# Patient Record
Sex: MALE | Race: WHITE | NOT HISPANIC OR LATINO | Employment: UNEMPLOYED | URBAN - METROPOLITAN AREA
[De-identification: names, ages, dates, MRNs, and addresses within clinical notes are randomized per-mention and may not be internally consistent; named-entity substitution may affect disease eponyms.]

---

## 2017-06-14 ENCOUNTER — OFFICE VISIT (OUTPATIENT)
Dept: FAMILY MEDICINE CLINIC | Facility: CLINIC | Age: 52
End: 2017-06-14

## 2017-06-14 VITALS
SYSTOLIC BLOOD PRESSURE: 120 MMHG | TEMPERATURE: 98.1 F | BODY MASS INDEX: 22.9 KG/M2 | HEART RATE: 78 BPM | OXYGEN SATURATION: 99 % | HEIGHT: 70 IN | WEIGHT: 160 LBS | DIASTOLIC BLOOD PRESSURE: 78 MMHG

## 2017-06-14 DIAGNOSIS — Z12.11 COLON CANCER SCREENING: ICD-10-CM

## 2017-06-14 DIAGNOSIS — M54.32 SCIATICA OF LEFT SIDE: Primary | ICD-10-CM

## 2017-06-14 DIAGNOSIS — N48.9 PENIS DISORDER: ICD-10-CM

## 2017-06-14 DIAGNOSIS — B20 HIV (HUMAN IMMUNODEFICIENCY VIRUS INFECTION) (HCC): ICD-10-CM

## 2017-06-14 DIAGNOSIS — K21.9 GASTROESOPHAGEAL REFLUX DISEASE WITHOUT ESOPHAGITIS: ICD-10-CM

## 2017-06-14 PROBLEM — F19.21 DRUG ADDICTION IN REMISSION (HCC): Status: ACTIVE | Noted: 2017-06-14

## 2017-06-14 PROBLEM — B19.10 HEPATITIS B VIRUS INFECTION: Status: ACTIVE | Noted: 2017-06-14

## 2017-06-14 PROBLEM — Z21 HIV (HUMAN IMMUNODEFICIENCY VIRUS INFECTION): Status: ACTIVE | Noted: 2017-06-14

## 2017-06-14 PROCEDURE — 99213 OFFICE O/P EST LOW 20 MIN: CPT | Performed by: NURSE PRACTITIONER

## 2017-06-14 RX ORDER — EFAVIRENZ, EMTRICITABINE, AND TENOFOVIR DISOPROXIL FUMARATE 600; 200; 300 MG/1; MG/1; MG/1
TABLET, FILM COATED ORAL
COMMUNITY
Start: 2017-06-12 | End: 2017-11-22

## 2017-06-14 RX ORDER — TRAZODONE HYDROCHLORIDE 50 MG/1
TABLET ORAL
Qty: 30 TABLET | Refills: 5 | Status: SHIPPED | OUTPATIENT
Start: 2017-06-14 | End: 2017-08-29 | Stop reason: SDUPTHER

## 2017-06-14 RX ORDER — OMEPRAZOLE 20 MG/1
40 CAPSULE, DELAYED RELEASE ORAL DAILY
Qty: 30 CAPSULE | Refills: 5 | Status: SHIPPED | OUTPATIENT
Start: 2017-06-14 | End: 2017-12-05 | Stop reason: SDUPTHER

## 2017-06-14 RX ORDER — MELOXICAM 15 MG/1
15 TABLET ORAL DAILY
Qty: 30 TABLET | Refills: 1 | Status: SHIPPED | OUTPATIENT
Start: 2017-06-14 | End: 2017-08-12 | Stop reason: SDUPTHER

## 2017-06-14 NOTE — PROGRESS NOTES
Subjective   Eliseo Phan is a 51 y.o. male.     HPI Comments: Pleasant gentleman here today for first time  Needs to establish care several issues    Fell a couple months ago I believe he tripped  Landed on his side  He has no back pain but is been having pain radiates down his left lateral buttock lateral leg to posterior heel  No weakness no bowel or bladder change no incontinence no fever chills    He had a cervical strain which is resolved    Is taking some over-the-counter medicine occasionally helped      Intermittent reflux takes Nexium 3 times a week approximately  Requesting prescription    Insomnia takes trazodone 50 mg one half tablet when necessary not nightly request prescription    HIV diagnosed 2010 viral load has been undetectable since anti-viral treatment  Needs referral to infectious disease specialty  Compliant with [employer]     He has a irregular shape to his penis  No acute pain questions if this is normal      Some issues with his toes we'll discuss at the follow-up  He also would like a physical  Has not had a colonoscopy      He previously practiced as a nurse anesthetist  Unfortunately he was forced to retire after becoming HIV-positive  After which she became severely depressed  And developed a drug problem  He did not go into detail but said he would like to talk about this further at some point  So I would know his history better       The following portions of the patient's history were reviewed and updated as appropriate: allergies, current medications, past medical history, past social history, past surgical history and problem list.    Review of Systems   Constitutional: Negative.  Negative for appetite change, chills, fatigue, fever and unexpected weight change.   HENT: Negative for congestion, ear pain and sore throat.    Eyes: Negative.    Respiratory: Negative for cough, chest tightness, shortness of breath and wheezing.    Cardiovascular: Negative for chest pain, palpitations  and leg swelling.   Gastrointestinal: Negative for abdominal pain and constipation.   Genitourinary: Negative for difficulty urinating, dysuria and urgency.        See H&P   Musculoskeletal: Negative for arthralgias and myalgias.        Sciatica pain left lower extremity   Skin: Negative for rash and wound.   Neurological: Negative for dizziness, speech difficulty, weakness, numbness and headaches.   Psychiatric/Behavioral: Negative for sleep disturbance. The patient is not nervous/anxious.        Objective   Physical Exam   Constitutional: He is oriented to person, place, and time. He appears well-developed and well-nourished.   HENT:   Head: Normocephalic.   Tm clear castro no mass canal patent without d/c   Eyes: Conjunctivae are normal. Pupils are equal, round, and reactive to light. No scleral icterus.   Neck: Neck supple. No JVD present.   Cardiovascular: Normal rate, regular rhythm and normal heart sounds.  Exam reveals no gallop and no friction rub.    No murmur heard.  Pulmonary/Chest: Effort normal and breath sounds normal. No stridor. No respiratory distress. He has no wheezes. He has no rales.   Abdominal: Soft. Bowel sounds are normal. He exhibits no distension. There is no tenderness.   No hepatosplenomegaly, no ascites,   Musculoskeletal: Normal range of motion. He exhibits no edema or tenderness.   Negative straight leg raise plantar flexion dorsal flexion normal gait normal     Lymphadenopathy:     He has no cervical adenopathy.   Neurological: He is alert and oriented to person, place, and time. He has normal reflexes. He displays normal reflexes.   Skin: Skin is warm and dry. No rash noted. No erythema.   Psychiatric: He has a normal mood and affect. His behavior is normal. Judgment and thought content normal.   Vitals reviewed.      Assessment/Plan   Eliseo was seen today for establish care.    Diagnoses and all orders for this visit:    Sciatica of left side  -     XR Spine Lumbar 2 or 3 View  -      Ambulatory Referral to Physical Therapy Evaluate and treat    Gastroesophageal reflux disease without esophagitis  -     omeprazole (priLOSEC) 20 MG capsule; Take 2 capsules by mouth Daily. As needed    HIV (human immunodeficiency virus infection)  -     Ambulatory Referral to Infectious Disease    Colon cancer screening  -     Ambulatory Referral For Screening Colonoscopy    Penis disorder  Comments:  Questionable pyronies dis  Orders:  -     Ambulatory Referral to Urology    Other orders  -     traZODone (DESYREL) 50 MG tablet; One half to one tablet daily at bedtime when necessary insomnia  -     meloxicam (MOBIC) 15 MG tablet; Take 1 tablet by mouth Daily.                  Return office complete physical exam fasting labs  Meloxicam risk benefit 64 ounces of water stop if worsens reflux presently which is stable  May take daily with omeprazole  Follow-up after physical therapy  Return sooner for the physical however  I'll see him back in 6 weeks after physical therapy  Call for results of x-ray as well lumbar spine  Office visit 25 minutes    Thank You,      James Epley, NP

## 2017-08-11 ENCOUNTER — TELEPHONE (OUTPATIENT)
Dept: FAMILY MEDICINE CLINIC | Facility: CLINIC | Age: 52
End: 2017-08-11

## 2017-08-11 NOTE — TELEPHONE ENCOUNTER
Patient wanted Rober to know that he completed his physical therapy at [facility] on [facility]. Zofia was in therapist and should be faxing over a progress note. Patient stated that the rehab had no effect and that he is actually in more pain.

## 2017-08-14 RX ORDER — MELOXICAM 15 MG/1
TABLET ORAL
Qty: 30 TABLET | Refills: 1 | Status: SHIPPED | OUTPATIENT
Start: 2017-08-14 | End: 2017-08-29

## 2017-08-16 NOTE — TELEPHONE ENCOUNTER
Sorry he is not improving  Return office  Reevaluation, so we can get a  MRI of the lumbar spine    Thank you

## 2017-08-18 ENCOUNTER — APPOINTMENT (OUTPATIENT)
Dept: GENERAL RADIOLOGY | Facility: HOSPITAL | Age: 52
End: 2017-08-18

## 2017-08-18 PROCEDURE — 72110 X-RAY EXAM L-2 SPINE 4/>VWS: CPT | Performed by: GENERAL PRACTICE

## 2017-08-25 ENCOUNTER — TELEPHONE (OUTPATIENT)
Dept: FAMILY MEDICINE CLINIC | Facility: CLINIC | Age: 52
End: 2017-08-25

## 2017-08-25 DIAGNOSIS — M51.16 LUMBAR DISC HERNIATION WITH RADICULOPATHY: ICD-10-CM

## 2017-08-25 RX ORDER — PREDNISONE 10 MG/1
TABLET ORAL DAILY
Qty: 21 TABLET | Refills: 0 | Status: SHIPPED | OUTPATIENT
Start: 2017-08-25 | End: 2017-08-29

## 2017-08-25 NOTE — TELEPHONE ENCOUNTER
Left patient a message  I refilled his prednisone pack Groin paresthesias weakness fever worsening symptoms  Emergency room

## 2017-08-25 NOTE — TELEPHONE ENCOUNTER
Spoke with patient and he went to the ER for the pain and was diagnosed with a herniated disc, patient was given gabapentin, robaxin, and predinsone. He has finished the steroid and the pain is still there, he has requested a refill of the steroid. Patient has an appointment on the 29th of this month to see James Epley

## 2017-08-25 NOTE — TELEPHONE ENCOUNTER
----- Message from Dennies Garrick sent at 8/25/2017 12:02 PM EDT -----  Regarding: back pain  Contact: [phone]  Mr. Phan was seen at the Urgent Care for back pain was given Medrol dose pack, Gabapentin and muscle relaxer for herniated disk L4 thru L5? Patient states that he is still in pain want to know if you can ok another script for the medrol dose pack he is still having pain. Mr. Phan has an appointment with you on Tuesday 8/29.    Please advise

## 2017-08-29 ENCOUNTER — OFFICE VISIT (OUTPATIENT)
Dept: FAMILY MEDICINE CLINIC | Facility: CLINIC | Age: 52
End: 2017-08-29

## 2017-08-29 VITALS
HEIGHT: 70 IN | DIASTOLIC BLOOD PRESSURE: 80 MMHG | TEMPERATURE: 98.2 F | SYSTOLIC BLOOD PRESSURE: 110 MMHG | WEIGHT: 166 LBS | HEART RATE: 75 BPM | BODY MASS INDEX: 23.77 KG/M2 | OXYGEN SATURATION: 100 %

## 2017-08-29 DIAGNOSIS — M51.36 DEGENERATIVE LUMBAR DISC: ICD-10-CM

## 2017-08-29 DIAGNOSIS — R39.9 SYMPTOMS INVOLVING URINARY SYSTEM: ICD-10-CM

## 2017-08-29 DIAGNOSIS — E29.1 HYPOGONADISM IN MALE: ICD-10-CM

## 2017-08-29 DIAGNOSIS — Z12.11 COLON CANCER SCREENING: ICD-10-CM

## 2017-08-29 DIAGNOSIS — M54.42 ACUTE LEFT-SIDED LOW BACK PAIN WITH LEFT-SIDED SCIATICA: ICD-10-CM

## 2017-08-29 DIAGNOSIS — M51.16 LUMBAR DISC HERNIATION WITH RADICULOPATHY: ICD-10-CM

## 2017-08-29 DIAGNOSIS — Z00.00 HEALTH MAINTENANCE EXAMINATION: Primary | ICD-10-CM

## 2017-08-29 DIAGNOSIS — B20 HIV (HUMAN IMMUNODEFICIENCY VIRUS INFECTION) (HCC): ICD-10-CM

## 2017-08-29 DIAGNOSIS — Z12.5 PROSTATE CANCER SCREENING: ICD-10-CM

## 2017-08-29 DIAGNOSIS — M54.50 LOW BACK PAIN WITH RADIATION, UNSPECIFIED LATERALITY: ICD-10-CM

## 2017-08-29 PROBLEM — M51.369 DEGENERATIVE LUMBAR DISC: Status: ACTIVE | Noted: 2017-08-29

## 2017-08-29 LAB — HEMOCCULT STL QL IA: NEGATIVE

## 2017-08-29 PROCEDURE — 82274 ASSAY TEST FOR BLOOD FECAL: CPT | Performed by: NURSE PRACTITIONER

## 2017-08-29 PROCEDURE — 99396 PREV VISIT EST AGE 40-64: CPT | Performed by: NURSE PRACTITIONER

## 2017-08-29 RX ORDER — SENNOSIDES 8.6 MG
CAPSULE ORAL
Qty: 60 TABLET | Refills: 5 | Status: SHIPPED | OUTPATIENT
Start: 2017-08-29 | End: 2017-09-15 | Stop reason: SDUPTHER

## 2017-08-29 RX ORDER — TRAZODONE HYDROCHLORIDE 100 MG/1
TABLET ORAL
Qty: 30 TABLET | Refills: 6 | Status: SHIPPED | OUTPATIENT
Start: 2017-08-29 | End: 2017-10-03 | Stop reason: SDUPTHER

## 2017-08-29 RX ORDER — METHOCARBAMOL 750 MG/1
750 TABLET, FILM COATED ORAL 3 TIMES DAILY PRN
Qty: 30 TABLET | Refills: 2 | Status: SHIPPED | OUTPATIENT
Start: 2017-08-29 | End: 2017-10-03 | Stop reason: SDUPTHER

## 2017-08-29 RX ORDER — NABUMETONE 750 MG/1
750 TABLET, FILM COATED ORAL 2 TIMES DAILY
Qty: 60 TABLET | Refills: 1 | Status: SHIPPED | OUTPATIENT
Start: 2017-08-29 | End: 2017-09-06

## 2017-08-29 RX ORDER — GABAPENTIN 300 MG/1
600 CAPSULE ORAL 3 TIMES DAILY
Qty: 60 CAPSULE | Refills: 2 | Status: SHIPPED | OUTPATIENT
Start: 2017-08-29 | End: 2017-09-11 | Stop reason: SDUPTHER

## 2017-08-31 PROBLEM — M51.16 LUMBAR DISC HERNIATION WITH RADICULOPATHY: Status: ACTIVE | Noted: 2017-08-31

## 2017-08-31 PROBLEM — Z00.00 HEALTH MAINTENANCE EXAMINATION: Status: ACTIVE | Noted: 2017-08-31

## 2017-08-31 PROBLEM — R39.9 SYMPTOMS INVOLVING URINARY SYSTEM: Status: ACTIVE | Noted: 2017-08-31

## 2017-08-31 PROBLEM — E29.1 HYPOGONADISM IN MALE: Status: ACTIVE | Noted: 2017-08-31

## 2017-09-01 LAB
ALBUMIN SERPL-MCNC: 4.2 G/DL (ref 3.5–5.2)
ALBUMIN/GLOB SERPL: 1.8 G/DL
ALP SERPL-CCNC: 57 U/L (ref 39–117)
ALT SERPL-CCNC: 12 U/L (ref 1–41)
AST SERPL-CCNC: 18 U/L (ref 1–40)
BASOPHILS # BLD AUTO: 0.02 10*3/MM3 (ref 0–0.2)
BASOPHILS NFR BLD AUTO: 0.3 % (ref 0–1.5)
BILIRUB SERPL-MCNC: 0.2 MG/DL (ref 0.1–1.2)
BUN SERPL-MCNC: 19 MG/DL (ref 6–20)
BUN/CREAT SERPL: 13.6 (ref 7–25)
CALCIUM SERPL-MCNC: 9.6 MG/DL (ref 8.6–10.5)
CHLORIDE SERPL-SCNC: 103 MMOL/L (ref 98–107)
CHOLEST SERPL-MCNC: 190 MG/DL (ref 0–200)
CO2 SERPL-SCNC: 26.9 MMOL/L (ref 22–29)
CREAT SERPL-MCNC: 1.4 MG/DL (ref 0.76–1.27)
EOSINOPHIL # BLD AUTO: 0.18 10*3/MM3 (ref 0–0.7)
EOSINOPHIL NFR BLD AUTO: 2.5 % (ref 0.3–6.2)
ERYTHROCYTE [DISTWIDTH] IN BLOOD BY AUTOMATED COUNT: 14.1 % (ref 11.5–14.5)
FERRITIN SERPL-MCNC: 12.72 NG/ML (ref 30–400)
FSH SERPL-ACNC: 3.4 MIU/ML (ref 1.5–12.4)
GLOBULIN SER CALC-MCNC: 2.4 GM/DL
GLUCOSE SERPL-MCNC: 95 MG/DL (ref 65–99)
HCT VFR BLD AUTO: 46 % (ref 40.4–52.2)
HDLC SERPL-MCNC: 43 MG/DL (ref 40–60)
HGB BLD-MCNC: 15.6 G/DL (ref 13.7–17.6)
IMM GRANULOCYTES # BLD: 0.06 10*3/MM3 (ref 0–0.03)
IMM GRANULOCYTES NFR BLD: 0.8 % (ref 0–0.5)
LDLC SERPL CALC-MCNC: 90 MG/DL (ref 0–100)
LDLC/HDLC SERPL: 2.1 {RATIO}
LH SERPL-ACNC: 10.1 MIU/ML (ref 1.7–8.6)
LYMPHOCYTES # BLD AUTO: 2.52 10*3/MM3 (ref 0.9–4.8)
LYMPHOCYTES NFR BLD AUTO: 34.8 % (ref 19.6–45.3)
MCH RBC QN AUTO: 30.6 PG (ref 27–32.7)
MCHC RBC AUTO-ENTMCNC: 33.9 G/DL (ref 32.6–36.4)
MCV RBC AUTO: 90.4 FL (ref 79.8–96.2)
MONOCYTES # BLD AUTO: 0.63 10*3/MM3 (ref 0.2–1.2)
MONOCYTES NFR BLD AUTO: 8.7 % (ref 5–12)
NEUTROPHILS # BLD AUTO: 3.83 10*3/MM3 (ref 1.9–8.1)
NEUTROPHILS NFR BLD AUTO: 52.9 % (ref 42.7–76)
PLATELET # BLD AUTO: 234 10*3/MM3 (ref 140–500)
POTASSIUM SERPL-SCNC: 3.9 MMOL/L (ref 3.5–5.2)
PROT SERPL-MCNC: 6.6 G/DL (ref 6–8.5)
PSA SERPL-MCNC: 0.43 NG/ML (ref 0–4)
RBC # BLD AUTO: 5.09 10*6/MM3 (ref 4.6–6)
SODIUM SERPL-SCNC: 142 MMOL/L (ref 136–145)
TRIGL SERPL-MCNC: 284 MG/DL (ref 0–150)
TSH SERPL DL<=0.005 MIU/L-ACNC: 2.02 MIU/ML (ref 0.27–4.2)
VLDLC SERPL CALC-MCNC: 56.8 MG/DL (ref 5–40)
WBC # BLD AUTO: 7.24 10*3/MM3 (ref 4.5–10.7)

## 2017-09-06 ENCOUNTER — TELEPHONE (OUTPATIENT)
Dept: FAMILY MEDICINE CLINIC | Facility: CLINIC | Age: 52
End: 2017-09-06

## 2017-09-06 ENCOUNTER — HOSPITAL ENCOUNTER (OUTPATIENT)
Dept: MRI IMAGING | Facility: HOSPITAL | Age: 52
Discharge: HOME | End: 2017-09-06
Admitting: NURSE PRACTITIONER

## 2017-09-06 DIAGNOSIS — M51.16 LUMBAR DISC HERNIATION WITH RADICULOPATHY: Primary | ICD-10-CM

## 2017-09-06 DIAGNOSIS — R79.89 LOW TESTOSTERONE: ICD-10-CM

## 2017-09-06 DIAGNOSIS — M51.36 BULGING LUMBAR DISC: ICD-10-CM

## 2017-09-06 DIAGNOSIS — M54.42 ACUTE LEFT-SIDED LOW BACK PAIN WITH LEFT-SIDED SCIATICA: ICD-10-CM

## 2017-09-06 DIAGNOSIS — M51.36 DEGENERATIVE LUMBAR DISC: ICD-10-CM

## 2017-09-06 DIAGNOSIS — N28.9 RENAL INSUFFICIENCY: Primary | ICD-10-CM

## 2017-09-06 PROCEDURE — 72148 MRI LUMBAR SPINE W/O DYE: CPT

## 2017-09-06 RX ORDER — PREDNISONE 10 MG/1
TABLET ORAL
Qty: 1 EACH | Refills: 0 | Status: SHIPPED | OUTPATIENT
Start: 2017-09-06 | End: 2017-11-22

## 2017-09-06 NOTE — TELEPHONE ENCOUNTER
Please call patient's  Infectious disease specialist  Patient has severe low back pain  MRI shows disc bulging degenerative disc changes    He has a history of HIV  I want to make sure it's okay if he has epidural steroid injections to pain management    Prior to him receiving the injection    Thank you

## 2017-09-06 NOTE — TELEPHONE ENCOUNTER
Discuss renal insufficiency with the patient  No NSAIDs push fluids  Recheck in one month    Refill prednisone per patient's request caution only if needed    Discussed MRI disc bulging annular tears degenerative disc disease  Not a candidate for opioids  Patient interested in traction  Refer to physical therapy  Evaluate epidural injections  I requested my medical assistant to contact patient's  Infectious disease specialist prior to patient receiving injections  Secondary to HIV status    BMP one month  Testosterone 1 month

## 2017-09-07 NOTE — TELEPHONE ENCOUNTER
Called [facility] to ask about epidural injections. Waiting for call back from office before starting prior authorization.

## 2017-09-08 ENCOUNTER — APPOINTMENT (OUTPATIENT)
Dept: MRI IMAGING | Facility: HOSPITAL | Age: 52
End: 2017-09-08

## 2017-09-09 DIAGNOSIS — M51.16 LUMBAR DISC HERNIATION WITH RADICULOPATHY: ICD-10-CM

## 2017-09-09 RX ORDER — GABAPENTIN 300 MG/1
CAPSULE ORAL
Qty: 60 CAPSULE | Refills: 0 | Status: CANCELLED | OUTPATIENT
Start: 2017-09-09

## 2017-09-11 ENCOUNTER — TELEPHONE (OUTPATIENT)
Dept: FAMILY MEDICINE CLINIC | Facility: CLINIC | Age: 52
End: 2017-09-11

## 2017-09-11 DIAGNOSIS — M51.16 LUMBAR DISC HERNIATION WITH RADICULOPATHY: ICD-10-CM

## 2017-09-11 RX ORDER — GABAPENTIN 300 MG/1
600 CAPSULE ORAL 3 TIMES DAILY
Qty: 60 CAPSULE | Refills: 2 | Status: SHIPPED | OUTPATIENT
Start: 2017-09-11 | End: 2017-09-19 | Stop reason: SDUPTHER

## 2017-09-12 ENCOUNTER — TELEPHONE (OUTPATIENT)
Dept: FAMILY MEDICINE CLINIC | Facility: CLINIC | Age: 52
End: 2017-09-12

## 2017-09-12 NOTE — TELEPHONE ENCOUNTER
Would like refill of steroid pack while waiting for approval of epidural injections. PA request faxed on 9/7/2017. I left message with [employer] to check status but per [employer] approval may take up to 14 days.

## 2017-09-12 NOTE — TELEPHONE ENCOUNTER
I believe he is had 3 within the last month of steroid cherelle    I would rather avoid another round of steroid  Due to his past medical history      Have him return office tomorrow morning if possible for labs  To check his kidney function    If normal we could consider anti-inflammatories again with close follow-up of renal function    He can take up to 3 g of Tylenol daily  Try heat or cold as well    Thank you

## 2017-09-12 NOTE — TELEPHONE ENCOUNTER
Spoke with patient and gave him the suggestions per James EPley, and the patient stated that he didnt think that coming in to see Rober would help the pain and its pretty much been the same. He stated he would think about coming and will call back later

## 2017-09-15 ENCOUNTER — OFFICE VISIT (OUTPATIENT)
Dept: FAMILY MEDICINE CLINIC | Facility: CLINIC | Age: 52
End: 2017-09-15

## 2017-09-15 ENCOUNTER — TELEPHONE (OUTPATIENT)
Dept: FAMILY MEDICINE CLINIC | Facility: CLINIC | Age: 52
End: 2017-09-15

## 2017-09-15 VITALS
HEART RATE: 94 BPM | HEIGHT: 70 IN | SYSTOLIC BLOOD PRESSURE: 114 MMHG | DIASTOLIC BLOOD PRESSURE: 68 MMHG | WEIGHT: 172.2 LBS | OXYGEN SATURATION: 95 % | BODY MASS INDEX: 24.65 KG/M2

## 2017-09-15 DIAGNOSIS — M51.36 DEGENERATIVE LUMBAR DISC: ICD-10-CM

## 2017-09-15 DIAGNOSIS — Z79.899 HIGH RISK MEDICATION USE: ICD-10-CM

## 2017-09-15 DIAGNOSIS — M51.16 LUMBAR DISC HERNIATION WITH RADICULOPATHY: Primary | ICD-10-CM

## 2017-09-15 DIAGNOSIS — F19.21 DRUG ADDICTION IN REMISSION (HCC): ICD-10-CM

## 2017-09-15 DIAGNOSIS — Z51.81 THERAPEUTIC DRUG MONITORING: ICD-10-CM

## 2017-09-15 DIAGNOSIS — M54.32 SCIATICA OF LEFT SIDE: ICD-10-CM

## 2017-09-15 DIAGNOSIS — B20 HIV (HUMAN IMMUNODEFICIENCY VIRUS INFECTION) (HCC): ICD-10-CM

## 2017-09-15 DIAGNOSIS — M54.42 ACUTE LEFT-SIDED LOW BACK PAIN WITH LEFT-SIDED SCIATICA: Primary | ICD-10-CM

## 2017-09-15 PROBLEM — M51.369 BULGING LUMBAR DISC: Status: ACTIVE | Noted: 2017-09-15

## 2017-09-15 PROCEDURE — 99213 OFFICE O/P EST LOW 20 MIN: CPT | Performed by: NURSE PRACTITIONER

## 2017-09-15 RX ORDER — TRAMADOL HYDROCHLORIDE 50 MG/1
TABLET ORAL
Qty: 42 TABLET | Refills: 0 | Status: SHIPPED | OUTPATIENT
Start: 2017-09-15 | End: 2017-09-22 | Stop reason: SDUPTHER

## 2017-09-15 RX ORDER — SENNOSIDES 8.6 MG
CAPSULE ORAL
Qty: 120 TABLET | Refills: 5 | Status: SHIPPED | OUTPATIENT
Start: 2017-09-15 | End: 2020-07-09

## 2017-09-15 NOTE — TELEPHONE ENCOUNTER
Patient would like to try the Tramadol .  I did explain that he'd have to come in for an office visit, sign control substance agreement as well as agree to terms of urine drug screening under the given terns of the agreement as long as he was being treated with the drug by James Epley.  Patient says the pain has gotten much worse and he now has numbness.  Awaiting your approval to schedule appointment.

## 2017-09-15 NOTE — PROGRESS NOTES
Subjective   Eliseo Phan is a 51 y.o. male.     HPI Comments: Patient's here today to discuss severe low back pain low back radiates down his left leg to his knee at times down his leg to ankle including foot  Sometimes some numbness and paresthesias without weakness  Presently taking Tylenol not helping  Neurontin he doesn't think helping as well  He has renal insufficiency likely chronic as he recalls having elevated numbers for many years possibly 20?  He hasn't taken care of his health and stay on top of this  He's had at least 3 steroid rounds, I declined the last due to his history of HIV risk  He may be a candidate for steroid epidural injection, but this is pending awaiting clearance from his infectious disease specialist.  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    We discussed his situation we discussed  Tramadol which is a scheduled synthetic opiate agonists  With a lesser addiction profile compared to other opiates  That said tramadol can be addictive, possibly dependence as well as worst-case scenario, reactivate an addiction which could be life-threatening.  Tramadol isn't effective pain reliever, but patient should have full understanding extreme caution with this medication lowest effective dose shortest time possible    We did discuss previous  visits other controlled medications, with personal history of addiction   He is not a candidate for chronic pain medication management            The following portions of the patient's history were reviewed and updated as appropriate: allergies, current medications, past medical history, past social history, past surgical history and problem list.    Review of Systems   Constitutional: Negative.  Negative for appetite change, chills, fatigue, fever and unexpected weight change.   HENT: Negative for congestion, ear pain and sore throat.    Eyes: Negative.    Respiratory: Negative for cough, chest tightness, shortness of breath and wheezing.    Cardiovascular: Negative for chest pain, palpitations and leg swelling.   Gastrointestinal: Negative for abdominal pain and constipation.   Genitourinary: Negative for decreased urine volume, difficulty urinating, dysuria and urgency.   Musculoskeletal: Positive for back pain. Negative for arthralgias, gait problem and myalgias.   Skin: Negative for rash and wound.   Neurological: Negative for dizziness, speech difficulty, weakness, numbness and headaches.   Psychiatric/Behavioral: Negative for sleep disturbance. The patient is not nervous/anxious.        Objective   Physical Exam   Constitutional: He is oriented to person, place, and time. He appears well-developed.   Musculoskeletal: He exhibits no tenderness.   Negative straight leg raise plantar flexion dorsal flexion  Left normal right mildly impaired at least the lateral distal portion of his foot digits 3,4,5 decreased range of motion plantar and dorsal flexion  Otherwise normal    Normal gait   Neurological: He is alert and oriented to person, place, and time. He displays normal reflexes. Coordination normal.   Skin: Skin is warm and dry.   Psychiatric: He has a normal mood and affect. His behavior is normal. Judgment and thought content normal.   Vitals reviewed.      Assessment/Plan   Eliseo was seen today for  pain.    Diagnoses and all orders for this visit:    Lumbar disc herniation with radiculopathy  -     ToxASSURE Select 13 (MW)    Sciatica of left side  -     ToxASSURE Select 13 (MW)    Drug addiction in remission  -     ToxASSURE Select 13 (MW)    High risk medication use  -     ToxASSURE Select 13 (MW)    Therapeutic drug monitoring  -     ToxASSURE Select 13 (MW)    Other orders  -     traMADol (ULTRAM) 50 MG tablet; 1-2 tablets 3 times a day as needed for pain, maximum 6 per day, may take with Tylenol  -     acetaminophen (TYLENOL) 650 MG 8 hr tablet; 1-2 tablets twice daily as needed for pain                  I don't see a good option here  HIV, we are limited regarding steroid use  Renal insufficiency, NSAIDs contraindicated  Steroid injection epidural, pending  He previously declined physical therapy, we will start physical therapy  I've been resistant to any controlled medications  Neurontin not effective or less effective  He was slowly decrease Neurontin as instructed  Until discontinued if not helping  Vern reviewed and clear  Urine drug screen pending  Patient has legitimate pain he is high risk  He is not a candidate for opiates due to his history of addiction  He has severe legitimate pain  He cannot stop working  Frequent calls with unbearable pain  I discussed the risk of tramadol  Risk of reactivation of addiction which could be catastrophic  Risk of dependence  I have agreed with extreme caution close follow-up short duration tramadol  To take with Tylenol  He understands we will have a plan to exit  May need referral to pain management which will manage this if required more than few weeks  As well as psychological evaluation    Office visit 30 minutes  Proper storage disposal  Appropriate use    As part of this patient's treatment plan I am prescribing controlled substances. The patient has been made aware of appropriate use of such medications, including potential risk of somnolence, limited  ability to drive and/or work safely, and potential for dependence or overdose. It has also been made clear that these medications are for use by this patient only, without concomitant use of alcohol or other substances unless prescribed.    Patient has completed prescribing agreement detailing terms of continued prescribing of controlled substances, including monitoring FRANCISCO reports, urine drug screening, and pill counts if necessary. The patient is aware that inappropriate use will result in cessation of prescribing such medications.    James Epley NP

## 2017-09-15 NOTE — TELEPHONE ENCOUNTER
Spoke with Dalia at Dr WASSERMAN's office with [facility]. She will not approve epidural injections until she gets a full set of labs on patient to check his immunity. Patient is to call her on Monday. Patient is notified.

## 2017-09-15 NOTE — TELEPHONE ENCOUNTER
Patient called again this morning stating that his pain has worsen. Now he has numbness in his legs. Patient asked about the epidural injections? We still have not heard from [employer].

## 2017-09-15 NOTE — TELEPHONE ENCOUNTER
Please call patient  Until his kidney function is normal,   Receive approval for epidurals, by his infectious disease doctor and insurance.    Physical therapy, heat ice etc.  Tylenol the mainstay of treatment    He might be a candidate for tramadol synthetic opiate  With a much lower addiction profile to take with Tylenol?  Although we generally avoid this with his history      I would like to refer him orthopedic surgery to review his MRI with him    Thank you

## 2017-09-19 DIAGNOSIS — M51.16 LUMBAR DISC HERNIATION WITH RADICULOPATHY: ICD-10-CM

## 2017-09-20 RX ORDER — GABAPENTIN 300 MG/1
CAPSULE ORAL
Qty: 60 CAPSULE | Refills: 0 | OUTPATIENT
Start: 2017-09-20 | End: 2017-10-03 | Stop reason: SDUPTHER

## 2017-09-22 ENCOUNTER — TELEPHONE (OUTPATIENT)
Dept: FAMILY MEDICINE CLINIC | Facility: CLINIC | Age: 52
End: 2017-09-22

## 2017-09-22 LAB — DRUGS UR: NORMAL

## 2017-09-22 RX ORDER — TRAMADOL HYDROCHLORIDE 50 MG/1
TABLET ORAL
Qty: 42 TABLET | Refills: 0 | OUTPATIENT
Start: 2017-09-22 | End: 2017-11-22

## 2017-09-22 RX ORDER — TRAMADOL HYDROCHLORIDE 50 MG/1
TABLET ORAL
Qty: 42 TABLET | Refills: 0 | OUTPATIENT
Start: 2017-09-22

## 2017-09-29 ENCOUNTER — RESULTS ENCOUNTER (OUTPATIENT)
Dept: FAMILY MEDICINE CLINIC | Facility: CLINIC | Age: 52
End: 2017-09-29

## 2017-09-29 DIAGNOSIS — R79.89 LOW TESTOSTERONE: ICD-10-CM

## 2017-09-29 DIAGNOSIS — N28.9 RENAL INSUFFICIENCY: ICD-10-CM

## 2017-10-01 ENCOUNTER — HOSPITAL ENCOUNTER (EMERGENCY)
Facility: HOSPITAL | Age: 52
Discharge: LEFT BEFORE DISCHARGE | End: 2017-10-01

## 2017-10-01 VITALS
BODY MASS INDEX: 24.34 KG/M2 | OXYGEN SATURATION: 100 % | RESPIRATION RATE: 16 BRPM | HEART RATE: 103 BPM | SYSTOLIC BLOOD PRESSURE: 154 MMHG | HEIGHT: 70 IN | TEMPERATURE: 98.1 F | WEIGHT: 170 LBS | DIASTOLIC BLOOD PRESSURE: 83 MMHG

## 2017-10-01 LAB
ALBUMIN SERPL-MCNC: 4.2 G/DL (ref 3.5–5.2)
ALBUMIN/GLOB SERPL: 1.3 G/DL
ALP SERPL-CCNC: 49 U/L (ref 39–117)
ALT SERPL W P-5'-P-CCNC: 18 U/L (ref 1–41)
ANION GAP SERPL CALCULATED.3IONS-SCNC: 11.1 MMOL/L
AST SERPL-CCNC: 18 U/L (ref 1–40)
BASOPHILS # BLD AUTO: 0.03 10*3/MM3 (ref 0–0.2)
BASOPHILS NFR BLD AUTO: 0.5 % (ref 0–1.5)
BILIRUB SERPL-MCNC: 0.3 MG/DL (ref 0.1–1.2)
BILIRUB UR QL STRIP: NEGATIVE
BUN BLD-MCNC: 18 MG/DL (ref 6–20)
BUN/CREAT SERPL: 11.7 (ref 7–25)
CALCIUM SPEC-SCNC: 9.5 MG/DL (ref 8.6–10.5)
CHLORIDE SERPL-SCNC: 103 MMOL/L (ref 98–107)
CLARITY UR: CLEAR
CO2 SERPL-SCNC: 25.9 MMOL/L (ref 22–29)
COLOR UR: YELLOW
CREAT BLD-MCNC: 1.54 MG/DL (ref 0.76–1.27)
DEPRECATED RDW RBC AUTO: 41.8 FL (ref 37–54)
EOSINOPHIL # BLD AUTO: 0.13 10*3/MM3 (ref 0–0.7)
EOSINOPHIL NFR BLD AUTO: 2.2 % (ref 0.3–6.2)
ERYTHROCYTE [DISTWIDTH] IN BLOOD BY AUTOMATED COUNT: 12.9 % (ref 11.5–14.5)
GFR SERPL CREATININE-BSD FRML MDRD: 48 ML/MIN/1.73
GLOBULIN UR ELPH-MCNC: 3.3 GM/DL
GLUCOSE BLD-MCNC: 101 MG/DL (ref 65–99)
GLUCOSE UR STRIP-MCNC: NEGATIVE MG/DL
HCT VFR BLD AUTO: 46.4 % (ref 40.4–52.2)
HGB BLD-MCNC: 15.9 G/DL (ref 13.7–17.6)
HGB UR QL STRIP.AUTO: NEGATIVE
HOLD SPECIMEN: NORMAL
HOLD SPECIMEN: NORMAL
IMM GRANULOCYTES # BLD: 0 10*3/MM3 (ref 0–0.03)
IMM GRANULOCYTES NFR BLD: 0 % (ref 0–0.5)
KETONES UR QL STRIP: NEGATIVE
LEUKOCYTE ESTERASE UR QL STRIP.AUTO: NEGATIVE
LIPASE SERPL-CCNC: 51 U/L (ref 13–60)
LYMPHOCYTES # BLD AUTO: 1.65 10*3/MM3 (ref 0.9–4.8)
LYMPHOCYTES NFR BLD AUTO: 28.4 % (ref 19.6–45.3)
MCH RBC QN AUTO: 30.6 PG (ref 27–32.7)
MCHC RBC AUTO-ENTMCNC: 34.3 G/DL (ref 32.6–36.4)
MCV RBC AUTO: 89.2 FL (ref 79.8–96.2)
MONOCYTES # BLD AUTO: 0.37 10*3/MM3 (ref 0.2–1.2)
MONOCYTES NFR BLD AUTO: 6.4 % (ref 5–12)
NEUTROPHILS # BLD AUTO: 3.64 10*3/MM3 (ref 1.9–8.1)
NEUTROPHILS NFR BLD AUTO: 62.5 % (ref 42.7–76)
NITRITE UR QL STRIP: NEGATIVE
PH UR STRIP.AUTO: 6 [PH] (ref 5–8)
PLATELET # BLD AUTO: 198 10*3/MM3 (ref 140–500)
PMV BLD AUTO: 9 FL (ref 6–12)
POTASSIUM BLD-SCNC: 3.9 MMOL/L (ref 3.5–5.2)
PROT SERPL-MCNC: 7.5 G/DL (ref 6–8.5)
PROT UR QL STRIP: NEGATIVE
RBC # BLD AUTO: 5.2 10*6/MM3 (ref 4.6–6)
SODIUM BLD-SCNC: 140 MMOL/L (ref 136–145)
SP GR UR STRIP: 1.02 (ref 1–1.03)
UROBILINOGEN UR QL STRIP: NORMAL
WBC NRBC COR # BLD: 5.82 10*3/MM3 (ref 4.5–10.7)
WHOLE BLOOD HOLD SPECIMEN: NORMAL
WHOLE BLOOD HOLD SPECIMEN: NORMAL

## 2017-10-01 PROCEDURE — 80053 COMPREHEN METABOLIC PANEL: CPT

## 2017-10-01 PROCEDURE — 99211 OFF/OP EST MAY X REQ PHY/QHP: CPT

## 2017-10-01 PROCEDURE — 36415 COLL VENOUS BLD VENIPUNCTURE: CPT

## 2017-10-01 PROCEDURE — 81003 URINALYSIS AUTO W/O SCOPE: CPT

## 2017-10-01 PROCEDURE — 85025 COMPLETE CBC W/AUTO DIFF WBC: CPT

## 2017-10-01 PROCEDURE — 83690 ASSAY OF LIPASE: CPT

## 2017-10-01 RX ORDER — SODIUM CHLORIDE 0.9 % (FLUSH) 0.9 %
10 SYRINGE (ML) INJECTION AS NEEDED
Status: DISCONTINUED | OUTPATIENT
Start: 2017-10-01 | End: 2017-10-01 | Stop reason: HOSPADM

## 2017-10-02 ENCOUNTER — TELEPHONE (OUTPATIENT)
Dept: FAMILY MEDICINE CLINIC | Facility: CLINIC | Age: 52
End: 2017-10-02

## 2017-10-02 NOTE — TELEPHONE ENCOUNTER
Patient was aware that he had an appointment on Friday and didn't call nor did he show. Patient stated that he was in bed sick with flu like symptoms, he believes he received from a flu vaccine that was given his at infectious diseases office. Patient requested  trazodone, gabapentin, and methocarbamol to be refilled on Friday. Medications were not refilled due to no show. Patient is requesting a phone call if and when possible from [facility]

## 2017-10-03 DIAGNOSIS — M51.16 LUMBAR DISC HERNIATION WITH RADICULOPATHY: ICD-10-CM

## 2017-10-03 RX ORDER — TRAZODONE HYDROCHLORIDE 100 MG/1
TABLET ORAL
Qty: 30 TABLET | Refills: 6 | Status: SHIPPED | OUTPATIENT
Start: 2017-10-03 | End: 2018-05-15 | Stop reason: SDUPTHER

## 2017-10-03 RX ORDER — METHOCARBAMOL 750 MG/1
750 TABLET, FILM COATED ORAL 3 TIMES DAILY PRN
Qty: 30 TABLET | Refills: 2 | Status: SHIPPED | OUTPATIENT
Start: 2017-10-03 | End: 2017-11-22

## 2017-10-03 RX ORDER — GABAPENTIN 300 MG/1
600 CAPSULE ORAL 3 TIMES DAILY
Qty: 60 CAPSULE | Refills: 0 | OUTPATIENT
Start: 2017-10-03 | End: 2017-11-22

## 2017-10-03 NOTE — TELEPHONE ENCOUNTER
Please refill patient's medication  Which she had requested on Friday  Please call patient notified  Thank you

## 2017-11-08 ENCOUNTER — OFFICE VISIT (OUTPATIENT)
Dept: ORTHOPEDIC SURGERY | Facility: CLINIC | Age: 52
End: 2017-11-08

## 2017-11-08 VITALS — TEMPERATURE: 98.4 F | HEIGHT: 68 IN | WEIGHT: 152 LBS | BODY MASS INDEX: 23.04 KG/M2

## 2017-11-08 DIAGNOSIS — M48.061 SPINAL STENOSIS OF LUMBAR REGION, UNSPECIFIED WHETHER NEUROGENIC CLAUDICATION PRESENT: Primary | ICD-10-CM

## 2017-11-08 PROCEDURE — 99204 OFFICE O/P NEW MOD 45 MIN: CPT | Performed by: ORTHOPAEDIC SURGERY

## 2017-11-08 NOTE — PROGRESS NOTES
New patient or new problem visit    Chief Complaint   Patient presents with   • Lumbar Spine - Pain       HPI: He complains of left hip and left lower extremity pain for 6-8 months which is moderate, constant, aching and burning and worse with activity.  He's tried a Medrol pack Neurontin and Robaxin with some relief physical therapy and traction haven't helped much yet.  Does not have much in way of back pain.    PFSH: See chart- reviewed.  He has a history of drug abuse and has been sober for 8 months.  He also has a history of HIV.    Review of Systems   Constitutional: Negative for chills, fever and unexpected weight change.   HENT: Negative for trouble swallowing and voice change.    Eyes: Negative for visual disturbance.   Respiratory: Negative for cough and shortness of breath.    Cardiovascular: Negative for chest pain and leg swelling.   Gastrointestinal: Negative for abdominal pain, nausea and vomiting.   Endocrine: Negative for cold intolerance and heat intolerance.   Genitourinary: Negative for difficulty urinating, frequency and urgency.   Musculoskeletal: Positive for joint swelling.   Skin: Negative for rash and wound.   Allergic/Immunologic: Positive for immunocompromised state.   Neurological: Negative for weakness and numbness.   Hematological: Does not bruise/bleed easily.   Psychiatric/Behavioral: Negative for dysphoric mood. The patient is not nervous/anxious.        PE: Constitutional: Vital signs above-noted.  Awake, alert and oriented    Psychiatric: Affect and insight do not appear grossly disturbed.    Pulmonary: Breathing is unlabored, color is good.    Skin: Warm, dry and normal turgor    Cardiac:  pedal pulses intact.  No edema.    Eyesight and hearing appear adequate for examination purposes      Musculoskeletal:  There is no tenderness to percussion and palpation of the spine. Motion appears undisturbed.  Posture is unremarkable to coronal and sagittal inspection.    The skin about the  area is intact.  There is no palpable or visible deformity.  There is no local spasm.       Neurologic:   Reflexes are 2+ and symmetrical in the patellae and absent in the Achilles.   Motor function is undisturbed in quadriceps, EHL, and gastrocnemius excetp for some breakaway weakness in the quads on the left.   Sensation appears symmetrically intact to light touch is note some diminishment in the lateral aspect of the left leg..  In the bilateral lower extremities there is no evidence of atrophy.   Clonus is absent..  Gait appears undisturbed.  SLR test negative      MEDICAL DECISION MAKING    XRAY: Plain film x-rays of lumbar spine obtained previously show slight scoliosis and the L3 to S1 disc degeneration, disc space narrowing and some crowding of the foramina on sagittal views.  MRI scan shows foraminal stenosis at 3-4, 4-5 and L5-S1.  Reviewed the radiologist's report with which I agree.    Other: n/a    Impression: Lumbar spinal stenosis left side seems to be L5.  Probably from the L5-S1 far lateral compression but could be from some passing obstruction at L4 5.  If he comes to surgery we may need to obtain a myelogram and CT scan but I don't think that will be necessary as we can try epidurals first    Plan: Plan lumbar epidural steroid injections and when necessary follow-up.

## 2017-11-20 ENCOUNTER — ANESTHESIA EVENT (OUTPATIENT)
Dept: PAIN MEDICINE | Facility: HOSPITAL | Age: 52
End: 2017-11-20

## 2017-11-20 ENCOUNTER — ANESTHESIA (OUTPATIENT)
Dept: PAIN MEDICINE | Facility: HOSPITAL | Age: 52
End: 2017-11-20

## 2017-11-20 ENCOUNTER — HOSPITAL ENCOUNTER (OUTPATIENT)
Dept: PAIN MEDICINE | Facility: HOSPITAL | Age: 52
Discharge: HOME | End: 2017-11-20
Attending: ORTHOPAEDIC SURGERY

## 2017-11-22 ENCOUNTER — HOSPITAL ENCOUNTER (OUTPATIENT)
Dept: PAIN MEDICINE | Facility: HOSPITAL | Age: 52
Discharge: HOME | End: 2017-11-22
Attending: ORTHOPAEDIC SURGERY | Admitting: ORTHOPAEDIC SURGERY

## 2017-11-22 ENCOUNTER — HOSPITAL ENCOUNTER (OUTPATIENT)
Dept: GENERAL RADIOLOGY | Facility: HOSPITAL | Age: 52
Discharge: HOME | End: 2017-11-22

## 2017-11-22 VITALS
BODY MASS INDEX: 23.62 KG/M2 | RESPIRATION RATE: 16 BRPM | OXYGEN SATURATION: 97 % | TEMPERATURE: 98.9 F | WEIGHT: 165 LBS | HEIGHT: 70 IN | DIASTOLIC BLOOD PRESSURE: 93 MMHG | SYSTOLIC BLOOD PRESSURE: 130 MMHG | HEART RATE: 86 BPM

## 2017-11-22 DIAGNOSIS — R52 PAIN: ICD-10-CM

## 2017-11-22 DIAGNOSIS — M48.061 SPINAL STENOSIS OF LUMBAR REGION, UNSPECIFIED WHETHER NEUROGENIC CLAUDICATION PRESENT: ICD-10-CM

## 2017-11-22 PROCEDURE — C1755 CATHETER, INTRASPINAL: HCPCS

## 2017-11-22 PROCEDURE — 0 IOPAMIDOL 41 % SOLUTION: Performed by: ANESTHESIOLOGY

## 2017-11-22 PROCEDURE — 77003 FLUOROGUIDE FOR SPINE INJECT: CPT

## 2017-11-22 PROCEDURE — 25010000002 METHYLPREDNISOLONE PER 80 MG: Performed by: ANESTHESIOLOGY

## 2017-11-22 RX ORDER — FENTANYL CITRATE 50 UG/ML
50 INJECTION, SOLUTION INTRAMUSCULAR; INTRAVENOUS
Status: DISCONTINUED | OUTPATIENT
Start: 2017-11-22 | End: 2017-11-23 | Stop reason: HOSPADM

## 2017-11-22 RX ORDER — METHYLPREDNISOLONE ACETATE 80 MG/ML
80 INJECTION, SUSPENSION INTRA-ARTICULAR; INTRALESIONAL; INTRAMUSCULAR; SOFT TISSUE ONCE
Status: COMPLETED | OUTPATIENT
Start: 2017-11-22 | End: 2017-11-22

## 2017-11-22 RX ORDER — MIDAZOLAM HYDROCHLORIDE 1 MG/ML
1 INJECTION INTRAMUSCULAR; INTRAVENOUS
Status: DISCONTINUED | OUTPATIENT
Start: 2017-11-22 | End: 2017-11-23 | Stop reason: HOSPADM

## 2017-11-22 RX ORDER — SODIUM CHLORIDE 0.9 % (FLUSH) 0.9 %
1-10 SYRINGE (ML) INJECTION AS NEEDED
Status: DISCONTINUED | OUTPATIENT
Start: 2017-11-22 | End: 2017-11-23 | Stop reason: HOSPADM

## 2017-11-22 RX ORDER — LIDOCAINE HYDROCHLORIDE 10 MG/ML
1 INJECTION, SOLUTION INFILTRATION; PERINEURAL ONCE
Status: DISCONTINUED | OUTPATIENT
Start: 2017-11-22 | End: 2017-11-23 | Stop reason: HOSPADM

## 2017-11-22 RX ADMIN — METHYLPREDNISOLONE ACETATE 80 MG: 80 INJECTION, SUSPENSION INTRA-ARTICULAR; INTRALESIONAL; INTRAMUSCULAR; SOFT TISSUE at 13:36

## 2017-11-22 RX ADMIN — IOPAMIDOL 10 ML: 408 INJECTION, SOLUTION INTRATHECAL at 13:35

## 2017-11-22 NOTE — H&P
[facility]    History and Physical    Patient Name: Eliseo Phan  :  1965  MRN:  [id number]  Date of Admission: 2017    Subjective     Patient is a 52 y.o. male presents with chief complaint of chronic, intermitent, moderate low back and leg: left pain.  Onset of symptoms was gradual starting several months ago.  Symptoms are associated/aggravated by activity, lifting or twisting. Symptoms improve with rest    The following portions of the patients history were reviewed and updated as appropriate: current medications, allergies, past medical history, past surgical history, past family history, past social history and problem list                Objective     Past Medical History:   Past Medical History:   Diagnosis Date   • Acid reflux    • Hepatitis B    • HIV (human immunodeficiency virus infection)      Past Surgical History:   Past Surgical History:   Procedure Laterality Date   • APPENDECTOMY     • BONE TUMOR EXCISION Right     right lower leg - as a child   • KNEE SURGERY Left     knee scope     Family History:   Family History   Problem Relation Age of Onset   • Heart disease Mother    • Hypertension Mother    • Diabetes Mother    • Thyroid disease Mother    • Depression Mother    • Anxiety disorder Mother    • Hypertension Father    • Depression Father    • Anxiety disorder Father    • Nephrolithiasis Brother      Social History:   Social History   Substance Use Topics   • Smoking status: Current Every Day Smoker     Packs/day: 1.00     Years: 10.00     Types: Cigarettes   • Smokeless tobacco: Never Used   • Alcohol use No       Vital Signs Range for the last 24 hours  Temperature: Temp:  [37.2 °C (98.9 °F)] 37.2 °C (98.9 °F)   Temp Source: Temp src: Oral   BP: BP: (139)/(99) 139/99   Pulse: Heart Rate:  [90] 90   Respirations: Resp:  [16] 16   SPO2: SpO2:  [99 %] 99 %   O2 Amount (l/min):     O2 Devices O2 Device: room air   Weight: Weight:  [165 lb (74.8 kg)] 165 lb (74.8 kg)  "    Flowsheet Rows         First Filed Value    Admission Height  70\" (177.8 cm) Documented at 11/22/2017 1308    Admission Weight  165 lb (74.8 kg) Documented at 11/22/2017 1308          --------------------------------------------------------------------------------    Current Outpatient Prescriptions   Medication Sig Dispense Refill   • acetaminophen (TYLENOL) 650 MG 8 hr tablet 1-2 tablets twice daily as needed for pain 120 tablet 5   • dolutegravir (TIVICAY) 50 MG tablet Take 50 mg by mouth.     • Emtricitabine-Tenofovir AF (DESCOVY) 200-25 MG tablet      • omeprazole (priLOSEC) 20 MG capsule Take 2 capsules by mouth Daily. As needed 30 capsule 5   • pentoxifylline (TRENtal) 400 MG CR tablet Take 400 mg by mouth.     • PredniSONE (DELTASONE) 10 MG (21) tablet pack Use as directed on package 1 each 0   • traMADol (ULTRAM) 50 MG tablet 1-2 tablets 3 times a day as needed for pain, maximum 6 per day, may take with Tylenol 42 tablet 0   • traZODone (DESYREL) 100 MG tablet One half to one tablet daily at bedtime when necessary insomnia 30 tablet 6   • UNKNOWN TO PATIENT As Needed. OTC allergy med prn       No current facility-administered medications for this encounter.        --------------------------------------------------------------------------------  Assessment/Plan      Anesthesia Evaluation     Patient summary reviewed and Nursing notes reviewed   no history of anesthetic complications:  NPO Solid Status: > 6 hours  NPO Liquid Status: > 6 hours     Airway   Mallampati: II  TM distance: >3 FB  Neck ROM: full  Dental - normal exam     Pulmonary - negative pulmonary ROS and normal exam   Cardiovascular - normal exam        Neuro/Psych- neuro exam normal  (+) numbness,    GI/Hepatic/Renal/Endo    (+)  GERD, hepatitis B, liver disease,     Musculoskeletal     (+) back pain, Straight Leg Test, radiculopathy  (-)  IRINA test  Abdominal  - normal exam   Substance History      OB/GYN          Other   (+) arthritis "                                Diagnosis and Plan    Treatment Plan  ASA 3      Procedures: Lumbar Epidural Steroid Injection(LESI), With fluoroscopy,       Anesthetic plan and risks discussed with patient.          Diagnosis     * Lumbar radiculopathy [M54.16]     * Lumbar degenerative disc disease [M51.36]

## 2017-11-22 NOTE — ANESTHESIA PROCEDURE NOTES
PAIN Epidural block    Patient location during procedure: pain clinic  Indication:procedure for pain  Performed By  Anesthesiologist: KARLA DERAS  Preanesthetic Checklist  Completed: patient identified, site marked, surgical consent, pre-op evaluation, timeout performed, risks and benefits discussed and monitors and equipment checked  Additional Notes  Depomedrol - 80mg    Needle position confirmed by fluoroscopy and epidurogram using 2cc of [id number].    Diagnosis  Post-Op Diagnosis Codes:     * Lumbar radiculopathy (M54.16)     * Lumbar degenerative disc disease (M51.36)    Prep:  Pt Position:prone  Sterile Tech:cap, gloves, mask and sterile barrier  Prep:chlorhexidine gluconate and isopropyl alcohol  Monitoring:blood pressure monitoring, continuous pulse oximetry and EKG  Procedure:  Sedation: no   Approach:left paramedian  Guidance: fluoroscopy  Location:lumbar  Level:4-5  Needle Type:Tuohy  Needle Gauge:20  Aspiration:negative  Medications:  Depomedrol:80 mg  Preservative Free Saline:2mL  Isovue:2mL    Post Assessment:  Post-procedure: bandaide.  Pt Tolerance:patient tolerated the procedure well with no apparent complications  Complications:no

## 2017-12-05 DIAGNOSIS — K21.9 GASTROESOPHAGEAL REFLUX DISEASE WITHOUT ESOPHAGITIS: ICD-10-CM

## 2017-12-06 RX ORDER — OMEPRAZOLE 20 MG/1
CAPSULE, DELAYED RELEASE ORAL
Qty: 30 CAPSULE | Refills: 5 | Status: SHIPPED | OUTPATIENT
Start: 2017-12-06 | End: 2018-04-04

## 2018-01-08 ENCOUNTER — TELEPHONE (OUTPATIENT)
Dept: ORTHOPEDIC SURGERY | Facility: CLINIC | Age: 53
End: 2018-01-08

## 2018-01-08 DIAGNOSIS — M54.50 LUMBAR SPINE PAIN: Primary | ICD-10-CM

## 2018-01-10 ENCOUNTER — TRANSCRIBE ORDERS (OUTPATIENT)
Dept: PAIN MEDICINE | Facility: HOSPITAL | Age: 53
End: 2018-01-10

## 2018-01-10 ENCOUNTER — HOSPITAL ENCOUNTER (OUTPATIENT)
Dept: PAIN MEDICINE | Facility: HOSPITAL | Age: 53
Discharge: HOME | End: 2018-01-10
Admitting: ORTHOPAEDIC SURGERY

## 2018-01-10 ENCOUNTER — HOSPITAL ENCOUNTER (OUTPATIENT)
Dept: GENERAL RADIOLOGY | Facility: HOSPITAL | Age: 53
Discharge: HOME | End: 2018-01-10

## 2018-01-10 ENCOUNTER — ANESTHESIA EVENT (OUTPATIENT)
Dept: PAIN MEDICINE | Facility: HOSPITAL | Age: 53
End: 2018-01-10

## 2018-01-10 ENCOUNTER — ANESTHESIA (OUTPATIENT)
Dept: PAIN MEDICINE | Facility: HOSPITAL | Age: 53
End: 2018-01-10

## 2018-01-10 VITALS
DIASTOLIC BLOOD PRESSURE: 74 MMHG | SYSTOLIC BLOOD PRESSURE: 134 MMHG | RESPIRATION RATE: 16 BRPM | TEMPERATURE: 98.6 F | HEART RATE: 87 BPM | OXYGEN SATURATION: 97 %

## 2018-01-10 DIAGNOSIS — R52 PAIN: ICD-10-CM

## 2018-01-10 DIAGNOSIS — M54.50 LUMBAR SPINE PAIN: ICD-10-CM

## 2018-01-10 DIAGNOSIS — M54.50 LUMBAR SPINE PAIN: Primary | ICD-10-CM

## 2018-01-10 PROCEDURE — 25010000002 METHYLPREDNISOLONE PER 80 MG: Performed by: ANESTHESIOLOGY

## 2018-01-10 PROCEDURE — 77003 FLUOROGUIDE FOR SPINE INJECT: CPT

## 2018-01-10 PROCEDURE — C1755 CATHETER, INTRASPINAL: HCPCS

## 2018-01-10 RX ORDER — FENTANYL CITRATE 50 UG/ML
50 INJECTION, SOLUTION INTRAMUSCULAR; INTRAVENOUS AS NEEDED
Status: DISCONTINUED | OUTPATIENT
Start: 2018-01-10 | End: 2018-01-11 | Stop reason: HOSPADM

## 2018-01-10 RX ORDER — SODIUM CHLORIDE 0.9 % (FLUSH) 0.9 %
1-10 SYRINGE (ML) INJECTION AS NEEDED
Status: DISCONTINUED | OUTPATIENT
Start: 2018-01-10 | End: 2018-01-11 | Stop reason: HOSPADM

## 2018-01-10 RX ORDER — MIDAZOLAM HYDROCHLORIDE 1 MG/ML
1 INJECTION INTRAMUSCULAR; INTRAVENOUS AS NEEDED
Status: DISCONTINUED | OUTPATIENT
Start: 2018-01-10 | End: 2018-01-11 | Stop reason: HOSPADM

## 2018-01-10 RX ORDER — METHYLPREDNISOLONE ACETATE 80 MG/ML
80 INJECTION, SUSPENSION INTRA-ARTICULAR; INTRALESIONAL; INTRAMUSCULAR; SOFT TISSUE ONCE
Status: COMPLETED | OUTPATIENT
Start: 2018-01-10 | End: 2018-01-10

## 2018-01-10 RX ORDER — LIDOCAINE HYDROCHLORIDE 10 MG/ML
1 INJECTION, SOLUTION INFILTRATION; PERINEURAL ONCE AS NEEDED
Status: DISCONTINUED | OUTPATIENT
Start: 2018-01-10 | End: 2018-01-11 | Stop reason: HOSPADM

## 2018-01-10 RX ADMIN — METHYLPREDNISOLONE ACETATE 80 MG: 80 INJECTION, SUSPENSION INTRA-ARTICULAR; INTRALESIONAL; INTRAMUSCULAR; SOFT TISSUE at 10:10

## 2018-01-10 NOTE — H&P
[facility]    History and Physical    Patient Name: Eliseo Phan  :  1965  MRN:  [id number]  Date of Admission: 1/10/2018    Subjective     Patient is a 52 y.o. male presents with chief complaint of chronic low back, buttock and leg: left pain.    He had one epidural in November for multilevel Lumbar disc degeneration, spinal and foraminal stenosis and neuritis and noted an 80% improvement. He is here today for his second epidural.    The following history were reviewed and updated as appropriate: current medications, allergies, past medical history, past surgical history, past family history, past social history and problem list                Objective     Past Medical History:   Past Medical History:   Diagnosis Date   • Acid reflux    • Hepatitis B    • HIV (human immunodeficiency virus infection)      Past Surgical History:   Past Surgical History:   Procedure Laterality Date   • APPENDECTOMY     • BONE TUMOR EXCISION Right     right lower leg - as a child   • KNEE SURGERY Left     knee scope     Family History:   Family History   Problem Relation Age of Onset   • Heart disease Mother    • Hypertension Mother    • Diabetes Mother    • Thyroid disease Mother    • Depression Mother    • Anxiety disorder Mother    • Hypertension Father    • Depression Father    • Anxiety disorder Father    • Nephrolithiasis Brother      Social History:   Social History   Substance Use Topics   • Smoking status: Current Every Day Smoker     Packs/day: 1.00     Years: 10.00     Types: Cigarettes   • Smokeless tobacco: Never Used   • Alcohol use No       Vital Signs Range for the last 24 hours  Temperature:     Temp Source:     BP:     Pulse:     Respirations:     SPO2:     O2 Amount (l/min):     O2 Devices     Weight:           --------------------------------------------------------------------------------    Current Outpatient Prescriptions   Medication Sig Dispense Refill   • acetaminophen (TYLENOL) 650 MG 8  hr tablet 1-2 tablets twice daily as needed for pain 120 tablet 5   • dolutegravir (TIVICAY) 50 MG tablet Take 50 mg by mouth.     • Emtricitabine-Tenofovir AF (DESCOVY) 200-25 MG tablet      • omeprazole (priLOSEC) 20 MG capsule take 2 capsules by mouth once daily if needed 30 capsule 5   • pentoxifylline (TRENtal) 400 MG CR tablet Take 400 mg by mouth.     • traZODone (DESYREL) 100 MG tablet One half to one tablet daily at bedtime when necessary insomnia 30 tablet 6     No current facility-administered medications for this encounter.        --------------------------------------------------------------------------------  Assessment/Plan      Anesthesia Evaluation            Airway   Mallampati: II  Dental - normal exam     Pulmonary     breath sounds clear to auscultation  Cardiovascular     Rate: normal        Neuro/Psych- neuro exam normal  normal reflexes and symmetric  (-) left straight leg raise test, right straight leg raise test  GI/Hepatic/Renal/Endo      Musculoskeletal (-) normal exam    Abdominal    Substance History      OB/GYN          Other                                           Diagnosis and Plan    Treatment Plan  ASA 2   Patient has had previous injection/procedure with % improvement.   Procedures: Lumbar Epidural Steroid Injection(LESI), With fluoroscopy,       Anesthetic plan and risks discussed with patient.          Diagnosis     * Lumbar degenerative disc disease [M51.36]     * Lumbar spinal stenosis [M48.061]     * Lumbar neuritis [M54.16]

## 2018-01-10 NOTE — ANESTHESIA PROCEDURE NOTES
PAIN Epidural block    Patient location during procedure: pain clinic  Indication:procedure for pain  Performed By  Anesthesiologist: JANNETH SEAY  Preanesthetic Checklist  Completed: patient identified, site marked, surgical consent, pre-op evaluation, timeout performed, IV checked, risks and benefits discussed and monitors and equipment checked  Additional Notes  Post-Op Diagnosis Codes:     * Lumbar degenerative disc disease (M51.36)     * Lumbar spinal stenosis (M48.061)     * Lumbar neuritis (M54.16)  Performed under fluoroscopic guidance  Prep:  Pt Position:prone  Sterile Tech:cap, gloves, mask and sterile barrier  Prep:chlorhexidine gluconate and isopropyl alcohol  Monitoring:blood pressure monitoring, continuous pulse oximetry and EKG  Procedure:  Sedation: no   Approach:left paramedian  Guidance: fluoroscopy  Location:lumbar (Intralaminar)  Interspace: L5-S1.  Needle Type:Tuohy  Needle Gauge:20  Aspiration:negative  Medications:  Depomedrol:80  Preservative Free Saline:4mL    Post Assessment:  Post-procedure: Band-aid.  Pt Tolerance:patient tolerated the procedure well with no apparent complications  Complications:no

## 2018-02-07 ENCOUNTER — ANESTHESIA (OUTPATIENT)
Dept: PAIN MEDICINE | Facility: HOSPITAL | Age: 53
End: 2018-02-07

## 2018-02-07 ENCOUNTER — ANESTHESIA EVENT (OUTPATIENT)
Dept: PAIN MEDICINE | Facility: HOSPITAL | Age: 53
End: 2018-02-07

## 2018-02-07 ENCOUNTER — HOSPITAL ENCOUNTER (OUTPATIENT)
Dept: GENERAL RADIOLOGY | Facility: HOSPITAL | Age: 53
Discharge: HOME | End: 2018-02-07

## 2018-02-07 ENCOUNTER — HOSPITAL ENCOUNTER (OUTPATIENT)
Dept: PAIN MEDICINE | Facility: HOSPITAL | Age: 53
Discharge: HOME | End: 2018-02-07
Attending: ORTHOPAEDIC SURGERY | Admitting: ORTHOPAEDIC SURGERY

## 2018-02-07 VITALS
HEIGHT: 70 IN | SYSTOLIC BLOOD PRESSURE: 133 MMHG | OXYGEN SATURATION: 98 % | BODY MASS INDEX: 23.62 KG/M2 | RESPIRATION RATE: 16 BRPM | DIASTOLIC BLOOD PRESSURE: 96 MMHG | WEIGHT: 165 LBS | HEART RATE: 78 BPM | TEMPERATURE: 99.1 F

## 2018-02-07 DIAGNOSIS — R52 PAIN: ICD-10-CM

## 2018-02-07 DIAGNOSIS — M54.50 LUMBAR SPINE PAIN: ICD-10-CM

## 2018-02-07 PROCEDURE — 25010000002 METHYLPREDNISOLONE PER 80 MG: Performed by: ANESTHESIOLOGY

## 2018-02-07 PROCEDURE — C1755 CATHETER, INTRASPINAL: HCPCS

## 2018-02-07 PROCEDURE — 0 IOPAMIDOL 41 % SOLUTION: Performed by: ANESTHESIOLOGY

## 2018-02-07 PROCEDURE — 77003 FLUOROGUIDE FOR SPINE INJECT: CPT

## 2018-02-07 RX ORDER — METHYLPREDNISOLONE ACETATE 80 MG/ML
80 INJECTION, SUSPENSION INTRA-ARTICULAR; INTRALESIONAL; INTRAMUSCULAR; SOFT TISSUE ONCE
Status: COMPLETED | OUTPATIENT
Start: 2018-02-07 | End: 2018-02-07

## 2018-02-07 RX ORDER — MIDAZOLAM HYDROCHLORIDE 1 MG/ML
1 INJECTION INTRAMUSCULAR; INTRAVENOUS
Status: DISCONTINUED | OUTPATIENT
Start: 2018-02-07 | End: 2018-02-08 | Stop reason: HOSPADM

## 2018-02-07 RX ORDER — SODIUM CHLORIDE 0.9 % (FLUSH) 0.9 %
1-10 SYRINGE (ML) INJECTION AS NEEDED
Status: DISCONTINUED | OUTPATIENT
Start: 2018-02-07 | End: 2018-02-08 | Stop reason: HOSPADM

## 2018-02-07 RX ORDER — FENTANYL CITRATE 50 UG/ML
50 INJECTION, SOLUTION INTRAMUSCULAR; INTRAVENOUS
Status: DISCONTINUED | OUTPATIENT
Start: 2018-02-07 | End: 2018-02-08 | Stop reason: HOSPADM

## 2018-02-07 RX ORDER — LIDOCAINE HYDROCHLORIDE 10 MG/ML
1 INJECTION, SOLUTION INFILTRATION; PERINEURAL ONCE
Status: DISCONTINUED | OUTPATIENT
Start: 2018-02-07 | End: 2018-02-08 | Stop reason: HOSPADM

## 2018-02-07 RX ADMIN — METHYLPREDNISOLONE ACETATE 80 MG: 80 INJECTION, SUSPENSION INTRA-ARTICULAR; INTRALESIONAL; INTRAMUSCULAR; SOFT TISSUE at 12:40

## 2018-02-07 RX ADMIN — IOPAMIDOL 10 ML: 408 INJECTION, SOLUTION INTRATHECAL at 12:39

## 2018-02-07 NOTE — PLAN OF CARE
Problem: Pain, Chronic (Adult)  Goal: Identify Related Risk Factors and Signs and Symptoms  Outcome: Unable to achieve outcome(s) by discharge Date Met: 02/07/18

## 2018-02-07 NOTE — ANESTHESIA PROCEDURE NOTES
PAIN Epidural block    Patient location during procedure: pain clinic  Indication:procedure for pain  Performed By  Anesthesiologist: KARLA DERAS  Preanesthetic Checklist  Completed: patient identified, site marked, surgical consent, pre-op evaluation, timeout performed, risks and benefits discussed and monitors and equipment checked  Additional Notes  Needed 2 pillows under his abdomen for better angulation.    Depomedrol - 80mg    Needle position confirmed by fluoroscopy and epidurogram using 2cc of [id number].    Diagnosis  Post-Op Diagnosis Codes:     * Lumbar radiculopathy (M54.16)     * Lumbar degenerative disc disease (M51.36)     * Lumbar spinal stenosis (M48.061)    Prep:  Pt Position:prone  Sterile Tech:cap, gloves, mask and sterile barrier  Prep:chlorhexidine gluconate and isopropyl alcohol  Monitoring:blood pressure monitoring, continuous pulse oximetry and EKG  Procedure:  Sedation: no   Approach:left paramedian  Guidance: fluoroscopy  Location:lumbar  Level:4-5  Needle Type:Tuohy  Needle Gauge:20  Aspiration:negative  Medications:  Depomedrol:80 mg  Preservative Free Saline:2mL  Isovue:2mL    Post Assessment:  Post-procedure: bandaide.  Pt Tolerance:patient tolerated the procedure well with no apparent complications  Complications:no

## 2018-02-07 NOTE — H&P (VIEW-ONLY)
[facility]    History and Physical    Patient Name: Eliseo Phan  :  1965  MRN:  [id number]  Date of Admission: 1/10/2018    Subjective     Patient is a 52 y.o. male presents with chief complaint of chronic low back, buttock and leg: left pain.    He had one epidural in November for multilevel Lumbar disc degeneration, spinal and foraminal stenosis and neuritis and noted an 80% improvement. He is here today for his second epidural.    The following history were reviewed and updated as appropriate: current medications, allergies, past medical history, past surgical history, past family history, past social history and problem list                Objective     Past Medical History:   Past Medical History:   Diagnosis Date   • Acid reflux    • Hepatitis B    • HIV (human immunodeficiency virus infection)      Past Surgical History:   Past Surgical History:   Procedure Laterality Date   • APPENDECTOMY     • BONE TUMOR EXCISION Right     right lower leg - as a child   • KNEE SURGERY Left     knee scope     Family History:   Family History   Problem Relation Age of Onset   • Heart disease Mother    • Hypertension Mother    • Diabetes Mother    • Thyroid disease Mother    • Depression Mother    • Anxiety disorder Mother    • Hypertension Father    • Depression Father    • Anxiety disorder Father    • Nephrolithiasis Brother      Social History:   Social History   Substance Use Topics   • Smoking status: Current Every Day Smoker     Packs/day: 1.00     Years: 10.00     Types: Cigarettes   • Smokeless tobacco: Never Used   • Alcohol use No       Vital Signs Range for the last 24 hours  Temperature:     Temp Source:     BP:     Pulse:     Respirations:     SPO2:     O2 Amount (l/min):     O2 Devices     Weight:           --------------------------------------------------------------------------------    Current Outpatient Prescriptions   Medication Sig Dispense Refill   • acetaminophen (TYLENOL) 650 MG 8  hr tablet 1-2 tablets twice daily as needed for pain 120 tablet 5   • dolutegravir (TIVICAY) 50 MG tablet Take 50 mg by mouth.     • Emtricitabine-Tenofovir AF (DESCOVY) 200-25 MG tablet      • omeprazole (priLOSEC) 20 MG capsule take 2 capsules by mouth once daily if needed 30 capsule 5   • pentoxifylline (TRENtal) 400 MG CR tablet Take 400 mg by mouth.     • traZODone (DESYREL) 100 MG tablet One half to one tablet daily at bedtime when necessary insomnia 30 tablet 6     No current facility-administered medications for this encounter.        --------------------------------------------------------------------------------  Assessment/Plan      Anesthesia Evaluation            Airway   Mallampati: II  Dental - normal exam     Pulmonary     breath sounds clear to auscultation  Cardiovascular     Rate: normal        Neuro/Psych- neuro exam normal  normal reflexes and symmetric  (-) left straight leg raise test, right straight leg raise test  GI/Hepatic/Renal/Endo      Musculoskeletal (-) normal exam    Abdominal    Substance History      OB/GYN          Other                                           Diagnosis and Plan    Treatment Plan  ASA 2   Patient has had previous injection/procedure with % improvement.   Procedures: Lumbar Epidural Steroid Injection(LESI), With fluoroscopy,       Anesthetic plan and risks discussed with patient.          Diagnosis     * Lumbar degenerative disc disease [M51.36]     * Lumbar spinal stenosis [M48.061]     * Lumbar neuritis [M54.16]

## 2018-02-07 NOTE — PLAN OF CARE
Problem: Pain, Chronic (Adult)  Goal: Acceptable Pain Control/Comfort Level  Outcome: Unable to achieve outcome(s) by discharge Date Met: 02/07/18

## 2018-02-07 NOTE — INTERVAL H&P NOTE
[facility]  H&P reviewed. No changes since last visit.  Patient states   25-50% improvement since the last procedure/injection.    Diagnosis     * Lumbar radiculopathy [M54.16]     * Lumbar degenerative disc disease [M51.36]     * Lumbar spinal stenosis [M48.061]      Airway assessed since last visit. Airway class equals: 2.

## 2018-04-03 ENCOUNTER — OFFICE VISIT (OUTPATIENT)
Dept: ORTHOPEDIC SURGERY | Facility: CLINIC | Age: 53
End: 2018-04-03

## 2018-04-03 VITALS — WEIGHT: 164 LBS | HEIGHT: 70 IN | TEMPERATURE: 97.2 F | BODY MASS INDEX: 23.48 KG/M2

## 2018-04-03 DIAGNOSIS — M48.062 LUMBAR STENOSIS WITH NEUROGENIC CLAUDICATION: Primary | ICD-10-CM

## 2018-04-03 PROCEDURE — 99214 OFFICE O/P EST MOD 30 MIN: CPT | Performed by: ORTHOPAEDIC SURGERY

## 2018-04-03 RX ORDER — SODIUM CHLORIDE, SODIUM LACTATE, POTASSIUM CHLORIDE, CALCIUM CHLORIDE 600; 310; 30; 20 MG/100ML; MG/100ML; MG/100ML; MG/100ML
100 INJECTION, SOLUTION INTRAVENOUS CONTINUOUS
Status: CANCELLED | OUTPATIENT
Start: 2018-04-05

## 2018-04-03 RX ORDER — CEFAZOLIN SODIUM 2 G/100ML
2 INJECTION, SOLUTION INTRAVENOUS ONCE
Status: CANCELLED | OUTPATIENT
Start: 2018-04-05 | End: 2018-04-05

## 2018-04-03 NOTE — PROGRESS NOTES
New patient or new problem visit    CC: Left hip and leg pain    HPI: He has failed to improve with conservative care for lumbar spinal stenosis causing left hip and leg pain, bilateral leg numbness.  He has minimal back pain.  Epidurals and physical therapy failed to provide relief.    PFSH: See attached.  He is HIV nonreactive, but previously did react positive for this    ROS: See attached    PE: Constitutional: Vital signs above-noted.  Awake, alert and oriented    Psychiatric: Affect and insight do not appear grossly disturbed.    Pulmonary: Breathing is unlabored, color is good.    Skin: Warm, dry and normal turgor    Cardiac:  pedal pulses intact.  No edema.    Eyesight and hearing appear adequate for examination purposes      Musculoskeletal:  There is no tenderness to percussion and palpation of the spine. Motion appears undisturbed.  Posture is unremarkable to coronal and sagittal inspection.    The skin about the area is intact.  There is no palpable or visible deformity.  There is no local spasm.       Neurologic:   Reflexes are 2+ and symmetrical in the patellae and absent in the left Achilles.   Motor function is undisturbed in quadriceps, EHL, and gastrocnemius on the right and he has slight weakness in the left EHL   Sensation appears symmetrically intact to light touch   .  In the bilateral lower extremities there is no evidence of atrophy.   Clonus is absent..  Gait appears undisturbed.  SLR test on the left    XRAY: Eye shows stenosis at L4 5 and 5 one on the left side particularly in the foramen at and far laterally at L5-S1.  He viewed radiologist's report with which I agree.  Plain film x-rays from the before showed a slight scoliosis at 3 for patellar groove at the lumbosacral junction on the left.    Other: n/a    Impression: At L4-5, L5-S1 stenosis    Plan: I plan L5 neural lysis from shoulder to ganglion and this will encompass a 4551 decompression on the left.  I think we can do this  without destabilizing him, and thus avoid a fusion.I discussed the risks and benefits of laminectomy.  Risks include adverse anesthetic events including death, stroke and myocardial infarction.  More specific surgical complications include infection, nerve root injury and/or paralysis, persistent pain, recurrent pain, spinal fluid leakage, painful scarring, and increased back pain and/or instability, among others. There is a 70 to 90 percent chance of success.   Alternatives were discussed.  After careful consideration the patient wishes to proceed with surgery.

## 2018-04-04 ENCOUNTER — APPOINTMENT (OUTPATIENT)
Dept: PREADMISSION TESTING | Facility: HOSPITAL | Age: 53
End: 2018-04-04

## 2018-04-04 VITALS
TEMPERATURE: 97.4 F | HEIGHT: 70 IN | SYSTOLIC BLOOD PRESSURE: 105 MMHG | HEART RATE: 75 BPM | DIASTOLIC BLOOD PRESSURE: 70 MMHG | OXYGEN SATURATION: 98 % | WEIGHT: 164 LBS | BODY MASS INDEX: 23.48 KG/M2 | RESPIRATION RATE: 18 BRPM

## 2018-04-04 LAB
ALBUMIN SERPL-MCNC: 4.2 G/DL (ref 3.5–5.2)
ALBUMIN/GLOB SERPL: 1.4 G/DL
ALP SERPL-CCNC: 47 U/L (ref 39–117)
ALT SERPL W P-5'-P-CCNC: 17 U/L (ref 1–41)
ANION GAP SERPL CALCULATED.3IONS-SCNC: 10.4 MMOL/L
AST SERPL-CCNC: 20 U/L (ref 1–40)
BILIRUB SERPL-MCNC: 0.3 MG/DL (ref 0.1–1.2)
BUN BLD-MCNC: 17 MG/DL (ref 6–20)
BUN/CREAT SERPL: 12.6 (ref 7–25)
CALCIUM SPEC-SCNC: 9.2 MG/DL (ref 8.6–10.5)
CHLORIDE SERPL-SCNC: 103 MMOL/L (ref 98–107)
CO2 SERPL-SCNC: 26.6 MMOL/L (ref 22–29)
CREAT BLD-MCNC: 1.35 MG/DL (ref 0.76–1.27)
DEPRECATED RDW RBC AUTO: 42.5 FL (ref 37–54)
ERYTHROCYTE [DISTWIDTH] IN BLOOD BY AUTOMATED COUNT: 13.1 % (ref 11.5–14.5)
GFR SERPL CREATININE-BSD FRML MDRD: 55 ML/MIN/1.73
GLOBULIN UR ELPH-MCNC: 3 GM/DL
GLUCOSE BLD-MCNC: 90 MG/DL (ref 65–99)
HCT VFR BLD AUTO: 47.9 % (ref 40.4–52.2)
HGB BLD-MCNC: 16.1 G/DL (ref 13.7–17.6)
MCH RBC QN AUTO: 30.1 PG (ref 27–32.7)
MCHC RBC AUTO-ENTMCNC: 33.6 G/DL (ref 32.6–36.4)
MCV RBC AUTO: 89.5 FL (ref 79.8–96.2)
PLATELET # BLD AUTO: 178 10*3/MM3 (ref 140–500)
PMV BLD AUTO: 9.7 FL (ref 6–12)
POTASSIUM BLD-SCNC: 4.1 MMOL/L (ref 3.5–5.2)
PROT SERPL-MCNC: 7.2 G/DL (ref 6–8.5)
RBC # BLD AUTO: 5.35 10*6/MM3 (ref 4.6–6)
SODIUM BLD-SCNC: 140 MMOL/L (ref 136–145)
WBC NRBC COR # BLD: 5.46 10*3/MM3 (ref 4.5–10.7)

## 2018-04-04 PROCEDURE — 85027 COMPLETE CBC AUTOMATED: CPT | Performed by: ORTHOPAEDIC SURGERY

## 2018-04-04 PROCEDURE — 36415 COLL VENOUS BLD VENIPUNCTURE: CPT

## 2018-04-04 PROCEDURE — 80053 COMPREHEN METABOLIC PANEL: CPT | Performed by: ORTHOPAEDIC SURGERY

## 2018-04-04 RX ORDER — OMEPRAZOLE 20 MG/1
20 CAPSULE, DELAYED RELEASE ORAL DAILY
COMMUNITY
End: 2019-04-22 | Stop reason: SDUPTHER

## 2018-04-04 NOTE — DISCHARGE INSTRUCTIONS
Take the following medications the morning of surgery with a small sip of water:        General Instructions:  • Do not eat solid food after midnight the night before surgery.  • You may drink clear liquids day of surgery but must stop at least one hour before your hospital arrival time.  • It is beneficial for you to have a clear drink that contains carbohydrates the day of surgery.  We suggest a 12 to 20 ounce bottle of Gatorade or Powerade for non-diabetic patients or a 12 to 20 ounce bottle of G2 or Powerade Zero for diabetic patients. (Pediatric patients, are not advised to drink a 12 to 20 ounce carbohydrate drink)    Clear liquids are liquids you can see through.  Nothing red in color.     Plain water                               Sports drinks  Sodas                                   Gelatin (Jell-O)  Fruit juices without pulp such as white grape juice and apple juice  Popsicles that contain no fruit or yogurt  Tea or coffee (no cream or milk added)  Gatorade / Powerade  G2 / Powerade Zero    • Infants may have breast milk up to four hours before surgery.  • Infants drinking formula may drink formula up to six hours before surgery.   • Patients who avoid smoking, chewing tobacco and alcohol for 4 weeks prior to surgery have a reduced risk of post-operative complications.  Quit smoking as many days before surgery as you can.  • Do not smoke, use chewing tobacco or drink alcohol the day of surgery.   • If applicable bring your C-PAP/ BI-PAP machine.  • Bring any papers given to you in the doctor’s office.  • Wear clean comfortable clothes and socks.  • Do not wear contact lenses or make-up.  Bring a case for your glasses.   • Bring crutches or walker if applicable.  • Remove all piercings.  Leave jewelry and any other valuables at home.  • Hair extensions with metal clips must be removed prior to surgery.  • The Pre-Admission Testing nurse will instruct you to bring medications if unable to obtain an accurate  list in Pre-Admission Testing.        If you were given a blood bank ID arm band remember to bring it with you the day of surgery.    Preventing a Surgical Site Infection:  • For 2 to 3 days before surgery, avoid shaving with a razor because the razor can irritate skin and make it easier to develop an infection.  • The night prior to surgery sleep in a clean bed with clean clothing.  Do not allow pets to sleep with you.  • Shower on the morning of surgery using a fresh bar of anti-bacterial soap (such as Dial) and clean washcloth.  Dry with a clean towel and dress in clean clothing.  • Ask your surgeon if you will be receiving antibiotics prior to surgery.  • Make sure you, your family, and all healthcare providers clean their hands with soap and water or an alcohol based hand  before caring for you or your wound.    Day of surgery:  Upon arrival, a Pre-op nurse and Anesthesiologist will review your health history, obtain vital signs, and answer questions you may have.  The only belongings needed at this time will be your home medications and if applicable your C-PAP/BI-PAP machine.  If you are staying overnight your family can leave the rest of your belongings in the car and bring them to your room later.  A Pre-op nurse will start an IV and you may receive medication in preparation for surgery, including something to help you relax.  Your family will be able to see you in the Pre-op area.  While you are in surgery your family should notify the waiting room  if they leave the waiting room area and provide a contact phone number.    Please be aware that surgery does come with discomfort.  We want to make every effort to control your discomfort so please discuss any uncontrolled symptoms with your nurse.   Your doctor will most likely have prescribed pain medications.      If you are going home after surgery you will receive individualized written care instructions before being discharged.  A  responsible adult must drive you to and from the hospital on the day of your surgery and stay with you for 24 hours.    If you are staying overnight following surgery, you will be transported to your hospital room following the recovery period.  [facility] has all private rooms.    If you have any questions please call Pre-Admission Testing at [phone].  Deductibles and co-payments are collected on the day of service. Please be prepared to pay the required co-pay, deductible or deposit on the day of service as defined by your plan.

## 2018-04-05 ENCOUNTER — HOSPITAL ENCOUNTER (OUTPATIENT)
Facility: HOSPITAL | Age: 53
Discharge: HOME | End: 2018-04-06
Attending: ORTHOPAEDIC SURGERY | Admitting: ORTHOPAEDIC SURGERY

## 2018-04-05 ENCOUNTER — ANESTHESIA EVENT (OUTPATIENT)
Dept: PERIOP | Facility: HOSPITAL | Age: 53
End: 2018-04-05

## 2018-04-05 ENCOUNTER — APPOINTMENT (OUTPATIENT)
Dept: GENERAL RADIOLOGY | Facility: HOSPITAL | Age: 53
End: 2018-04-05

## 2018-04-05 ENCOUNTER — ANESTHESIA (OUTPATIENT)
Dept: PERIOP | Facility: HOSPITAL | Age: 53
End: 2018-04-05

## 2018-04-05 DIAGNOSIS — M48.062 LUMBAR STENOSIS WITH NEUROGENIC CLAUDICATION: ICD-10-CM

## 2018-04-05 LAB
ABO GROUP BLD: NORMAL
BLD GP AB SCN SERPL QL: NEGATIVE
RH BLD: POSITIVE
T&S EXPIRATION DATE: NORMAL

## 2018-04-05 PROCEDURE — 25010000003 CEFAZOLIN IN DEXTROSE 2-4 GM/100ML-% SOLUTION: Performed by: ORTHOPAEDIC SURGERY

## 2018-04-05 PROCEDURE — 25010000002 ONDANSETRON PER 1 MG: Performed by: NURSE ANESTHETIST, CERTIFIED REGISTERED

## 2018-04-05 PROCEDURE — G0378 HOSPITAL OBSERVATION PER HR: HCPCS

## 2018-04-05 PROCEDURE — 86901 BLOOD TYPING SEROLOGIC RH(D): CPT | Performed by: ORTHOPAEDIC SURGERY

## 2018-04-05 PROCEDURE — 25010000002 PROPOFOL 10 MG/ML EMULSION: Performed by: NURSE ANESTHETIST, CERTIFIED REGISTERED

## 2018-04-05 PROCEDURE — 76000 FLUOROSCOPY <1 HR PHYS/QHP: CPT

## 2018-04-05 PROCEDURE — 63048 LAM FACETEC &FORAMOT EA ADDL: CPT | Performed by: ORTHOPAEDIC SURGERY

## 2018-04-05 PROCEDURE — 72100 X-RAY EXAM L-S SPINE 2/3 VWS: CPT

## 2018-04-05 PROCEDURE — 25010000002 MAGNESIUM SULFATE PER 500 MG OF MAGNESIUM: Performed by: NURSE ANESTHETIST, CERTIFIED REGISTERED

## 2018-04-05 PROCEDURE — 25010000002 FENTANYL CITRATE (PF) 100 MCG/2ML SOLUTION: Performed by: NURSE ANESTHETIST, CERTIFIED REGISTERED

## 2018-04-05 PROCEDURE — 63047 LAM FACETEC & FORAMOT LUMBAR: CPT | Performed by: ORTHOPAEDIC SURGERY

## 2018-04-05 PROCEDURE — 86850 RBC ANTIBODY SCREEN: CPT | Performed by: ORTHOPAEDIC SURGERY

## 2018-04-05 PROCEDURE — 25010000002 PHENYLEPHRINE PER 1 ML: Performed by: NURSE ANESTHETIST, CERTIFIED REGISTERED

## 2018-04-05 PROCEDURE — 25010000002 FENTANYL CITRATE (PF) 100 MCG/2ML SOLUTION: Performed by: ANESTHESIOLOGY

## 2018-04-05 PROCEDURE — 63710000001 DIPHENHYDRAMINE PER 50 MG: Performed by: ORTHOPAEDIC SURGERY

## 2018-04-05 PROCEDURE — 25010000002 HYDROMORPHONE HCL PF 500 MG/50ML SOLUTION: Performed by: ORTHOPAEDIC SURGERY

## 2018-04-05 PROCEDURE — 25010000002 HYDROMORPHONE PER 4 MG: Performed by: NURSE ANESTHETIST, CERTIFIED REGISTERED

## 2018-04-05 PROCEDURE — 86900 BLOOD TYPING SEROLOGIC ABO: CPT | Performed by: ORTHOPAEDIC SURGERY

## 2018-04-05 PROCEDURE — 25010000002 MIDAZOLAM PER 1 MG: Performed by: ANESTHESIOLOGY

## 2018-04-05 PROCEDURE — 25810000003 POTASSIUM CHLORIDE PER 2 MEQ: Performed by: ORTHOPAEDIC SURGERY

## 2018-04-05 RX ORDER — PROMETHAZINE HYDROCHLORIDE 25 MG/ML
12.5 INJECTION, SOLUTION INTRAMUSCULAR; INTRAVENOUS ONCE AS NEEDED
Status: DISCONTINUED | OUTPATIENT
Start: 2018-04-05 | End: 2018-04-05 | Stop reason: HOSPADM

## 2018-04-05 RX ORDER — FENTANYL CITRATE 50 UG/ML
50 INJECTION, SOLUTION INTRAMUSCULAR; INTRAVENOUS
Status: DISCONTINUED | OUTPATIENT
Start: 2018-04-05 | End: 2018-04-05 | Stop reason: HOSPADM

## 2018-04-05 RX ORDER — PROMETHAZINE HYDROCHLORIDE 25 MG/1
25 TABLET ORAL ONCE AS NEEDED
Status: DISCONTINUED | OUTPATIENT
Start: 2018-04-05 | End: 2018-04-05 | Stop reason: HOSPADM

## 2018-04-05 RX ORDER — ONDANSETRON 2 MG/ML
4 INJECTION INTRAMUSCULAR; INTRAVENOUS ONCE AS NEEDED
Status: COMPLETED | OUTPATIENT
Start: 2018-04-05 | End: 2018-04-05

## 2018-04-05 RX ORDER — FLUMAZENIL 0.1 MG/ML
0.2 INJECTION INTRAVENOUS AS NEEDED
Status: DISCONTINUED | OUTPATIENT
Start: 2018-04-05 | End: 2018-04-05 | Stop reason: HOSPADM

## 2018-04-05 RX ORDER — DIPHENHYDRAMINE HYDROCHLORIDE 50 MG/ML
12.5 INJECTION INTRAMUSCULAR; INTRAVENOUS
Status: DISCONTINUED | OUTPATIENT
Start: 2018-04-05 | End: 2018-04-05 | Stop reason: HOSPADM

## 2018-04-05 RX ORDER — CEFAZOLIN SODIUM 2 G/100ML
2 INJECTION, SOLUTION INTRAVENOUS ONCE
Status: COMPLETED | OUTPATIENT
Start: 2018-04-05 | End: 2018-04-05

## 2018-04-05 RX ORDER — SODIUM CHLORIDE 0.9 % (FLUSH) 0.9 %
1-10 SYRINGE (ML) INJECTION AS NEEDED
Status: DISCONTINUED | OUTPATIENT
Start: 2018-04-05 | End: 2018-04-06 | Stop reason: HOSPADM

## 2018-04-05 RX ORDER — BISACODYL 10 MG
10 SUPPOSITORY, RECTAL RECTAL DAILY PRN
Status: DISCONTINUED | OUTPATIENT
Start: 2018-04-05 | End: 2018-04-06 | Stop reason: HOSPADM

## 2018-04-05 RX ORDER — ONDANSETRON 2 MG/ML
4 INJECTION INTRAMUSCULAR; INTRAVENOUS EVERY 6 HOURS PRN
Status: DISCONTINUED | OUTPATIENT
Start: 2018-04-05 | End: 2018-04-06 | Stop reason: HOSPADM

## 2018-04-05 RX ORDER — NALOXONE HCL 0.4 MG/ML
0.2 VIAL (ML) INJECTION AS NEEDED
Status: DISCONTINUED | OUTPATIENT
Start: 2018-04-05 | End: 2018-04-05 | Stop reason: HOSPADM

## 2018-04-05 RX ORDER — HYDROCODONE BITARTRATE AND ACETAMINOPHEN 7.5; 325 MG/1; MG/1
1 TABLET ORAL ONCE AS NEEDED
Status: DISCONTINUED | OUTPATIENT
Start: 2018-04-05 | End: 2018-04-05 | Stop reason: HOSPADM

## 2018-04-05 RX ORDER — SODIUM CHLORIDE, SODIUM LACTATE, POTASSIUM CHLORIDE, CALCIUM CHLORIDE 600; 310; 30; 20 MG/100ML; MG/100ML; MG/100ML; MG/100ML
9 INJECTION, SOLUTION INTRAVENOUS CONTINUOUS
Status: DISCONTINUED | OUTPATIENT
Start: 2018-04-05 | End: 2018-04-06 | Stop reason: HOSPADM

## 2018-04-05 RX ORDER — ROCURONIUM BROMIDE 10 MG/ML
INJECTION, SOLUTION INTRAVENOUS AS NEEDED
Status: DISCONTINUED | OUTPATIENT
Start: 2018-04-05 | End: 2018-04-05 | Stop reason: SURG

## 2018-04-05 RX ORDER — CYCLOBENZAPRINE HCL 10 MG
10 TABLET ORAL 3 TIMES DAILY PRN
Status: DISCONTINUED | OUTPATIENT
Start: 2018-04-05 | End: 2018-04-06 | Stop reason: HOSPADM

## 2018-04-05 RX ORDER — PROMETHAZINE HYDROCHLORIDE 25 MG/1
12.5 TABLET ORAL ONCE AS NEEDED
Status: DISCONTINUED | OUTPATIENT
Start: 2018-04-05 | End: 2018-04-05 | Stop reason: HOSPADM

## 2018-04-05 RX ORDER — LIDOCAINE HYDROCHLORIDE 10 MG/ML
0.5 INJECTION, SOLUTION EPIDURAL; INFILTRATION; INTRACAUDAL; PERINEURAL ONCE AS NEEDED
Status: DISCONTINUED | OUTPATIENT
Start: 2018-04-05 | End: 2018-04-05 | Stop reason: HOSPADM

## 2018-04-05 RX ORDER — ONDANSETRON 4 MG/1
4 TABLET, ORALLY DISINTEGRATING ORAL EVERY 6 HOURS PRN
Status: DISCONTINUED | OUTPATIENT
Start: 2018-04-05 | End: 2018-04-06 | Stop reason: HOSPADM

## 2018-04-05 RX ORDER — SENNA AND DOCUSATE SODIUM 50; 8.6 MG/1; MG/1
1 TABLET, FILM COATED ORAL NIGHTLY
Status: DISCONTINUED | OUTPATIENT
Start: 2018-04-05 | End: 2018-04-06 | Stop reason: HOSPADM

## 2018-04-05 RX ORDER — MAGNESIUM SULFATE HEPTAHYDRATE 500 MG/ML
INJECTION, SOLUTION INTRAMUSCULAR; INTRAVENOUS AS NEEDED
Status: DISCONTINUED | OUTPATIENT
Start: 2018-04-05 | End: 2018-04-05 | Stop reason: SURG

## 2018-04-05 RX ORDER — LIDOCAINE HYDROCHLORIDE 20 MG/ML
INJECTION, SOLUTION INFILTRATION; PERINEURAL AS NEEDED
Status: DISCONTINUED | OUTPATIENT
Start: 2018-04-05 | End: 2018-04-05 | Stop reason: SURG

## 2018-04-05 RX ORDER — HYDROMORPHONE HCL IN 0.9% NACL 10 MG/50ML
PATIENT CONTROLLED ANALGESIA SYRINGE INTRAVENOUS CONTINUOUS
Status: DISCONTINUED | OUTPATIENT
Start: 2018-04-05 | End: 2018-04-06 | Stop reason: HOSPADM

## 2018-04-05 RX ORDER — DIPHENHYDRAMINE HCL 12.5MG/5ML
25 LIQUID (ML) ORAL EVERY 6 HOURS PRN
Status: DISCONTINUED | OUTPATIENT
Start: 2018-04-05 | End: 2018-04-05

## 2018-04-05 RX ORDER — BUPIVACAINE HYDROCHLORIDE AND EPINEPHRINE 2.5; 5 MG/ML; UG/ML
INJECTION, SOLUTION INFILTRATION; PERINEURAL AS NEEDED
Status: DISCONTINUED | OUTPATIENT
Start: 2018-04-05 | End: 2018-04-05 | Stop reason: HOSPADM

## 2018-04-05 RX ORDER — DIPHENHYDRAMINE HCL 25 MG
25 CAPSULE ORAL EVERY 6 HOURS PRN
Status: DISCONTINUED | OUTPATIENT
Start: 2018-04-05 | End: 2018-04-06 | Stop reason: HOSPADM

## 2018-04-05 RX ORDER — EPHEDRINE SULFATE 50 MG/ML
INJECTION, SOLUTION INTRAVENOUS AS NEEDED
Status: DISCONTINUED | OUTPATIENT
Start: 2018-04-05 | End: 2018-04-05 | Stop reason: SURG

## 2018-04-05 RX ORDER — EPHEDRINE SULFATE 50 MG/ML
5 INJECTION, SOLUTION INTRAVENOUS ONCE AS NEEDED
Status: DISCONTINUED | OUTPATIENT
Start: 2018-04-05 | End: 2018-04-05 | Stop reason: HOSPADM

## 2018-04-05 RX ORDER — NALOXONE HCL 0.4 MG/ML
0.1 VIAL (ML) INJECTION
Status: DISCONTINUED | OUTPATIENT
Start: 2018-04-05 | End: 2018-04-06 | Stop reason: HOSPADM

## 2018-04-05 RX ORDER — OXYCODONE AND ACETAMINOPHEN 7.5; 325 MG/1; MG/1
1 TABLET ORAL ONCE AS NEEDED
Status: DISCONTINUED | OUTPATIENT
Start: 2018-04-05 | End: 2018-04-05 | Stop reason: HOSPADM

## 2018-04-05 RX ORDER — HYDROMORPHONE HCL 110MG/55ML
0.5 PATIENT CONTROLLED ANALGESIA SYRINGE INTRAVENOUS
Status: DISCONTINUED | OUTPATIENT
Start: 2018-04-05 | End: 2018-04-05 | Stop reason: HOSPADM

## 2018-04-05 RX ORDER — OXYCODONE HYDROCHLORIDE AND ACETAMINOPHEN 5; 325 MG/1; MG/1
2 TABLET ORAL EVERY 4 HOURS PRN
Status: DISCONTINUED | OUTPATIENT
Start: 2018-04-05 | End: 2018-04-06 | Stop reason: HOSPADM

## 2018-04-05 RX ORDER — PANTOPRAZOLE SODIUM 40 MG/1
40 TABLET, DELAYED RELEASE ORAL EVERY MORNING
Status: DISCONTINUED | OUTPATIENT
Start: 2018-04-06 | End: 2018-04-06 | Stop reason: HOSPADM

## 2018-04-05 RX ORDER — FAMOTIDINE 10 MG/ML
20 INJECTION, SOLUTION INTRAVENOUS ONCE
Status: COMPLETED | OUTPATIENT
Start: 2018-04-05 | End: 2018-04-05

## 2018-04-05 RX ORDER — LABETALOL HYDROCHLORIDE 5 MG/ML
5 INJECTION, SOLUTION INTRAVENOUS
Status: DISCONTINUED | OUTPATIENT
Start: 2018-04-05 | End: 2018-04-05 | Stop reason: HOSPADM

## 2018-04-05 RX ORDER — MIDAZOLAM HYDROCHLORIDE 1 MG/ML
2 INJECTION INTRAMUSCULAR; INTRAVENOUS
Status: DISCONTINUED | OUTPATIENT
Start: 2018-04-05 | End: 2018-04-05 | Stop reason: HOSPADM

## 2018-04-05 RX ORDER — SODIUM CHLORIDE AND POTASSIUM CHLORIDE 150; 450 MG/100ML; MG/100ML
100 INJECTION, SOLUTION INTRAVENOUS CONTINUOUS
Status: DISCONTINUED | OUTPATIENT
Start: 2018-04-05 | End: 2018-04-06 | Stop reason: HOSPADM

## 2018-04-05 RX ORDER — FENTANYL CITRATE 50 UG/ML
INJECTION, SOLUTION INTRAMUSCULAR; INTRAVENOUS AS NEEDED
Status: DISCONTINUED | OUTPATIENT
Start: 2018-04-05 | End: 2018-04-05 | Stop reason: SURG

## 2018-04-05 RX ORDER — MIDAZOLAM HYDROCHLORIDE 1 MG/ML
1 INJECTION INTRAMUSCULAR; INTRAVENOUS
Status: DISCONTINUED | OUTPATIENT
Start: 2018-04-05 | End: 2018-04-05 | Stop reason: HOSPADM

## 2018-04-05 RX ORDER — LIDOCAINE HYDROCHLORIDE 40 MG/ML
SOLUTION TOPICAL AS NEEDED
Status: DISCONTINUED | OUTPATIENT
Start: 2018-04-05 | End: 2018-04-05 | Stop reason: SURG

## 2018-04-05 RX ORDER — HYDRALAZINE HYDROCHLORIDE 20 MG/ML
5 INJECTION INTRAMUSCULAR; INTRAVENOUS
Status: DISCONTINUED | OUTPATIENT
Start: 2018-04-05 | End: 2018-04-05 | Stop reason: HOSPADM

## 2018-04-05 RX ORDER — PROPOFOL 10 MG/ML
VIAL (ML) INTRAVENOUS AS NEEDED
Status: DISCONTINUED | OUTPATIENT
Start: 2018-04-05 | End: 2018-04-05 | Stop reason: SURG

## 2018-04-05 RX ORDER — SODIUM CHLORIDE, SODIUM LACTATE, POTASSIUM CHLORIDE, CALCIUM CHLORIDE 600; 310; 30; 20 MG/100ML; MG/100ML; MG/100ML; MG/100ML
100 INJECTION, SOLUTION INTRAVENOUS CONTINUOUS
Status: DISCONTINUED | OUTPATIENT
Start: 2018-04-05 | End: 2018-04-06 | Stop reason: HOSPADM

## 2018-04-05 RX ORDER — ACETAMINOPHEN 325 MG/1
650 TABLET ORAL EVERY 4 HOURS PRN
Status: DISCONTINUED | OUTPATIENT
Start: 2018-04-05 | End: 2018-04-06 | Stop reason: HOSPADM

## 2018-04-05 RX ORDER — PROMETHAZINE HYDROCHLORIDE 25 MG/1
25 SUPPOSITORY RECTAL ONCE AS NEEDED
Status: DISCONTINUED | OUTPATIENT
Start: 2018-04-05 | End: 2018-04-05 | Stop reason: HOSPADM

## 2018-04-05 RX ORDER — TRAZODONE HYDROCHLORIDE 100 MG/1
100 TABLET ORAL NIGHTLY
Status: DISCONTINUED | OUTPATIENT
Start: 2018-04-05 | End: 2018-04-06 | Stop reason: HOSPADM

## 2018-04-05 RX ORDER — ONDANSETRON 4 MG/1
4 TABLET, FILM COATED ORAL EVERY 6 HOURS PRN
Status: DISCONTINUED | OUTPATIENT
Start: 2018-04-05 | End: 2018-04-06 | Stop reason: HOSPADM

## 2018-04-05 RX ORDER — SODIUM CHLORIDE 0.9 % (FLUSH) 0.9 %
1-10 SYRINGE (ML) INJECTION AS NEEDED
Status: DISCONTINUED | OUTPATIENT
Start: 2018-04-05 | End: 2018-04-05 | Stop reason: HOSPADM

## 2018-04-05 RX ORDER — CEFAZOLIN SODIUM 2 G/100ML
2 INJECTION, SOLUTION INTRAVENOUS EVERY 8 HOURS
Status: COMPLETED | OUTPATIENT
Start: 2018-04-05 | End: 2018-04-06

## 2018-04-05 RX ADMIN — HYDROMORPHONE HYDROCHLORIDE: 10 INJECTION INTRAMUSCULAR; INTRAVENOUS; SUBCUTANEOUS at 21:55

## 2018-04-05 RX ADMIN — ONDANSETRON 4 MG: 2 INJECTION INTRAMUSCULAR; INTRAVENOUS at 12:42

## 2018-04-05 RX ADMIN — SUGAMMADEX 300 MG: 100 INJECTION, SOLUTION INTRAVENOUS at 11:36

## 2018-04-05 RX ADMIN — DOCUSATE SODIUM -SENNOSIDES 1 TABLET: 50; 8.6 TABLET, COATED ORAL at 21:45

## 2018-04-05 RX ADMIN — HYDROMORPHONE HYDROCHLORIDE 0.5 MG: 2 INJECTION INTRAMUSCULAR; INTRAVENOUS; SUBCUTANEOUS at 12:50

## 2018-04-05 RX ADMIN — PHENYLEPHRINE HYDROCHLORIDE 100 MCG: 10 INJECTION INTRAVENOUS at 11:32

## 2018-04-05 RX ADMIN — LIDOCAINE HYDROCHLORIDE 1 EACH: 40 SPRAY LARYNGEAL; TRANSTRACHEAL at 10:32

## 2018-04-05 RX ADMIN — CYCLOBENZAPRINE 10 MG: 10 TABLET, FILM COATED ORAL at 15:46

## 2018-04-05 RX ADMIN — EPHEDRINE SULFATE 5 MG: 50 INJECTION INTRAMUSCULAR; INTRAVENOUS; SUBCUTANEOUS at 10:50

## 2018-04-05 RX ADMIN — EMTRICITABINE AND TENOFOVIR ALAFENAMIDE 1 TABLET: 200; 25 TABLET ORAL at 21:45

## 2018-04-05 RX ADMIN — MIDAZOLAM 2 MG: 1 INJECTION INTRAMUSCULAR; INTRAVENOUS at 09:28

## 2018-04-05 RX ADMIN — OXYCODONE HYDROCHLORIDE AND ACETAMINOPHEN 2 TABLET: 5; 325 TABLET ORAL at 18:03

## 2018-04-05 RX ADMIN — TRAZODONE HYDROCHLORIDE 100 MG: 100 TABLET, FILM COATED ORAL at 21:45

## 2018-04-05 RX ADMIN — ROCURONIUM BROMIDE 30 MG: 10 INJECTION INTRAVENOUS at 10:30

## 2018-04-05 RX ADMIN — DIPHENHYDRAMINE HYDROCHLORIDE 25 MG: 25 CAPSULE ORAL at 14:15

## 2018-04-05 RX ADMIN — HYDROMORPHONE HYDROCHLORIDE 0.5 MG: 2 INJECTION INTRAMUSCULAR; INTRAVENOUS; SUBCUTANEOUS at 12:10

## 2018-04-05 RX ADMIN — ROCURONIUM BROMIDE 20 MG: 10 INJECTION INTRAVENOUS at 10:49

## 2018-04-05 RX ADMIN — FENTANYL CITRATE 50 MCG: 50 INJECTION, SOLUTION INTRAMUSCULAR; INTRAVENOUS at 09:20

## 2018-04-05 RX ADMIN — PHENYLEPHRINE HYDROCHLORIDE 100 MCG: 10 INJECTION INTRAVENOUS at 11:18

## 2018-04-05 RX ADMIN — HYDROMORPHONE HYDROCHLORIDE: 10 INJECTION INTRAMUSCULAR; INTRAVENOUS; SUBCUTANEOUS at 12:18

## 2018-04-05 RX ADMIN — FENTANYL CITRATE 50 MCG: 50 INJECTION, SOLUTION INTRAMUSCULAR; INTRAVENOUS at 12:35

## 2018-04-05 RX ADMIN — OXYCODONE HYDROCHLORIDE AND ACETAMINOPHEN 2 TABLET: 5; 325 TABLET ORAL at 14:09

## 2018-04-05 RX ADMIN — MIDAZOLAM 2 MG: 1 INJECTION INTRAMUSCULAR; INTRAVENOUS at 10:09

## 2018-04-05 RX ADMIN — CEFAZOLIN SODIUM 2 G: 2 INJECTION, SOLUTION INTRAVENOUS at 10:34

## 2018-04-05 RX ADMIN — FENTANYL CITRATE 50 MCG: 50 INJECTION, SOLUTION INTRAMUSCULAR; INTRAVENOUS at 10:08

## 2018-04-05 RX ADMIN — DOLUTEGRAVIR SODIUM 50 MG: 50 TABLET, FILM COATED ORAL at 21:46

## 2018-04-05 RX ADMIN — PHENYLEPHRINE HYDROCHLORIDE 100 MCG: 10 INJECTION INTRAVENOUS at 10:50

## 2018-04-05 RX ADMIN — FAMOTIDINE 20 MG: 10 INJECTION INTRAVENOUS at 09:20

## 2018-04-05 RX ADMIN — HYDROMORPHONE HYDROCHLORIDE 0.5 MG: 2 INJECTION INTRAMUSCULAR; INTRAVENOUS; SUBCUTANEOUS at 12:41

## 2018-04-05 RX ADMIN — FENTANYL CITRATE 100 MCG: 50 INJECTION INTRAMUSCULAR; INTRAVENOUS at 10:28

## 2018-04-05 RX ADMIN — FENTANYL CITRATE 50 MCG: 50 INJECTION INTRAMUSCULAR; INTRAVENOUS at 10:58

## 2018-04-05 RX ADMIN — HYDROMORPHONE HYDROCHLORIDE 0.5 MG: 2 INJECTION INTRAMUSCULAR; INTRAVENOUS; SUBCUTANEOUS at 12:27

## 2018-04-05 RX ADMIN — SODIUM CHLORIDE, POTASSIUM CHLORIDE, SODIUM LACTATE AND CALCIUM CHLORIDE: 600; 310; 30; 20 INJECTION, SOLUTION INTRAVENOUS at 10:48

## 2018-04-05 RX ADMIN — MAGNESIUM SULFATE HEPTAHYDRATE 2 G: 500 INJECTION, SOLUTION INTRAMUSCULAR; INTRAVENOUS at 11:07

## 2018-04-05 RX ADMIN — POTASSIUM CHLORIDE AND SODIUM CHLORIDE 100 ML/HR: 450; 150 INJECTION, SOLUTION INTRAVENOUS at 15:46

## 2018-04-05 RX ADMIN — DIPHENHYDRAMINE HYDROCHLORIDE 25 MG: 25 CAPSULE ORAL at 19:32

## 2018-04-05 RX ADMIN — LIDOCAINE HYDROCHLORIDE 80 MG: 20 INJECTION, SOLUTION INFILTRATION; PERINEURAL at 10:30

## 2018-04-05 RX ADMIN — PHENYLEPHRINE HYDROCHLORIDE 100 MCG: 10 INJECTION INTRAVENOUS at 11:06

## 2018-04-05 RX ADMIN — CYCLOBENZAPRINE 10 MG: 10 TABLET, FILM COATED ORAL at 19:49

## 2018-04-05 RX ADMIN — PHENYLEPHRINE HYDROCHLORIDE 100 MCG: 10 INJECTION INTRAVENOUS at 10:37

## 2018-04-05 RX ADMIN — FENTANYL CITRATE 50 MCG: 50 INJECTION, SOLUTION INTRAMUSCULAR; INTRAVENOUS at 12:21

## 2018-04-05 RX ADMIN — SODIUM CHLORIDE, POTASSIUM CHLORIDE, SODIUM LACTATE AND CALCIUM CHLORIDE: 600; 310; 30; 20 INJECTION, SOLUTION INTRAVENOUS at 10:25

## 2018-04-05 RX ADMIN — PROPOFOL 200 MG: 10 INJECTION, EMULSION INTRAVENOUS at 10:30

## 2018-04-05 RX ADMIN — CEFAZOLIN SODIUM 2 G: 2 INJECTION, SOLUTION INTRAVENOUS at 18:04

## 2018-04-05 RX ADMIN — PHENYLEPHRINE HYDROCHLORIDE 100 MCG: 10 INJECTION INTRAVENOUS at 10:40

## 2018-04-05 NOTE — INTERVAL H&P NOTE
H&P updated. The patient was examined and There are no changes.  I plan left L4-5, L5-S1 laminectomy

## 2018-04-05 NOTE — ANESTHESIA POSTPROCEDURE EVALUATION
"Patient: Eliseo Phan    Procedure Summary     Date:  04/05/18 Room / Location:  [facility] OR [address] [place] [address] MAIN OR    Anesthesia Start:  1025 Anesthesia Stop:  1155    Procedure:  L4-5, L5-S1 Lumbar Laminectomy (N/A Spine Lumbar) Diagnosis:       Lumbar stenosis with neurogenic claudication      (Lumbar stenosis with neurogenic claudication [M48.062])    Surgeon:  Ernie Curtis MD Provider:  Gayla Bravo MD    Anesthesia Type:  general ASA Status:  2          Anesthesia Type: general  Last vitals  BP   116/83 (04/05/18 1255)   Temp   36.5 °C (97.7 °F) (04/05/18 1255)   Pulse   75 (04/05/18 1255)   Resp   16 (04/05/18 1255)     SpO2   94 % (04/05/18 1255)     Post Anesthesia Care and Evaluation    Patient location during evaluation: PACU  Level of consciousness: awake    Comments: Blood pressure 116/83, pulse 75, temperature 36.5 °C (97.7 °F), temperature source Oral, resp. rate 16, height 177.8 cm (70\"), weight 73.7 kg (162 lb 6 oz), SpO2 94 %.    No anesthesia care post op    "

## 2018-04-05 NOTE — PLAN OF CARE
Problem: Patient Care Overview  Goal: Plan of Care Review  Outcome: Ongoing (interventions implemented as appropriate)   04/05/18 7440   Coping/Psychosocial   Plan of Care Reviewed With patient   Plan of Care Review   Progress improving   OTHER   Outcome Summary Plan is to go home with significant other at d/c. Pain is being controlled with PCA and po pain meds. Ambulating in the room to bathroom and doing well. Will continue to monitor.        Problem: Pain, Acute (Adult)  Goal: Identify Related Risk Factors and Signs and Symptoms  Outcome: Ongoing (interventions implemented as appropriate)    Goal: Acceptable Pain Control/Comfort Level  Outcome: Ongoing (interventions implemented as appropriate)      Problem: Fall Risk (Adult)  Goal: Identify Related Risk Factors and Signs and Symptoms  Outcome: Ongoing (interventions implemented as appropriate)    Goal: Absence of Fall  Outcome: Ongoing (interventions implemented as appropriate)

## 2018-04-05 NOTE — OP NOTE
Operative note    Preoperative diagnosis: Lumbar spinal stenosis L4-5, L5-S1    Postoperative diagnosis: Same    Procedure: Left L4-5, L5-S1 laminectomy, medial facetectomy, and foraminotomy    Surgeon: Ernie Curtis Jr., M.D.    Assistant: Felipa Glynn    Anesthetic: Gen.    Estimated blood loss: Minimal    Condition: Satisfactory    Description of procedure: The patient was anesthetized and positioned prone.  Bony prominences were well protected.  The back was prepped and draped in sterile fashion.  Incision was made down to lumbodorsal fascia.  This was divided on on the left side side of the spinous processes and subperiosteal elevation was carried out to the medial aspect of the facet joints.  Self-retaining retractors were placed.  Anatomic level was verified by image intensification.  The interspinous ligament was excised.  The upper laminae was removed completely out to within 8-10 mm of the pars intra-articularis at L4-5 and L5-S1.  A small portion of the cephalad aspect of the caudal lamina was excised with a Kerrison rongeur.  Medial facetectomies were performed bilaterally using Kerrison rongeur and osteotomes.  A high-speed natasha was used as well.  Foraminotomies were accomplished with a foraminotomy rongeur.   The disc was determined to be not impinging and stable and was not excised.  This process was repeated at the adjacent levels in identical fashion.  Upon completion of the decompression I could pass a ball probe through the affected foramina, and easily retract  the nerve roots  toward the midline.  Bleeding was controlled with bipolar cautery,and Gelfoam with thrombin and/ or FloSeal hemostatic matrix.  No dural trauma was sustained, and no CSF leakage was noted.  The wound was irrigated and then closed with #1 Vicryl in running and interrupted fashion for the lumbodorsal fascia.  2-0 Vicryl subcutaneously, followed by 4-0 Monocryl and Dermabond completed skin closure.  A dressing was applied  and the patient is about to be recovered.

## 2018-04-05 NOTE — PERIOPERATIVE NURSING NOTE
Patient complains of low back pain that radiates down left buttock, left leg all the way to toes with intermittent numbness and tingling.

## 2018-04-05 NOTE — ANESTHESIA PREPROCEDURE EVALUATION
Anesthesia Evaluation     Patient summary reviewed   history of anesthetic complications: PONV  NPO Solid Status: > 8 hours  NPO Liquid Status: > 4 hours           Airway   Mallampati: II  TM distance: >3 FB  Dental      Pulmonary    Cardiovascular     Rhythm: regular  Rate: normal        Neuro/Psych  GI/Hepatic/Renal/Endo    (+)  GERD,  hepatitis B,     Musculoskeletal     Abdominal    Substance History      OB/GYN          Other   (+) arthritis     ROS/Med Hx Other: HIV, undetectable viral count.      Phys Exam Other: Upper front all caps.                Anesthesia Plan    ASA 2     general     intravenous induction   Anesthetic plan and risks discussed with patient.

## 2018-04-05 NOTE — ANESTHESIA PROCEDURE NOTES
Airway  Urgency: elective    Airway not difficult    General Information and Staff    Patient location during procedure: OR  Anesthesiologist: DARY GRIER  CRNA: ALEJANDRINA BERNAL    Indications and Patient Condition  Indications for airway management: airway protection    Preoxygenated: yes  Mask difficulty assessment: 1 - vent by mask    Final Airway Details  Final airway type: endotracheal airway      Successful airway: ETT  Cuffed: yes   Successful intubation technique: direct laryngoscopy  Facilitating devices/methods: intubating stylet  Endotracheal tube insertion site: oral  Blade: Shahriar  Blade size: #4  ETT size: 7.5 mm  Cormack-Lehane Classification: grade III - view of epiglottis only  Placement verified by: chest auscultation and capnometry   Measured from: teeth  ETT to teeth (cm): 22  Number of attempts at approach: 1    Additional Comments  Atraumatic. Dentition as preop.

## 2018-04-06 VITALS
WEIGHT: 162.38 LBS | SYSTOLIC BLOOD PRESSURE: 105 MMHG | RESPIRATION RATE: 16 BRPM | OXYGEN SATURATION: 94 % | DIASTOLIC BLOOD PRESSURE: 56 MMHG | TEMPERATURE: 99.3 F | HEIGHT: 70 IN | HEART RATE: 98 BPM | BODY MASS INDEX: 23.25 KG/M2

## 2018-04-06 PROCEDURE — G0378 HOSPITAL OBSERVATION PER HR: HCPCS

## 2018-04-06 PROCEDURE — 25810000003 POTASSIUM CHLORIDE PER 2 MEQ: Performed by: ORTHOPAEDIC SURGERY

## 2018-04-06 PROCEDURE — 25010000003 CEFAZOLIN IN DEXTROSE 2-4 GM/100ML-% SOLUTION: Performed by: ORTHOPAEDIC SURGERY

## 2018-04-06 PROCEDURE — 99024 POSTOP FOLLOW-UP VISIT: CPT | Performed by: ORTHOPAEDIC SURGERY

## 2018-04-06 PROCEDURE — 25010000002 ONDANSETRON PER 1 MG: Performed by: ORTHOPAEDIC SURGERY

## 2018-04-06 RX ORDER — CYCLOBENZAPRINE HCL 10 MG
10 TABLET ORAL NIGHTLY PRN
Qty: 30 TABLET | Refills: 0 | Status: SHIPPED | OUTPATIENT
Start: 2018-04-06 | End: 2018-09-26

## 2018-04-06 RX ORDER — HYDROCODONE BITARTRATE AND ACETAMINOPHEN 5; 325 MG/1; MG/1
TABLET ORAL
Qty: 30 TABLET | Refills: 0 | Status: SHIPPED | OUTPATIENT
Start: 2018-04-06 | End: 2018-04-18 | Stop reason: HOSPADM

## 2018-04-06 RX ADMIN — CYCLOBENZAPRINE 10 MG: 10 TABLET, FILM COATED ORAL at 08:23

## 2018-04-06 RX ADMIN — POTASSIUM CHLORIDE AND SODIUM CHLORIDE 100 ML/HR: 450; 150 INJECTION, SOLUTION INTRAVENOUS at 01:27

## 2018-04-06 RX ADMIN — ONDANSETRON 4 MG: 2 INJECTION INTRAMUSCULAR; INTRAVENOUS at 01:27

## 2018-04-06 RX ADMIN — OXYCODONE HYDROCHLORIDE AND ACETAMINOPHEN 2 TABLET: 5; 325 TABLET ORAL at 06:58

## 2018-04-06 RX ADMIN — OXYCODONE HYDROCHLORIDE AND ACETAMINOPHEN 2 TABLET: 5; 325 TABLET ORAL at 02:56

## 2018-04-06 RX ADMIN — PANTOPRAZOLE SODIUM 40 MG: 40 TABLET, DELAYED RELEASE ORAL at 07:01

## 2018-04-06 RX ADMIN — CEFAZOLIN SODIUM 2 G: 2 INJECTION, SOLUTION INTRAVENOUS at 02:57

## 2018-04-06 RX ADMIN — Medication 10 ML: at 01:27

## 2018-04-06 RX ADMIN — CYCLOBENZAPRINE 10 MG: 10 TABLET, FILM COATED ORAL at 02:56

## 2018-04-06 NOTE — DISCHARGE SUMMARY
Orthopedic Discharge Summary      Patient: Eliseo Phan  YOB: 1965  Medical Record Number: [id number]    Attending Physician: Ernie Curtis MD  Consulting Physician(s):   Consults     No orders found for last 30 day(s).          Date of Admission: 4/5/2018  8:04 AM  Date of Discharge:4/6/2018    Discharge Diagnosis: L4-5, L5-S1 Lumbar Laminectomy,         Presenting Problem/History of Present Illness: Lumbar stenosis with neurogenic claudication [M48.062]  Lumbar stenosis with neurogenic claudication [M48.062]        Allergies: No Known Allergies    Discharge Medications   Eliseo Phan   Home Medication Instructions NELLY:[id number]    Printed on:04/06/18 0719   Medication Information                      acetaminophen (TYLENOL) 650 MG 8 hr tablet  1-2 tablets twice daily as needed for pain             Cholecalciferol (VITAMIN D) 1000 units tablet  Take 1,000 Units by mouth Daily.             cyclobenzaprine (FLEXERIL) 10 MG tablet  Take 1 tablet by mouth At Night As Needed for Muscle Spasms.             dolutegravir (TIVICAY) 50 MG tablet  Take 50 mg by mouth Daily.             Emtricitabine-Tenofovir AF (DESCOVY) 200-25 MG tablet  Take 1 tablet by mouth Daily.             HYDROcodone-acetaminophen (NORCO) 5-325 MG per tablet  One to two po q 4 hr prn pain             omeprazole (priLOSEC) 20 MG capsule  Take 20 mg by mouth Daily.             pentoxifylline (TRENtal) 400 MG CR tablet  Take 400 mg by mouth Daily.             traZODone (DESYREL) 100 MG tablet  One half to one tablet daily at bedtime when necessary insomnia                   Past Medical History:   Diagnosis Date   • Acid reflux    • Arthritis    • Hepatitis B    • History of kidney stones    • HIV (human immunodeficiency virus infection)    • PONV (postoperative nausea and vomiting)         Past Surgical History:   Procedure Laterality Date   • APPENDECTOMY     • BONE TUMOR EXCISION Right     right lower leg - as a child    • KNEE SURGERY Left 2011    knee scope        Social History     Occupational History   • care giver      Social History Main Topics   • Smoking status: Current Every Day Smoker     Packs/day: 0.50     Years: 10.00     Types: Cigarettes   • Smokeless tobacco: Never Used      Comment: TRYING TO QUIT   • Alcohol use No   • Drug use:      Types: Other      Comment: opiates, patient states he stopped 7 years ago   • Sexual activity: Defer      Social History     Social History Narrative   • No narrative on file        Family History   Problem Relation Age of Onset   • Heart disease Mother    • Hypertension Mother    • Diabetes Mother    • Thyroid disease Mother    • Depression Mother    • Anxiety disorder Mother    • Hypertension Father    • Depression Father    • Anxiety disorder Father    • Nephrolithiasis Brother          Physical Exam: 52 y.o. male  General Appearance:    Alert, cooperative, in no acute distress                      Vitals:    04/05/18 1320 04/05/18 1955 04/06/18 0011 04/06/18 0300   BP: 122/81 172/73 98/59 121/63   BP Location: Right arm Right arm Right arm Right arm   Patient Position: Sitting Lying Lying Lying   Pulse: 72 87 95 99   Resp:  16 16 16   Temp: 97.7 °F (36.5 °C) 98.4 °F (36.9 °C) 99.9 °F (37.7 °C) 98.9 °F (37.2 °C)   TempSrc: Oral Oral Oral Oral   SpO2:  94% 90% 93%   Weight:       Height:            DIAGNOSTIC TESTS:   Admission on 04/05/2018   Component Date Value Ref Range Status   • ABO Type 04/05/2018 A   Final   • RH type 04/05/2018 Positive   Final   • Antibody Screen 04/05/2018 Negative   Final   • T&S Expiration Date 04/05/2018 4/8/2018 11:59:59 PM   Final   Appointment on 04/04/2018   Component Date Value Ref Range Status   • WBC 04/04/2018 5.46  4.50 - 10.70 10*3/mm3 Final   • RBC 04/04/2018 5.35  4.60 - 6.00 10*6/mm3 Final   • Hemoglobin 04/04/2018 16.1  13.7 - 17.6 g/dL Final   • Hematocrit 04/04/2018 47.9  40.4 - 52.2 % Final   • MCV 04/04/2018 89.5  79.8 - 96.2 fL  Final   • MCH 04/04/2018 30.1  27.0 - 32.7 pg Final   • MCHC 04/04/2018 33.6  32.6 - 36.4 g/dL Final   • RDW 04/04/2018 13.1  11.5 - 14.5 % Final   • RDW-SD 04/04/2018 42.5  37.0 - 54.0 fl Final   • MPV 04/04/2018 9.7  6.0 - 12.0 fL Final   • Platelets 04/04/2018 178  140 - 500 10*3/mm3 Final   • Glucose 04/04/2018 90  65 - 99 mg/dL Final   • BUN 04/04/2018 17  6 - 20 mg/dL Final   • Creatinine 04/04/2018 1.35* 0.76 - 1.27 mg/dL Final   • Sodium 04/04/2018 140  136 - 145 mmol/L Final   • Potassium 04/04/2018 4.1  3.5 - 5.2 mmol/L Final   • Chloride 04/04/2018 103  98 - 107 mmol/L Final   • CO2 04/04/2018 26.6  22.0 - 29.0 mmol/L Final   • Calcium 04/04/2018 9.2  8.6 - 10.5 mg/dL Final   • Total Protein 04/04/2018 7.2  6.0 - 8.5 g/dL Final   • Albumin 04/04/2018 4.20  3.50 - 5.20 g/dL Final   • ALT (SGPT) 04/04/2018 17  1 - 41 U/L Final   • AST (SGOT) 04/04/2018 20  1 - 40 U/L Final   • Alkaline Phosphatase 04/04/2018 47  39 - 117 U/L Final   • Total Bilirubin 04/04/2018 0.3  0.1 - 1.2 mg/dL Final   • eGFR Non African Amer 04/04/2018 55* >60 mL/min/1.73 Final   • Globulin 04/04/2018 3.0  gm/dL Final   • A/G Ratio 04/04/2018 1.4  g/dL Final   • BUN/Creatinine Ratio 04/04/2018 12.6  7.0 - 25.0 Final   • Anion Gap 04/04/2018 10.4  mmol/L Final       No results found.    Hospital Course:  52 y.o. male admitted to [facility] to services of Ernie Curtis MD with Lumbar stenosis with neurogenic claudication [M48.062]  Lumbar stenosis with neurogenic claudication [M48.062] on 4/5/2018 and underwent L4-5, L5-S1 Lumbar Laminectomy  Per Ernie Curtis MD. Antibiotic and VTE prophylaxis were per [facility] protocols.  The patient was admitted to the floor where IV and/or oral pain medications were administered for postoperative pain.  At discharge the incisional pain was tolerable and preop neurologic function was intact.  The dressing was dry and the wound was clean.    Condition on Discharge:  Stable    Discharge  Instructions: . Patient may weight bear as tolerated unless otherwise specified. Continue SOPHIA hose daily (for two weeks) and ice regularly. Patient also instructed on incentive spirometer during hospitalization and encouraged to continue to use at home regularly. Patient may shower on POD #3 if and when all wound drainage has stopped.  Where applicable, the brace should be worn when up and about.  It need not be worn to the bathroom and certainly not in the shower.  A detailed list of instructions specific to the operation was given to the patient at the time of discharge.    Follow up Instructions:  Follow up in the office with Dr. Ernie Curtis Jr. in 2-3 weeks - patient to call the office at [phone] to schedule. Prescriptions were given for pain medication.    Follow-up Appointments  No future appointments.      Discharge Disposition Plan:today to home    Date: 4/6/2018    Ernie Curtis MD  04/06/18  7:19 AM

## 2018-04-09 NOTE — PROGRESS NOTES
Case Management Discharge Note    Final Note: home    Destination     No service coordination in this encounter.      Durable Medical Equipment     No service coordination in this encounter.      Dialysis/Infusion     No service coordination in this encounter.      Home Medical Care     No service coordination in this encounter.      Social Care     No service coordination in this encounter.        Other:  (car)    Final Discharge Disposition Code: 01 - home or self-care

## 2018-04-11 RX ORDER — METHYLPREDNISOLONE 4 MG/1
TABLET ORAL
Qty: 21 TABLET | Refills: 0 | Status: SHIPPED | OUTPATIENT
Start: 2018-04-11 | End: 2018-04-18

## 2018-04-16 ENCOUNTER — TELEPHONE (OUTPATIENT)
Dept: ORTHOPEDIC SURGERY | Facility: CLINIC | Age: 53
End: 2018-04-16

## 2018-04-18 ENCOUNTER — TELEPHONE (OUTPATIENT)
Dept: ORTHOPEDIC SURGERY | Facility: CLINIC | Age: 53
End: 2018-04-18

## 2018-04-18 ENCOUNTER — OFFICE VISIT (OUTPATIENT)
Dept: ORTHOPEDIC SURGERY | Facility: CLINIC | Age: 53
End: 2018-04-18

## 2018-04-18 VITALS — BODY MASS INDEX: 23.62 KG/M2 | TEMPERATURE: 97.8 F | WEIGHT: 165 LBS | HEIGHT: 70 IN

## 2018-04-18 DIAGNOSIS — Z98.890 S/P LAMINECTOMY: Primary | ICD-10-CM

## 2018-04-18 PROCEDURE — 72100 X-RAY EXAM L-S SPINE 2/3 VWS: CPT | Performed by: ORTHOPAEDIC SURGERY

## 2018-04-18 PROCEDURE — 99024 POSTOP FOLLOW-UP VISIT: CPT | Performed by: ORTHOPAEDIC SURGERY

## 2018-04-18 RX ORDER — PREGABALIN 50 MG/1
50 CAPSULE ORAL 3 TIMES DAILY
Qty: 90 CAPSULE | Refills: 0 | Status: SHIPPED | OUTPATIENT
Start: 2018-04-18 | End: 2018-06-11 | Stop reason: SDUPTHER

## 2018-04-18 NOTE — PROGRESS NOTES
He has persistent leg pain and seems to of failed to improve from the L4 5, L5-S1 left laminectomy.  The wound looks fine.  Straight leg raise is negative but he has slight weakness of the left EHL.  Two-view x-rays of the lumbar spine show the appropriate anatomic level and side of the laminectomy.  I'm going to put him on Lyrica and watch him along see him back in a month and try some physical therapy meantime.  If he fails to improve MRI with gadolinium, and EMG may be in the future.  No Further x-rays are needed.

## 2018-04-19 NOTE — TELEPHONE ENCOUNTER
SPOKE WITH PT MG  WE DID CALL IN HIM RX FOR LYRICA. THIS MED NEEDED A PRIOR AUTHORIZATION. THIS WAS SENT TO INSURANCE YESTERDAY AND WAITING ON THEIR RESPONSE. PT IS AWARE OF THIS. MG

## 2018-04-20 NOTE — PLAN OF CARE
- Recommend starting warm compresses x 10 minutes with lid massage BID OU Problem: Pain, Chronic (Adult)  Goal: Acceptable Pain Control/Comfort Level  Outcome: Ongoing (interventions implemented as appropriate)

## 2018-04-24 DIAGNOSIS — K21.9 GASTROESOPHAGEAL REFLUX DISEASE WITHOUT ESOPHAGITIS: ICD-10-CM

## 2018-04-25 RX ORDER — OMEPRAZOLE 20 MG/1
CAPSULE, DELAYED RELEASE ORAL
Qty: 30 CAPSULE | Refills: 5 | Status: SHIPPED | OUTPATIENT
Start: 2018-04-25 | End: 2018-10-09 | Stop reason: SDUPTHER

## 2018-04-26 ENCOUNTER — HOSPITAL ENCOUNTER (OUTPATIENT)
Dept: PHYSICAL THERAPY | Facility: HOSPITAL | Age: 53
Setting detail: THERAPIES SERIES
Discharge: HOME | End: 2018-04-26
Attending: ORTHOPAEDIC SURGERY

## 2018-04-26 DIAGNOSIS — G89.29 CHRONIC LEFT-SIDED LOW BACK PAIN WITH LEFT-SIDED SCIATICA: ICD-10-CM

## 2018-04-26 DIAGNOSIS — Z98.890 S/P LUMBAR LAMINECTOMY: Primary | ICD-10-CM

## 2018-04-26 DIAGNOSIS — M54.42 CHRONIC LEFT-SIDED LOW BACK PAIN WITH LEFT-SIDED SCIATICA: ICD-10-CM

## 2018-04-26 PROCEDURE — 97161 PT EVAL LOW COMPLEX 20 MIN: CPT | Performed by: PHYSICAL THERAPIST

## 2018-04-26 NOTE — THERAPY EVALUATION
Outpatient Physical Therapy Ortho Initial Evaluation  [facility]     Patient Name: Eliseo Phan  : 1965  MRN: [id number]  Today's Date: 2018      Visit Date: 2018    Patient Active Problem List   Diagnosis   • HIV (human immunodeficiency virus infection)   • Acid reflux   • Penis disorder   • Sciatica of left side   • Hepatitis B virus infection   • Colon cancer screening   • Drug addiction in remission   • Acute left-sided low back pain with left-sided sciatica   • Degenerative lumbar disc   • Health maintenance examination   • Lumbar disc herniation with radiculopathy   • Hypogonadism in male   • Symptoms involving urinary system   • Bulging lumbar disc   • Lumbar stenosis with neurogenic claudication        Past Medical History:   Diagnosis Date   • Acid reflux    • Arthritis    • Hepatitis B    • History of kidney stones    • HIV (human immunodeficiency virus infection)    • PONV (postoperative nausea and vomiting)         Past Surgical History:   Procedure Laterality Date   • APPENDECTOMY     • BONE TUMOR EXCISION Right     right lower leg - as a child   • KNEE SURGERY Left     knee scope   • LUMBAR LAMINECTOMY DISCECTOMY DECOMPRESSION N/A 2018    Procedure: L4-5, L5-S1 Lumbar Laminectomy;  Surgeon: Ernie Curtis MD;  Location: [facility];  Service: Neurosurgery       Visit Dx:     ICD-10-CM ICD-9-CM   1. S/P lumbar laminectomy Z98.890 V45.89   2. Chronic left-sided low back pain with left-sided sciatica M54.42 724.2    G89.29 724.3     338.29             Patient History     Row Name 18 0900             History    Chief Complaint Difficulty Walking;Difficulty with daily activities;Numbness;Tightness;Pain;Muscle weakness  -      Type of Pain Back pain;Lower Extremity / Leg  -      Date Current Problem(s) Began 17  -      Brief Description of Current Complaint Pt started with back pain about a year ago. He was having constant L LE. he did have  epidural injections that did not help. He had laminectomy on 4/5/18. He is having less leg pain over the last couple of days. 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 advil  -         Pain     Pain Location Back;Leg  -      Pain at Present 5  -      Pain at Worst 8  -      Pain Frequency Constant/continuous  -      Pain Description Sharp;Numbness  -      What Performance Factors Make the Current Problem(s) WORSE? standing, walking, extended sitting,   -      What Performance Factors Make the Current Problem(s) BETTER? hot tub, ice after surgery  -         Fall Risk Assessment    Any falls in the past year: No  -         Services    Prior Rehab/Home Health Experiences Yes  -         Daily Activities    Teaching needs identified Home Exercise Program;Management of Condition  -      Patient is concerned about/has problems with Climbing Stairs;Performing home management (household chores, shopping, care of dependents);Performing job responsibilities/community activities (work, school,;Standing;Walking  -      Pt Participated in POC and Goals Yes  -         Safety    Are you being hurt, hit, or frightened by anyone at home or in your life? No  -      Are you being neglected by a caregiver No  -        User Key  (r) = Recorded By, (t) = Taken By, (c) = Cosigned By    Initials Name Provider Type     Yusra Alanis PT Physical Therapist                PT Ortho     Row Name 04/26/18 0900       Posture/Observations    Lumbar  lordosis Mild;Decreased  -LH    Iliac crests Normal  -LH    Posture/Observations Comments incision healed with tenderness at the distal end  -LH       Sensory Screen for Light Touch- Lower Quarter Clearing    L1 (inguinal area) Intact  -LH    L2 (anterior mid thigh) Intact  -LH    L3 (distal anterior thigh) Intact  -LH    L4 (medial lower leg/foot) Left:;Diminished  -LH    L5 (lateral lower leg/great toe) Left:;Diminished  -LH    S1 (bottom of foot) Intact  -LH       Myotomal Screen- Lower Quarter Clearing    Hip flexion (L2) Right:;5 (Normal);Left:;4 (Good)  -LH    Knee extension (L3) Right:;5 (Normal);Left:;4+ (Good +)  -LH    Ankle DF (L4) Right:;5 (Normal);Left:;4- (Good -)  -LH    Great toe extension (L5) Right:;5 (Normal);Left:;4- (Good -)  -LH    Ankle PF (S1) Right:;5 (Normal);Left:;4 (Good)  -LH    Knee flexion (S2) Right:;5 (Normal);Left:;4+ (Good +)  -LH       Lumbosacral Palpation    Piriformis Left:;Tender;Guarded/taut  -LH    Gluteus Wayne Left:;Tender;Guarded/taut  -LH    Quadratus Lumborum Bilateral:;Tender;Guarded/taut;Left:;Trigger point  -    Erector Spinae (Paraspinals) Bilateral:;Tender;Guarded/taut;Left:;Trigger point  -LH       Head/Neck/Trunk    Trunk Flexion AROM 50%   tightness  -LH    Trunk Lt Lateral Flexion AROM 40%  -LH    Trunk Rt Lateral Flexion AROM 40%  -LH    Trunk Lt Rotation AROM 50%  -LH    Trunk Rt Rotation AROM 50%  -LH    Head/Neck/Trunk Comments some pain with L rotation  -LH       General Assessment (Manual Muscle Testing)    Comment, General Manual Muscle Testing (MMT) Assessment trunk strength 4/5  -LH       Lower Extremity Flexibility    Hamstrings Bilateral:;Moderately limited  -LH    Hip External Rotators Right:;Mildly limited;Left:;Moderately limited  -LH    Hip Internal Rotators Right:;Mildly limited;Left:;Moderately limited  -LH    Gastrocnemius Bilateral:;Mildly limited  -LH    Soleus Bilateral:;Mildly limited  -LH      User Key  (r) = Recorded By, (t) = Taken  By, (c) = Cosigned By    Initials Name Provider Type     Yusra Alanis PT Physical Therapist                      Therapy Education  Education Details: given red Guzmán back book for pt education  Given: HEP, Symptoms/condition management, Pain management, Posture/body mechanics  Program: New  How Provided: Verbal, Demonstration, Written  Provided to: Patient  Level of Understanding: Teach back education performed, Verbalized, Demonstrated           PT OP Goals     Row Name 04/26/18 1100          PT Short Term Goals    STG 1 Patient will be independent with education for symptom management, joint protection and strategies to minimize stress on affected tissues  -     STG 1 Progress New  -     STG 2 Pt to improve pain complaints to no more than 5/10 with ADLs  -     STG 2 Progress [facility]        Long Term Goals    LTG 1 Pt to improve pain complaints to no more than 2-3/10 with ADLs  -     LTG 1 Progress [facility]     LTG 2 Pt to improve trunk and  L LE strength by 1/2 to 1 MMT grade  -     LTG 2 Progress [facility]     LTG 3 Pt to improve Oswestry Back index score by 20% for overall functional improvement  -     LTG 3 Progress New  [facility]     LTG 4 Pt to be independent with HEP for ROM, flexibility and core strength  -     LTG 4 Progress New  -LH        Time Calculation    PT Goal Re-Cert Due Date 07/26/18  -       User Key  (r) = Recorded By, (t) = Taken By, (c) = Cosigned By    Initials Name Provider Type     Yusra Alanis PT Physical Therapist                PT Assessment/Plan     Row Name 04/26/18 1130          PT Assessment    Functional Limitations Limitation in home management;Limitations in community activities;Performance in work activities;Performance in leisure activities;Limitations in functional capacity and performance  -     Impairments Range of motion;Sensation;Muscle strength;Pain;Impaired flexibility;Joint mobility;Posture;Poor body mechanics  -     Assessment Comments  Pt presents to therapy s/p lumbar laminectomy on 4/5/18. He continues to have L LE radicular symptoms, which are a little less this week, and weakness in the L lower leg in L4/5 myotomes. His incision is healed with some swelling and tendenress over the distal incision. He has decreased lumbar AROM and mild pain on the L rising from flexion and with rotation. He has decreased trunk and L LE strength, decreased flexiblity of L>R LEs, pain with L SLR, and tenderness and tightness of the lumbar PVMs and L gluteals. Pt scored a 48% on the JUAN JOSE, with 0% being no disability. 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   -     Please refer to paper survey for additional self-reported information Yes  -LH     Rehab Potential Good  -     Patient/caregiver participated in establishment of treatment plan and goals Yes  -     Patient would benefit from skilled therapy intervention Yes  -        PT Plan    PT Frequency 2x/week  -     Predicted Duration of Therapy Intervention (OT Eval) 6-8 weeks  -     Planned CPT's? PT EVAL LOW COMPLEXITY: 82352;PT ULTRASOUND EA 15 MIN: 44194;PT MANUAL THERAPY EA 15 MIN: 80194;PT HOT/COLD PACK WC NONMCARE: 37384;PT THER PROC EA 15 MIN: 10431  -     Physical Therapy Interventions (Optional Details) joint mobilization;postural re-education;lumbar stabilization;ROM (Range of Motion);manual therapy techniques;stair training;strengthening;modalities;stretching;dry needling;home exercise program;patient/family education  -     PT Plan Comments plan to see pt 2x week for ROM, flexibilty, core strength, posture education, and pain control  -       User Key  (r) = Recorded By, (t) = Taken By, (c) = Cosigned By    Initials Name Provider Type     Yusra Alanis, PT Physical Therapist                  Exercises     Row Name 04/26/18 1100             Exercise 1     Exercise Name 1 PPT  -LH      Sets 1 1  -LH      Reps 1 10  -LH      Time 1 5 sec  -LH         Exercise 2    Exercise Name 2 hip add iso with pillow  -LH      Sets 2 1  -LH      Reps 2 10  -LH      Time 2 5 sec  -LH         Exercise 3    Exercise Name 3 SKTC  -LH      Sets 3 1  -LH      Reps 3 3  -LH      Time 3 20 sec  -LH         Exercise 4    Exercise Name 4 piriformis and ITBand stretching  -LH      Sets 4 1  -LH      Reps 4 3  -LH      Time 4 20 sec  -LH        User Key  (r) = Recorded By, (t) = Taken By, (c) = Cosigned By    Initials Name Provider Type     Yusra Alanis, PT Physical Therapist                        Outcome Measure Options: Cornelius Ackerman  Modified Oswestry  Modified Oswestry Score/Comments: 48%      Time Calculation:   Start Time: 0930  Stop Time: 1015  Time Calculation (min): 45 min     Therapy Charges for Today     Code Description Service Date Service Provider Modifiers Qty    12708985037 HC PT EVAL LOW COMPLEXITY 3 4/26/2018 Yusra Alanis, PT GP 1          PT G-Codes  Outcome Measure Options: Modifed Owestry         Yusra Alanis, PT  4/26/2018

## 2018-05-02 ENCOUNTER — HOSPITAL ENCOUNTER (OUTPATIENT)
Dept: PHYSICAL THERAPY | Facility: HOSPITAL | Age: 53
Setting detail: THERAPIES SERIES
Discharge: HOME | End: 2018-05-02

## 2018-05-02 DIAGNOSIS — G89.29 CHRONIC LEFT-SIDED LOW BACK PAIN WITH LEFT-SIDED SCIATICA: ICD-10-CM

## 2018-05-02 DIAGNOSIS — M54.42 CHRONIC LEFT-SIDED LOW BACK PAIN WITH LEFT-SIDED SCIATICA: ICD-10-CM

## 2018-05-02 DIAGNOSIS — Z98.890 S/P LUMBAR LAMINECTOMY: Primary | ICD-10-CM

## 2018-05-02 PROCEDURE — 97140 MANUAL THERAPY 1/> REGIONS: CPT | Performed by: PHYSICAL THERAPIST

## 2018-05-02 PROCEDURE — 97110 THERAPEUTIC EXERCISES: CPT | Performed by: PHYSICAL THERAPIST

## 2018-05-02 NOTE — THERAPY TREATMENT NOTE
Outpatient Physical Therapy Ortho Treatment Note   [facility]     Patient Name: Eliseo Phan  : 1965  MRN: [id number]  Today's Date: 2018      Visit Date: 2018    Visit Dx:    ICD-10-CM ICD-9-CM   1. S/P lumbar laminectomy Z98.890 V45.89   2. Chronic left-sided low back pain with left-sided sciatica M54.42 724.2    G89.29 724.3     338.29       Patient Active Problem List   Diagnosis   • HIV (human immunodeficiency virus infection)   • Acid reflux   • Penis disorder   • Sciatica of left side   • Hepatitis B virus infection   • Colon cancer screening   • Drug addiction in remission   • Acute left-sided low back pain with left-sided sciatica   • Degenerative lumbar disc   • Health maintenance examination   • Lumbar disc herniation with radiculopathy   • Hypogonadism in male   • Symptoms involving urinary system   • Bulging lumbar disc   • Lumbar stenosis with neurogenic claudication        Past Medical History:   Diagnosis Date   • Acid reflux    • Arthritis    • Hepatitis B    • History of kidney stones    • HIV (human immunodeficiency virus infection)    • PONV (postoperative nausea and vomiting)         Past Surgical History:   Procedure Laterality Date   • APPENDECTOMY     • BONE TUMOR EXCISION Right     right lower leg - as a child   • KNEE SURGERY Left     knee scope   • LUMBAR LAMINECTOMY DISCECTOMY DECOMPRESSION N/A 2018    Procedure: L4-5, L5-S1 Lumbar Laminectomy;  Surgeon: Ernie Curtis MD;  Location: [facility]  Service: Neurosurgery                             PT Assessment/Plan     Row Name 18 1157          PT Assessment    Assessment Comments Pt with complaints of tightness all the time in the L gluteals and LB. He is having radiating pain into the L anterior lower leg that is constant. We discussed dry needling and he was willing to try it. He was very sensative in the gluteals but he did tolerate it well and the muscles did relax. He had less pain in  the LE as well   -        PT Plan    PT Plan Comments cont  -LH       User Key  (r) = Recorded By, (t) = Taken By, (c) = Cosigned By    Initials Name Provider Type     Yusra Alanis PT Physical Therapist                    Exercises     Row Name 05/02/18 1000             Subjective Comments    Subjective Comments I haven't really done much with the exercises this week.   -LH         Subjective Pain    Able to rate subjective pain? yes  -LH      Pre-Treatment Pain Level 7   pain radiating into the L shin  -LH      Post-Treatment Pain Level 6  -LH         Exercise 1    Exercise Name 1 PPT  -LH      Sets 1 1  -LH      Reps 1 10  -LH      Time 1 5 sec  -LH         Exercise 2    Exercise Name 2 hip add iso with pillow  -LH      Sets 2 1  -LH      Reps 2 10  -LH      Time 2 5 sec  -LH         Exercise 3    Exercise Name 3 SKTC  -LH      Sets 3 1  -LH      Reps 3 3  -LH      Time 3 20 sec  -LH         Exercise 4    Exercise Name 4 piriformis and ITBand stretching  -LH      Sets 4 1  -LH      Reps 4 3  -LH      Time 4 20 sec  -LH        User Key  (r) = Recorded By, (t) = Taken By, (c) = Cosigned By    Initials Name Provider Type     Yusra Alanis, PT Physical Therapist                        Manual Rx (last 36 hours)      Manual Treatments     Row Name 05/02/18 1100             Manual Rx 1    Manual Rx 1 Location L erector spinae, glute max and glute med  -      Manual Rx 1 Type intramuscular manual therapy using direct technique. LTRs were achieved. Palpation was done before, during, and after tx. Obtained written and verbal consent to treat.   -LH      Manual Rx 1 Duration 8  -LH         Manual Rx 2    Manual Rx 2 Location L gluteals in R SL  -      Manual Rx 2 Type STM to above with hands and use of smooth end of the hand tool, sacral and ITBand release  -      Manual Rx 2 Duration 15  -LH        User Key  (r) = Recorded By, (t) = Taken By, (c) = Cosigned By    Initials Name Provider Type     Yusra STEVE  Zeke, PT Physical Therapist              Therapy Education  Education Details: given info for dry needling  Given: HEP, Symptoms/condition management, Pain management, Posture/body mechanics  Program: Reinforced  How Provided: Verbal  Provided to: Patient  Level of Understanding: Teach back education performed              Time Calculation:   Start Time: 1016  Stop Time: 1100  Time Calculation (min): 44 min  Total Timed Code Minutes- PT: 42 minute(s)    Therapy Charges for Today     Code Description Service Date Service Provider Modifiers Qty    65338542232  PT THER PROC EA 15 MIN 5/2/2018 Yusra Alanis, PT GP 2    [id number]  PT MANUAL THERAPY EA 15 MIN 5/2/2018 Yusra Alanis, PT GP 1                    Yusra Alanis, PT  5/2/2018

## 2018-05-04 ENCOUNTER — HOSPITAL ENCOUNTER (OUTPATIENT)
Dept: PHYSICAL THERAPY | Facility: HOSPITAL | Age: 53
Setting detail: THERAPIES SERIES
Discharge: HOME | End: 2018-05-04

## 2018-05-04 DIAGNOSIS — M54.42 CHRONIC LEFT-SIDED LOW BACK PAIN WITH LEFT-SIDED SCIATICA: ICD-10-CM

## 2018-05-04 DIAGNOSIS — G89.29 CHRONIC LEFT-SIDED LOW BACK PAIN WITH LEFT-SIDED SCIATICA: ICD-10-CM

## 2018-05-04 DIAGNOSIS — Z98.890 S/P LUMBAR LAMINECTOMY: Primary | ICD-10-CM

## 2018-05-04 PROCEDURE — 97110 THERAPEUTIC EXERCISES: CPT | Performed by: PHYSICAL THERAPIST

## 2018-05-04 PROCEDURE — 97035 APP MDLTY 1+ULTRASOUND EA 15: CPT | Performed by: PHYSICAL THERAPIST

## 2018-05-04 PROCEDURE — 97140 MANUAL THERAPY 1/> REGIONS: CPT | Performed by: PHYSICAL THERAPIST

## 2018-05-04 NOTE — THERAPY TREATMENT NOTE
Outpatient Physical Therapy Ortho Treatment Note   [facility]     Patient Name: Eliseo Phan  : 1965  MRN: [id number]  Today's Date: 2018      Visit Date: 2018    Visit Dx:    ICD-10-CM ICD-9-CM   1. S/P lumbar laminectomy Z98.890 V45.89   2. Chronic left-sided low back pain with left-sided sciatica M54.42 724.2    G89.29 724.3     338.29       Patient Active Problem List   Diagnosis   • HIV (human immunodeficiency virus infection)   • Acid reflux   • Penis disorder   • Sciatica of left side   • Hepatitis B virus infection   • Colon cancer screening   • Drug addiction in remission   • Acute left-sided low back pain with left-sided sciatica   • Degenerative lumbar disc   • Health maintenance examination   • Lumbar disc herniation with radiculopathy   • Hypogonadism in male   • Symptoms involving urinary system   • Bulging lumbar disc   • Lumbar stenosis with neurogenic claudication        Past Medical History:   Diagnosis Date   • Acid reflux    • Arthritis    • Hepatitis B    • History of kidney stones    • HIV (human immunodeficiency virus infection)    • PONV (postoperative nausea and vomiting)         Past Surgical History:   Procedure Laterality Date   • APPENDECTOMY     • BONE TUMOR EXCISION Right     right lower leg - as a child   • KNEE SURGERY Left     knee scope   • LUMBAR LAMINECTOMY DISCECTOMY DECOMPRESSION N/A 2018    Procedure: L4-5, L5-S1 Lumbar Laminectomy;  Surgeon: Ernie Curtis MD;  Location: [facility]  Service: Neurosurgery                             PT Assessment/Plan     Row Name 18 0951          PT Assessment    Assessment Comments Pt did get about 3 hours of relief from dry needling and had no L lower leg pain at that time. He is still very taut in the L piriformis and gluteals, getting some relief from ischemic pressure to the muscle   -LH        PT Plan    PT Plan Comments cont  -LH       User Key  (r) = Recorded By, (t) = Taken By, (c) =  Cosigned By    Initials Name Provider Type     Yusra Alanis PT Physical Therapist                Modalities     Row Name 05/04/18 0800             Ultrasound 28966    Location L LB, piriformis  -LH      Rx Minutes 10  -LH      Duty Cycle 100  -LH      Frequency 1.0 MHz  -LH      Intensity - Wts/cm 1.6  -LH        User Key  (r) = Recorded By, (t) = Taken By, (c) = Cosigned By    Initials Name Provider Type     Yusra Alanis PT Physical Therapist                Exercises     Row Name 05/04/18 0800             Subjective Comments    Subjective Comments I had about 3 hours of relief following the needling, and had a few hours of no pain in the shin.   -LH         Subjective Pain    Able to rate subjective pain? yes  -LH      Pre-Treatment Pain Level 6  -LH      Post-Treatment Pain Level 5  -LH         Exercise 1    Exercise Name 1 PPT  -LH      Sets 1 1  -LH      Reps 1 10  -LH      Time 1 5 sec  -LH         Exercise 2    Exercise Name 2 hip add iso with pillow  -LH      Sets 2 1  -LH      Reps 2 10  -LH      Time 2 5 sec  -LH         Exercise 3    Exercise Name 3 SKTC  -LH      Sets 3 1  -LH      Reps 3 3  -LH      Time 3 20 sec  -LH         Exercise 4    Exercise Name 4 piriformis and ITBand stretching  -LH      Sets 4 1  -LH      Reps 4 3  -LH      Time 4 20 sec  -LH         Exercise 5    Exercise Name 5 1/2 bridge with pillow  -LH      Sets 5 1  -LH      Reps 5 20  -LH         Exercise 6    Exercise Name 6 hip abd/ER with band  -LH      Sets 6 1  -LH      Reps 6 20  -LH      Additional Comments green  -LH        User Key  (r) = Recorded By, (t) = Taken By, (c) = Cosigned By    Initials Name Provider Type     Yusra Alanis PT Physical Therapist                        Manual Rx (last 36 hours)      Manual Treatments     Row Name 05/04/18 0900             Manual Rx 2    Manual Rx 2 Location L gluteals in R SL  -LH      Manual Rx 2 Type STM to above with hands and use of smooth end of the hand tool,  sacral and ITBand release, ischemic pressure on the proximal piriformis  -      Manual Rx 2 Duration 15  -LH        User Key  (r) = Recorded By, (t) = Taken By, (c) = Cosigned By    Initials Name Provider Type     Yusra Alanis, PT Physical Therapist              Therapy Education  Given: HEP, Symptoms/condition management, Pain management, Posture/body mechanics  Program: New  How Provided: Verbal, Written  Provided to: Patient  Level of Understanding: Teach back education performed              Time Calculation:   Start Time: 0845  Stop Time: 0930  Time Calculation (min): 45 min  Total Timed Code Minutes- PT: 42 minute(s)    Therapy Charges for Today     Code Description Service Date Service Provider Modifiers Qty    50481420486 HC PT THER PROC EA 15 MIN 5/4/2018 Yusra Alanis, PT GP 1    39807697988 HC PT ULTRASOUND EA 15 MIN 5/4/2018 Yusra Alanis, PT GP 1    93696725827 HC PT MANUAL THERAPY EA 15 MIN 5/4/2018 Yusra Alanis, PT GP 1                    Yusra Alanis, PT  5/4/2018

## 2018-05-09 ENCOUNTER — APPOINTMENT (OUTPATIENT)
Dept: PHYSICAL THERAPY | Facility: HOSPITAL | Age: 53
End: 2018-05-09

## 2018-05-11 ENCOUNTER — APPOINTMENT (OUTPATIENT)
Dept: PHYSICAL THERAPY | Facility: HOSPITAL | Age: 53
End: 2018-05-11

## 2018-05-14 ENCOUNTER — HOSPITAL ENCOUNTER (OUTPATIENT)
Dept: PHYSICAL THERAPY | Facility: HOSPITAL | Age: 53
Setting detail: THERAPIES SERIES
Discharge: HOME | End: 2018-05-14

## 2018-05-14 DIAGNOSIS — Z98.890 S/P LUMBAR LAMINECTOMY: Primary | ICD-10-CM

## 2018-05-14 DIAGNOSIS — G89.29 CHRONIC LEFT-SIDED LOW BACK PAIN WITH LEFT-SIDED SCIATICA: ICD-10-CM

## 2018-05-14 DIAGNOSIS — M54.42 CHRONIC LEFT-SIDED LOW BACK PAIN WITH LEFT-SIDED SCIATICA: ICD-10-CM

## 2018-05-14 PROCEDURE — 97110 THERAPEUTIC EXERCISES: CPT | Performed by: PHYSICAL THERAPIST

## 2018-05-14 PROCEDURE — 97140 MANUAL THERAPY 1/> REGIONS: CPT | Performed by: PHYSICAL THERAPIST

## 2018-05-14 NOTE — THERAPY TREATMENT NOTE
Outpatient Physical Therapy Ortho Treatment Note   [facility]     Patient Name: Eliseo Phan  : 1965  MRN: [id number]  Today's Date: 2018      Visit Date: 2018    Visit Dx:    ICD-10-CM ICD-9-CM   1. S/P lumbar laminectomy Z98.890 V45.89   2. Chronic left-sided low back pain with left-sided sciatica M54.42 724.2    G89.29 724.3     338.29       Patient Active Problem List   Diagnosis   • HIV (human immunodeficiency virus infection)   • Acid reflux   • Penis disorder   • Sciatica of left side   • Hepatitis B virus infection   • Colon cancer screening   • Drug addiction in remission   • Acute left-sided low back pain with left-sided sciatica   • Degenerative lumbar disc   • Health maintenance examination   • Lumbar disc herniation with radiculopathy   • Hypogonadism in male   • Symptoms involving urinary system   • Bulging lumbar disc   • Lumbar stenosis with neurogenic claudication        Past Medical History:   Diagnosis Date   • Acid reflux    • Arthritis    • Hepatitis B    • History of kidney stones    • HIV (human immunodeficiency virus infection)    • PONV (postoperative nausea and vomiting)         Past Surgical History:   Procedure Laterality Date   • APPENDECTOMY     • BONE TUMOR EXCISION Right     right lower leg - as a child   • KNEE SURGERY Left     knee scope   • LUMBAR LAMINECTOMY DISCECTOMY DECOMPRESSION N/A 2018    Procedure: L4-5, L5-S1 Lumbar Laminectomy;  Surgeon: Ernie Curtis MD;  Location: [facility];  Service: Neurosurgery                             PT Assessment/Plan     Row Name 18 0930          PT Assessment    Assessment Comments Pt was sick last week and was not doing as much physical activity. He did note that his leg pain was less. He was lifting some things yesterday and does have leg pain and increased L buttock tightness today. He did have release from the dry needling and had no leg pain with leaving therapy. Discussed core  activation with lifting  -        PT Plan    PT Plan Comments cont  -LH       User Key  (r) = Recorded By, (t) = Taken By, (c) = Cosigned By    Initials Name Provider Type     Yusra Alanis PT Physical Therapist                    Exercises     Row Name 05/14/18 0800             Subjective Comments    Subjective Comments I was sick last week  -         Subjective Pain    Able to rate subjective pain? yes  -LH      Pre-Treatment Pain Level 5  -LH      Post-Treatment Pain Level 3  -LH         Exercise 1    Exercise Name 1 PPT  -LH      Sets 1 1  -LH      Reps 1 10  -LH      Time 1 5 sec  -LH         Exercise 2    Exercise Name 2 hip add iso with pillow  -LH      Sets 2 1  -LH      Reps 2 10  -LH      Time 2 5 sec  -LH         Exercise 3    Exercise Name 3 SKTC  -LH      Sets 3 1  -LH      Reps 3 3  -LH      Time 3 20 sec  -LH         Exercise 4    Exercise Name 4 piriformis and ITBand stretching  -LH      Sets 4 1  -LH      Reps 4 3  -LH      Time 4 20 sec  -LH         Exercise 5    Exercise Name 5 1/2 bridge with pillow  -LH      Sets 5 1  -LH      Reps 5 20  -LH         Exercise 6    Exercise Name 6 hip abd/ER with band  -LH      Sets 6 1  -LH      Reps 6 20  -LH      Additional Comments green  -LH        User Key  (r) = Recorded By, (t) = Taken By, (c) = Cosigned By    Initials Name Provider Type     Yusra Alanis, PT Physical Therapist                        Manual Rx (last 36 hours)      Manual Treatments     Row Name 05/14/18 0900             Manual Rx 1    Manual Rx 1 Location L glute max and glute med  -LH      Manual Rx 1 Type intramuscular manual therapy using direct technique. LTRs were achieved. Palpation was done before, during, and after tx. Obtained verbal consent to treat.   -LH      Manual Rx 1 Duration 8  -LH         Manual Rx 2    Manual Rx 2 Location L gluteals in R SL  -LH      Manual Rx 2 Type STM to above with hands and use of smooth end of the hand tool, sacral and ITBand  release, ischemic pressure on the proximal piriformis  -      Manual Rx 2 Duration 15  -LH        User Key  (r) = Recorded By, (t) = Taken By, (c) = Cosigned By    Initials Name Provider Type     Yusra Alanis, PT Physical Therapist              Therapy Education  Given: HEP, Symptoms/condition management, Pain management, Posture/body mechanics  Program: Reinforced  How Provided: Verbal  Provided to: Patient  Level of Understanding: Teach back education performed              Time Calculation:   Start Time: 0845  Stop Time: 0930  Time Calculation (min): 45 min  Total Timed Code Minutes- PT: 43 minute(s)    Therapy Charges for Today     Code Description Service Date Service Provider Modifiers Qty    44683974557  PT THER PROC EA 15 MIN 5/14/2018 Yusra Alanis, PT GP 1    [id number]  PT MANUAL THERAPY EA 15 MIN 5/14/2018 Yusra Alanis, PT GP 2                    Yusra Alanis, PT  5/14/2018

## 2018-05-15 RX ORDER — TRAZODONE HYDROCHLORIDE 100 MG/1
TABLET ORAL
Qty: 30 TABLET | Refills: 6 | Status: SHIPPED | OUTPATIENT
Start: 2018-05-15 | End: 2018-12-10 | Stop reason: SDUPTHER

## 2018-05-16 ENCOUNTER — HOSPITAL ENCOUNTER (OUTPATIENT)
Dept: PHYSICAL THERAPY | Facility: HOSPITAL | Age: 53
Setting detail: THERAPIES SERIES
Discharge: HOME | End: 2018-05-16

## 2018-05-16 DIAGNOSIS — M54.42 CHRONIC LEFT-SIDED LOW BACK PAIN WITH LEFT-SIDED SCIATICA: ICD-10-CM

## 2018-05-16 DIAGNOSIS — G89.29 CHRONIC LEFT-SIDED LOW BACK PAIN WITH LEFT-SIDED SCIATICA: ICD-10-CM

## 2018-05-16 DIAGNOSIS — Z98.890 S/P LUMBAR LAMINECTOMY: Primary | ICD-10-CM

## 2018-05-16 PROCEDURE — 97110 THERAPEUTIC EXERCISES: CPT | Performed by: PHYSICAL THERAPIST

## 2018-05-16 PROCEDURE — 97140 MANUAL THERAPY 1/> REGIONS: CPT | Performed by: PHYSICAL THERAPIST

## 2018-05-16 PROCEDURE — 97035 APP MDLTY 1+ULTRASOUND EA 15: CPT | Performed by: PHYSICAL THERAPIST

## 2018-05-16 NOTE — THERAPY TREATMENT NOTE
Outpatient Physical Therapy Ortho Treatment Note   [facility]     Patient Name: Eliseo Phan  : 1965  MRN: [id number]  Today's Date: 2018      Visit Date: 2018    Visit Dx:    ICD-10-CM ICD-9-CM   1. S/P lumbar laminectomy Z98.890 V45.89   2. Chronic left-sided low back pain with left-sided sciatica M54.42 724.2    G89.29 724.3     338.29       Patient Active Problem List   Diagnosis   • HIV (human immunodeficiency virus infection)   • Acid reflux   • Penis disorder   • Sciatica of left side   • Hepatitis B virus infection   • Colon cancer screening   • Drug addiction in remission   • Acute left-sided low back pain with left-sided sciatica   • Degenerative lumbar disc   • Health maintenance examination   • Lumbar disc herniation with radiculopathy   • Hypogonadism in male   • Symptoms involving urinary system   • Bulging lumbar disc   • Lumbar stenosis with neurogenic claudication        Past Medical History:   Diagnosis Date   • Acid reflux    • Arthritis    • Hepatitis B    • History of kidney stones    • HIV (human immunodeficiency virus infection)    • PONV (postoperative nausea and vomiting)         Past Surgical History:   Procedure Laterality Date   • APPENDECTOMY     • BONE TUMOR EXCISION Right     right lower leg - as a child   • KNEE SURGERY Left     knee scope   • LUMBAR LAMINECTOMY DISCECTOMY DECOMPRESSION N/A 2018    Procedure: L4-5, L5-S1 Lumbar Laminectomy;  Surgeon: Ernie Curtis MD;  Location: [facility];  Service: Neurosurgery                             PT Assessment/Plan     Row Name 18 1125          PT Assessment    Assessment Comments when patient is not at work, he is having less pain and less tightness overall. 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 Discussed stress managing techniques, relaxation breathing to help with this  -        PT Plan    PT Plan Comments cont  -LH       User Key  (r) = Recorded By, (t) =  Taken By, (c) = Cosigned By    Initials Name Provider Type     Yusra Alanis PT Physical Therapist                Modalities     Row Name 05/16/18 0900             Ultrasound 08239    Location L LB, piriformis  -LH      Rx Minutes 9  -LH      Duty Cycle 100  -LH      Frequency 1.0 MHz  -LH      Intensity - Wts/cm 1.6  -LH        User Key  (r) = Recorded By, (t) = Taken By, (c) = Cosigned By    Initials Name Provider Type     Yusra Alanis PT Physical Therapist                Exercises     Row Name 05/16/18 0900             Subjective Comments    Subjective Comments yesterday and this am I am really sore and painful  -         Subjective Pain    Able to rate subjective pain? yes  -LH      Pre-Treatment Pain Level 8   radiating to top of foot.  -LH      Post-Treatment Pain Level 4  -LH         Exercise 1    Exercise Name 1 PPT  -LH      Sets 1 1  -LH      Reps 1 10  -LH      Time 1 5 sec  -LH         Exercise 2    Exercise Name 2 hip add iso with pillow  -LH      Sets 2 1  -LH      Reps 2 10  -LH      Time 2 5 sec  -LH         Exercise 3    Exercise Name 3 SKTC  -LH      Sets 3 1  -LH      Reps 3 3  -LH      Time 3 20 sec  -LH         Exercise 4    Exercise Name 4 piriformis and ITBand stretching  -LH      Sets 4 1  -LH      Reps 4 3  -LH      Time 4 20 sec  -LH         Exercise 5    Exercise Name 5 1/2 bridge with pillow  -LH      Sets 5 1  -LH      Reps 5 20  -LH         Exercise 6    Exercise Name 6 hip abd/ER with band  -LH      Sets 6 1  -LH      Reps 6 20  -LH      Additional Comments green  -LH         Exercise 7    Exercise Name 7 supine horiz abd and D2 flex  -LH      Sets 7 1  -LH      Reps 7 20  -LH      Additional Comments green  -LH        User Key  (r) = Recorded By, (t) = Taken By, (c) = Cosigned By    Initials Name Provider Type     Yusra Alanis PT Physical Therapist                        Manual Rx (last 36 hours)      Manual Treatments     Row Name 05/16/18 1100              Manual Rx 2    Manual Rx 2 Location L gluteals and lower lumbar PVMs in R SL  -LH      Manual Rx 2 Type STM to above with hands and use of smooth end of the hand tool, sacral and ITBand release, ischemic pressure on the proximal piriformis  -      Manual Rx 2 Duration 15  -LH        User Key  (r) = Recorded By, (t) = Taken By, (c) = Cosigned By    Initials Name Provider Type     Yusra Alanis, PT Physical Therapist              Therapy Education  Given: HEP, Symptoms/condition management, Pain management, Posture/body mechanics  Program: New  How Provided: Verbal, Written  Provided to: Patient  Level of Understanding: Teach back education performed              Time Calculation:   Start Time: 0933  Stop Time: 1015  Time Calculation (min): 42 min  Total Timed Code Minutes- PT: 41 minute(s)    Therapy Charges for Today     Code Description Service Date Service Provider Modifiers Qty    18571806483  PT THER PROC EA 15 MIN 5/16/2018 Yusra Alanis, PT GP 1    [id number] HC PT ULTRASOUND EA 15 MIN 5/16/2018 Yusra Alanis, PT GP 1    [id number]  PT MANUAL THERAPY EA 15 MIN 5/16/2018 Yusra Alanis, PT GP 1                    Yusra Alanis, PT  5/16/2018

## 2018-05-23 ENCOUNTER — HOSPITAL ENCOUNTER (OUTPATIENT)
Dept: PHYSICAL THERAPY | Facility: HOSPITAL | Age: 53
Setting detail: THERAPIES SERIES
Discharge: HOME | End: 2018-05-23

## 2018-05-23 DIAGNOSIS — G89.29 CHRONIC LEFT-SIDED LOW BACK PAIN WITH LEFT-SIDED SCIATICA: ICD-10-CM

## 2018-05-23 DIAGNOSIS — Z98.890 S/P LUMBAR LAMINECTOMY: Primary | ICD-10-CM

## 2018-05-23 DIAGNOSIS — M54.42 CHRONIC LEFT-SIDED LOW BACK PAIN WITH LEFT-SIDED SCIATICA: ICD-10-CM

## 2018-05-23 PROCEDURE — 97110 THERAPEUTIC EXERCISES: CPT | Performed by: PHYSICAL THERAPIST

## 2018-05-23 PROCEDURE — 97140 MANUAL THERAPY 1/> REGIONS: CPT | Performed by: PHYSICAL THERAPIST

## 2018-05-23 NOTE — THERAPY PROGRESS REPORT/RE-CERT
Outpatient Physical Therapy Ortho Progress Note   [facility]     Patient Name: Eliseo Phan  : 1965  MRN: [id number]  Today's Date: 2018      Visit Date: 2018    Visit Dx:    ICD-10-CM ICD-9-CM   1. S/P lumbar laminectomy Z98.890 V45.89   2. Chronic left-sided low back pain with left-sided sciatica M54.42 724.2    G89.29 724.3     338.29       Patient Active Problem List   Diagnosis   • HIV (human immunodeficiency virus infection)   • Acid reflux   • Penis disorder   • Sciatica of left side   • Hepatitis B virus infection   • Colon cancer screening   • Drug addiction in remission   • Acute left-sided low back pain with left-sided sciatica   • Degenerative lumbar disc   • Health maintenance examination   • Lumbar disc herniation with radiculopathy   • Hypogonadism in male   • Symptoms involving urinary system   • Bulging lumbar disc   • Lumbar stenosis with neurogenic claudication        Past Medical History:   Diagnosis Date   • Acid reflux    • Arthritis    • Hepatitis B    • History of kidney stones    • HIV (human immunodeficiency virus infection)    • PONV (postoperative nausea and vomiting)         Past Surgical History:   Procedure Laterality Date   • APPENDECTOMY     • BONE TUMOR EXCISION Right     right lower leg - as a child   • KNEE SURGERY Left     knee scope   • LUMBAR LAMINECTOMY DISCECTOMY DECOMPRESSION N/A 2018    Procedure: L4-5, L5-S1 Lumbar Laminectomy;  Surgeon: Ernie Curtis MD;  Location: [facility];  Service: Neurosurgery                             PT Assessment/Plan     Row Name 18 1017          PT Assessment    Assessment Comments Pt's overall pain levels have improved over the past month. His pain is 5-6/10 on avg with occasional spikes up to 8-9/10. His higher levels of pain are less frequent and shorter in duration. He still has radiating pain into the L LE to the foot that is there the majority of the day. he has trigger points in the L  gluteals that have been improved with manual therapy  -        PT Plan    PT Plan Comments cont  -LH       User Key  (r) = Recorded By, (t) = Taken By, (c) = Cosigned By    Initials Name Provider Type     Yusra Alanis PT Physical Therapist                    Exercises     Row Name 05/23/18 0800             Subjective Comments    Subjective Comments I feel better throughout the day, but still pretty painful at the end of the day.   -         Subjective Pain    Able to rate subjective pain? yes  -      Pre-Treatment Pain Level 5  -LH      Post-Treatment Pain Level 3  -      Subjective Pain Comment pain at most 8-9/10  -LH         Exercise 1    Exercise Name 1 PPT  -LH      Sets 1 1  -LH      Reps 1 10  -LH      Time 1 5 sec  -LH         Exercise 2    Exercise Name 2 hip add iso with pillow  -LH      Sets 2 1  -LH      Reps 2 10  -LH      Time 2 5 sec  -LH         Exercise 3    Exercise Name 3 SKTC  -LH      Sets 3 1  -LH      Reps 3 3  -LH      Time 3 20 sec  -LH         Exercise 4    Exercise Name 4 piriformis and ITBand stretching  -      Sets 4 1  -LH      Reps 4 3  -LH      Time 4 20 sec  -LH         Exercise 5    Exercise Name 5 1/2 bridge with pillow  -LH      Sets 5 1  -LH      Reps 5 20  -LH         Exercise 6    Exercise Name 6 hip abd/ER with band  -LH      Sets 6 1  -LH      Reps 6 20  -LH         Exercise 7    Exercise Name 7 supine horiz abd and D2 flex  -LH      Sets 7 1  -LH      Reps 7 20  -LH      Additional Comments green  -LH         Exercise 8    Exercise Name 8 quad cat/camel  -      Reps 8 10  -LH        User Key  (r) = Recorded By, (t) = Taken By, (c) = Cosigned By    Initials Name Provider Type     Yusra Alanis PT Physical Therapist                        Manual Rx (last 36 hours)      Manual Treatments     Row Name 05/23/18 0900             Manual Rx 1    Manual Rx 1 Location L glute max and glute med  -LH      Manual Rx 1 Type intramuscular manual therapy using  direct technique. LTRs were achieved. Palpation was done before, during, and after tx. Obtained verbal consent to treat.   -      Manual Rx 1 Duration 8  -LH         Manual Rx 2    Manual Rx 2 Location L gluteals and lower lumbar PVMs in R SL  -      Manual Rx 2 Type STM to above with hands and use of smooth end of the hand tool, sacral and ITBand release, ischemic pressure on the proximal piriformis  -      Manual Rx 2 Duration 15  -LH        User Key  (r) = Recorded By, (t) = Taken By, (c) = Cosigned By    Initials Name Provider Type     Yusra Alanis PT Physical Therapist                PT OP Goals     Row Name 05/23/18 1000          PT Short Term Goals    STG 1 Patient will be independent with education for symptom management, joint protection and strategies to minimize stress on affected tissues  -     STG 1 Progress Partially Met;Ongoing  -     STG 2 Pt to improve pain complaints to no more than 5/10 with ADLs  -     STG 2 Progress Ongoing  -     STG 2 Progress Comments some spikes up to 8-9/10  -        Long Term Goals    LTG 1 Pt to improve pain complaints to no more than 2-3/10 with ADLs  -     LTG 1 Progress Ongoing  -     LTG 2 Pt to improve trunk and  L LE strength by 1/2 to 1 MMT grade  -     LTG 2 Progress Ongoing  -     LTG 3 Pt to improve Oswestry Back index score by 20% for overall functional improvement  -     LTG 3 Progress Ongoing  -     LTG 4 Pt to be independent with HEP for ROM, flexibility and core strength  -     LTG 4 Progress Ongoing  -       User Key  (r) = Recorded By, (t) = Taken By, (c) = Cosigned By    Initials Name Provider Type     Yusra Alanis PT Physical Therapist          Therapy Education  Given: HEP, Symptoms/condition management, Pain management, Posture/body mechanics  Program: New  How Provided: Verbal  Provided to: Patient  Level of Understanding: Teach back education performed, Demonstrated              Time Calculation:   Start  Time: 0845  Stop Time: 0930  Time Calculation (min): 45 min  Total Timed Code Minutes- PT: 43 minute(s)    Therapy Charges for Today     Code Description Service Date Service Provider Modifiers Qty    [id number]  PT THER PROC EA 15 MIN 5/23/2018 Yusra Alanis, PT GP 1    [id number]  PT MANUAL THERAPY EA 15 MIN 5/23/2018 Yusra Alanis, PT GP 2                    Yusra Alanis, PT  5/23/2018

## 2018-05-30 ENCOUNTER — HOSPITAL ENCOUNTER (OUTPATIENT)
Dept: PHYSICAL THERAPY | Facility: HOSPITAL | Age: 53
Setting detail: THERAPIES SERIES
Discharge: HOME | End: 2018-05-30

## 2018-05-30 DIAGNOSIS — M54.42 CHRONIC LEFT-SIDED LOW BACK PAIN WITH LEFT-SIDED SCIATICA: ICD-10-CM

## 2018-05-30 DIAGNOSIS — Z98.890 S/P LUMBAR LAMINECTOMY: Primary | ICD-10-CM

## 2018-05-30 DIAGNOSIS — G89.29 CHRONIC LEFT-SIDED LOW BACK PAIN WITH LEFT-SIDED SCIATICA: ICD-10-CM

## 2018-05-30 PROCEDURE — 97110 THERAPEUTIC EXERCISES: CPT | Performed by: PHYSICAL THERAPIST

## 2018-05-30 PROCEDURE — 97140 MANUAL THERAPY 1/> REGIONS: CPT | Performed by: PHYSICAL THERAPIST

## 2018-05-30 PROCEDURE — 97035 APP MDLTY 1+ULTRASOUND EA 15: CPT | Performed by: PHYSICAL THERAPIST

## 2018-05-30 NOTE — THERAPY TREATMENT NOTE
Outpatient Physical Therapy Ortho Treatment Note   [facility]     Patient Name: Eliseo Phan  : 1965  MRN: [id number]  Today's Date: 2018      Visit Date: 2018    Visit Dx:    ICD-10-CM ICD-9-CM   1. S/P lumbar laminectomy Z98.890 V45.89   2. Chronic left-sided low back pain with left-sided sciatica M54.42 724.2    G89.29 724.3     338.29       Patient Active Problem List   Diagnosis   • HIV (human immunodeficiency virus infection)   • Acid reflux   • Penis disorder   • Sciatica of left side   • Hepatitis B virus infection   • Colon cancer screening   • Drug addiction in remission   • Acute left-sided low back pain with left-sided sciatica   • Degenerative lumbar disc   • Health maintenance examination   • Lumbar disc herniation with radiculopathy   • Hypogonadism in male   • Symptoms involving urinary system   • Bulging lumbar disc   • Lumbar stenosis with neurogenic claudication        Past Medical History:   Diagnosis Date   • Acid reflux    • Arthritis    • Hepatitis B    • History of kidney stones    • HIV (human immunodeficiency virus infection)    • PONV (postoperative nausea and vomiting)         Past Surgical History:   Procedure Laterality Date   • APPENDECTOMY     • BONE TUMOR EXCISION Right     right lower leg - as a child   • KNEE SURGERY Left     knee scope   • LUMBAR LAMINECTOMY DISCECTOMY DECOMPRESSION N/A 2018    Procedure: L4-5, L5-S1 Lumbar Laminectomy;  Surgeon: Ernie Curtis MD;  Location: [facility];  Service: Neurosurgery                             PT Assessment/Plan     Row Name 18 0927          PT Assessment    Assessment Comments Pt had a tooth abcess last week and has been ill with a fever. He is on antibiotics 4x daily and is doing better. He has not been as active and his back/LE have been a little less flared up. He has still been stretching at home  -        PT Plan    PT Plan Comments cont  -LH       User Key  (r) = Recorded By,  (t) = Taken By, (c) = Cosigned By    Initials Name Provider Type     Yusra Alanis PT Physical Therapist                Modalities     Row Name 05/30/18 0800             Subjective Pain    Post-Treatment Pain Level 2  -         Ultrasound 26921    Location L LB, piriformis  -LH      Rx Minutes 10  -LH      Duty Cycle 100  -LH      Frequency 1.0 MHz  -LH      Intensity - Wts/cm 1.6  -LH        User Key  (r) = Recorded By, (t) = Taken By, (c) = Cosigned By    Initials Name Provider Type     Yusra Alanis PT Physical Therapist                Exercises     Row Name 05/30/18 0800             Subjective Comments    Subjective Comments I had an abcessed tooth and have been ill.   -LH         Subjective Pain    Able to rate subjective pain? yes  -LH      Pre-Treatment Pain Level 3  -LH      Post-Treatment Pain Level 2  -         Exercise 1    Exercise Name 1 PPT  -LH      Sets 1 1  -LH      Reps 1 10  -LH      Time 1 5 sec  -LH         Exercise 2    Exercise Name 2 hip add iso with pillow  -LH      Sets 2 1  -LH      Reps 2 10  -LH      Time 2 5 sec  -LH         Exercise 3    Exercise Name 3 SKTC  -LH      Sets 3 1  -LH      Reps 3 3  -LH      Time 3 20 sec  -LH         Exercise 4    Exercise Name 4 piriformis and ITBand stretching  -LH      Sets 4 1  -LH      Reps 4 3  -LH      Time 4 20 sec  -LH         Exercise 5    Exercise Name 5 1/2 bridge with pillow  -LH      Sets 5 1  -LH      Reps 5 20  -LH         Exercise 6    Exercise Name 6 hip abd/ER with band  -LH      Sets 6 --  -LH      Reps 6 --  -LH         Exercise 7    Exercise Name 7 supine horiz abd and D2 flex  -LH      Sets 7 --  -LH      Reps 7 --  -LH         Exercise 8    Exercise Name 8 quad cat/camel  -LH      Reps 8 --  -LH        User Key  (r) = Recorded By, (t) = Taken By, (c) = Cosigned By    Initials Name Provider Type     Yusra Alanis PT Physical Therapist                        Manual Rx (last 36 hours)      Manual Treatments      Row Name 05/30/18 0900             Manual Rx 2    Manual Rx 2 Location L gluteals and lower lumbar PVMs in R SL  -LH      Manual Rx 2 Type STM to above with hands and use of smooth end of the hand tool, sacral and ITBand release, ischemic pressure on the proximal piriformis  -      Manual Rx 2 Duration 20  -LH        User Key  (r) = Recorded By, (t) = Taken By, (c) = Cosigned By    Initials Name Provider Type     Yusra Alanis, PT Physical Therapist              Therapy Education  Given: HEP, Symptoms/condition management, Pain management, Posture/body mechanics  Program: Reinforced  How Provided: Verbal  Provided to: Patient  Level of Understanding: Teach back education performed, Demonstrated              Time Calculation:   Start Time: 0845  Stop Time: 0929  Time Calculation (min): 44 min  Total Timed Code Minutes- PT: 42 minute(s)    Therapy Charges for Today     Code Description Service Date Service Provider Modifiers Qty    [id number]  PT THER PROC EA 15 MIN 5/30/2018 Yusra Alanis, PT GP 1    [id number]  PT ULTRASOUND EA 15 MIN 5/30/2018 Yusra Alanis, PT GP 1    [id number]  PT MANUAL THERAPY EA 15 MIN 5/30/2018 Yusra Alanis, PT GP 1                    Yusra Alanis PT  5/30/2018

## 2018-06-01 ENCOUNTER — OFFICE VISIT (OUTPATIENT)
Dept: FAMILY MEDICINE CLINIC | Facility: CLINIC | Age: 53
End: 2018-06-01

## 2018-06-01 VITALS
TEMPERATURE: 98.3 F | DIASTOLIC BLOOD PRESSURE: 70 MMHG | SYSTOLIC BLOOD PRESSURE: 110 MMHG | BODY MASS INDEX: 23.34 KG/M2 | WEIGHT: 163 LBS | OXYGEN SATURATION: 95 % | HEART RATE: 88 BPM | HEIGHT: 70 IN

## 2018-06-01 DIAGNOSIS — J20.9 ACUTE BRONCHITIS, UNSPECIFIED ORGANISM: Primary | ICD-10-CM

## 2018-06-01 PROCEDURE — 99213 OFFICE O/P EST LOW 20 MIN: CPT | Performed by: NURSE PRACTITIONER

## 2018-06-01 RX ORDER — DEXTROMETHORPHAN HYDROBROMIDE AND PROMETHAZINE HYDROCHLORIDE 15; 6.25 MG/5ML; MG/5ML
5 SYRUP ORAL 4 TIMES DAILY PRN
Qty: 120 ML | Refills: 0 | Status: SHIPPED | OUTPATIENT
Start: 2018-06-01 | End: 2018-09-26

## 2018-06-01 RX ORDER — FLUTICASONE PROPIONATE 50 MCG
2 SPRAY, SUSPENSION (ML) NASAL DAILY
Qty: 1 BOTTLE | Refills: 5 | Status: SHIPPED | OUTPATIENT
Start: 2018-06-01 | End: 2019-08-24 | Stop reason: SDUPTHER

## 2018-06-01 RX ORDER — BENZONATATE 200 MG/1
200 CAPSULE ORAL 3 TIMES DAILY PRN
Qty: 20 CAPSULE | Refills: 1 | Status: SHIPPED | OUTPATIENT
Start: 2018-06-01 | End: 2018-09-26

## 2018-06-01 RX ORDER — CETIRIZINE HYDROCHLORIDE 10 MG/1
10 TABLET ORAL NIGHTLY PRN
Qty: 30 TABLET | Refills: 11 | Status: SHIPPED | OUTPATIENT
Start: 2018-06-01 | End: 2020-07-09

## 2018-06-04 NOTE — PROGRESS NOTES
Subjective   Eliseo Phan is a 52 y.o. male.     Patient complains cough congestion sore throat.  Ear Pressure several days  No chest pain shortness of breath  Taking some over-the-counter medicine helps some  Needs something for cough      hiv      Undetectable  3-4 mos       He would like Chantix to quit smoking  No history severe psychiatric illness    Allergies nasal          Cough   Associated symptoms include a sore throat. Pertinent negatives include no chills, fever or shortness of breath.   Otitis Media   Associated symptoms include coughing and a sore throat. Pertinent negatives include no chills, fatigue or fever.   Sore Throat    Associated symptoms include coughing. Pertinent negatives include no shortness of breath.        The following portions of the patient's history were reviewed and updated as appropriate: allergies, current medications, past family history, past social history, past surgical history and problem list.    Review of Systems   Constitutional: Negative for chills, fatigue and fever.   HENT: Positive for sore throat.    Respiratory: Positive for cough. Negative for shortness of breath.    Skin: Negative.    Psychiatric/Behavioral: Negative.    All other systems reviewed and are negative.      Objective   Physical Exam   Constitutional: He is oriented to person, place, and time. He appears well-developed and well-nourished. No distress.   HENT:   Head: Normocephalic and atraumatic.   Nose: Nose normal.   Mouth/Throat: Oropharynx is clear and moist.   Turbinates congested   Eyes: Conjunctivae are normal. Pupils are equal, round, and reactive to light.   Neck: Neck supple.   Cardiovascular: Normal rate, regular rhythm and normal heart sounds.    No murmur heard.  Pulmonary/Chest: Effort normal and breath sounds normal. No respiratory distress. He has no wheezes.   Abdominal: Soft. Bowel sounds are normal. He exhibits no distension and no mass. There is no tenderness. There is no  guarding. No hernia.   Musculoskeletal: He exhibits no edema or tenderness.   Lymphadenopathy:     He has no cervical adenopathy.   Neurological: He is alert and oriented to person, place, and time.   Skin: Skin is warm and dry. He is not diaphoretic.   Psychiatric: He has a normal mood and affect. His behavior is normal. Judgment and thought content normal.   Vitals reviewed.        Assessment/Plan   Eliseo was seen today for cough, otitis media and sore throat.    Diagnoses and all orders for this visit:    Acute bronchitis, unspecified organism    Other orders  -     varenicline (CHANTIX STARTING MONTH PAK) 0.5 MG X 11 & 1 MG X 42 tablet; Take 0.5 mg one daily on days 1-3 and and 0.5 mg twice daily on days 4-7.Then 1 mg twice daily for a total of 12 weeks.  -     benzonatate (TESSALON) 200 MG capsule; Take 1 capsule by mouth 3 (Three) Times a Day As Needed for Cough.  -     promethazine-dextromethorphan (PROMETHAZINE-DM) 6.25-15 MG/5ML syrup; Take 5 mL by mouth 4 (Four) Times a Day As Needed for Cough.  -     fluticasone (FLONASE) 50 MCG/ACT nasal spray; 2 sprays into each nostril Daily. As needed for allergies  -     cetirizine (zyrTEC) 10 MG tablet; Take 1 tablet by mouth At Night As Needed for Allergies. As needed allergies                  Symptomatic treatment  Push fluids  He's requesting cough but for the day Tessalon Perle  At night he can take promethazine with Phenergan    For allergies Flonase and Zyrtec    Risk-benefit Chantix he should do well change people places and things  A recheck in 3 weeks

## 2018-06-07 ENCOUNTER — HOSPITAL ENCOUNTER (OUTPATIENT)
Dept: PHYSICAL THERAPY | Facility: HOSPITAL | Age: 53
Setting detail: THERAPIES SERIES
Discharge: HOME | End: 2018-06-07

## 2018-06-07 DIAGNOSIS — G89.29 CHRONIC LEFT-SIDED LOW BACK PAIN WITH LEFT-SIDED SCIATICA: ICD-10-CM

## 2018-06-07 DIAGNOSIS — M54.42 CHRONIC LEFT-SIDED LOW BACK PAIN WITH LEFT-SIDED SCIATICA: ICD-10-CM

## 2018-06-07 DIAGNOSIS — Z98.890 S/P LUMBAR LAMINECTOMY: Primary | ICD-10-CM

## 2018-06-07 PROCEDURE — 97110 THERAPEUTIC EXERCISES: CPT | Performed by: PHYSICAL THERAPIST

## 2018-06-07 PROCEDURE — 97035 APP MDLTY 1+ULTRASOUND EA 15: CPT | Performed by: PHYSICAL THERAPIST

## 2018-06-07 PROCEDURE — 97140 MANUAL THERAPY 1/> REGIONS: CPT | Performed by: PHYSICAL THERAPIST

## 2018-06-07 NOTE — THERAPY TREATMENT NOTE
Outpatient Physical Therapy Ortho Treatment Note  [facility]     Patient Name: Eliseo Phan  : 1965  MRN: [id number]  Today's Date: 2018      Visit Date: 2018    Visit Dx:    ICD-10-CM ICD-9-CM   1. S/P lumbar laminectomy Z98.890 V45.89   2. Chronic left-sided low back pain with left-sided sciatica M54.42 724.2    G89.29 724.3     338.29       Patient Active Problem List   Diagnosis   • HIV (human immunodeficiency virus infection)   • Acid reflux   • Penis disorder   • Sciatica of left side   • Hepatitis B virus infection   • Colon cancer screening   • Drug addiction in remission   • Acute left-sided low back pain with left-sided sciatica   • Degenerative lumbar disc   • Health maintenance examination   • Lumbar disc herniation with radiculopathy   • Hypogonadism in male   • Symptoms involving urinary system   • Bulging lumbar disc   • Lumbar stenosis with neurogenic claudication        Past Medical History:   Diagnosis Date   • Acid reflux    • Arthritis    • Hepatitis B    • History of kidney stones    • HIV (human immunodeficiency virus infection)    • PONV (postoperative nausea and vomiting)         Past Surgical History:   Procedure Laterality Date   • APPENDECTOMY     • BONE TUMOR EXCISION Right     right lower leg - as a child   • KNEE SURGERY Left     knee scope   • LUMBAR LAMINECTOMY DISCECTOMY DECOMPRESSION N/A 2018    Procedure: L4-5, L5-S1 Lumbar Laminectomy;  Surgeon: Ernie Curtis MD;  [facility];  Service: Neurosurgery                             PT Assessment/Plan     Row Name 18 1149          PT Assessment    Assessment Comments Pt is still healing from his tooth abcess. He has been a little more active this past week and his back has been pretty sore. 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 He has been having to move furniture (mostly scooting it) with the help of a friend. He has tenderness,  tightness, and trigger points in the L gluteals with pain that is radiating into the L LE to the calf/foot. He does get relief from therapy but usually for about 1day.   -        PT Plan    PT Plan Comments cont to work on core strength and stability, pain control  -       User Key  (r) = Recorded By, (t) = Taken By, (c) = Cosigned By    Initials Name Provider Type     Ysura Alanis, PT Physical Therapist                Modalities     Row Name 06/07/18 1000             Ultrasound 77790    Location L LB, piriformis  -      Rx Minutes 9  -LH      Duty Cycle 100  -      Frequency 1.0 MHz  -      Intensity - Wts/cm 1.6  -LH        User Key  (r) = Recorded By, (t) = Taken By, (c) = Cosigned By    Initials Name Provider Type     Yusra Alanis, PT Physical Therapist                Exercises     Row Name 06/07/18 1000             Subjective Comments    Subjective Comments We are having some painting done at the house and i have had to move some furniture. , mainly scooting it around.   -         Subjective Pain    Able to rate subjective pain? yes  -      Pre-Treatment Pain Level 7  -      Subjective Pain Comment pain into L buttock and lower leg  -         Exercise 1    Exercise Name 1 PPT  -LH      Sets 1 1  -LH      Reps 1 10  -LH      Time 1 5 sec  -LH         Exercise 2    Exercise Name 2 hip add iso with pillow  -LH      Sets 2 1  -LH      Reps 2 10  -LH      Time 2 5 sec  -LH         Exercise 3    Exercise Name 3 SKTC  -LH      Sets 3 1  -LH      Reps 3 3  -LH      Time 3 20 sec  -LH         Exercise 4    Exercise Name 4 piriformis and ITBand stretching  -LH      Sets 4 1  -LH      Reps 4 3  -LH      Time 4 20 sec  -LH         Exercise 5    Exercise Name 5 1/2 bridge with pillow  -LH      Sets 5 1  -LH      Reps 5 20  -LH         Exercise 6    Exercise Name 6 hip abd/ER with band  -LH      Sets 6 1  -LH      Reps 6 20  -LH      Additional Comments green  -LH         Exercise 7    Exercise  Name 7 supine horiz abd and D2 flex  -LH         Exercise 8    Exercise Name 8 quad cat/camel  -LH        User Key  (r) = Recorded By, (t) = Taken By, (c) = Cosigned By    Initials Name Provider Type     Yusra Alanis PT Physical Therapist                        Manual Rx (last 36 hours)      Manual Treatments     Row Name 06/07/18 1100             Manual Rx 2    Manual Rx 2 Location L gluteals and lower lumbar PVMs in R SL  -LH      Manual Rx 2 Type STM to above with hands and use of smooth end of the hand tool, sacral and ITBand release, ischemic pressure on the proximal piriformis  -      Manual Rx 2 Duration 12  -LH        User Key  (r) = Recorded By, (t) = Taken By, (c) = Cosigned By    Initials Name Provider Type     Yusra Alanis PT Physical Therapist              Therapy Education  Given: HEP, Symptoms/condition management, Pain management, Posture/body mechanics  Program: Reinforced  How Provided: Verbal  Provided to: Patient  Level of Understanding: Teach back education performed, Demonstrated              Time Calculation:   Start Time: 1020  Stop Time: 1100  Time Calculation (min): 40 min  Total Timed Code Minutes- PT: 40 minute(s)    Therapy Charges for Today     Code Description Service Date Service Provider Modifiers Qty    24594994617 HC PT THER PROC EA 15 MIN 6/7/2018 Yusra Alanis, PT GP 1    32804861387 HC PT ULTRASOUND EA 15 MIN 6/7/2018 Yusra Alanis, PT GP 1    15414309166 HC PT MANUAL THERAPY EA 15 MIN 6/7/2018 Yusra Alanis, PT GP 1                    Yusra Alanis, PT  6/7/2018

## 2018-06-11 ENCOUNTER — HOSPITAL ENCOUNTER (OUTPATIENT)
Dept: PHYSICAL THERAPY | Facility: HOSPITAL | Age: 53
Setting detail: THERAPIES SERIES
Discharge: HOME | End: 2018-06-11

## 2018-06-11 ENCOUNTER — TELEPHONE (OUTPATIENT)
Dept: ORTHOPEDIC SURGERY | Facility: CLINIC | Age: 53
End: 2018-06-11

## 2018-06-11 DIAGNOSIS — G89.29 CHRONIC LEFT-SIDED LOW BACK PAIN WITH LEFT-SIDED SCIATICA: ICD-10-CM

## 2018-06-11 DIAGNOSIS — Z98.890 S/P LUMBAR LAMINECTOMY: Primary | ICD-10-CM

## 2018-06-11 DIAGNOSIS — M54.42 CHRONIC LEFT-SIDED LOW BACK PAIN WITH LEFT-SIDED SCIATICA: ICD-10-CM

## 2018-06-11 PROCEDURE — 97035 APP MDLTY 1+ULTRASOUND EA 15: CPT | Performed by: PHYSICAL THERAPIST

## 2018-06-11 PROCEDURE — 97110 THERAPEUTIC EXERCISES: CPT | Performed by: PHYSICAL THERAPIST

## 2018-06-11 PROCEDURE — 97140 MANUAL THERAPY 1/> REGIONS: CPT | Performed by: PHYSICAL THERAPIST

## 2018-06-11 RX ORDER — PREGABALIN 50 MG/1
50 CAPSULE ORAL 3 TIMES DAILY
Qty: 90 CAPSULE | Refills: 0 | OUTPATIENT
Start: 2018-06-11 | End: 2018-09-26

## 2018-06-11 NOTE — THERAPY PROGRESS REPORT/RE-CERT
Outpatient Physical Therapy Ortho Progress Note  [facility]     Patient Name: Eliseo Phan  : 1965  MRN: [id number]  Today's Date: 2018      Visit Date: 2018    Visit Dx:    ICD-10-CM ICD-9-CM   1. S/P lumbar laminectomy Z98.890 V45.89   2. Chronic left-sided low back pain with left-sided sciatica M54.42 724.2    G89.29 724.3     338.29       Patient Active Problem List   Diagnosis   • HIV (human immunodeficiency virus infection)   • Acid reflux   • Penis disorder   • Sciatica of left side   • Hepatitis B virus infection   • Colon cancer screening   • Drug addiction in remission   • Acute left-sided low back pain with left-sided sciatica   • Degenerative lumbar disc   • Health maintenance examination   • Lumbar disc herniation with radiculopathy   • Hypogonadism in male   • Symptoms involving urinary system   • Bulging lumbar disc   • Lumbar stenosis with neurogenic claudication        Past Medical History:   Diagnosis Date   • Acid reflux    • Arthritis    • Hepatitis B    • History of kidney stones    • HIV (human immunodeficiency virus infection)    • PONV (postoperative nausea and vomiting)         Past Surgical History:   Procedure Laterality Date   • APPENDECTOMY     • BONE TUMOR EXCISION Right     right lower leg - as a child   • KNEE SURGERY Left     knee scope   • LUMBAR LAMINECTOMY DISCECTOMY DECOMPRESSION N/A 2018    Procedure: L4-5, L5-S1 Lumbar Laminectomy;  Surgeon: Ernie Curtis MD;  [facility];  Service: Neurosurgery             PT Ortho     Row Name 18 1100       Subjective Comments    Subjective Comments Overall pain levels have b een helped with therapy  -       Subjective Pain    Able to rate subjective pain? yes  -    Pre-Treatment Pain Level 6  -    Subjective Pain Comment pain at best 2/10, pain at most 8-9/10 depending on activity  -      User Key  (r) = Recorded By, (t) = Taken By, (c) = Cosigned By    Initials Name  Provider Type     Yusra Alanis PT Physical Therapist                            PT Assessment/Plan     Row Name 06/11/18 1147          PT Assessment    Assessment Comments Pt is still having L sided LBP with radicular symptoms into the L LE down to the foot. 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 He continues with tenderness and tightness over the L gluteals and lumbar PVMs with trigger points. He is improving his core strength and working on body mechanics with his ADLs and work. He can lie down to help releive his pain, 2/10 at the best and 8-9/10 at the most, and he reports that the severe pain is less frequent.   -        PT Plan    PT Plan Comments cont to work on core strength, stability, and pain control to improve function.   -       User Key  (r) = Recorded By, (t) = Taken By, (c) = Cosigned By    Initials Name Provider Type     Yusra Alanis PT Physical Therapist                Modalities     Row Name 06/11/18 1100             Ultrasound 61433    Location L LB, piriformis  -LH      Rx Minutes 10  -      Duty Cycle 100  -      Frequency 1.0 MHz  -      Intensity - Wts/cm 1.6  -        User Key  (r) = Recorded By, (t) = Taken By, (c) = Cosigned By    Initials Name Provider Type     Yusra Alanis, PT Physical Therapist                Exercises     Row Name 06/11/18 1100             Subjective Comments    Subjective Comments Overall pain levels have b een helped with therapy  -         Subjective Pain    Able to rate subjective pain? yes  -      Pre-Treatment Pain Level 6  -      Subjective Pain Comment pain at best 2/10, pain at most 8-9/10 depending on activity  -         Exercise 1    Exercise Name 1 PPT  -      Sets 1 1  -      Reps 1 10  -      Time 1 5 sec  -         Exercise 2    Exercise Name 2 hip add iso with pillow  -      Sets 2 1   -LH      Reps 2 10  -LH      Time 2 5 sec  -LH         Exercise 3    Exercise Name 3 SKTC  -LH      Sets 3 1  -LH      Reps 3 3  -LH      Time 3 20 sec  -LH         Exercise 4    Exercise Name 4 piriformis and ITBand stretching  -LH      Sets 4 1  -LH      Reps 4 3  -LH      Time 4 20 sec  -LH         Exercise 5    Exercise Name 5 1/2 bridge with pillow  -LH      Sets 5 1  -LH      Reps 5 20  -LH         Exercise 6    Exercise Name 6 hip abd/ER with band  -LH      Sets 6 1  -LH      Reps 6 20  -LH      Additional Comments green  -LH         Exercise 7    Exercise Name 7 supine horiz abd and D2 flex  -LH         Exercise 8    Exercise Name 8 quad cat/camel  -LH         Exercise 9    Exercise Name 9 sciatic nerve glide  -LH      Sets 9 2  -LH      Reps 9 10  -LH        User Key  (r) = Recorded By, (t) = Taken By, (c) = Cosigned By    Initials Name Provider Type     Yusra Alanis, PT Physical Therapist                        Manual Rx (last 36 hours)      Manual Treatments     Row Name 06/11/18 1100             Manual Rx 2    Manual Rx 2 Location L gluteals and lower lumbar PVMs in R SL  -LH      Manual Rx 2 Type STM to above with hands and use of smooth end of the hand tool, sacral and ITBand release, ischemic pressure on the proximal piriformis  -      Manual Rx 2 Duration 15  -LH        User Key  (r) = Recorded By, (t) = Taken By, (c) = Cosigned By    Initials Name Provider Type     Yusra Alanis, PT Physical Therapist                PT OP Goals     Row Name 06/11/18 1100          PT Short Term Goals    STG 1 Patient will be independent with education for symptom management, joint protection and strategies to minimize stress on affected tissues  -     STG 1 Progress Partially Met;Ongoing  -     STG 2 Pt to improve pain complaints to no more than 5/10 with ADLs  -     STG 2 Progress Ongoing  -        Long Term Goals    LTG 1 Pt to improve pain complaints to no more than 2-3/10 with ADLs  -LH      LTG 1 Progress Ongoing  -     LTG 2 Pt to improve trunk and  L LE strength by 1/2 to 1 MMT grade  -     LTG 2 Progress Ongoing  -     LTG 3 Pt to improve Oswestry Back index score by 20% for overall functional improvement  -     LTG 3 Progress Ongoing  -     LTG 3 Progress Comments improved 8%  -     LTG 4 Pt to be independent with HEP for ROM, flexibility and core strength  -     LTG 4 Progress Ongoing  -       User Key  (r) = Recorded By, (t) = Taken By, (c) = Cosigned By    Initials Name Provider Type     Yusra Alanis, PT Physical Therapist          Therapy Education  Given: HEP, Symptoms/condition management, Pain management, Posture/body mechanics  Program: New  How Provided: Verbal  Provided to: Patient  Level of Understanding: Teach back education performed, Demonstrated    Outcome Measure Options: Modifed Owestry  Modified Oswestry  Modified Oswestry Score/Comments: 40%      Time Calculation:   Start Time: 1100  Stop Time: 1145  Time Calculation (min): 45 min    Therapy Charges for Today     Code Description Service Date Service Provider Modifiers Qty    08001815926 HC PT THER PROC EA 15 MIN 6/11/2018 Yusra Alanis, PT GP 1    18896562835 HC PT ULTRASOUND EA 15 MIN 6/11/2018 Yusra Alanis, PT GP 1    38557606716 HC PT MANUAL THERAPY EA 15 MIN 6/11/2018 Yusra Alanis, PT GP 1          PT G-Codes  Outcome Measure Options: Modifed Owestry         Yusra Alanis, PT  6/11/2018

## 2018-06-12 ENCOUNTER — TELEPHONE (OUTPATIENT)
Dept: ORTHOPEDIC SURGERY | Facility: CLINIC | Age: 53
End: 2018-06-12

## 2018-06-12 NOTE — TELEPHONE ENCOUNTER
HIS INSURANCE DOES NOT COVER THE LYRICA 50 MG THAT YOU GAVE HIM.   IS THERE SOME THING ELSE YOU CAN GIVE HIM?  PLEASE ADVISE

## 2018-06-20 ENCOUNTER — APPOINTMENT (OUTPATIENT)
Dept: PHYSICAL THERAPY | Facility: HOSPITAL | Age: 53
End: 2018-06-20

## 2018-06-22 ENCOUNTER — TELEPHONE (OUTPATIENT)
Dept: ORTHOPEDIC SURGERY | Facility: CLINIC | Age: 53
End: 2018-06-22

## 2018-06-22 ENCOUNTER — DOCUMENTATION (OUTPATIENT)
Dept: ORTHOPEDIC SURGERY | Facility: CLINIC | Age: 53
End: 2018-06-22

## 2018-06-22 ENCOUNTER — HOSPITAL ENCOUNTER (OUTPATIENT)
Dept: PHYSICAL THERAPY | Facility: HOSPITAL | Age: 53
Setting detail: THERAPIES SERIES
Discharge: HOME | End: 2018-06-22

## 2018-06-22 DIAGNOSIS — Z98.1 S/P LAMINECTOMY WITH SPINAL FUSION: Primary | ICD-10-CM

## 2018-06-22 DIAGNOSIS — G89.29 CHRONIC LEFT-SIDED LOW BACK PAIN WITH LEFT-SIDED SCIATICA: ICD-10-CM

## 2018-06-22 DIAGNOSIS — M54.42 CHRONIC LEFT-SIDED LOW BACK PAIN WITH LEFT-SIDED SCIATICA: ICD-10-CM

## 2018-06-22 DIAGNOSIS — Z98.890 S/P LUMBAR LAMINECTOMY: Primary | ICD-10-CM

## 2018-06-22 PROCEDURE — 97035 APP MDLTY 1+ULTRASOUND EA 15: CPT | Performed by: PHYSICAL THERAPIST

## 2018-06-22 PROCEDURE — 97140 MANUAL THERAPY 1/> REGIONS: CPT | Performed by: PHYSICAL THERAPIST

## 2018-06-22 PROCEDURE — 97110 THERAPEUTIC EXERCISES: CPT | Performed by: PHYSICAL THERAPIST

## 2018-06-22 NOTE — THERAPY TREATMENT NOTE
Outpatient Physical Therapy Ortho Treatment Note  [facility]     Patient Name: Eliseo Phan  : 1965  MRN: [id number]  Today's Date: 2018      Visit Date: 2018    Visit Dx:    ICD-10-CM ICD-9-CM   1. S/P lumbar laminectomy Z98.890 V45.89   2. Chronic left-sided low back pain with left-sided sciatica M54.42 724.2    G89.29 724.3     338.29       Patient Active Problem List   Diagnosis   • HIV (human immunodeficiency virus infection)   • Acid reflux   • Penis disorder   • Sciatica of left side   • Hepatitis B virus infection   • Colon cancer screening   • Drug addiction in remission   • Acute left-sided low back pain with left-sided sciatica   • Degenerative lumbar disc   • Health maintenance examination   • Lumbar disc herniation with radiculopathy   • Hypogonadism in male   • Symptoms involving urinary system   • Bulging lumbar disc   • Lumbar stenosis with neurogenic claudication        Past Medical History:   Diagnosis Date   • Acid reflux    • Arthritis    • Hepatitis B    • History of kidney stones    • HIV (human immunodeficiency virus infection)    • PONV (postoperative nausea and vomiting)         Past Surgical History:   Procedure Laterality Date   • APPENDECTOMY     • BONE TUMOR EXCISION Right     right lower leg - as a child   • KNEE SURGERY Left     knee scope   • LUMBAR LAMINECTOMY DISCECTOMY DECOMPRESSION N/A 2018    Procedure: L4-5, L5-S1 Lumbar Laminectomy;  Surgeon: Ernie Curtis MD;  Location: [facility];  Service: Neurosurgery                             PT Assessment/Plan     Row Name 18 0937          PT Assessment    Assessment Comments Pt pain levels today are lower, but still having sharp pain upt o 8/10 and down the L LE. 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 He is working on relaxation breathing more to help with his L buttock and LB tightness  -        PT Plan    PT  Plan Comments cont to work on core strength, flexibility, and pain control  -       User Key  (r) = Recorded By, (t) = Taken By, (c) = Cosigned By    Initials Name Provider Type     Yusra Alanis, PT Physical Therapist                Modalities     Row Name 06/22/18 0800             Ultrasound 74617    Location L LB, piriformis  -      Duty Cycle 100  -      Frequency 1.0 MHz  -      Intensity - Wts/cm 1.6  -      85093 - PT Ultrasound Minutes 9  -LH         Other Treatment Provided    Rx Minutes 10  -LH        User Key  (r) = Recorded By, (t) = Taken By, (c) = Cosigned By    Initials Name Provider Type     Yusra Alanis, PT Physical Therapist                Exercises     Row Name 06/22/18 0900 06/22/18 0800          Subjective Comments    Subjective Comments  -- started on Neurontin, insurance wouldn't approve Lyrica.   -        Subjective Pain    Able to rate subjective pain?  -- yes  -     Pre-Treatment Pain Level  -- 3  -        Total Minutes    31658 - PT Therapeutic Exercise Minutes  -- 20  -LH     55072 - PT Manual Therapy Minutes 12  -LH  --        Exercise 1    Exercise Name 1  -- PPT  -LH     Sets 1  -- 1  -LH     Reps 1  -- 10  -LH     Time 1  -- 5 sec  -LH        Exercise 2    Exercise Name 2  -- hip add iso with pillow  -     Sets 2  -- 1  -LH     Reps 2  -- 10  -LH     Time 2  -- 5 sec  -LH        Exercise 3    Exercise Name 3  -- SKTC  -     Sets 3  -- 1  -LH     Reps 3  -- 3  -LH     Time 3  -- 20 sec  -LH        Exercise 4    Exercise Name 4  -- piriformis and ITBand stretching  -     Sets 4  -- 1  -LH     Reps 4  -- 3  -LH     Time 4  -- 20 sec  -LH        Exercise 5    Exercise Name 5  -- 1/2 bridge with pillow  -     Sets 5  -- 1  -LH     Reps 5  -- 20  -LH        Exercise 6    Exercise Name 6  -- hip abd/ER with band  -     Sets 6  -- 1  -LH     Reps 6  -- 20  -LH     Additional Comments  -- blue  -        Exercise 7    Exercise Name 7  -- supine horiz abd  and D2 flex  -     Sets 7  -- 1  -LH     Reps 7  -- 20  -     Additional Comments  -- green  -        Exercise 8    Exercise Name 8  -- quad cat/camel  -        Exercise 9    Exercise Name 9  -- sciatic nerve glide  -     Sets 9  -- 2  -LH     Reps 9  -- 10  -LH       User Key  (r) = Recorded By, (t) = Taken By, (c) = Cosigned By    Initials Name Provider Type     Yusra Alanis PT Physical Therapist                        Manual Rx (last 36 hours)      Manual Treatments     Row Name 06/22/18 0900             Total Minutes    38049 - PT Manual Therapy Minutes 12  -LH         Manual Rx 2    Manual Rx 2 Location L gluteals and lower lumbar PVMs in R SL  -      Manual Rx 2 Type STM to above with hands and use of smooth end of the hand tool, sacral and ITBand release, ischemic pressure on the proximal piriformis  -      Manual Rx 2 Duration 12  -LH        User Key  (r) = Recorded By, (t) = Taken By, (c) = Cosigned By    Initials Name Provider Type     Yusra Alanis PT Physical Therapist              Therapy Education  Given: HEP, Symptoms/condition management, Pain management, Posture/body mechanics  Program: Reinforced  How Provided: Verbal  Provided to: Patient  Level of Understanding: Teach back education performed, Demonstrated              Time Calculation:   Start Time: 0850  Stop Time: 0930  Time Calculation (min): 40 min  Total Timed Code Minutes- PT: 40 minute(s)  Therapy Suggested Charges     Code   Minutes Charges    49292 (CPT®)  Pt Neuromusc Re Education Ea 15 Min      17712 (CPT®) Hc Pt Ther Proc Ea 15 Min 20 1    59194 (CPT®) Hc Gait Training Ea 15 Min      60585 (CPT®) Hc Pt Therapeutic Act Ea 15 Min      93843 (CPT®) Hc Pt Manual Therapy Ea 15 Min 12 1    99331 (CPT®) Hc Pt Ther Massage- Per 15 Min      53445 (CPT®) Hc Pt Iontophoresis Ea 15 Min      10038 (CPT®) Hc Pt Elec Stim Ea-Per 15 Min      27528 (CPT®) Hc Pt Ultrasound Ea 15 Min 9 1    49872 (CPT®)  Pt Self  Care/Mgmt/Train Ea 15 Min      Total  41 3        Therapy Charges for Today     Code Description Service Date Service Provider Modifiers Qty    09258690917 HC PT THER PROC EA 15 MIN 6/22/2018 Yusra Alanis, PT GP 1    74938226709 HC PT MANUAL THERAPY EA 15 MIN 6/22/2018 Yusra Alanis, PT GP 1    03465191299 HC PT ULTRASOUND EA 15 MIN 6/22/2018 Yusra Alanis, PT GP 1                    Yusra Alanis, PT  6/22/2018

## 2018-06-22 NOTE — TELEPHONE ENCOUNTER
Pt stopped by the office asking about an MRI.  He stated he is still hurting and the medication is not helping.  Order has been placed for lumbar MRI w/wo lucia. Per ERLIN.

## 2018-06-25 ENCOUNTER — HOSPITAL ENCOUNTER (OUTPATIENT)
Dept: PHYSICAL THERAPY | Facility: HOSPITAL | Age: 53
Setting detail: THERAPIES SERIES
Discharge: HOME | End: 2018-06-25

## 2018-06-25 DIAGNOSIS — M54.42 CHRONIC LEFT-SIDED LOW BACK PAIN WITH LEFT-SIDED SCIATICA: ICD-10-CM

## 2018-06-25 DIAGNOSIS — Z98.890 S/P LUMBAR LAMINECTOMY: Primary | ICD-10-CM

## 2018-06-25 DIAGNOSIS — G89.29 CHRONIC LEFT-SIDED LOW BACK PAIN WITH LEFT-SIDED SCIATICA: ICD-10-CM

## 2018-06-25 PROCEDURE — 97140 MANUAL THERAPY 1/> REGIONS: CPT | Performed by: PHYSICAL THERAPIST

## 2018-06-25 PROCEDURE — 97035 APP MDLTY 1+ULTRASOUND EA 15: CPT | Performed by: PHYSICAL THERAPIST

## 2018-06-25 PROCEDURE — 97110 THERAPEUTIC EXERCISES: CPT | Performed by: PHYSICAL THERAPIST

## 2018-06-25 NOTE — THERAPY TREATMENT NOTE
Outpatient Physical Therapy Ortho Treatment Note  [facility]     Patient Name: Eliseo Phan  : 1965  MRN: [id number]  Today's Date: 2018      Visit Date: 2018    Visit Dx:    ICD-10-CM ICD-9-CM   1. S/P lumbar laminectomy Z98.890 V45.89   2. Chronic left-sided low back pain with left-sided sciatica M54.42 724.2    G89.29 724.3     338.29       Patient Active Problem List   Diagnosis   • HIV (human immunodeficiency virus infection)   • Acid reflux   • Penis disorder   • Sciatica of left side   • Hepatitis B virus infection   • Colon cancer screening   • Drug addiction in remission   • Acute left-sided low back pain with left-sided sciatica   • Degenerative lumbar disc   • Health maintenance examination   • Lumbar disc herniation with radiculopathy   • Hypogonadism in male   • Symptoms involving urinary system   • Bulging lumbar disc   • Lumbar stenosis with neurogenic claudication        Past Medical History:   Diagnosis Date   • Acid reflux    • Arthritis    • Hepatitis B    • History of kidney stones    • HIV (human immunodeficiency virus infection)    • PONV (postoperative nausea and vomiting)         Past Surgical History:   Procedure Laterality Date   • APPENDECTOMY     • BONE TUMOR EXCISION Right     right lower leg - as a child   • KNEE SURGERY Left     knee scope   • LUMBAR LAMINECTOMY DISCECTOMY DECOMPRESSION N/A 2018    Procedure: L4-5, L5-S1 Lumbar Laminectomy;  Surgeon: Ernie Curtis MD;  Location: [facility];  Service: Neurosurgery                             PT Assessment/Plan     Row Name 18 1059          PT Assessment    Assessment Comments pt pain levels are coming down some, but he is still having sharp pains at times down the L LE. The frequency and duration has been reduced. He has tenderness over the L gluteals and lumbar PVMs. He reports that lifting or extended sitting (such as in a movie seat) continue to bother him  -        PT Plan    PT  Plan Comments cont to work on core strength, flexibility, and pain  -       User Key  (r) = Recorded By, (t) = Taken By, (c) = Cosigned By    Initials Name Provider Type     Yusra Alanis PT Physical Therapist                Modalities     Row Name 06/25/18 1000             Subjective Pain    Pre-Treatment Pain Level 5  -LH      Post-Treatment Pain Level 3  -LH         Ultrasound 96700    Location L LB, piriformis  -      Duty Cycle 100  -      Frequency 1.0 MHz  -      Intensity - Wts/cm 1.6  -LH      75690 - PT Ultrasound Minutes 9  -LH         Other Treatment Provided    Rx Minutes 10  -LH        User Key  (r) = Recorded By, (t) = Taken By, (c) = Cosigned By    Initials Name Provider Type     Yusra Alanis PT Physical Therapist                Exercises     Row Name 06/25/18 1000 06/25/18 0900          Subjective Comments    Subjective Comments Pt is having an MRI done on 7/9. He is having more times without intense pain  -  --        Subjective Pain    Able to rate subjective pain? yes  -LH  --     Pre-Treatment Pain Level 5  -LH  --     Post-Treatment Pain Level 3  -LH  --        Total Minutes    40206 - PT Therapeutic Exercise Minutes 18  -LH  --     31644 - PT Manual Therapy Minutes  -- 15  -LH        Exercise 1    Exercise Name 1 PPT  -LH  --     Sets 1 1  -LH  --     Reps 1 10  -LH  --     Time 1 5 sec  -LH  --        Exercise 2    Exercise Name 2 hip add iso with pillow  -LH  --     Sets 2 1  -LH  --     Reps 2 10  -LH  --     Time 2 5 sec  -LH  --        Exercise 3    Exercise Name 3 SKTC  -LH  --     Sets 3 1  -LH  --     Reps 3 3  -LH  --     Time 3 20 sec  -LH  --        Exercise 4    Exercise Name 4 piriformis and ITBand stretching  -LH  --     Sets 4 1  -LH  --     Reps 4 3  -LH  --     Time 4 20 sec  -LH  --        Exercise 5    Exercise Name 5 1/2 bridge with pillow  -LH  --     Sets 5 1  -LH  --     Reps 5 20  -LH  --        Exercise 6    Exercise Name 6 hip abd/ER with band   -LH  --     Sets 6 1  -LH  --     Reps 6 20  -LH  --     Additional Comments blue  -LH  --        Exercise 7    Exercise Name 7 supine horiz abd and D2 flex  -LH  --     Cueing 7 Verbal  -LH  --     Sets 7 1  -LH  --     Reps 7 20  -LH  --     Additional Comments blue  -LH  --        Exercise 8    Exercise Name 8 quad cat/camel  -LH  --        Exercise 9    Exercise Name 9 sciatic nerve glide  -LH  --     Sets 9 2  -LH  --     Reps 9 10  -LH  --       User Key  (r) = Recorded By, (t) = Taken By, (c) = Cosigned By    Initials Name Provider Type     Yusra Alanis, PT Physical Therapist                        Manual Rx (last 36 hours)      Manual Treatments     Row Name 06/25/18 0900             Total Minutes    28305 - PT Manual Therapy Minutes 15  -LH         Manual Rx 2    Manual Rx 2 Location L gluteals and lower lumbar PVMs in R SL  -LH      Manual Rx 2 Type STM to above with hands and use of smooth end of the hand tool, sacral and ITBand release, ischemic pressure on the proximal piriformis  -      Manual Rx 2 Duration 15  -LH        User Key  (r) = Recorded By, (t) = Taken By, (c) = Cosigned By    Initials Name Provider Type     Yusra Alanis PT Physical Therapist              Therapy Education  Given: HEP, Symptoms/condition management, Pain management, Posture/body mechanics  Program: Reinforced  How Provided: Verbal  Provided to: Patient  Level of Understanding: Teach back education performed, Demonstrated              Time Calculation:   Start Time: 1015  Stop Time: 1058  Time Calculation (min): 43 min  Total Timed Code Minutes- PT: 43 minute(s)  Therapy Suggested Charges     Code   Minutes Charges    63458 (CPT®) Hc Pt Neuromusc Re Education Ea 15 Min      90837 (CPT®) Hc Pt Ther Proc Ea 15 Min 18 1    46561 (CPT®) Hc Gait Training Ea 15 Min      37730 (CPT®) Hc Pt Therapeutic Act Ea 15 Min      62252 (CPT®) Hc Pt Manual Therapy Ea 15 Min 15 1    45624 (CPT®) Hc Pt Ther Massage- Per 15 Min       46690 (CPT®) Hc Pt Iontophoresis Ea 15 Min      33548 (CPT®) Hc Pt Elec Stim Ea-Per 15 Min      96556 (CPT®) Hc Pt Ultrasound Ea 15 Min 9 1    73524 (CPT®) Hc Pt Self Care/Mgmt/Train Ea 15 Min      Total  42 3        Therapy Charges for Today     Code Description Service Date Service Provider Modifiers Qty    33175094473 HC PT THER PROC EA 15 MIN 6/25/2018 Yusra Alanis, PT GP 1    79584193128 HC PT MANUAL THERAPY EA 15 MIN 6/25/2018 Yusra Alanis, PT GP 1    19638872578 HC PT ULTRASOUND EA 15 MIN 6/25/2018 Yusra Alanis, PT GP 1                    Yusra Alanis, PT  6/25/2018

## 2018-07-09 ENCOUNTER — HOSPITAL ENCOUNTER (OUTPATIENT)
Dept: PHYSICAL THERAPY | Facility: HOSPITAL | Age: 53
Setting detail: THERAPIES SERIES
Discharge: HOME | End: 2018-07-09

## 2018-07-09 ENCOUNTER — HOSPITAL ENCOUNTER (OUTPATIENT)
Dept: MRI IMAGING | Facility: HOSPITAL | Age: 53
Discharge: HOME | End: 2018-07-09
Attending: ORTHOPAEDIC SURGERY | Admitting: ORTHOPAEDIC SURGERY

## 2018-07-09 DIAGNOSIS — Z98.890 S/P LUMBAR LAMINECTOMY: Primary | ICD-10-CM

## 2018-07-09 DIAGNOSIS — M54.42 CHRONIC LEFT-SIDED LOW BACK PAIN WITH LEFT-SIDED SCIATICA: ICD-10-CM

## 2018-07-09 DIAGNOSIS — Z98.1 S/P LAMINECTOMY WITH SPINAL FUSION: ICD-10-CM

## 2018-07-09 DIAGNOSIS — G89.29 CHRONIC LEFT-SIDED LOW BACK PAIN WITH LEFT-SIDED SCIATICA: ICD-10-CM

## 2018-07-09 PROCEDURE — 97110 THERAPEUTIC EXERCISES: CPT | Performed by: PHYSICAL THERAPIST

## 2018-07-09 PROCEDURE — 72158 MRI LUMBAR SPINE W/O & W/DYE: CPT

## 2018-07-09 PROCEDURE — 97140 MANUAL THERAPY 1/> REGIONS: CPT | Performed by: PHYSICAL THERAPIST

## 2018-07-09 PROCEDURE — A9577 INJ MULTIHANCE: HCPCS | Performed by: ORTHOPAEDIC SURGERY

## 2018-07-09 PROCEDURE — 97035 APP MDLTY 1+ULTRASOUND EA 15: CPT | Performed by: PHYSICAL THERAPIST

## 2018-07-09 PROCEDURE — 82565 ASSAY OF CREATININE: CPT

## 2018-07-09 PROCEDURE — 0 GADOBENATE DIMEGLUMINE 529 MG/ML SOLUTION: Performed by: ORTHOPAEDIC SURGERY

## 2018-07-09 RX ADMIN — GADOBENATE DIMEGLUMINE 15 ML: 529 INJECTION, SOLUTION INTRAVENOUS at 10:44

## 2018-07-09 NOTE — THERAPY TREATMENT NOTE
Outpatient Physical Therapy Ortho Treatment Note  [facility]     Patient Name: Eliseo Phan  : 1965  MRN: [id number]  Today's Date: 2018      Visit Date: 2018    Visit Dx:    ICD-10-CM ICD-9-CM   1. S/P lumbar laminectomy Z98.890 V45.89   2. Chronic left-sided low back pain with left-sided sciatica M54.42 724.2    G89.29 724.3     338.29       Patient Active Problem List   Diagnosis   • HIV (human immunodeficiency virus infection) (CMS/HCC)   • Acid reflux   • Penis disorder   • Sciatica of left side   • Hepatitis B virus infection   • Colon cancer screening   • Drug addiction in remission (CMS/HCC)   • Acute left-sided low back pain with left-sided sciatica   • Degenerative lumbar disc   • Health maintenance examination   • Lumbar disc herniation with radiculopathy   • Hypogonadism in male   • Symptoms involving urinary system   • Bulging lumbar disc   • Lumbar stenosis with neurogenic claudication        Past Medical History:   Diagnosis Date   • Acid reflux    • Arthritis    • Hepatitis B    • History of kidney stones    • HIV (human immunodeficiency virus infection) (CMS/[facility])    • PONV (postoperative nausea and vomiting)         Past Surgical History:   Procedure Laterality Date   • APPENDECTOMY     • BONE TUMOR EXCISION Right     right lower leg - as a child   • KNEE SURGERY Left     knee scope   • LUMBAR LAMINECTOMY DISCECTOMY DECOMPRESSION N/A 2018    Procedure: L4-5, L5-S1 Lumbar Laminectomy;  Surgeon: Ernie Curtis MD;  Location: [facility];  Service: Neurosurgery                             PT Assessment/Plan     Row Name 18 0952          PT Assessment    Assessment Comments Pt is still having pain iwth extended activities such as sitting, walking, standing and with lifting. 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 Pt has repeat MRI today  -       User Key  (r) = Recorded By, (t) = Taken By, (c) = Cosigned By    Initials Name Provider Type     Yusra Alanis, PT Physical Therapist                Modalities     Row Name 07/09/18 0800             Subjective Pain    Post-Treatment Pain Level 2  -         Ultrasound 11835    Location L LB, piriformis  -      Duty Cycle 100  -      Frequency 1.0 MHz  -      Intensity - Wts/cm 1.6  -      36332 - PT Ultrasound Minutes 10  -LH         Other Treatment Provided    Rx Minutes 10  -LH        User Key  (r) = Recorded By, (t) = Taken By, (c) = Cosigned By    Initials Name Provider Type     Yusra Alanis PT Physical Therapist                Exercises     Row Name 07/09/18 0900 07/09/18 0800          Subjective Comments    Subjective Comments  -- I was travelling with his charge.  -        Subjective Pain    Able to rate subjective pain?  -- yes  -     Pre-Treatment Pain Level  -- 4  -     Post-Treatment Pain Level  -- 2  -     Subjective Pain Comment  -- pain at the most 9/10 walking in the airport  -        Total Minutes    26896 - PT Therapeutic Exercise Minutes  -- 20  -LH     26687 - PT Manual Therapy Minutes 15  -LH  --        Exercise 1    Exercise Name 1  -- PPT  -     Sets 1  -- 1  -LH     Reps 1  -- 10  -LH     Time 1  -- 5 sec  -LH        Exercise 2    Exercise Name 2  -- hip add iso with pillow  -     Sets 2  -- 1  -LH     Reps 2  -- 10  -LH     Time 2  -- 5 sec  -LH        Exercise 3    Exercise Name 3  -- SKTC  -     Sets 3  -- 1  -LH     Reps 3  -- 3  -LH     Time 3  -- 20 sec  -LH        Exercise 4    Exercise Name 4  -- piriformis and ITBand stretching  -     Sets 4  -- 1  -LH     Reps 4  -- 3  -LH     Time 4  -- 20 sec  -LH        Exercise 5    Exercise Name 5  -- 1/2 bridge with pillow  -     Sets 5  -- 1  -LH     Reps 5  -- 20  -LH        Exercise 6    Exercise Name  6  -- hip abd/ER with band  -LH     Sets 6  -- 1  -LH     Reps 6  -- 20  -LH     Additional Comments  -- blue  -LH        Exercise 7    Exercise Name 7  -- supine horiz abd and D2 flex  -LH     Cueing 7  -- Verbal  -LH     Sets 7  -- 1  -LH     Reps 7  -- 20  -LH     Additional Comments  -- blue  -LH        Exercise 8    Exercise Name 8  -- quad cat/camel  -     Reps 8  -- 10  -LH        Exercise 9    Exercise Name 9  -- sciatic nerve glide  -LH     Sets 9  -- --  -LH     Reps 9  -- --  -LH        Exercise 10    Exercise Name 10  -- prone alt hip ext over pillow  -     Cueing 10  -- Verbal;Tactile  -LH     Sets 10  -- 1  -LH     Reps 10  -- 10  -LH       User Key  (r) = Recorded By, (t) = Taken By, (c) = Cosigned By    Initials Name Provider Type     Yusra Alanis, PT Physical Therapist                        Manual Rx (last 36 hours)      Manual Treatments     Row Name 07/09/18 0900             Total Minutes    27655 - PT Manual Therapy Minutes 15  -LH         Manual Rx 2    Manual Rx 2 Location L gluteals and lower lumbar PVMs in R SL  -      Manual Rx 2 Type STM to above with hands and use of smooth end of the hand tool, sacral and ITBand release, ischemic pressure on the proximal piriformis  -      Manual Rx 2 Duration 15  -LH        User Key  (r) = Recorded By, (t) = Taken By, (c) = Cosigned By    Initials Name Provider Type     Yusra Alanis, PT Physical Therapist              Therapy Education  Given: HEP, Symptoms/condition management, Pain management, Posture/body mechanics  Program: New  How Provided: Verbal  Provided to: Patient  Level of Understanding: Teach back education performed, Demonstrated              Time Calculation:   Start Time: 0852  Stop Time: 0937  Time Calculation (min): 45 min  Total Timed Code Minutes- PT: 45 minute(s)  Therapy Suggested Charges     Code   Minutes Charges    65519 (CPT®)  Pt Neuromusc Re Education Ea 15 Min      23706 (CPT®) Hc Pt Ther Proc Ea 15 Min  20 1    31163 (CPT®) Hc Gait Training Ea 15 Min      73283 (CPT®) Hc Pt Therapeutic Act Ea 15 Min      33829 (CPT®) Hc Pt Manual Therapy Ea 15 Min 15 1    38603 (CPT®) Hc Pt Ther Massage- Per 15 Min      62725 (CPT®) Hc Pt Iontophoresis Ea 15 Min      58855 (CPT®) Hc Pt Elec Stim Ea-Per 15 Min      12062 (CPT®) Hc Pt Ultrasound Ea 15 Min 10 1    59959 (CPT®) Hc Pt Self Care/Mgmt/Train Ea 15 Min      Total  45 3        Therapy Charges for Today     Code Description Service Date Service Provider Modifiers Qty    58264069708 HC PT THER PROC EA 15 MIN 7/9/2018 Yusra Alanis, PT GP 1    87603707330 HC PT MANUAL THERAPY EA 15 MIN 7/9/2018 Yusra Alanis, PT GP 1    50274352126 HC PT ULTRASOUND EA 15 MIN 7/9/2018 Yusra Alanis, PT GP 1                    Yusra Alanis, PT  7/9/2018

## 2018-07-10 ENCOUNTER — TELEPHONE (OUTPATIENT)
Dept: ORTHOPEDIC SURGERY | Facility: CLINIC | Age: 53
End: 2018-07-10

## 2018-07-10 LAB — CREAT BLDA-MCNC: 1.4 MG/DL (ref 0.6–1.3)

## 2018-07-10 NOTE — TELEPHONE ENCOUNTER
Pt called back and he was informed of his results.  He already has a follow up appt scheduled for 8/1/2018.

## 2018-07-10 NOTE — TELEPHONE ENCOUNTER
----- Message from Ernie Curtis MD sent at 7/10/2018  7:59 AM EDT -----  Let him know that there is considerable wear and tear change but no new neurologic compression since the previous film.  I think it's a combination of multilevel degenerative change which is causing his current problem and I don't see a single, discrete the target to go after like last time.  If he likes to have him make an appointment and we'll spend some more time going over this.

## 2018-07-11 ENCOUNTER — HOSPITAL ENCOUNTER (OUTPATIENT)
Dept: PHYSICAL THERAPY | Facility: HOSPITAL | Age: 53
Setting detail: THERAPIES SERIES
Discharge: HOME | End: 2018-07-11

## 2018-07-11 DIAGNOSIS — M54.42 CHRONIC LEFT-SIDED LOW BACK PAIN WITH LEFT-SIDED SCIATICA: ICD-10-CM

## 2018-07-11 DIAGNOSIS — Z98.890 S/P LUMBAR LAMINECTOMY: Primary | ICD-10-CM

## 2018-07-11 DIAGNOSIS — G89.29 CHRONIC LEFT-SIDED LOW BACK PAIN WITH LEFT-SIDED SCIATICA: ICD-10-CM

## 2018-07-11 PROCEDURE — 97110 THERAPEUTIC EXERCISES: CPT | Performed by: PHYSICAL THERAPIST

## 2018-07-11 PROCEDURE — 97140 MANUAL THERAPY 1/> REGIONS: CPT | Performed by: PHYSICAL THERAPIST

## 2018-07-11 NOTE — THERAPY PROGRESS REPORT/RE-CERT
Outpatient Physical Therapy Ortho Progress Note  [facility]     Patient Name: Eliseo Phan  : 1965  MRN: [id number]  Today's Date: 2018      Visit Date: 2018    Visit Dx:    ICD-10-CM ICD-9-CM   1. S/P lumbar laminectomy Z98.890 V45.89   2. Chronic left-sided low back pain with left-sided sciatica M54.42 724.2    G89.29 724.3     338.29       Patient Active Problem List   Diagnosis   • HIV (human immunodeficiency virus infection) (CMS/HCC)   • Acid reflux   • Penis disorder   • Sciatica of left side   • Hepatitis B virus infection   • Colon cancer screening   • Drug addiction in remission (CMS/HCC)   • Acute left-sided low back pain with left-sided sciatica   • Degenerative lumbar disc   • Health maintenance examination   • Lumbar disc herniation with radiculopathy   • Hypogonadism in male   • Symptoms involving urinary system   • Bulging lumbar disc   • Lumbar stenosis with neurogenic claudication        Past Medical History:   Diagnosis Date   • Acid reflux    • Arthritis    • Hepatitis B    • History of kidney stones    • HIV (human immunodeficiency virus infection) (CMS/HCC)    • PONV (postoperative nausea and vomiting)         Past Surgical History:   Procedure Laterality Date   • APPENDECTOMY     • BONE TUMOR EXCISION Right     right lower leg - as a child   • KNEE SURGERY Left     knee scope   • LUMBAR LAMINECTOMY DISCECTOMY DECOMPRESSION N/A 2018    Procedure: L4-5, L5-S1 Lumbar Laminectomy;  Surgeon: Ernie Curtis MD;  Location: [facility];  Service: Neurosurgery             PT Ortho     Row Name 18 0900       Subjective Pain    Post-Treatment Pain Level 3  -LH       Lumbar/SI Special Tests    SLR (Neural Tension) Left:;Positive  -LH    SI Compression Test (SI Dysfunction) Positive  -LH    SI Distraction Test (SI Dysfunction) Positive   reduction of pain  -LH    IRINA (hip vs. SI Dysfunction) Left:;Positive  -LH      User Key  (r) = Recorded By, (t) = Taken  By, (c) = Cosigned By    Initials Name Provider Type     Yusra Alanis, PT Physical Therapist                            PT Assessment/Plan     Row Name 07/11/18 1104          PT Assessment    Assessment Comments Pt had repeat MRI and is meeting with MD in 3 weeks. He has continued pain with extended sitting, standing, and walking. He is (+) for neural tension tests on the L LE. He is continuing to improve his core strength, but is still weak in the deep muscles, like the multifidus. He does get some relief from manual lumbar distraction and therapy helps him for about 1-2 days after the sessions  -        PT Plan    PT Plan Comments cont to work on core strength and pain control  -       User Key  (r) = Recorded By, (t) = Taken By, (c) = Cosigned By    Initials Name Provider Type     Yusra Alanis, PT Physical Therapist                    Exercises     Row Name 07/11/18 0900             Subjective Comments    Subjective Comments A little more painful today. Had MRI and they told me I had a lot of changes in the spine  -         Subjective Pain    Able to rate subjective pain? yes  -LH      Pre-Treatment Pain Level 6  -LH      Post-Treatment Pain Level 3  -LH         Total Minutes    63320 - PT Therapeutic Exercise Minutes 20  -LH      74846 - PT Manual Therapy Minutes 23  -LH         Exercise 1    Exercise Name 1 PPT  -LH      Sets 1 1  -LH      Reps 1 10  -LH      Time 1 5 sec  -LH         Exercise 2    Exercise Name 2 hip add iso with pillow  -LH      Sets 2 1  -LH      Reps 2 10  -LH      Time 2 5 sec  -LH         Exercise 3    Exercise Name 3 SKTC  -LH      Sets 3 1  -LH      Reps 3 3  -LH      Time 3 20 sec  -LH         Exercise 4    Exercise Name 4 piriformis and ITBand stretching  -LH      Sets 4 1  -LH      Reps 4 3  -LH      Time 4 20 sec  -LH         Exercise 5    Exercise Name 5 1/2 bridge with pillow  -LH      Sets 5 1  -LH      Reps 5 20  -LH         Exercise 6    Exercise Name 6 hip  abd/ER with band  -LH      Sets 6 1  -LH      Reps 6 20  -LH      Additional Comments blue  -LH         Exercise 7    Exercise Name 7 supine horiz abd and D2 flex  -LH      Cueing 7 Verbal  -LH      Sets 7 1  -LH      Reps 7 20  -LH         Exercise 8    Exercise Name 8 quad cat/camel  -LH      Reps 8 10  -LH         Exercise 9    Exercise Name 9 sciatic nerve glide  -         Exercise 10    Exercise Name 10 prone alt hip ext over pillow  -LH      Cueing 10 Verbal;Tactile  -LH      Sets 10 1  -LH      Reps 10 10  -LH        User Key  (r) = Recorded By, (t) = Taken By, (c) = Cosigned By    Initials Name Provider Type     Yusra Alanis, PT Physical Therapist                        Manual Rx (last 36 hours)      Manual Treatments     Row Name 07/11/18 0900             Total Minutes    82475 - PT Manual Therapy Minutes 23  -LH         Manual Rx 2    Manual Rx 2 Location L gluteals and lower lumbar PVMs in R SL  -      Manual Rx 2 Type STM to above with hands and use of smooth end of the hand tool, sacral and ITBand release, ischemic pressure on the proximal piriformis  -      Manual Rx 2 Duration 15  -LH         Manual Rx 3    Manual Rx 3 Location lumbar spine  -      Manual Rx 3 Type light PA mobs to lower spine for mobility, manual distraction using cross hand technique   -      Manual Rx 3 Duration 8  -LH        User Key  (r) = Recorded By, (t) = Taken By, (c) = Cosigned By    Initials Name Provider Type     Yusra Alanis, PT Physical Therapist                PT OP Goals     Row Name 07/11/18 1100          PT Short Term Goals    STG 1 Patient will be independent with education for symptom management, joint protection and strategies to minimize stress on affected tissues  -     STG 1 Progress Partially Met;Ongoing  -     STG 2 Pt to improve pain complaints to no more than 5/10 with ADLs  -     STG 2 Progress Ongoing  -        Long Term Goals    LTG 1 Pt to improve pain complaints to no more  than 2-3/10 with ADLs  -     LTG 1 Progress Ongoing  -     LTG 2 Pt to improve trunk and  L LE strength by 1/2 to 1 MMT grade  -     LTG 2 Progress Partially Met;Ongoing  -     LTG 3 Pt to improve Oswestry Back index score by 20% for overall functional improvement  -     LTG 3 Progress Ongoing  -     LTG 4 Pt to be independent with HEP for ROM, flexibility and core strength  -     LTG 4 Progress Ongoing  [facility]       User Key  (r) = Recorded By, (t) = Taken By, (c) = Cosigned By    Initials Name Provider Type     Yusra Alanis, PT Physical Therapist          Therapy Education  Given: HEP, Symptoms/condition management, Pain management, Posture/body mechanics  Program: Reinforced  How Provided: Verbal  Provided to: Patient  Level of Understanding: Teach back education performed              Time Calculation:   Start Time: 0800  Stop Time: 0845  Time Calculation (min): 45 min  Therapy Suggested Charges     Code   Minutes Charges    75260 (CPT®) Hc Pt Neuromusc Re Education Ea 15 Min      55394 (CPT®) Hc Pt Ther Proc Ea 15 Min 20 1    57519 (CPT®) Hc Gait Training Ea 15 Min      21357 (CPT®) Hc Pt Therapeutic Act Ea 15 Min      40671 (CPT®) Hc Pt Manual Therapy Ea 15 Min 23 2    44724 (CPT®) Hc Pt Ther Massage- Per 15 Min      90641 (CPT®) Hc Pt Iontophoresis Ea 15 Min      69002 (CPT®) Hc Pt Elec Stim Ea-Per 15 Min      44188 (CPT®) Hc Pt Ultrasound Ea 15 Min      89452 (CPT®) Hc Pt Self Care/Mgmt/Train Ea 15 Min      Total  43 3        Therapy Charges for Today     Code Description Service Date Service Provider Modifiers Qty    71625139213 HC PT THER PROC EA 15 MIN 7/11/2018 Yusra Alanis, PT GP 1    48183320900 HC PT MANUAL THERAPY EA 15 MIN 7/11/2018 Yusra Alanis, PT GP 2                    Yusra Alanis, PT  7/11/2018

## 2018-08-06 ENCOUNTER — TELEPHONE (OUTPATIENT)
Dept: FAMILY MEDICINE CLINIC | Facility: CLINIC | Age: 53
End: 2018-08-06

## 2018-08-06 DIAGNOSIS — Z98.890 STATUS POST LAMINECTOMY: ICD-10-CM

## 2018-08-06 DIAGNOSIS — M51.36 DEGENERATIVE LUMBAR DISC: ICD-10-CM

## 2018-08-06 DIAGNOSIS — M51.16 LUMBAR DISC HERNIATION WITH RADICULOPATHY: Primary | ICD-10-CM

## 2018-08-06 NOTE — TELEPHONE ENCOUNTER
----- Message from Dorcas Brumfield sent at 8/1/2018 11:39 AM EDT -----  PT IS WANTING A REFERRAL TO SEE DR EDWARD FOR A SECOND OPINION FOR HIS BACK PAIN       PT CONTACT # [phone]

## 2018-08-20 ENCOUNTER — DOCUMENTATION (OUTPATIENT)
Dept: PHYSICAL THERAPY | Facility: HOSPITAL | Age: 53
End: 2018-08-20

## 2018-08-20 DIAGNOSIS — G89.29 CHRONIC LEFT-SIDED LOW BACK PAIN WITH LEFT-SIDED SCIATICA: ICD-10-CM

## 2018-08-20 DIAGNOSIS — Z98.890 S/P LUMBAR LAMINECTOMY: Primary | ICD-10-CM

## 2018-08-20 DIAGNOSIS — M54.42 CHRONIC LEFT-SIDED LOW BACK PAIN WITH LEFT-SIDED SCIATICA: ICD-10-CM

## 2018-08-20 NOTE — THERAPY DISCHARGE NOTE
Outpatient Physical Therapy Discharge Summary         Patient Name: Eliseo Phan  : 1965  MRN: [id number]    Today's Date: 2018    Visit Dx:    ICD-10-CM ICD-9-CM   1. S/P lumbar laminectomy Z98.890 V45.89   2. Chronic left-sided low back pain with left-sided sciatica M54.42 724.2    G89.29 724.3     338.29             PT OP Goals     Row Name 18 1000          PT Short Term Goals    STG 1 Patient will be independent with education for symptom management, joint protection and strategies to minimize stress on affected tissues  -     STG 1 Progress Met  -     STG 2 Pt to improve pain complaints to no more than 5/10 with ADLs  -     STG 2 Progress Not Met  -        Long Term Goals    LTG 1 Pt to improve pain complaints to no more than 2-3/10 with ADLs  -     LTG 1 Progress Not Met  -     LTG 2 Pt to improve trunk and  L LE strength by 1/2 to 1 MMT grade  -     LTG 2 Progress Partially Met;Ongoing  -     LTG 3 Pt to improve Oswestry Back index score by 20% for overall functional improvement  -     LTG 3 Progress Not Met  -     LTG 4 Pt to be independent with HEP for ROM, flexibility and core strength  -     LTG 4 Progress Met  -       User Key  (r) = Recorded By, (t) = Taken By, (c) = Cosigned By    Initials Name Provider Type     Yusra Alanis, PT Physical Therapist          OP PT Discharge Summary  Date of Discharge: 18  Reason for Discharge: other (comment)  Outcomes Achieved: Patient able to partially acheive established goals  Discharge Destination: Home with home program  Discharge Instructions/Additional Comments: Pt continued to have pain and radiating symptoms into the L buttock and LE. His pain was exacerbated by lifting, sitting too long, or standing/walking. He was independent with his HEP for core strength and stretching.       Time Calculation:        Therapy Suggested Charges     Code   Minutes Charges    None                       Yusra MCKEON  Zeke, PT  8/20/2018

## 2018-09-26 ENCOUNTER — OFFICE VISIT (OUTPATIENT)
Dept: FAMILY MEDICINE CLINIC | Facility: CLINIC | Age: 53
End: 2018-09-26

## 2018-09-26 VITALS
SYSTOLIC BLOOD PRESSURE: 112 MMHG | DIASTOLIC BLOOD PRESSURE: 68 MMHG | WEIGHT: 164 LBS | BODY MASS INDEX: 23.48 KG/M2 | OXYGEN SATURATION: 95 % | HEART RATE: 79 BPM | HEIGHT: 70 IN

## 2018-09-26 DIAGNOSIS — B02.9 HERPES ZOSTER WITHOUT COMPLICATION: Primary | ICD-10-CM

## 2018-09-26 PROCEDURE — 99214 OFFICE O/P EST MOD 30 MIN: CPT | Performed by: NURSE PRACTITIONER

## 2018-09-26 RX ORDER — VALACYCLOVIR HYDROCHLORIDE 1 G/1
1000 TABLET, FILM COATED ORAL 3 TIMES DAILY
Qty: 21 TABLET | Refills: 0 | Status: SHIPPED | OUTPATIENT
Start: 2018-09-26 | End: 2018-10-03

## 2018-09-26 RX ORDER — IBUPROFEN 800 MG/1
TABLET ORAL
Qty: 90 TABLET | Refills: 1 | Status: SHIPPED | OUTPATIENT
Start: 2018-09-26 | End: 2019-04-24

## 2018-10-01 NOTE — PROGRESS NOTES
Subjective   Eliseo Phan is a 52 y.o. male.     Painful rash right side of chest  ×5 days  Acyclovir ×2 days  No fever no chills but the makes better or worse  Pain mild to moderate  No other sites  No recurrent shingles    HIV stable  Up-to-date infectious disease specialty           The following portions of the patient's history were reviewed and updated as appropriate: allergies, past family history, past medical history, past social history, past surgical history and problem list.    Review of Systems   Constitutional: Negative for chills, fatigue, fever and unexpected weight loss.   Skin: Positive for rash.        Painful rash   All other systems reviewed and are negative.      Objective   Physical Exam   Constitutional: He appears well-developed and well-nourished.   HENT:   Head: Normocephalic and atraumatic.   Eyes: Pupils are equal, round, and reactive to light. Conjunctivae are normal.   Neck: Neck supple.   Pulmonary/Chest: Effort normal.   Musculoskeletal:   Scattered mildly erythemic papular vesicle or rash right upper back wraps around lateral chest to anterior chest does not cross dermatomes  Moderate no pustules     Skin: Skin is warm and dry. No rash noted. No erythema.   Psychiatric: He has a normal mood and affect. His behavior is normal. Judgment and thought content normal.   Vitals reviewed.        Assessment/Plan   Eliseo was seen today for poss shingles.    Diagnoses and all orders for this visit:    Herpes zoster without complication    Other orders  -     valACYclovir (VALTREX) 1000 MG tablet; Take 1 tablet by mouth 3 (Three) Times a Day for 7 days.  -     lidocaine (XYLOCAINE) 2 % jelly; Apply  topically to the appropriate area as directed As Needed for Moderate Pain . Ever 3 hrs  -     ibuprofen (ADVIL,MOTRIN) 800 MG tablet; One half to one tablet 3 times a day as needed for pain take with food and water                Discussed expectations  Appropriate treatment  Recheck if not  improving in 1 week  If worsening rash severe rash  Similar rash in another dermatomes  Urgent recheck or ER  Contact precautions especially someone pregnant or who has not had chickenpox or severely immunocompromise

## 2018-10-09 DIAGNOSIS — K21.9 GASTROESOPHAGEAL REFLUX DISEASE WITHOUT ESOPHAGITIS: ICD-10-CM

## 2018-10-09 RX ORDER — OMEPRAZOLE 20 MG/1
CAPSULE, DELAYED RELEASE ORAL
Qty: 30 CAPSULE | Refills: 5 | Status: SHIPPED | OUTPATIENT
Start: 2018-10-09 | End: 2018-12-19

## 2018-11-05 ENCOUNTER — TELEPHONE (OUTPATIENT)
Dept: FAMILY MEDICINE CLINIC | Facility: CLINIC | Age: 53
End: 2018-11-05

## 2018-11-05 RX ORDER — AMOXICILLIN AND CLAVULANATE POTASSIUM 875; 125 MG/1; MG/1
1 TABLET, FILM COATED ORAL 2 TIMES DAILY
Qty: 14 TABLET | Refills: 0 | Status: SHIPPED | OUTPATIENT
Start: 2018-11-05 | End: 2018-11-12

## 2018-11-05 NOTE — TELEPHONE ENCOUNTER
Please call patient  Normally I would see the patient  If he has mild redness started without fever  I don't mind given him an antibiotic but if he has a big red swollen toe increased pain or fever  Or abscess he definitely needs to be seen  Urgent care  Please advise

## 2018-11-05 NOTE — TELEPHONE ENCOUNTER
Patient got a pedicure over the weekend now have and infected toe. Would like antibiotic.  No allergy

## 2018-11-05 NOTE — TELEPHONE ENCOUNTER
Patient denies fever  Denies abscess  Describes only mild redness  No streaking early    Augmentin  If increased pain redness fever chills  Emergency room  Recheck if not improving after 3 days      Reyna call patient back clarify  And make sure he does not have any serious infection

## 2018-11-16 ENCOUNTER — OFFICE VISIT (OUTPATIENT)
Dept: ORTHOPEDIC SURGERY | Facility: CLINIC | Age: 53
End: 2018-11-16

## 2018-11-16 VITALS — TEMPERATURE: 97.6 F | WEIGHT: 165 LBS | HEIGHT: 70 IN | BODY MASS INDEX: 23.62 KG/M2

## 2018-11-16 DIAGNOSIS — M48.062 LUMBAR STENOSIS WITH NEUROGENIC CLAUDICATION: Primary | ICD-10-CM

## 2018-11-16 PROCEDURE — 99214 OFFICE O/P EST MOD 30 MIN: CPT | Performed by: ORTHOPAEDIC SURGERY

## 2018-11-16 PROCEDURE — 99406 BEHAV CHNG SMOKING 3-10 MIN: CPT | Performed by: ORTHOPAEDIC SURGERY

## 2018-11-16 NOTE — PROGRESS NOTES
New patient or new problem visit    CC: Left leg pain    HPI: He is status post lumbar laminectomy I performed in April 2017 from which she had a less than stellar result.  Pain is increased since then in the left leg and he has some persistent mild weakness.  He does have back pain but he is complaining mostly of leg pain    PFSH: See attached.  He is HIV positive.    ROS: See attached    PE: Constitutional: Vital signs above-noted.  Awake, alert and oriented    Psychiatric: Affect and insight do not appear grossly disturbed.    Pulmonary: Breathing is unlabored, color is good.    Skin: Warm, dry and normal turgor    Cardiac:  pedal pulses intact.  No edema.    Eyesight and hearing appear adequate for examination purposes      Musculoskeletal:  There is no tenderness to percussion and palpation of the spine. Motion appears untested.  Posture is unremarkable to coronal and sagittal inspection.    The skin about the area is intact.  There is no palpable or visible deformity.  There is no local spasm.       Neurologic:   Reflexes are 2+ and symmetrical in the patellae and untested in the Achilles.   Motor function is undisturbed in quadriceps, EHL, and gastrocnemius on the right but weak in the left EHL where there is breakaway weakness   Sensation appears symmetrically intact to light touch except for diminished area in the lateral aspect the left leg.  In the bilateral lower extremities there is no evidence of atrophy.   Clonus is absent..  Gait appears undisturbed.  SLR test negative    XRAY: Plain film x-ray show multilevel disc degeneration and loss of lordosis.  MRI scan in summer of this year demonstrated multilevel disc degeneration and some left foraminal stenosis at 5 one which may be causing impingement of the exiting the L5 nerve root.  The radiologist report with which I agree.    Other: n/a    Impression: 2 level lumbar disc degeneration, lumbar hypolordosis, lumbar spinal stenosis    Plan: For now  epidural injections.  If he responds favorably but the effect Thomas's, this would be a good positive provocative test indicate for perhaps a repeat intervention which may consist of repeat laminectomy plus or minus fusion of the involved levels.  I would stay away from a large fusion and of caution him to go ahead and quit smoking meantime and that will help him whether he comes to surgery or not.  He is going to see Dr. Ledezma.

## 2018-11-21 ENCOUNTER — TELEPHONE (OUTPATIENT)
Dept: ORTHOPEDIC SURGERY | Facility: CLINIC | Age: 53
End: 2018-11-21

## 2018-11-21 DIAGNOSIS — M48.061 SPINAL STENOSIS OF LUMBAR REGION WITHOUT NEUROGENIC CLAUDICATION: Primary | ICD-10-CM

## 2018-11-21 NOTE — TELEPHONE ENCOUNTER
DONNA PUT THIS IN AS LUMBAR EPIDURAL TO BE ROUTED TO DR ROSE OFFICE BUT HIS HAS TO BE PUT IN AS PAIN MGMT.  COULD YOU PLEASE PUT THIS IN FOR ME SO I CAN HAVE THEM SCHEDULE CORRECTLY/DM

## 2018-12-06 ENCOUNTER — TELEPHONE (OUTPATIENT)
Dept: FAMILY MEDICINE CLINIC | Facility: CLINIC | Age: 53
End: 2018-12-06

## 2018-12-06 NOTE — TELEPHONE ENCOUNTER
If he is doing okay, and there is no outstanding problems he can cancel  Hope he is doing well  Thanks

## 2018-12-06 NOTE — TELEPHONE ENCOUNTER
Patient was seen on 9/26/18 he is schedule for appointment on 12/7 @ 8:45 do you need to see him or can he cancel appointment.    Please advise.

## 2018-12-10 RX ORDER — TRAZODONE HYDROCHLORIDE 100 MG/1
100 TABLET ORAL NIGHTLY
Qty: 30 TABLET | Refills: 6 | Status: SHIPPED | OUTPATIENT
Start: 2018-12-10 | End: 2019-07-05 | Stop reason: SDUPTHER

## 2018-12-19 ENCOUNTER — OFFICE VISIT (OUTPATIENT)
Dept: PAIN MEDICINE | Facility: CLINIC | Age: 53
End: 2018-12-19

## 2018-12-19 VITALS
RESPIRATION RATE: 15 BRPM | HEIGHT: 70 IN | SYSTOLIC BLOOD PRESSURE: 124 MMHG | BODY MASS INDEX: 23.91 KG/M2 | HEART RATE: 75 BPM | OXYGEN SATURATION: 98 % | WEIGHT: 167 LBS | TEMPERATURE: 98.3 F | DIASTOLIC BLOOD PRESSURE: 83 MMHG

## 2018-12-19 DIAGNOSIS — M48.061 NEURAL FORAMINAL STENOSIS OF LUMBAR SPINE: ICD-10-CM

## 2018-12-19 DIAGNOSIS — M54.16 LEFT LUMBAR RADICULOPATHY: ICD-10-CM

## 2018-12-19 DIAGNOSIS — G57.02 PIRIFORMIS SYNDROME OF LEFT SIDE: ICD-10-CM

## 2018-12-19 DIAGNOSIS — G89.4 CHRONIC PAIN SYNDROME: Primary | ICD-10-CM

## 2018-12-19 DIAGNOSIS — M53.3 SACROILIAC JOINT DYSFUNCTION OF LEFT SIDE: ICD-10-CM

## 2018-12-19 LAB
POC AMPHETAMINES: NEGATIVE
POC BARBITURATES: NEGATIVE
POC BENZODIAZEPHINES: NEGATIVE
POC COCAINE: NEGATIVE
POC METHADONE: NEGATIVE
POC METHAMPHETAMINE SCREEN URINE: NEGATIVE
POC OPIATES: NEGATIVE
POC OXYCODONE: NEGATIVE
POC PHENCYCLIDINE: NEGATIVE
POC PROPOXYPHENE: NEGATIVE
POC THC: NEGATIVE
POC TRICYCLIC ANTIDEPRESSANTS: NEGATIVE

## 2018-12-19 PROCEDURE — 80305 DRUG TEST PRSMV DIR OPT OBS: CPT | Performed by: ANESTHESIOLOGY

## 2018-12-19 PROCEDURE — 99204 OFFICE O/P NEW MOD 45 MIN: CPT | Performed by: ANESTHESIOLOGY

## 2018-12-19 NOTE — PROGRESS NOTES
"CHIEF COMPLAINT    New pt c/o low back pain. He has been struggling with it for 2 years and had a laminectomy in April 2018, and unfortunately has not gotten any better. It is mainly on the left side of low back and radiates down the back of the left leg and down the left shin. He c/o numbness in the left leg as well as weakness. Recent MRI 7-09-18. States he now has a herniated disk at L3, his fusion was lower L4, L5, S1. It is constant pain, aggravated by lifting, going up and down stairs, standing in one spot, and walking for any length of time. He has had PT. He has had 6 epidurals before but only 1-2 of them helped - done at [facility] (before his surgery). States he has only been on pain medication after surgery and it does help. He has tried Gabapentin which doesn't help. He states he had a problem with substance abuse 7 years ago but hasn't had any problems since. He hardly takes ibuprofen or tylenol. (Ibuprofen \"tears up stomach\"). This is his first time to a pain management office. States laying flat for 30 minutes helps the pain, stretching, and a ball he uses on the ground for his back (temporary relief).     Subjective   Eliseo Phan is a 53 y.o. male.   He presents to the office for evaluation of back pain. He was referred here by Dr. Curtis.         Back Pain   This is a chronic problem. The current episode started more than 1 year ago. The problem occurs constantly. The problem has been gradually worsening since onset. The pain is present in the lumbar spine and sacro-iliac (Left Leg pain > Back pain). The quality of the pain is described as aching, shooting and burning. The pain radiates to the left thigh (not to the foot but down to calf, in L4 and L5 dermatomes). The pain is severe. The symptoms are aggravated by standing and twisting. Associated symptoms include numbness and weakness. Pertinent negatives include no chest pain, fever or headaches. He has tried bed rest, NSAIDs, walking, " home exercises, heat and ice for the symptoms. The treatment provided mild relief.        PEG Assessment   What number best describes your pain on average in the past week?7  What number best describes how, during the past week, pain has interfered with your enjoyment of life?8  What number best describes how, during the past week, pain has interfered with your general activity?  9    --  The aforementioned information the Chief Complaint section and above subjective data including any HPI data has been personally reviewed and affirmed.  --        Current Outpatient Medications:   •  acetaminophen (TYLENOL) 650 MG 8 hr tablet, 1-2 tablets twice daily as needed for pain (Patient taking differently: Take 650 mg by mouth As Needed. 1-2 tablets twice daily as needed for pain), Disp: 120 tablet, Rfl: 5  •  cetirizine (zyrTEC) 10 MG tablet, Take 1 tablet by mouth At Night As Needed for Allergies. As needed allergies, Disp: 30 tablet, Rfl: 11  •  Cholecalciferol (VITAMIN D) 1000 units tablet, Take 1,000 Units by mouth Daily., Disp: , Rfl:   •  dolutegravir (TIVICAY) 50 MG tablet, Take 50 mg by mouth Daily., Disp: , Rfl:   •  Emtricitabine-Tenofovir AF (DESCOVY) 200-25 MG tablet, Take 1 tablet by mouth Daily., Disp: , Rfl:   •  fluticasone (FLONASE) 50 MCG/ACT nasal spray, 2 sprays into each nostril Daily. As needed for allergies, Disp: 1 bottle, Rfl: 5  •  ibuprofen (ADVIL,MOTRIN) 800 MG tablet, One half to one tablet 3 times a day as needed for pain take with food and water, Disp: 90 tablet, Rfl: 1  •  omeprazole (priLOSEC) 20 MG capsule, Take 20 mg by mouth Daily., Disp: , Rfl:   •  pentoxifylline (TRENtal) 400 MG CR tablet, Take 400 mg by mouth Daily., Disp: , Rfl:   •  traZODone (DESYREL) 100 MG tablet, Take 1 tablet by mouth Every Night., Disp: 30 tablet, Rfl: 6    The following portions of the patient's history were reviewed and updated as appropriate: allergies, current medications, past family history, past medical  history, past social history, past surgical history and problem list.    -------    The following portions of the patient's history were reviewed and updated as appropriate: allergies, current medications, past family history, past medical history, past social history, past surgical history and problem list.    No Known Allergies    Current Outpatient Medications on File Prior to Visit   Medication Sig Dispense Refill   • acetaminophen (TYLENOL) 650 MG 8 hr tablet 1-2 tablets twice daily as needed for pain (Patient taking differently: Take 650 mg by mouth As Needed. 1-2 tablets twice daily as needed for pain) 120 tablet 5   • cetirizine (zyrTEC) 10 MG tablet Take 1 tablet by mouth At Night As Needed for Allergies. As needed allergies 30 tablet 11   • Cholecalciferol (VITAMIN D) 1000 units tablet Take 1,000 Units by mouth Daily.     • dolutegravir (TIVICAY) 50 MG tablet Take 50 mg by mouth Daily.     • Emtricitabine-Tenofovir AF (DESCOVY) 200-25 MG tablet Take 1 tablet by mouth Daily.     • fluticasone (FLONASE) 50 MCG/ACT nasal spray 2 sprays into each nostril Daily. As needed for allergies 1 bottle 5   • ibuprofen (ADVIL,MOTRIN) 800 MG tablet One half to one tablet 3 times a day as needed for pain take with food and water 90 tablet 1   • omeprazole (priLOSEC) 20 MG capsule Take 20 mg by mouth Daily.     • pentoxifylline (TRENtal) 400 MG CR tablet Take 400 mg by mouth Daily.     • traZODone (DESYREL) 100 MG tablet Take 1 tablet by mouth Every Night. 30 tablet 6   • [DISCONTINUED] lidocaine (XYLOCAINE) 2 % jelly Apply  topically to the appropriate area as directed As Needed for Moderate Pain . Ever 3 hrs 90 mL 1   • [DISCONTINUED] omeprazole (priLOSEC) 20 MG capsule take 2 capsules by mouth once daily if needed 30 capsule 5     No current facility-administered medications on file prior to visit.        Patient Active Problem List   Diagnosis   • HIV (human immunodeficiency virus infection) (CMS/[facility])   • Acid reflux    • Penis disorder   • Sciatica of left side   • Hepatitis B virus infection   • Colon cancer screening   • Drug addiction in remission (CMS/HCC)   • Acute left-sided low back pain with left-sided sciatica   • Degenerative lumbar disc   • Health maintenance examination   • Lumbar disc herniation with radiculopathy   • Hypogonadism in male   • Symptoms involving urinary system   • Bulging lumbar disc   • Lumbar stenosis with neurogenic claudication   • Herpes zoster without complication       Past Medical History:   Diagnosis Date   • Acid reflux    • Arthritis    • Hepatitis B    • History of kidney stones    • HIV (human immunodeficiency virus infection) (CMS/HCC)    • PONV (postoperative nausea and vomiting)        Past Surgical History:   Procedure Laterality Date   • APPENDECTOMY     • BONE TUMOR EXCISION Right     right lower leg - as a child   • KNEE SURGERY Left 2011    knee scope   • LUMBAR LAMINECTOMY DISCECTOMY DECOMPRESSION N/A 4/5/2018    Procedure: L4-5, L5-S1 Lumbar Laminectomy;  Surgeon: Ernie Curtis MD;  Location: [facility];  Service: Neurosurgery       Family History   Problem Relation Age of Onset   • Heart disease Mother    • Hypertension Mother    • Diabetes Mother    • Thyroid disease Mother    • Depression Mother    • Anxiety disorder Mother    • Hypertension Father    • Depression Father    • Anxiety disorder Father    • Nephrolithiasis Brother        Social History     Socioeconomic History   • Marital status:      Spouse name: Not on file   • Number of children: Not on file   • Years of education: Not on file   • Highest education level: Not on file   Social Needs   • Financial resource strain: Not on file   • Food insecurity - worry: Not on file   • Food insecurity - inability: Not on file   • Transportation needs - medical: Not on file   • Transportation needs - non-medical: Not on file   Occupational History   • Occupation: care giver   Tobacco Use   • Smoking status:  Current Every Day Smoker     Packs/day: 0.50     Years: 10.00     Pack years: 5.00     Types: Cigarettes   • Smokeless tobacco: Never Used   • Tobacco comment: TRYING TO QUIT   Substance and Sexual Activity   • Alcohol use: No   • Drug use: Yes     Types: Other     Comment: opiates, patient states he stopped 7 years ago   • Sexual activity: Defer     Partners: Male   Other Topics Concern   • Not on file   Social History Narrative   • Not on file       -------      REVIEW OF PERTINENT MEDICAL DATA    I reviewed the office note from spine surgeon Dr. Ernie Curtis this was from 11/16/2018.  Review the patient had laminectomy in April 2017.  He had some increasing pain in the left leg and he had persistent leg weakness and radicular leg pain is worse than the back pain.  There is a notation multilevel disc degeneration and also some left-sided foraminal stenosis impinging the left L5 root.  The surgical plan was epidural injections in the monitor her for diagnostic and therapeutic effects.    I reviewed the most recent family medicine note which was from James Epley, APRN from 9/26/2018.  He was being treated for shingles.        ---  MRI OF THE LUMBAR SPINE WITH AND WITHOUT CONTRAST - 7-9-18     HISTORY: 52-year-old with ongoing left lower extremity radicular  symptoms and low back pain. On 04/05/2018, patient underwent left L4-5,  L5-S1 laminectomy, medial facetectomy, and foraminotomy.     TECHNIQUE: MRI of the lumbar spine includes sagittal T1, T2 fat-sat, PD  as well as axial T1 and T2-weighted sequences. Gadolinium was  administered intravenously followed by axial and sagittal T1-weighted  sequences.     COMPARISON: MRI lumbar spine without contrast 09/06/2017.     FINDINGS: Distal thoracic cord and conus medullaris appear normal. The  tip of the conus medullaris terminates at the L1 level. Vertebral body  heights are within normal limits.     At T12-L1, the central canal and neural foramen are patent.     At  L1-2, there is disc space narrowing and there is a broad disc bulge  with mild central canal and foraminal narrowing without change.     At L2-3, the central canal and neural foramen are patent.     At L3-4, there is disc space narrowing eccentric to the right and there  have developed endplate degenerative changes exhibiting T1 hypointense  and T2 hyperintense signal. Broad disc bulge is present and there is a  central to right posterior annular tear. There is 3 mm retrolisthesis  present of L3 with respect to L4. Bulge is slightly centered to the left  and there is mild narrowing of the central canal. Mild left and  mild-to-moderate right neuroforaminal narrowing is present.     At L4-5, there is bilateral facet overgrowth and there is a posterior  disc osteophyte complex. Left hemilaminectomy has been performed and  there has been medial facetectomy. Central canal is patent. There is  endplate and facet spur formation contributing to mild to moderate right  and mild left neuroforaminal narrowing.     At L5-S1, there has been left decompressive laminectomy with medial  facetectomy without canal narrowing. Disc space narrowing is present  particularly eccentric to the left and there are endplate degenerative  changes. Endplate spur formation is present and there is a disc  osteophyte complex as well as facet arthritis and mild right and  moderate to severe left foraminal narrowing associated with diminished  height. There are endplate degenerative changes at the L5-S1 level which  appear progressive when compared to the previous exam.     IMPRESSION:  1. Endplate degenerative changes are progressive at L3-4 compared to the  previous examination. At this level, there is a broad disc bulge and  there is 3 mm retrolisthesis of L3 with respect to L4. Bulge is  eccentric to the left and there is mild narrowing of the central canal,  mild left and mild-to-moderate right neuroforaminal narrowing.  2. Postsurgical changes  "at L4-5 and L5-S1. At L4-5, there is a broad  disc bulge with improved degree of canal narrowing. Endplate facet  spurring contributes to mild to moderate right and mild left  neuroforaminal narrowing.  3. At L5-S1, there are increased endplate degenerative changes. Endplate  and facet spur formation are present and there is diminished foraminal  height with moderate to severe left and mild right neuroforaminal  narrowing.     This report was finalized on 7/9/2018 1:12 PM by Dr. Emanuel Salter M.D.     @ -S1...          Review of Systems   Constitutional: Positive for activity change and fatigue. Negative for chills and fever.   HENT: Negative for ear pain.    Eyes: Negative for pain.   Respiratory: Negative for apnea, cough, shortness of breath and wheezing.    Cardiovascular: Negative for chest pain.   Gastrointestinal: Positive for constipation (thinks is r/t the back). Negative for diarrhea, nausea and vomiting.   Genitourinary: Negative for difficulty urinating.   Musculoskeletal: Positive for back pain.   Skin: Negative for rash and wound.   Allergic/Immunologic: Positive for immunocompromised state.   Neurological: Positive for weakness and numbness. Negative for dizziness, light-headedness and headaches.   Hematological: Does not bruise/bleed easily (be he does take Trental).   Psychiatric/Behavioral: Negative for agitation, confusion, hallucinations, sleep disturbance (states has a hard time falling asleep but can usually fall asleep) and suicidal ideas. The patient is not nervous/anxious.          Vitals:    12/19/18 0817   BP: 124/83   Pulse: 75   Resp: 15   Temp: 98.3 °F (36.8 °C)   SpO2: 98%   Weight: 75.8 kg (167 lb)   Height: 177.8 cm (70\")   PainSc:   7   PainLoc: Back         Objective   Physical Exam   Constitutional: He is oriented to person, place, and time. Vital signs are normal. He appears well-developed and well-nourished. He does not have a sickly appearance.   HENT:   Head: " Normocephalic and atraumatic.   Right Ear: Hearing and external ear normal.   Left Ear: Hearing and external ear normal.   Nose: Nose normal.   Mouth/Throat: Uvula is midline and oropharynx is clear and moist.   Eyes: Conjunctivae and EOM are normal. Pupils are equal, round, and reactive to light.   Neck: Neck supple.   Cardiovascular: Normal rate, regular rhythm and normal heart sounds.   Pulmonary/Chest: Effort normal and breath sounds normal.   Abdominal: Soft. Normal appearance and bowel sounds are normal. There is no hepatosplenomegaly. There is no tenderness. There is no CVA tenderness.   Musculoskeletal:        Lumbar back: He exhibits tenderness (karla left SI joint and also left piriformis) and pain (left vivien and left piriformis maneuvers mildly positive but SLR is more painful on the left than these). He exhibits normal range of motion.   Lymphadenopathy:     He has no cervical adenopathy.   Neurological: He is alert and oriented to person, place, and time. He displays no tremor. A sensory deficit (decr Light touch posterolateral left leg below knee) is present. No cranial nerve deficit. He exhibits abnormal muscle tone (mild weaknes LLE). He displays a negative Romberg sign. Coordination and gait normal.   Reflex Scores:       Patellar reflexes are 1+ on the right side and 0 on the left side.       Achilles reflexes are 1+ on the right side and 0 on the left side.  5-/5 on left leg knee flex/ext & hip flex.  Difficult with heel & toe walks.  RLE 5/5 throughout.     Skin: Skin is warm and dry.   Psychiatric: He has a normal mood and affect. His behavior is normal. Judgment and thought content normal.   Nursing note and vitals reviewed.      Assessment/Plan   Eliseo was seen today for back pain.    Diagnoses and all orders for this visit:    Chronic pain syndrome  -     Urine Drug Screen Confirmation - Urine, Clean Catch; Future  -     POC Urine Drug Screen, Triage    Neural foraminal stenosis of lumbar  spine  -     Case Request  -     Urine Drug Screen Confirmation - Urine, Clean Catch; Future  -     POC Urine Drug Screen, Triage    Sacroiliac joint dysfunction of left side  -     Urine Drug Screen Confirmation - Urine, Clean Catch; Future  -     POC Urine Drug Screen, Triage    Piriformis syndrome of left side  -     Urine Drug Screen Confirmation - Urine, Clean Catch; Future  -     POC Urine Drug Screen, Triage    Left lumbar radiculopathy  -     Case Request  -     Urine Drug Screen Confirmation - Urine, Clean Catch; Future  -     POC Urine Drug Screen, Triage        --- Follow-up for procedure... Left L4 and L5 LTFESI, x2, 2-4 wks apart    --- if not satisfactory, we could consider diagnostic Left SI joint & Left Piriformis injections.    Given lumbar radiculopathy follows the left L4 & L5 dermatome distrubution, patient would likely benefit from a left L4 & L5 transforaminal epidural injection.  The procedure was described in detail and the risks, benefits and alternatives were discussed with the patient (including but not limited to: bleeding, infection, nerve damage, worsening of pain, inability to perform injection, paralysis, seizures, and death) who agreed to proceed.       Regarding management of pentoxifylline, there is some controversy in our literature regarding whether or not to stop this medication before neuraxial procedures.  The prevailing consensus is that for low risk procedures is very safe and reasonable to continue.  The less invasive injections such as the use of a lumbar selective nerve blockades, it seems reasonable to allow him to continue this medication, considering risk/benefit analysis....     --------    Anticoagulation risks for procedures....   - there are risks to discontinuation of anticoagulants for neuraxial procedures and surgery.  Risks include but are not limited to deep vein thromboses, pulmonary emboli, myocardial infarction, and stroke    - Neuraxial access with a  needle is relatively contraindicated while anticoagulated because of the risk of hemorrhage and/or epidural hematoma, which can be a neurosurgical emergency to evacuate bleeding.  Left unchecked, bleeding can cause nerve damage leading to paralysis and/or death.  --------    -- He is very high risk for long-term opiate therapy.  I feel that long-term opiates are contraindicated.           FRANCISCO REPORT  FRANCISCO report has been reviewed and scanned into the patient's chart.  Date of last FRANCISCO : as above.  No current use of opioids.            EMR Dragon/Transcription disclaimer:   Much of this encounter note is an electronic transcription/translation of spoken language to printed text. The electronic translation of spoken language may permit erroneous, or at times, nonsensical words or phrases to be inadvertently transcribed; Although I have reviewed the note for such errors, some may still exist.

## 2018-12-19 NOTE — PATIENT INSTRUCTIONS
--- Follow-up for procedure... Left L4 and L5 LTFESI, x2, 2-4 wks apart  --- if not satisfactory, we could consider diagnostic Left SI joint & Left Piriformis injections.

## 2018-12-24 ENCOUNTER — RESULTS ENCOUNTER (OUTPATIENT)
Dept: PAIN MEDICINE | Facility: CLINIC | Age: 53
End: 2018-12-24

## 2018-12-24 DIAGNOSIS — G57.02 PIRIFORMIS SYNDROME OF LEFT SIDE: ICD-10-CM

## 2018-12-24 DIAGNOSIS — M53.3 SACROILIAC JOINT DYSFUNCTION OF LEFT SIDE: ICD-10-CM

## 2018-12-24 DIAGNOSIS — M54.16 LEFT LUMBAR RADICULOPATHY: ICD-10-CM

## 2018-12-24 DIAGNOSIS — M48.061 NEURAL FORAMINAL STENOSIS OF LUMBAR SPINE: ICD-10-CM

## 2018-12-24 DIAGNOSIS — G89.4 CHRONIC PAIN SYNDROME: ICD-10-CM

## 2019-02-19 ENCOUNTER — OFFICE VISIT (OUTPATIENT)
Dept: ORTHOPEDIC SURGERY | Facility: CLINIC | Age: 54
End: 2019-02-19

## 2019-02-19 VITALS — WEIGHT: 165 LBS | TEMPERATURE: 97.4 F | BODY MASS INDEX: 23.62 KG/M2 | HEIGHT: 70 IN

## 2019-02-19 DIAGNOSIS — M48.062 SPINAL STENOSIS OF LUMBAR REGION WITH NEUROGENIC CLAUDICATION: Primary | ICD-10-CM

## 2019-02-19 PROCEDURE — 99213 OFFICE O/P EST LOW 20 MIN: CPT | Performed by: ORTHOPAEDIC SURGERY

## 2019-02-19 NOTE — PROGRESS NOTES
He complains primarily of left buttock pain and occasional testicular pain and then low back pain in that order.  He is scheduled for transforaminal epidural steroid injection with Dr. Pederson for some reason passport would not approve this.  His last epidural was nearly a year ago.  On exam he has good strength in the lower extremity sensation is diminished in the lateral aspect of the left leg.  Reviewed his MRI from last year and he has multilevel disc degeneration and foraminal narrowing primarily at 5 1 but at multiple levels he is an excellent candidate for transforaminal injection which we will reorder I will see him as needed.

## 2019-03-21 ENCOUNTER — DOCUMENTATION (OUTPATIENT)
Dept: PAIN MEDICINE | Facility: CLINIC | Age: 54
End: 2019-03-21

## 2019-03-21 ENCOUNTER — OUTSIDE FACILITY SERVICE (OUTPATIENT)
Dept: PAIN MEDICINE | Facility: CLINIC | Age: 54
End: 2019-03-21

## 2019-03-21 PROCEDURE — 64483 NJX AA&/STRD TFRM EPI L/S 1: CPT | Performed by: ANESTHESIOLOGY

## 2019-03-23 NOTE — PROGRESS NOTES
Lumbar Transforaminal Epidural Steroid Injection (one level Unilateral)  [facility]      PREOPERATIVE DIAGNOSIS:  Lumbar Spinal Stenosis unspecified regarding Neurogenic Claudication and left Lumbar Radiculopathy    POSTOPERATIVE DIAGNOSIS:  Same as preop diagnosis    PROCEDURE:  CPT 21194 --  Diagnostic Transforaminal Epidural Steroid Injection at the L4 level, on the left     PRE-PROCEDURE DISCUSSION WITH PATIENT:    Risks and complications were discussed with the patient prior to starting the procedure and informed consent was obtained.  We discussed various topics including but not limited to bleeding, infection, injury, nerve injury, paralysis, coma, death, postprocedural painful flare-up, postprocedural site soreness, and a lack of pain relief.  We discussed the diagnostic aspect of transforaminal epidural / selective nerve root blockade.    SURGEON:  Chacorta Pederson MD    REASON FOR PROCEDURE:    Radicular pain pattern seems consistent with this dermatome.    SEDATION:  Versed 4mg & Fentanyl 100 mcg IV  ANESTHETIC:  Marcaine 0.25%  STEROID:  Methylprednisolone (DEPO MEDROL) 80mg/ml    DESCRIPTON OF PROCEDURE:  After obtaining informed consent, an I.V. was started in the preoperative area. The patient taken to the operating room and was placed in the prone position with a pillow under the abdomen.  All pressure points were well padded.  EKG, blood pressure, and pulse oximeter were monitored.  The lumbar area was prepped with Chloraprep and draped in a sterile fashion. Under fluoroscopic guidance in an oblique dimension on the above mentioned side, the transverse process of the aforementioned vertebra at the junction of the body at 6 o'clock position was identified. Skin and subcutaneous tissue was anesthetized with 1% lidocaine. A 22-gauge spinal needle was introduced under fluoroscopic guidance at the above junction into the foramen without parasthesias and into the epidural space. After  confirming the position of the needle with PA, lateral, and oblique fluoroscopic views, aspiration was checked and was clear of blood or CSF.  Next, 1 mL of Omnipaque was injected. After seeing adequate spread on the corresponding nerve root, a total volume 3mL of injectate containing 1ml of the above mentioned local anesthetic, 1 ml saline,  and the above mentioned corticosteroid was injected into the epidural space.    The needle was removed intact.  Vital signs were stable throughout.        ESTIMATED BLOOD LOSS:  <5 mL  SPECIMENS:  none    COMPLICATIONS:   No complications were noted., There was no indication of vascular uptake on live injection of contrast dye. and The patient did not have any signs of postprocedure numbness nor weakness.    TOLERANCE & DISCHARGE CONDITION:    The patient tolerated the procedure well.  The patient was transported to the recovery area without difficulties.  The patient was discharged to home under the care of family in stable and satisfactory condition.    PLAN OF CARE:  1. The patient was given our standard instruction sheet.  2. The patient will Return to clinic 2 wks.  3. The patient will resume all medications as per the medication reconciliation sheet.

## 2019-04-16 ENCOUNTER — TELEPHONE (OUTPATIENT)
Dept: NEUROSURGERY | Facility: CLINIC | Age: 54
End: 2019-04-16

## 2019-04-16 ENCOUNTER — TELEPHONE (OUTPATIENT)
Dept: FAMILY MEDICINE CLINIC | Facility: CLINIC | Age: 54
End: 2019-04-16

## 2019-04-16 DIAGNOSIS — M48.062 LUMBAR STENOSIS WITH NEUROGENIC CLAUDICATION: Primary | ICD-10-CM

## 2019-04-16 NOTE — TELEPHONE ENCOUNTER
Per Dr HINOJOSA he worked with this patient at [facility] several years ago, he ran into patient at [facility] and patient asked him to see him for a 2nd opinion, he saw Dr Curtis and he has recommended surgery.    I called patient for appt and he has Passport, but has a [facility] PCP.  Told him that we have to have a referral from his primary care and once that is received I can get him into see Dr HINOJOSA on 5.24.19 at 9:15 am.    Patient said he would work on that today.

## 2019-04-16 NOTE — TELEPHONE ENCOUNTER
----- Message from Dorcas Brumfield sent at 4/16/2019  2:30 PM EDT -----  PT IS REQUESTING A REFERRAL TO DR EDWARD FOR BACK PAIN       PT CONTACT # [phone]

## 2019-04-17 NOTE — TELEPHONE ENCOUNTER
Patient called back, his referral is in the computer. He was scheduled with the date in Malissa's message.

## 2019-04-22 RX ORDER — OMEPRAZOLE 20 MG/1
CAPSULE, DELAYED RELEASE ORAL
Qty: 60 CAPSULE | Refills: 2 | Status: SHIPPED | OUTPATIENT
Start: 2019-04-22 | End: 2019-10-15 | Stop reason: SDUPTHER

## 2019-04-24 ENCOUNTER — OFFICE VISIT (OUTPATIENT)
Dept: PAIN MEDICINE | Facility: CLINIC | Age: 54
End: 2019-04-24

## 2019-04-24 VITALS
HEART RATE: 73 BPM | HEIGHT: 70 IN | TEMPERATURE: 98.8 F | SYSTOLIC BLOOD PRESSURE: 108 MMHG | DIASTOLIC BLOOD PRESSURE: 72 MMHG | OXYGEN SATURATION: 94 % | RESPIRATION RATE: 18 BRPM | WEIGHT: 171.2 LBS | BODY MASS INDEX: 24.51 KG/M2

## 2019-04-24 DIAGNOSIS — M79.2 NEUROPATHIC PAIN: ICD-10-CM

## 2019-04-24 DIAGNOSIS — G89.29 OTHER CHRONIC PAIN: Primary | ICD-10-CM

## 2019-04-24 DIAGNOSIS — M48.061 NEURAL FORAMINAL STENOSIS OF LUMBAR SPINE: ICD-10-CM

## 2019-04-24 DIAGNOSIS — M54.16 LUMBAR RADICULOPATHY: ICD-10-CM

## 2019-04-24 PROCEDURE — 99214 OFFICE O/P EST MOD 30 MIN: CPT | Performed by: NURSE PRACTITIONER

## 2019-04-24 RX ORDER — METHYLPREDNISOLONE 4 MG/1
TABLET ORAL
Qty: 21 TABLET | Refills: 0 | Status: SHIPPED | OUTPATIENT
Start: 2019-04-24 | End: 2019-05-28

## 2019-04-24 RX ORDER — PREGABALIN 75 MG/1
75 CAPSULE ORAL 2 TIMES DAILY
Qty: 60 CAPSULE | Refills: 1 | Status: SHIPPED | OUTPATIENT
Start: 2019-04-24 | End: 2019-05-28

## 2019-04-24 NOTE — PROGRESS NOTES
CHIEF COMPLAINT  Follow-up for back pain.    Subjective   Eliseo Phan is a 53 y.o. male  who presents to the office for follow-up of procedure.  He completed a Lumbar Transforaminal Epidural Steroid Injection @ left L4  on  3/21/19 performed by Dr. Pederson for management of back pain. Patient reports 60% relief from the procedure for 2 days.     Back surgery April of 2018 with Dr. Curtis.  No improvement in pain/symptoms since surgery.  Dr. Curtis recommending LTFESI's for diagnostic/possibly therapeutic purposes. Sees Dr. Torres in a month for a second opinion.      He asks about pain medication. Reports no relief with Ibuprofen 800 mg, Tylenol extra strength, muscle relaxants, Gabapentin, Meloxicam.      Previous PT - yes    Back Pain   This is a chronic problem. The current episode started more than 1 year ago. The problem occurs constantly. The problem has been gradually worsening since onset. The pain is present in the lumbar spine and sacro-iliac (Left Leg pain > Back pain). The quality of the pain is described as aching, shooting and burning. The pain radiates to the left thigh (not to the foot but down to calf, in L4 and L5 dermatomes). The pain is at a severity of 8/10. The pain is severe. The symptoms are aggravated by standing and twisting. Associated symptoms include numbness (left leg) and weakness (left leg). Pertinent negatives include no chest pain, fever or headaches. He has tried bed rest, NSAIDs, walking, home exercises, heat and ice for the symptoms. The treatment provided mild relief.      PEG Assessment   What number best describes your pain on average in the past week?8  What number best describes how, during the past week, pain has interfered with your enjoyment of life?8  What number best describes how, during the past week, pain has interfered with your general activity?  8    The following portions of the patient's history were reviewed and updated as appropriate: allergies,  "current medications, past family history, past medical history, past social history, past surgical history and problem list.    Review of Systems   Constitutional: Negative for fatigue and fever.   HENT: Negative for congestion.    Eyes: Negative for visual disturbance.   Respiratory: Negative for cough, shortness of breath and wheezing.    Cardiovascular: Negative.  Negative for chest pain.   Gastrointestinal: Negative for constipation and diarrhea.   Genitourinary: Negative for difficulty urinating.   Musculoskeletal: Positive for back pain.   Neurological: Positive for weakness (left leg) and numbness (left leg). Negative for headaches.   Psychiatric/Behavioral: Negative for sleep disturbance and suicidal ideas. The patient is not nervous/anxious.        Vitals:    04/24/19 1432   BP: 108/72   Pulse: 73   Resp: 18   Temp: 98.8 °F (37.1 °C)   SpO2: 94%   Weight: 77.7 kg (171 lb 3.2 oz)   Height: 177.8 cm (70\")   PainSc:   8   PainLoc: Back     Objective   Physical Exam   Constitutional: He is oriented to person, place, and time. He appears well-developed and well-nourished. No distress.   HENT:   Head: Normocephalic and atraumatic.   Eyes: Conjunctivae and EOM are normal.   Neck: Neck supple.   Cardiovascular: Normal rate.   Pulmonary/Chest: Effort normal. No respiratory distress.   Musculoskeletal:        Lumbar back: He exhibits tenderness and pain.   +SLR left    Neurological: He is alert and oriented to person, place, and time. He has normal strength. No sensory deficit. Coordination and gait normal.   Skin: Skin is warm and dry. He is not diaphoretic.   Psychiatric: He has a normal mood and affect. His behavior is normal.   Nursing note and vitals reviewed.        Assessment/Plan   Eliseo was seen today for back pain.    Diagnoses and all orders for this visit:    Other chronic pain    Neural foraminal stenosis of lumbar spine  -     Cancel: Case Request  -     Case Request    Lumbar radiculopathy  -     " Cancel: Case Request  -     Case Request    Neuropathic pain  -     pregabalin (LYRICA) 75 MG capsule; Take 1 capsule by mouth 2 (Two) Times a Day.    Other orders  -     methylPREDNISolone (MEDROL, OMA,) 4 MG tablet; Take as directed on package instructions.  -     diclofenac (VOLTAREN) 50 MG EC tablet; Take 1 tablet by mouth 2 (Two) Times a Day As Needed (pain).      --- Repeat left L4 and L5 LTFESI - goal to maximize therapeutic benefit, add L5 as Dr. Curtis has also recommended.  Reviewed the procedure at length with the patient.  Included in the review was expectations, complications, risk and benefits.The procedure was described in detail and the risks, benefits and alternatives were discussed with the patient (including but not limited to: bleeding, infection, nerve damage, worsening of pain, inability to perform injection, paralysis, seizures, and death) who agreed to proceed.  Discussed the potential for sedation if warranted/wanted.  The procedure will plan to be performed at [facility] with fluoroscopic guidance(unless ultrasound is indicated). Questions were answered and in a way the patient could understand.  Patient verbalized understanding and wishes to proceed.  This intervention will be ordered.  --- Trial Lyrica 75 mg bid. Discussed medication with the patient.  Included in this discussion was the potential for side effects and adverse events.  Patient verbalized understanding and wished to proceed.  Prescription will be sent to pharmacy.  --- Trial Diclofenac  --- Medrol dose pack to pharmacy for acute flare - benefit with this in the past   --- Non-opioid regimen per Dr. Pederson plan - high risk, past drug abuse (opioids)  --- Follow-up after procedure          FRANCISCO REPORT  As part of the patient's treatment plan, I am prescribing controlled substances. The patient has been made aware of appropriate use of such medications, including potential risk of somnolence, limited  ability to drive and/or work safely, and the potential for dependence or overdose. It has also bee made clear that these medications are for use by this patient only, without concomitant use of alcohol or other substances unless prescribed.     Patient has completed prescribing agreement detailing terms of continued prescribing of controlled substances, including monitoring FRANCISCO reports, urine drug screening, and pill counts if necessary. The patient is aware that inappropriate use will results in cessation of prescribing such medications.    FRANCISCO report has been reviewed and scanned into the patient's chart.    As the clinician, I personally reviewed the FRANCISCO from 4/23/19 while the patient was in the office today.    History and physical exam exhibit continued safe and appropriate use of controlled substances.     EMR Dragon/Transcription disclaimer:   Much of this encounter note is an electronic transcription/translation of spoken language to printed text. The electronic translation of spoken language may permit erroneous, or at times, nonsensical words or phrases to be inadvertently transcribed; Although I have reviewed the note for such errors, some may still exist.

## 2019-04-25 ENCOUNTER — PRIOR AUTHORIZATION (OUTPATIENT)
Dept: PAIN MEDICINE | Facility: CLINIC | Age: 54
End: 2019-04-25

## 2019-04-25 NOTE — TELEPHONE ENCOUNTER
Eliseo Dailey (Key: [id number])  Lyrica 75MG capsules  Status: Denied  Created: April 25th, 2019  Sent: April 25th, 2019

## 2019-05-06 ENCOUNTER — TELEPHONE (OUTPATIENT)
Dept: NEUROSURGERY | Facility: CLINIC | Age: 54
End: 2019-05-06

## 2019-05-06 NOTE — TELEPHONE ENCOUNTER
Spoke with pt regarding NP Packet- he did receive it-informed him we have to have it back by 5.16.19/cc

## 2019-05-09 ENCOUNTER — TELEPHONE (OUTPATIENT)
Dept: NEUROSURGERY | Facility: CLINIC | Age: 54
End: 2019-05-09

## 2019-05-09 NOTE — TELEPHONE ENCOUNTER
Patient's PCP office called stating passport does not require a written passport referral anymore, she states that the patient just has to have a general referral to as specialist which was done in April.

## 2019-05-09 NOTE — TELEPHONE ENCOUNTER
LM for patient letting him know that we have not rec'd his passport referral from his PCP yet ( they are a [employer] provider), we must have by 5.24.19 or we have to cancel his appt

## 2019-05-21 ENCOUNTER — TELEPHONE (OUTPATIENT)
Dept: NEUROSURGERY | Facility: CLINIC | Age: 54
End: 2019-05-21

## 2019-05-21 NOTE — TELEPHONE ENCOUNTER
LM for patient at both numbers letting him know that Dr HINOJOSA has had to add on a 5 hour case for 5.24.19 and we have had to r/s his appt to 5.28.19 at 1:15 pm

## 2019-05-23 NOTE — PROGRESS NOTES
Subjective   Patient ID: Eliseo Phan is a 53 y.o. male is here today as a self referral for a 2nd opinion for low back and left leg pain.  Patient states that his symptoms started about 18 months ago.  He had his last DARLENE 6 weeks ago with Dr Pederson at [place] which did not help.  Dr Pederson's office is trying to get additional DARLENE approved with his insurance company.  PT 6 months ago was not very helpful.  MDP 6 weeks ago helped temporarily.    He had surgery 4.5.18 , left L4/5 L5/S1 laminectomy,medial facetectomy and foraminotomy with Dr Curtis.      Patient last saw Dr Curtis in November 2018, he ordered DARLENE and stated that he might need a fusion.  Patient is wanting a 2nd opinion before he proceeds with additional surgery.    Patient states that he has constant moderate to severe low back pain, left buttock and left posterior leg pain, left leg weakness.  He also has N/T left foot that is intermittent.    He is not taking any pain meds, he has tried Gabapentin in the past, however, it was not helpful.  His insurance would not approve Lyrica.    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 He has not worked the last year and a half.  He had surgery in the form of a left L4-L5 and left L5-S1 decompression and laminectomy by Dr. Curtis about 1 year ago. He said that he had been having that pain for about a year prior to that surgery. He actually has very little low back pain and no right leg pain.     He had epidural blocks and physical therapy and that did not help. He went on to have the surgery and really did not experience any improvement in his left buttock and leg pain. He continues to have very little low back pain. The pain in his leg tends to get worse if he sits for too long, but lying down tends to make it better, and activity tends to aggravate it. He had a postoperative epidural block which did not help. He has tried gabapentin  which did not help and Lyrica was not approved by his insurance. We went over his postoperative MRI which really did not show much nerve root compression but it did show 3 level degenerative disk disease from L3 to S1. He tells me that Dr. Conner had talked about doing a 3 level fusion and he wanted my opinion about this. First of all, I would not agree with a fusion since he actually has very little low back pain. Re-operating on him at L4-L5 to decompress his nerve root also probably would not result in a whole lot of improvement since I think the canal is actually sufficiently widened and the nerve itself might have been partially injured by the compressive process even before the 1st surgery. I think it is best to repeat the lumbar MRI because the images are over a year old and he thinks he has gotten worse since then. I suspect he would be a decent candidate for a percutaneous trial for spinal cord stimulator followed by permanent implantation. Again, he has mostly radicular pain and very little low back pain and this is the kind of pain pattern that spinal cord stimulator tends to be most helpful with. Will get the MRI and have him come back to see me again.       Back Pain   The current episode started more than 1 year ago. The problem occurs constantly. The pain is present in the lumbar spine. The pain radiates to the left thigh and right thigh. The pain is at a severity of 8/10. The pain is moderate. Associated symptoms include leg pain, numbness, tingling and weakness.   Leg Pain    The pain is present in the left leg. The pain is at a severity of 7/10. The pain is moderate. The pain has been constant since onset. Associated symptoms include numbness and tingling.   Extremity Weakness    The pain is present in the left lower leg and left upper leg. The problem occurs constantly. Associated symptoms include numbness and tingling.       The following portions of the patient's history were reviewed and  updated as appropriate: allergies, current medications, past family history, past medical history, past social history, past surgical history and problem list.    Review of Systems   Musculoskeletal: Positive for arthralgias, back pain and extremity weakness.   Neurological: Positive for tingling, weakness and numbness.   All other systems reviewed and are negative.      Objective   Physical Exam   Constitutional: He is oriented to person, place, and time. He appears well-developed and well-nourished.   HENT:   Head: Normocephalic and atraumatic.   Eyes: Conjunctivae and EOM are normal. Pupils are equal, round, and reactive to light.   Fundoscopic exam:       The right eye shows no papilledema. The right eye shows venous pulsations.        The left eye shows no papilledema. The left eye shows venous pulsations.   Neck: Carotid bruit is not present.   Neurological: He is oriented to person, place, and time. He has a normal Finger-Nose-Finger Test and a normal Heel to Shin Test. Gait normal.   Reflex Scores:       Tricep reflexes are 2+ on the right side and 2+ on the left side.       Bicep reflexes are 2+ on the right side and 2+ on the left side.       Brachioradialis reflexes are 2+ on the right side and 2+ on the left side.       Patellar reflexes are 2+ on the right side and 2+ on the left side.       Achilles reflexes are 2+ on the right side and 2+ on the left side.  Psychiatric: His speech is normal.     Neurologic Exam     Mental Status   Oriented to person, place, and time.   Registration of memory: Good recent and remote memory.   Attention: normal. Concentration: normal.   Speech: speech is normal   Level of consciousness: alert  Knowledge: consistent with education.     Cranial Nerves     CN II   Visual fields full to confrontation.   Visual acuity: normal    CN III, IV, VI   Pupils are equal, round, and reactive to light.  Extraocular motions are normal.     CN V   Facial sensation intact.   Right  corneal reflex: normal  Left corneal reflex: normal    CN VII   Facial expression full, symmetric.   Right facial weakness: none  Left facial weakness: none    CN VIII   Hearing: intact    CN IX, X   Palate: symmetric    CN XI   Right sternocleidomastoid strength: normal  Left sternocleidomastoid strength: normal    CN XII   Tongue: not atrophic  Tongue deviation: none    Motor Exam   Muscle bulk: normal  Right arm tone: normal  Left arm tone: normal  Right leg tone: normal  Left leg tone: normal    Strength   Strength 5/5 except as noted.     Sensory Exam   Light touch normal.     Gait, Coordination, and Reflexes     Gait  Gait: normal    Coordination   Finger to nose coordination: normal  Heel to shin coordination: normal    Reflexes   Right brachioradialis: 2+  Left brachioradialis: 2+  Right biceps: 2+  Left biceps: 2+  Right triceps: 2+  Left triceps: 2+  Right patellar: 2+  Left patellar: 2+  Right achilles: 2+  Left achilles: 2+  Right : 2+  Left : 2+      Assessment/Plan   Independent Review of Radiographic Studies:    I reviewed the postoperative MRI done 7/9/2018 shows multilevel degenerative disc disease at L3-L4 L4-L5 and L5-S1.  There is some mild persistent foraminal disc bulging at L4-L5 on the left compared to the preoperative study but the bottom line is that the entire spinal canal seems fairly wide open.  Agree with the report.      Medical Decision Making:    I suspect that he will be a candidate for spinal cord stimulation but I would like to check postoperative MRI again since the last one I saw was almost a year ago.  Assuming that there is not much nerve root compression at L4-L5, we can talk about a percutaneous trial by Dr. Pederson and possible permanent implantation by me.      Eliseo was seen today for back pain, leg pain, extremity weakness and numbness.    Diagnoses and all orders for this visit:    Postlaminectomy syndrome of lumbar region  -     MRI Lumbar Spine With &  Without Contrast; Future    Neuropathic pain, leg, left  -     MRI Lumbar Spine With & Without Contrast; Future      Return in about 8 days (around 6/5/2019) for After MRI.

## 2019-05-28 ENCOUNTER — OFFICE VISIT (OUTPATIENT)
Dept: NEUROSURGERY | Facility: CLINIC | Age: 54
End: 2019-05-28

## 2019-05-28 VITALS
BODY MASS INDEX: 24.05 KG/M2 | WEIGHT: 168 LBS | DIASTOLIC BLOOD PRESSURE: 78 MMHG | HEIGHT: 70 IN | HEART RATE: 72 BPM | SYSTOLIC BLOOD PRESSURE: 112 MMHG

## 2019-05-28 DIAGNOSIS — M79.2 NEUROPATHIC PAIN, LEG, LEFT: ICD-10-CM

## 2019-05-28 DIAGNOSIS — M96.1 POSTLAMINECTOMY SYNDROME OF LUMBAR REGION: Primary | ICD-10-CM

## 2019-05-28 PROCEDURE — 99204 OFFICE O/P NEW MOD 45 MIN: CPT | Performed by: NEUROLOGICAL SURGERY

## 2019-06-15 ENCOUNTER — HOSPITAL ENCOUNTER (OUTPATIENT)
Dept: MRI IMAGING | Facility: HOSPITAL | Age: 54
Discharge: HOME | End: 2019-06-15
Admitting: NEUROLOGICAL SURGERY

## 2019-06-15 DIAGNOSIS — M79.2 NEUROPATHIC PAIN, LEG, LEFT: ICD-10-CM

## 2019-06-15 DIAGNOSIS — M96.1 POSTLAMINECTOMY SYNDROME OF LUMBAR REGION: ICD-10-CM

## 2019-06-15 PROCEDURE — 72158 MRI LUMBAR SPINE W/O & W/DYE: CPT

## 2019-06-15 PROCEDURE — 82565 ASSAY OF CREATININE: CPT

## 2019-06-15 PROCEDURE — 0 GADOBENATE DIMEGLUMINE 529 MG/ML SOLUTION: Performed by: NEUROLOGICAL SURGERY

## 2019-06-15 PROCEDURE — A9577 INJ MULTIHANCE: HCPCS | Performed by: NEUROLOGICAL SURGERY

## 2019-06-15 RX ADMIN — GADOBENATE DIMEGLUMINE 15 ML: 529 INJECTION, SOLUTION INTRAVENOUS at 10:15

## 2019-06-17 LAB — CREAT BLDA-MCNC: 1.3 MG/DL (ref 0.6–1.3)

## 2019-06-21 NOTE — PROGRESS NOTES
Subjective   Patient ID: Eliseo Phan is a 53 y.o. male is here today for follow-up to discuss lumbar MRI results.    Patient was last seen 5.28.19 for  low back,left buttock and left posterior leg pain and weakness. Prior DARLENE at [facility] were not helpful.    Patient states that his symptoms have worsened since his visit.  He has constant moderate to severe low back, left buttock, left posterior leg pain, left leg weakness and N/T left lower extremity.  He has developed testicular pain and urinary frequency since since his last visit.  No incontinence.    He is not taking anything for pain prescribed or OTC.    He is here to talk about the MRI. I looked at it and while the report indicates that there is severe left L5-S1 foraminal stenosis, I disagree with that. I simply do not see that much root compression. For that reason, I do not think another surgery, particularly a fusion, would be terribly helpful.     Dr. Curtis had recommended a 3 level decompression and fusion. I disagree; I happen to think that is not likely to help him, particularly in his left buttock and leg. He also has a bit more low back pain as well but the right leg is fine. No motor deficits. We had a long discussion about this. I still think the spinal cord stimulator is the best option for him. He wants to return to work at some point, I think this is probably his best chance at doing that. I do not think a fusion operation offers him the likelihood that that would happen. I told him that having the percutaneous trial is essential and he will be able to decide if that is worth his while. Will send him back to Dr. Pederson for that. Gabapentin and Topamax did not help, blocks did not help. He said Tramadol was tried before and it did not help, so I did give him a small amount of pain medications to use very sparingly for now. He will see me after the trial.         Back Pain   The problem occurs constantly. The pain is present in the  lumbar spine and gluteal. The pain is at a severity of 8/10. The pain is moderate. The symptoms are aggravated by twisting, sitting, standing and bending. Associated symptoms include leg pain, numbness and weakness.   Leg Pain    The pain is present in the left leg. The pain is at a severity of 8/10. The pain is moderate. The pain has been constant since onset. Associated symptoms include numbness.   Extremity Weakness    The pain is present in the left lower leg and left upper leg. The problem occurs constantly. The problem has been unchanged. The pain is at a severity of 8/10. Associated symptoms include numbness.       The following portions of the patient's history were reviewed and updated as appropriate: allergies, current medications, past family history, past medical history, past social history, past surgical history and problem list.    Review of Systems   Genitourinary: Positive for frequency and testicular pain.   Musculoskeletal: Positive for arthralgias, back pain and extremity weakness. Negative for gait problem.   Neurological: Positive for weakness and numbness.   All other systems reviewed and are negative.      Objective   Physical Exam   Constitutional: He is oriented to person, place, and time. He appears well-developed and well-nourished.   HENT:   Head: Normocephalic and atraumatic.   Eyes: Conjunctivae and EOM are normal. Pupils are equal, round, and reactive to light.   Fundoscopic exam:       The right eye shows no papilledema. The right eye shows venous pulsations.        The left eye shows no papilledema. The left eye shows venous pulsations.   Neck: Carotid bruit is not present.   Neurological: He is oriented to person, place, and time. He has a normal Finger-Nose-Finger Test and a normal Heel to Shin Test. Gait normal.   Reflex Scores:       Tricep reflexes are 2+ on the right side and 2+ on the left side.       Bicep reflexes are 2+ on the right side and 2+ on the left side.        Brachioradialis reflexes are 2+ on the right side and 2+ on the left side.       Patellar reflexes are 2+ on the right side and 2+ on the left side.       Achilles reflexes are 2+ on the right side and 2+ on the left side.  Psychiatric: His speech is normal.     Neurologic Exam     Mental Status   Oriented to person, place, and time.   Registration of memory: Good recent and remote memory.   Attention: normal. Concentration: normal.   Speech: speech is normal   Level of consciousness: alert  Knowledge: consistent with education.     Cranial Nerves     CN II   Visual fields full to confrontation.   Visual acuity: normal    CN III, IV, VI   Pupils are equal, round, and reactive to light.  Extraocular motions are normal.     CN V   Facial sensation intact.   Right corneal reflex: normal  Left corneal reflex: normal    CN VII   Facial expression full, symmetric.   Right facial weakness: none  Left facial weakness: none    CN VIII   Hearing: intact    CN IX, X   Palate: symmetric    CN XI   Right sternocleidomastoid strength: normal  Left sternocleidomastoid strength: normal    CN XII   Tongue: not atrophic  Tongue deviation: none    Motor Exam   Muscle bulk: normal  Right arm tone: normal  Left arm tone: normal  Right leg tone: normal  Left leg tone: normal    Strength   Strength 5/5 except as noted.     Sensory Exam   Light touch normal.     Gait, Coordination, and Reflexes     Gait  Gait: normal    Coordination   Finger to nose coordination: normal  Heel to shin coordination: normal    Reflexes   Right brachioradialis: 2+  Left brachioradialis: 2+  Right biceps: 2+  Left biceps: 2+  Right triceps: 2+  Left triceps: 2+  Right patellar: 2+  Left patellar: 2+  Right achilles: 2+  Left achilles: 2+  Right : 2+  Left : 2+      Assessment/Plan   Independent Review of Radiographic Studies:    Reviewed the lumbar MRI done 6/15/2019 which has not really changed since the July 2018 MRI.  There are postoperative changes  at L4 and L5, and L5-S1.  There is a report of severe neuroforaminal compromise on the left at L5-S1 but I actually disagree with and I do not think it is that severe at the gets on the milder side.  Otherwise I agree with the report.      Medical Decision Making:    He is willing to see Dr. Pederson for a spinal cord stimulator trial.  I have given him some pain medications to use very sparingly and to tide him over.  I suspect a trial will be helpful and if it is he is to come back to see me to talk about putting him a permanent system.  We will not set any follow-up visit up now but he will call us to let us know when the trial will be and then we can set up that visit.    Eliseo was seen today for back pain, leg pain, numbness, testicle pain and urinary frequency.    Diagnoses and all orders for this visit:    Postlaminectomy syndrome of lumbar region  -     Ambulatory Referral to Pain Management    Neuropathic pain, leg, left  -     Ambulatory Referral to Pain Management    DDD (degenerative disc disease), lumbar  -     Ambulatory Referral to Pain Management    Other orders  -     HYDROcodone-acetaminophen (NORCO) 5-325 MG per tablet; Take 1 tablet by mouth 2 (Two) Times a Day As Needed for Severe Pain .      Return for he will call after scs trial for f/u visit.

## 2019-06-26 ENCOUNTER — OFFICE VISIT (OUTPATIENT)
Dept: NEUROSURGERY | Facility: CLINIC | Age: 54
End: 2019-06-26

## 2019-06-26 VITALS — HEIGHT: 70 IN | WEIGHT: 168 LBS | BODY MASS INDEX: 24.05 KG/M2

## 2019-06-26 DIAGNOSIS — M96.1 POSTLAMINECTOMY SYNDROME OF LUMBAR REGION: Primary | ICD-10-CM

## 2019-06-26 DIAGNOSIS — M79.2 NEUROPATHIC PAIN, LEG, LEFT: ICD-10-CM

## 2019-06-26 DIAGNOSIS — M51.36 DDD (DEGENERATIVE DISC DISEASE), LUMBAR: ICD-10-CM

## 2019-06-26 PROCEDURE — 99214 OFFICE O/P EST MOD 30 MIN: CPT | Performed by: NEUROLOGICAL SURGERY

## 2019-06-26 RX ORDER — HYDROCODONE BITARTRATE AND ACETAMINOPHEN 5; 325 MG/1; MG/1
1 TABLET ORAL 2 TIMES DAILY PRN
Qty: 50 TABLET | Refills: 0 | Status: SHIPPED | OUTPATIENT
Start: 2019-06-26 | End: 2019-10-30

## 2019-07-05 RX ORDER — TRAZODONE HYDROCHLORIDE 100 MG/1
100 TABLET ORAL NIGHTLY
Qty: 30 TABLET | Refills: 6 | Status: SHIPPED | OUTPATIENT
Start: 2019-07-05 | End: 2020-03-18 | Stop reason: SDUPTHER

## 2019-07-11 ENCOUNTER — TELEPHONE (OUTPATIENT)
Dept: NEUROSURGERY | Facility: CLINIC | Age: 54
End: 2019-07-11

## 2019-07-11 NOTE — TELEPHONE ENCOUNTER
Mr. Dailey will out be out of town for 3wks or more due to a family emerg. He will be leaving tomorrow. He is currently taking the  Hydrocodone that was prescribed. He does not know how long he will be out of town and is wanting to know if he can get a refill before he leaves. Please call and advise.    Thank you,

## 2019-07-11 NOTE — TELEPHONE ENCOUNTER
Discussed with Dr HINOJOSA- he will not refill pain meds.  He gave him #50 on 6.26.19 to tide him over till pain management appt which he has on Monday     Patient informed

## 2019-07-11 NOTE — TELEPHONE ENCOUNTER
Dr HINOJOSA please advise - he was given Norco 5/325 1 bid  #50 on 6.26.19-  He would not be due a refill till 7.21.19    He has an appt to see Dr Pederson with [facility] pain [facility] on Monday 7.15.19    Do you want to refill on 7.21.19 or have him discuss with Dr Pederson.      You gave him a script on 6.26.19 to use sparingly until he could get in with pain management

## 2019-07-12 ENCOUNTER — TELEPHONE (OUTPATIENT)
Dept: PAIN MEDICINE | Facility: CLINIC | Age: 54
End: 2019-07-12

## 2019-07-12 NOTE — TELEPHONE ENCOUNTER
I have reviewed Dr. Pederson's plan of care.  There is no plan for opioid management which is well documented in his initial office note as well as my last office note.  We can see him to discuss SCS trial as Dr. Torres has recommended, however prescribing this patient opioids has never been part of the plan.  If he wishes to discuss SCS he can schedule an office visit with myself or BB as soon as we can get him in, but this will be to discuss stim and not prescribe pain medication.  Also, the fact that he was asking Dr. Torres's office for a VERY early refill is concerning behavior and further supports our decision to manage his pain without the use of high risk medication.

## 2019-07-12 NOTE — TELEPHONE ENCOUNTER
Pt called in today c/o severe lower back pain and left leg pain. He had an upcoming appt with dr. Pederson which was cancelled due to his emergency leave. Pt is receiving norco 5/325mg from Dr. Torres. Pt sts Dr. Torres wants Dr. Pederson to prescribe him pain medication but his appointment was cancelled so now he doesn't know what to do. He last filled his hydro on 6/26/19.

## 2019-07-15 ENCOUNTER — TELEPHONE (OUTPATIENT)
Dept: PAIN MEDICINE | Facility: CLINIC | Age: 54
End: 2019-07-15

## 2019-07-15 NOTE — TELEPHONE ENCOUNTER
I checked the referral notes and saw the appeal was denied for his injection. So I don't know where to go from here:(also, he refuses the scs trial and said he doesn't want to schedule a f/u to discuss scs.

## 2019-07-15 NOTE — TELEPHONE ENCOUNTER
Called pt back this morning to discuss his back pain. I told him he is intervention only and that Laura and dr. Pederson are not prescribing opiods per office note. I asked him about scs trial, pt said he is not interested in trial anymore, he would like to have his injection, I will f/u with alin.

## 2019-07-15 NOTE — TELEPHONE ENCOUNTER
I checked the referral notes and saw the appeal was denied for his injection. So I don't know where to go from here:(

## 2019-08-26 RX ORDER — FLUTICASONE PROPIONATE 50 MCG
SPRAY, SUSPENSION (ML) NASAL
Qty: 16 G | Refills: 2 | Status: SHIPPED | OUTPATIENT
Start: 2019-08-26

## 2019-09-05 DIAGNOSIS — R23.9 SKIN CHANGE: Primary | ICD-10-CM

## 2019-10-11 NOTE — PROGRESS NOTES
Subjective   Patient ID: Eliseo Phan is a 53 y.o. male is here today for follow-up.  He has not been back to pain management since he was here last, he is not interested in SCS so they have nothing else to offer him and will not manage his pain meds.  Insurance denied appeal for injection.     Patient was last seen 6.26.19 for low back and leg pain, numbness, testicle pain and frequency.  Patient states that he is still having intermittent mild to moderate low back and left buttock pain, intermittent left leg pain, left foot weakness and N/T left foot and toes    He returns after seeing his infectious disease doctor. He is HIV positive and she did not feel that a spinal cord stimulator was a good option because of the risk of infection and the potential for treatment complications. That is a valid argument. I do not have much longitudinal experience with people who are HIV who have had spinal cord stimulators but apparently his ID doctor really did not feel it was a good ideal. I have to defer to her on that. Today he has mainly left-sided low back pain and some hip pain, occasionally some radiating leg pain. He had been working with Dr. Pederson. Epidural blocks work intermittently. I told him that a radiofrequency ablation might help him and so will send him back to Dr. Pederson for that. I stressed I did not think that surgery would be a good option, not only for his leg pain or his back pain, simply because I cannot identify anything that I considered to be surgically treatable. Will have him come back in 6 months.      Back Pain   The problem occurs intermittently. The problem is unchanged. The pain is present in the lumbar spine. The pain radiates to the left thigh. The pain is at a severity of 4/10. The pain is mild. The symptoms are aggravated by lying down, bending, standing, sitting and twisting. Associated symptoms include leg pain, numbness and weakness.   Leg Pain    The pain is present in the left  leg. The pain is at a severity of 4/10. The pain is mild. The pain has been intermittent since onset. Associated symptoms include numbness.   Extremity Weakness    The pain is present in the left foot. The problem occurs constantly. The problem has been unchanged. Associated symptoms include numbness.       The following portions of the patient's history were reviewed and updated as appropriate: allergies, current medications, past family history, past medical history, past social history, past surgical history and problem list.    Review of Systems   Musculoskeletal: Positive for back pain and extremity weakness. Negative for arthralgias, gait problem and myalgias.   Neurological: Positive for weakness and numbness.   All other systems reviewed and are negative.      Objective   Physical Exam   Constitutional: He is oriented to person, place, and time. He appears well-developed and well-nourished.   HENT:   Head: Normocephalic and atraumatic.   Eyes: Conjunctivae and EOM are normal. Pupils are equal, round, and reactive to light.   Fundoscopic exam:       The right eye shows no papilledema. The right eye shows venous pulsations.        The left eye shows no papilledema. The left eye shows venous pulsations.   Neck: Carotid bruit is not present.   Neurological: He is oriented to person, place, and time. He has a normal Finger-Nose-Finger Test and a normal Heel to Shin Test. Gait normal.   Reflex Scores:       Tricep reflexes are 2+ on the right side and 2+ on the left side.       Bicep reflexes are 2+ on the right side and 2+ on the left side.       Brachioradialis reflexes are 2+ on the right side and 2+ on the left side.       Patellar reflexes are 2+ on the right side and 2+ on the left side.       Achilles reflexes are 2+ on the right side and 2+ on the left side.  Psychiatric: His speech is normal.     Neurologic Exam     Mental Status   Oriented to person, place, and time.   Registration of memory: Good recent  and remote memory.   Attention: normal. Concentration: normal.   Speech: speech is normal   Level of consciousness: alert  Knowledge: consistent with education.     Cranial Nerves     CN II   Visual fields full to confrontation.   Visual acuity: normal    CN III, IV, VI   Pupils are equal, round, and reactive to light.  Extraocular motions are normal.     CN V   Facial sensation intact.   Right corneal reflex: normal  Left corneal reflex: normal    CN VII   Facial expression full, symmetric.   Right facial weakness: none  Left facial weakness: none    CN VIII   Hearing: intact    CN IX, X   Palate: symmetric    CN XI   Right sternocleidomastoid strength: normal  Left sternocleidomastoid strength: normal    CN XII   Tongue: not atrophic  Tongue deviation: none    Motor Exam   Muscle bulk: normal  Right arm tone: normal  Left arm tone: normal  Right leg tone: normal  Left leg tone: normal    Strength   Strength 5/5 except as noted.     Sensory Exam   Light touch normal.     Gait, Coordination, and Reflexes     Gait  Gait: normal    Coordination   Finger to nose coordination: normal  Heel to shin coordination: normal    Reflexes   Right brachioradialis: 2+  Left brachioradialis: 2+  Right biceps: 2+  Left biceps: 2+  Right triceps: 2+  Left triceps: 2+  Right patellar: 2+  Left patellar: 2+  Right achilles: 2+  Left achilles: 2+  Right : 2+  Left : 2+      Assessment/Plan   Independent Review of Radiographic Studies:    I reviewed the lumbar MRI done 6/15/2019 which shows postoperative changes at L4-L5 and L5-S1.  I do not see any's foraminal compromise at any other levels including L5-S1.  But there is some facet disease and disc degeneration.  Agree with the report.      Medical Decision Making:    We will send to Dr. Pederson for medial branch blocks and possible radiofrequency ablation.  Given that he cannot have a spinal cord stimulator, I think that is probably the best option.  I will see him in 6  months.      Eliseo was seen today for back pain, leg pain, extremity weakness and numbness.    Diagnoses and all orders for this visit:    Postlaminectomy syndrome of lumbar region  -     Ambulatory Referral to Pain Management    Neuropathic pain, leg, left  -     Ambulatory Referral to Pain Management    DDD (degenerative disc disease), lumbar  -     Ambulatory Referral to Pain Management      Return in about 6 months (around 4/30/2020).

## 2019-10-16 RX ORDER — OMEPRAZOLE 20 MG/1
CAPSULE, DELAYED RELEASE ORAL
Qty: 60 CAPSULE | Refills: 5 | Status: SHIPPED | OUTPATIENT
Start: 2019-10-16 | End: 2020-10-30 | Stop reason: SDUPTHER

## 2019-10-30 ENCOUNTER — OFFICE VISIT (OUTPATIENT)
Dept: NEUROSURGERY | Facility: CLINIC | Age: 54
End: 2019-10-30

## 2019-10-30 VITALS
WEIGHT: 164 LBS | SYSTOLIC BLOOD PRESSURE: 115 MMHG | HEIGHT: 70 IN | DIASTOLIC BLOOD PRESSURE: 70 MMHG | HEART RATE: 68 BPM | BODY MASS INDEX: 23.48 KG/M2

## 2019-10-30 DIAGNOSIS — M96.1 POSTLAMINECTOMY SYNDROME OF LUMBAR REGION: Primary | ICD-10-CM

## 2019-10-30 DIAGNOSIS — M51.36 DDD (DEGENERATIVE DISC DISEASE), LUMBAR: ICD-10-CM

## 2019-10-30 DIAGNOSIS — M79.2 NEUROPATHIC PAIN, LEG, LEFT: ICD-10-CM

## 2019-10-30 PROCEDURE — 99214 OFFICE O/P EST MOD 30 MIN: CPT | Performed by: NEUROLOGICAL SURGERY

## 2019-10-31 DIAGNOSIS — M47.816 LUMBAR FACET ARTHROPATHY: Primary | ICD-10-CM

## 2019-11-15 ENCOUNTER — DOCUMENTATION (OUTPATIENT)
Dept: PAIN MEDICINE | Facility: CLINIC | Age: 54
End: 2019-11-15

## 2019-11-15 ENCOUNTER — OUTSIDE FACILITY SERVICE (OUTPATIENT)
Dept: PAIN MEDICINE | Facility: CLINIC | Age: 54
End: 2019-11-15

## 2019-11-15 PROCEDURE — 64494 INJ PARAVERT F JNT L/S 2 LEV: CPT | Performed by: ANESTHESIOLOGY

## 2019-11-15 PROCEDURE — 64493 INJ PARAVERT F JNT L/S 1 LEV: CPT | Performed by: ANESTHESIOLOGY

## 2019-11-15 NOTE — PROGRESS NOTES
Bilateral L3-5 Lumbar Medial Branch Blockade  [facility]      PREOPERATIVE DIAGNOSIS:  Lumbar spondylosis without myelopathy    POSTOPERATIVE DIAGNOSIS:  Lumbar spondylosis without myelopathy    PROCEDURE:   Diagnostic Bilateral Lumbar Medial Branch Nerve Blockades, with fluoroscopy:  L3, L4, and L5 nerves (at the L4 & L5 transverse processes and the sacral alar groove) to block facet joints L4-5, and L5-S1  1. 23635-32 -- Bilateral Lumbar Facet blocks, 1st Level  2. 88553-40 -- Bilateral Lumbar Facet blocks, 2nd  Level    PRE-PROCEDURE DISCUSSION WITH PATIENT:    Risks and complications were discussed with the patient prior to starting the procedure and informed consent was obtained.      SURGEON:  Chacorta Pederson MD    REASON FOR PROCEDURE:    The patient complains of pain that seems to have a significant axial component and The patient has a mixed nociceptive / neuropathic painful presentation but the axial pain seems to be more problematic    SEDATION:  Versed 4mg & Fentanyl 100 mcg IV  ANESTHETIC:  Marcaine 0.5%  STEROID:  Methylprednisolone (DEPO MEDROL) 60mg  TOTAL VOLUME OF SOLUTION:  6ml    DESCRIPTON OF PROCEDURE:  After obtaining informed consent, IV access was obtained in the preoperative area.   The patient was taken to the operating room.  The patient was placed in the prone position with a pillow under the abdomen. All pressure points were well padded.  EKG, blood pressure, and pulse oximeter were monitored.  The patient was monitored and sedated by the RN under my direction. The lumbosacral area was prepped with Chloraprep and draped in a sterile fashion. Under fluoroscopic guidance the transverse processes of the L4 and L5 vertebrae at the junctions of the superior articular processes were identified on the right. Also identified was the groove between the ala and the superior articular process of the sacrum on the ipsilateral side.  Skin and subcutaneous tissue were  anesthetized with 1% lidocaine above each of these points. A 22-gauge spinal needle was introduced under fluoroscopic guidance at the above junctions. Aspiration was negative for blood and CSF.  After confirming the position of the needle with fluoroscope in all views, 0.25 mL of Omnipaque was injected, and after seeing the proper spread a total of 1 mL of the anesthetic solution noted above was injected at each of these points.  Needles were removed intact from each of the areas.  A similar procedure was repeated to block the L3, L4, and L5 nerves on the contralateral side.   Onset of analgesia was noted.  Vital signs remained stable throughout.      ESTIMATED BLOOD LOSS:  <5 mL  SPECIMENS:  none    COMPLICATIONS:   No complications were noted., There was no indication of vascular uptake on live injection of contrast dye., There was not any evidence of dural puncture.   and The patient did not have any signs of postprocedure numbness nor weakness.    TOLERANCE & DISCHARGE CONDITION:    The patient tolerated the procedure well.  The patient was transported to the recovery area without difficulties.  The patient was discharged to home under the care of family in stable and satisfactory condition.    PLAN OF CARE:  1. The patient was given our standard instruction sheet.  2. We discussed that Lumbar Medial Branch Blockade is a diagnostic procedure in consideration for radiofrequency ablation if two diagnostic procedures prove to be positive for significant benefit.  If sustained relief of 6 to eight weeks is obtained, then an alternative plan could be therapeutic lumbar branch blockades.  3. The patient is asked to keep a pain log each hour for 8 hours after the procedure today.  4. The patient will  Repeat injection 2-4 wks.  5. The patient will resume all medications as per the medication reconciliation sheet.

## 2019-12-26 ENCOUNTER — DOCUMENTATION (OUTPATIENT)
Dept: PAIN MEDICINE | Facility: CLINIC | Age: 54
End: 2019-12-26

## 2019-12-26 ENCOUNTER — OUTSIDE FACILITY SERVICE (OUTPATIENT)
Dept: PAIN MEDICINE | Facility: CLINIC | Age: 54
End: 2019-12-26

## 2019-12-26 PROCEDURE — 64493 INJ PARAVERT F JNT L/S 1 LEV: CPT | Performed by: ANESTHESIOLOGY

## 2019-12-26 PROCEDURE — 64494 INJ PARAVERT F JNT L/S 2 LEV: CPT | Performed by: ANESTHESIOLOGY

## 2019-12-27 NOTE — PROGRESS NOTES
Bilateral L3-5 Lumbar Medial Branch Blockade  [facility]      PREOPERATIVE DIAGNOSIS:  Lumbar spondylosis without myelopathy    POSTOPERATIVE DIAGNOSIS:  Lumbar spondylosis without myelopathy    PROCEDURE:   Diagnostic Bilateral Lumbar Medial Branch Nerve Blockades, with fluoroscopy:  L3, L4, and L5 nerves (at the L4 & L5 transverse processes and the sacral alar groove) to block facet joints L4-5, and L5-S1  1. 62511-93 -- Bilateral Lumbar Facet blocks, 1st Level  2. [id number] -- Bilateral Lumbar Facet blocks, 2nd  Level    PRE-PROCEDURE DISCUSSION WITH PATIENT:    Risks and complications were discussed with the patient prior to starting the procedure and informed consent was obtained.      SURGEON:  Chacorta Pederson MD    REASON FOR PROCEDURE:    The patient complains of pain that seems to have a significant axial component and Previous diagnostic positivity of a Lumbar Medial Branch Blockade at the same levels    SEDATION:  Versed 4mg & Fentanyl 100 mcg IV  ANESTHETIC:  Marcaine 0.5%  STEROID:  Methylprednisolone (DEPO MEDROL) 60mg  TOTAL VOLUME OF SOLUTION:  6ml    DESCRIPTON OF PROCEDURE:  After obtaining informed consent, IV access was obtained in the preoperative area.   The patient was taken to the operating room.  The patient was placed in the prone position with a pillow under the abdomen. All pressure points were well padded.  EKG, blood pressure, and pulse oximeter were monitored.  The patient was monitored and sedated by the RN under my direction. The lumbosacral area was prepped with Chloraprep and draped in a sterile fashion. Under fluoroscopic guidance the transverse processes of the L4 and L5 vertebrae at the junctions of the superior articular processes were identified on the right. Also identified was the groove between the ala and the superior articular process of the sacrum on the ipsilateral side.  Skin and subcutaneous tissue were anesthetized with 1% lidocaine above each of  these points. A 22-gauge spinal needle was introduced under fluoroscopic guidance at the above junctions. Aspiration was negative for blood and CSF.  After confirming the position of the needle with fluoroscope in all views, 0.25 mL of Omnipaque was injected, and after seeing the proper spread a total of 1 mL of the anesthetic solution noted above was injected at each of these points.  Needles were removed intact from each of the areas.  A similar procedure was repeated to block the L3, L4, and L5 nerves on the contralateral side.   Onset of analgesia was noted.  Vital signs remained stable throughout.      ESTIMATED BLOOD LOSS:  <5 mL  SPECIMENS:  none    COMPLICATIONS:   No complications were noted., There was no indication of vascular uptake on live injection of contrast dye. and The patient did not have any signs of postprocedure numbness nor weakness.    TOLERANCE & DISCHARGE CONDITION:    The patient tolerated the procedure well.  The patient was transported to the recovery area without difficulties.  The patient was discharged to home under the care of family in stable and satisfactory condition.    PLAN OF CARE:  1. The patient was given our standard instruction sheet.  2. We discussed that Lumbar Medial Branch Blockade is a diagnostic procedure in consideration for radiofrequency ablation if two diagnostic procedures prove to be positive for significant benefit.  If sustained relief of 6 to eight weeks is obtained, then an alternative plan could be therapeutic lumbar branch blockades.  3. The patient is asked to keep a pain log each hour for 8 hours after the procedure today.  4. The patient will  Return to clinic 3-4 wks.  5. The patient will resume all medications as per the medication reconciliation sheet.

## 2020-01-20 ENCOUNTER — OFFICE VISIT (OUTPATIENT)
Dept: PAIN MEDICINE | Facility: CLINIC | Age: 55
End: 2020-01-20

## 2020-01-20 VITALS
HEART RATE: 69 BPM | SYSTOLIC BLOOD PRESSURE: 133 MMHG | OXYGEN SATURATION: 98 % | TEMPERATURE: 98.6 F | HEIGHT: 70 IN | WEIGHT: 171.2 LBS | BODY MASS INDEX: 24.51 KG/M2 | RESPIRATION RATE: 16 BRPM | DIASTOLIC BLOOD PRESSURE: 81 MMHG

## 2020-01-20 DIAGNOSIS — M47.816 LUMBAR FACET ARTHROPATHY: ICD-10-CM

## 2020-01-20 DIAGNOSIS — M51.36 DDD (DEGENERATIVE DISC DISEASE), LUMBAR: Primary | ICD-10-CM

## 2020-01-20 PROCEDURE — 99214 OFFICE O/P EST MOD 30 MIN: CPT | Performed by: NURSE PRACTITIONER

## 2020-01-20 NOTE — PATIENT INSTRUCTIONS
--------  Education about Radiofrequency Lesioning of Medial Branches:    The medial branch blockade was intended for diagnostic purposes, with the intent of offering the patient Radiofrequency thermal rhizotomy if the MBB is diagnostically effective.  The diagnostic blockade is necessary to determine the likelihood that RF therapy could be efficacious in providing long term relief to the patient.  As indicated above, diagnostic efficacy was established.      In the RF procedure, needles are placed to the joint lines in the same fashion, and after testing, the needle tips are heated to thermally treat the nerves, blocking the nerves by in essence damaging the nerves with the heat treatment.      Medically, a successful RF procedure should provide a patient with 50% pain relief or more for at least 6 months.  We estimate a likelihood of about an 80% chance that medical success will be realized.  We discussed & educated once again that not all patients have a medically successful result, and the patient voices understanding.  However, our clinical experience suggests that successful patients receive relief more in the range of 12 months on average.  (We also discussed that a fortunate minority of patients receive therapeutic success from the MBB, and may not require RF ablation.  If a patient receives more than 8 weeks of relief from MBB, then occasional repeat MBB for therapeutic purposes is a very reasonable alternative therapy.  This course of therapy is consistent with our LCDs according to our CMS  in the area, and therefore other insurance providers should follow accordingly.  We will monitor our patients to screen for these therapeutic responders and will offer RF therapy only when necessary.  However, in this clinical scenario, this therapeutic result was not realized, and therefore Radiofrequency Lesioning is medically necessary.)      We discussed that MBB & RF are not without risks.  Guidelines  regarding anticoagulant use & neuraxial procedures will be respected.  Patients that are ill or otherwise may be at risk for sepsis will not have their spines accessed by neuraxial injections of any type.  This patient will not be offered these therapies if there is an increased risk.   We discussed that there is a risk of postprocedural pain and also a risk of worsening of clinical picture with these procedures as with any neuraxial procedure.  All invasive procedures have risks including but not limited to bleeding, infection, injury, nerve injury, paralysis, coma, death, lack of pain relief, and worsening of clinical picture.      In this education session, all of these topics were covered and the patient voiced understanding.    ---------

## 2020-01-20 NOTE — PROGRESS NOTES
CHIEF COMPLAINT  F/U back pain- Bilateral L3-5 Lumbar Medial Branch Blockade- patient states that his pain was improved.    Initial evaluation by BERRY Will   Eliseo Phan is a 54 y.o. male  who presents to the office for follow-up of procedure.  He completed  Bilateral L3-L5 Lumbar Medial Branch Blockade on 11/15/19 and 12/26/19 performed by Dr. Pederson for management of low back pain. Patient reports 80% pain relief x 2 days from the first procedure and 80-85% pain relief for the first 2 days and then 75% relief for 2 weeks after the second procedure. Today his pain is 5/10VAS.     Back Pain   This is a chronic problem. The current episode started more than 1 year ago. The problem occurs constantly. The problem has been gradually worsening since onset. The pain is present in the lumbar spine and sacro-iliac. The quality of the pain is described as aching, burning and shooting. The pain is at a severity of 5/10. The symptoms are aggravated by standing and twisting. Pertinent negatives include no abdominal pain, chest pain, dysuria, fever, headaches, numbness (left leg) or weakness (left leg). He has tried bed rest, NSAIDs, walking, home exercises, heat and ice (MBB) for the symptoms. The treatment provided moderate relief.      PEG Assessment   What number best describes your pain on average in the past week?4  What number best describes how, during the past week, pain has interfered with your enjoyment of life?5  What number best describes how, during the past week, pain has interfered with your general activity?  5      The following portions of the patient's history were reviewed and updated as appropriate: allergies, current medications, past family history, past medical history, past social history, past surgical history and problem list.    Review of Systems   Constitutional: Negative for activity change, fatigue and fever.   HENT: Negative for congestion.    Eyes: Negative for visual  "disturbance.   Respiratory: Negative for cough, chest tightness, shortness of breath and wheezing.    Cardiovascular: Negative.  Negative for chest pain.   Gastrointestinal: Negative for abdominal pain, constipation and diarrhea.   Genitourinary: Negative for difficulty urinating and dysuria.   Musculoskeletal: Positive for back pain.   Neurological: Negative for dizziness, weakness (left leg), light-headedness, numbness (left leg) and headaches.   Psychiatric/Behavioral: Positive for sleep disturbance. Negative for agitation and suicidal ideas. The patient is not nervous/anxious.        Vitals:    01/20/20 1345   BP: 133/81   Pulse: 69   Resp: 16   Temp: 98.6 °F (37 °C)   SpO2: 98%   Weight: 77.7 kg (171 lb 3.2 oz)   Height: 177.8 cm (70\")   PainSc:   5   PainLoc: Back     Objective   Physical Exam   Constitutional: He is oriented to person, place, and time. He appears well-developed and well-nourished.   HENT:   Head: Normocephalic and atraumatic.   Eyes: Conjunctivae and EOM are normal.   Neck: Normal range of motion.   Cardiovascular: Normal rate.   Pulmonary/Chest: Effort normal.   Musculoskeletal:        Lumbar back: He exhibits tenderness, bony tenderness and pain.   POS Lumbar Loading Maneuver  NEG SLR    Neurological: He is alert and oriented to person, place, and time. Gait normal.   Skin: Skin is warm and dry.   Psychiatric: He has a normal mood and affect. His behavior is normal. Judgment and thought content normal.   Nursing note and vitals reviewed.    Assessment/Plan   Eliseo was seen today for back pain.    Diagnoses and all orders for this visit:    DDD (degenerative disc disease), lumbar    Lumbar facet arthropathy  -     Case Request      --- Bilateral L3-L5 Radiofrequency ablation  --------  Education about Radiofrequency Lesioning of Medial Branches:    The medial branch blockade was intended for diagnostic purposes, with the intent of offering the patient Radiofrequency thermal rhizotomy if " the MBB is diagnostically effective.  The diagnostic blockade is necessary to determine the likelihood that RF therapy could be efficacious in providing long term relief to the patient.  As indicated above, diagnostic efficacy was established.      In the RF procedure, needles are placed to the joint lines in the same fashion, and after testing, the needle tips are heated to thermally treat the nerves, blocking the nerves by in essence damaging the nerves with the heat treatment.      Medically, a successful RF procedure should provide a patient with 50% pain relief or more for at least 6 months.  We estimate a likelihood of about an 80% chance that medical success will be realized.  We discussed & educated once again that not all patients have a medically successful result, and the patient voices understanding.  However, our clinical experience suggests that successful patients receive relief more in the range of 12 months on average.  (We also discussed that a fortunate minority of patients receive therapeutic success from the MBB, and may not require RF ablation.  If a patient receives more than 8 weeks of relief from MBB, then occasional repeat MBB for therapeutic purposes is a very reasonable alternative therapy.  This course of therapy is consistent with our LCDs according to our CMS  in the area, and therefore other insurance providers should follow accordingly.  We will monitor our patients to screen for these therapeutic responders and will offer RF therapy only when necessary.  However, in this clinical scenario, this therapeutic result was not realized, and therefore Radiofrequency Lesioning is medically necessary.)      We discussed that MBB & RF are not without risks.  Guidelines regarding anticoagulant use & neuraxial procedures will be respected.  Patients that are ill or otherwise may be at risk for sepsis will not have their spines accessed by neuraxial injections of any type.  This  patient will not be offered these therapies if there is an increased risk.   We discussed that there is a risk of postprocedural pain and also a risk of worsening of clinical picture with these procedures as with any neuraxial procedure.  All invasive procedures have risks including but not limited to bleeding, infection, injury, nerve injury, paralysis, coma, death, lack of pain relief, and worsening of clinical picture.      In this education session, all of these topics were covered and the patient voiced understanding.    ---------  --- Follow-up after procedure     FRANCISCO REPORT  FRANCISCO report has been reviewed and scanned into the patient's chart.    As the clinician, I personally reviewed the FRANCISCO from 1/17/2020 while the patient was in the office today.    EMR Dragon/Transcription disclaimer:   Much of this encounter note is an electronic transcription/translation of spoken language to printed text. The electronic translation of spoken language may permit erroneous, or at times, nonsensical words or phrases to be inadvertently transcribed; Although I have reviewed the note for such errors, some may still exist.

## 2020-02-20 ENCOUNTER — DOCUMENTATION (OUTPATIENT)
Dept: PAIN MEDICINE | Facility: CLINIC | Age: 55
End: 2020-02-20

## 2020-02-20 ENCOUNTER — OUTSIDE FACILITY SERVICE (OUTPATIENT)
Dept: PAIN MEDICINE | Facility: CLINIC | Age: 55
End: 2020-02-20

## 2020-02-20 PROCEDURE — 64636 DESTROY L/S FACET JNT ADDL: CPT | Performed by: ANESTHESIOLOGY

## 2020-02-20 PROCEDURE — 99152 MOD SED SAME PHYS/QHP 5/>YRS: CPT | Performed by: ANESTHESIOLOGY

## 2020-02-20 PROCEDURE — 64635 DESTROY LUMB/SAC FACET JNT: CPT | Performed by: ANESTHESIOLOGY

## 2020-02-21 NOTE — PROGRESS NOTES
Bilateral L3-5 Lumbar Medial Branch RADIOFREQUENCY  [facility]      PREOPERATIVE DIAGNOSIS:  Lumbar spondylosis without myelopathy    POSTOPERATIVE DIAGNOSIS:  Lumbar spondylosis without myelopathy    PROCEDURE:   Diagnostic Bilateral Lumbar Medial Branch Nerve thermal radiofrequency lesioning, with fluoroscopy:  L3, L4, and L5 nerves (at the L4 and L5 transverse processes and the sacral alar groove) to thermally treat the innervation to facet joints L4-5 and L5-S1  1. 91657-58 -- Bilateral L/S facet neuro destr., 1st Level  2. 58531-81 -- Bilateral L/S facet neuro destr., 2nd  Level    PRE-PROCEDURE DISCUSSION WITH PATIENT:    Risks and complications were discussed with the patient prior to starting the procedure and informed consent was obtained.      SURGEON:  Chacorta Pederson MD    REASON FOR PROCEDURE:    Previous diagnostic positivity of two Lumbar Medial Branch Blockades at the same levels and The patient admits to 80% or more pain relief diagnostically from medial branch blockades.    SEDATION:  Fentanyl 150 mcg IV and Versed 6mg IV  TIME OF PROCEDURE:   The intraoperative procedure time after administration of the sedative was 27 minutes.     ANESTHETIC:  Lidocaine 2%  STEROID:  NONE      DESCRIPTON OF PROCEDURE:  After obtaining informed consent, IV access  was obtained in the preoperative area.   The patient was taken to the operating room.  The patient was placed in the prone position with a pillow under the abdomen. All pressure points were well padded.  EKG, blood pressure, and pulse oximeter were monitored.  The patient was monitored and sedated by the RN under my direction. The lumbosacral area was prepped with Chloraprep and draped in a sterile fashion.     Under fluoroscopic guidance the transverse processes of the L4 and L5 vertebrae at the junctions of the superior articular processes were identified on the right.  Also identified was the groove between the ala and the  superior articular process of the sacrum on the ipsilateral side.  Skin and subcutaneous tissue were anesthetized with 1ml of 1% lidocaine above each of these points. Then, radiofrequency probe needles were advanced in this fluoro view to the above junctions.  Aspiration was negative for blood and CSF.  After confirming the position of the needle with fluoroscope in all views, testing was initiated.  Motor testing was confirmed to be negative at 3V and 2Hz for any radicular stimulation.  Then 1mL of the local anesthetic was instilled in each needle.  Two minutes elapsed, and during this time a lateral fluoroscopic view was confirmed again to ensure the needles had not advanced nor retracted.  Then, Radiofrequency Lesioning was initiated for 2.5 minutes at 85 degrees Celsius.  Needles were removed intact from each of the areas.     A similar procedure was repeated to address the L3, L4, and L5 nerves on the contralateral side.   Onset of analgesia was noted.  Vital signs remained stable throughout.      ESTIMATED BLOOD LOSS:  <5 mL  SPECIMENS:  none    COMPLICATIONS:   No complications were noted. and The patient did not have any signs of postprocedure numbness nor weakness.    TOLERANCE & DISCHARGE CONDITION:    The patient tolerated the procedure well.  The patient was transported to the recovery area without difficulties.  The patient was discharged to home under the care of family in stable and satisfactory condition.    PLAN OF CARE:  1. The patient was given our standard instruction sheet.  2. The patient will  Return to clinic 6 wks.  3. The patient will resume all medications as per the medication reconciliation sheet.

## 2020-03-13 ENCOUNTER — TELEPHONE (OUTPATIENT)
Dept: FAMILY MEDICINE CLINIC | Facility: CLINIC | Age: 55
End: 2020-03-13

## 2020-03-13 RX ORDER — VARENICLINE TARTRATE 1 MG/1
1 TABLET, FILM COATED ORAL 2 TIMES DAILY
Qty: 60 TABLET | Refills: 5 | Status: SHIPPED | OUTPATIENT
Start: 2020-03-13 | End: 2020-07-07

## 2020-03-13 NOTE — TELEPHONE ENCOUNTER
PATIENT CALLING, STATES HE WAS RX'ED CHANTIX TO HELP HIM  QUIT SMOKING, HE FILLED THE SCRIPT 6 MONTHS AGO BUT DID NOT USE IT. HE IS READY TO QUIT SMOKING AND WOULD LIKE IT CALLED IN.    [facility].    PATIENT CALL BACK -[phone].

## 2020-03-18 RX ORDER — TRAZODONE HYDROCHLORIDE 100 MG/1
100 TABLET ORAL NIGHTLY
Qty: 90 TABLET | Refills: 0 | Status: SHIPPED | OUTPATIENT
Start: 2020-03-18 | End: 2020-06-30 | Stop reason: SDUPTHER

## 2020-05-01 ENCOUNTER — OFFICE VISIT (OUTPATIENT)
Dept: PAIN MEDICINE | Facility: CLINIC | Age: 55
End: 2020-05-01

## 2020-05-01 DIAGNOSIS — M96.1 POSTLAMINECTOMY SYNDROME OF LUMBAR REGION: Primary | ICD-10-CM

## 2020-05-01 DIAGNOSIS — M51.36 DDD (DEGENERATIVE DISC DISEASE), LUMBAR: ICD-10-CM

## 2020-05-01 DIAGNOSIS — M48.062 LUMBAR STENOSIS WITH NEUROGENIC CLAUDICATION: ICD-10-CM

## 2020-05-01 DIAGNOSIS — M51.16 LUMBAR DISC HERNIATION WITH RADICULOPATHY: ICD-10-CM

## 2020-05-01 PROCEDURE — 99441 PR PHYS/QHP TELEPHONE EVALUATION 5-10 MIN: CPT | Performed by: NURSE PRACTITIONER

## 2020-05-01 RX ORDER — METHYLPREDNISOLONE 4 MG/1
TABLET ORAL
Qty: 1 EACH | Refills: 0 | Status: SHIPPED | OUTPATIENT
Start: 2020-05-01 | End: 2020-07-07

## 2020-05-01 NOTE — PROGRESS NOTES
TELEPHONE VISIT    You have chosen to receive care through a telephone visit. Do you consent to use a telephone visit for your medical care today? Yes    CHIEF COMPLAINT  F/U back pain- Bilateral L3-5 Lumbar Medial Branch RADIOFREQUENCY- patient states that he had no improvement from the procedure and that the epidurals worked a lot better.     Subjective   Eliseo Phan is a 54 y.o. male  who presents for a telephonic follow-up.He has a history of low back pain.  Today his pain is 8/10VAS in severity. He completed a Bilateral L3-L5 Lumbar medial branch RFL on 2/20/2020 with Dr. Pederson and states that he has had no lasting relief from this procedure.  He states that the epidural injections he received previously worked better.  It appears when he completed LTFESI's they provided only temporary relief.  In review of Dr. Pederson's initial visit with Mr. Phan on 12/18/2019 consideration of a left SI injection and Left piriformis injection may be considered.  I discussed with Mr. Phan that this would be a good option, but I would like to have an in-office visit to perform a physical assessment prior to ordering that procedure.  I will prescribe a medrol dose pack to help with the inflammation and then follow up in the office. Mr. Phan is in agreement with this plan.     Back Pain   This is a chronic problem. The current episode started more than 1 year ago. The problem occurs constantly. The problem has been gradually worsening since onset. The pain is present in the lumbar spine and sacro-iliac. The quality of the pain is described as aching, burning and shooting. The pain is at a severity of 8/10. The symptoms are aggravated by standing and twisting. Associated symptoms include numbness (left leg) and weakness (left leg). Pertinent negatives include no abdominal pain, chest pain, dysuria, fever or headaches. He has tried bed rest, NSAIDs, walking, home exercises, heat and ice (MBB) for the symptoms. The  treatment provided moderate relief.      PEG Assessment   What number best describes your pain on average in the past week?7  What number best describes how, during the past week, pain has interfered with your enjoyment of life?8  What number best describes how, during the past week, pain has interfered with your general activity?  8    The following portions of the patient's history were reviewed and updated as appropriate: allergies, current medications, past family history, past medical history, past social history, past surgical history and problem list.    Review of Systems   Constitutional: Positive for activity change (dec) and fatigue. Negative for fever.   HENT: Negative for congestion.    Eyes: Negative for visual disturbance.   Respiratory: Negative for cough, chest tightness, shortness of breath and wheezing.    Cardiovascular: Negative.  Negative for chest pain.   Gastrointestinal: Negative for abdominal pain, constipation and diarrhea.   Genitourinary: Negative for difficulty urinating and dysuria.   Musculoskeletal: Positive for back pain.   Neurological: Positive for weakness (left leg) and numbness (left leg). Negative for dizziness, light-headedness and headaches.   Psychiatric/Behavioral: Positive for sleep disturbance. Negative for agitation and suicidal ideas. The patient is not nervous/anxious.      Vitals:    05/01/20 1435   PainSc:   8   PainLoc: Back     Objective   Physical Exam  As this is a telephone check-in, the ability to perform a routine physical exam is extremely limited. The patient seems alert and is oriented appropriately.   On this phone call there is not any evidence of respiratory distress.   The patient seems of normal mood.   The remainder of a routine physical exam is deferred.    Assessment/Plan   Eliseo was seen today for back pain.    Diagnoses and all orders for this visit:    Postlaminectomy syndrome of lumbar region    DDD (degenerative disc disease),  lumbar    Lumbar disc herniation with radiculopathy    Lumbar stenosis with neurogenic claudication    Other orders  -     methylPREDNISolone (MEDROL, OMA,) 4 MG tablet; Take as directed on package instructions.      ----------------  Our practice is offering alternative &/or electronic methods to continue to follow our patients while at the same time further the efforts toward social distancing, in accordance with our organizational policies, professional societies' guidance, and gubernatorial mandates.  I support the Healthy at Home campaign and in this visit I have counseled the patient on our needs to limit in-person office visits and physical encounters with medical facilities whenever possible.  I have also educated the patient on the medical necessities of maintaining social distancing while we continue to function during this crisis period.      The patient was counseled on the need to consider telehealth options. The patient agreed to a Telephone Check-In. The patient was educated about our efforts to comply with monitoring standards when prescribing potent medications.  ----------------  --- This visit has been rescheduled as a phone visit to comply with patient safety concerns in accordance with CDC recommendations. Total time of discussion was 8 minutes.  --- Consider Left SI and Left Piriformis injection  --- Follow-up in office 1 month or sooner if needed     FRANCISCO REPORT  FRANCISCO report has been reviewed and scanned into the patient's chart.    As the clinician, I personally reviewed the FRANCISCO from 4/29/2020 while the patient was on the telephonic visit today.  -------    EMR Dragon/Transcription disclaimer:   Much of this encounter note is an electronic transcription/translation of spoken language to printed text. The electronic translation of spoken language may permit erroneous, or at times, nonsensical words or phrases to be inadvertently transcribed; Although I have reviewed the note for such  errors, some may still exist.

## 2020-05-06 ENCOUNTER — OFFICE VISIT (OUTPATIENT)
Dept: NEUROSURGERY | Facility: CLINIC | Age: 55
End: 2020-05-06

## 2020-05-06 DIAGNOSIS — M51.16 LUMBAR DISC HERNIATION WITH RADICULOPATHY: ICD-10-CM

## 2020-05-06 DIAGNOSIS — M79.2 NEUROPATHIC PAIN, LEG, LEFT: ICD-10-CM

## 2020-05-06 DIAGNOSIS — M48.062 LUMBAR STENOSIS WITH NEUROGENIC CLAUDICATION: Primary | ICD-10-CM

## 2020-05-06 DIAGNOSIS — M51.36 DDD (DEGENERATIVE DISC DISEASE), LUMBAR: ICD-10-CM

## 2020-05-06 DIAGNOSIS — M96.1 POSTLAMINECTOMY SYNDROME OF LUMBAR REGION: Primary | ICD-10-CM

## 2020-05-06 PROCEDURE — 99213 OFFICE O/P EST LOW 20 MIN: CPT | Performed by: NEUROLOGICAL SURGERY

## 2020-05-11 ENCOUNTER — HOSPITAL ENCOUNTER (OUTPATIENT)
Dept: GENERAL RADIOLOGY | Facility: HOSPITAL | Age: 55
Discharge: HOME | End: 2020-05-11

## 2020-05-11 ENCOUNTER — HOSPITAL ENCOUNTER (OUTPATIENT)
Dept: CT IMAGING | Facility: HOSPITAL | Age: 55
Discharge: HOME | End: 2020-05-11
Admitting: NEUROLOGICAL SURGERY

## 2020-05-11 VITALS
SYSTOLIC BLOOD PRESSURE: 127 MMHG | HEIGHT: 70 IN | TEMPERATURE: 99 F | DIASTOLIC BLOOD PRESSURE: 78 MMHG | HEART RATE: 63 BPM | RESPIRATION RATE: 18 BRPM | OXYGEN SATURATION: 98 % | BODY MASS INDEX: 24.34 KG/M2 | WEIGHT: 170 LBS

## 2020-05-11 DIAGNOSIS — M79.2 NEUROPATHIC PAIN, LEG, LEFT: ICD-10-CM

## 2020-05-11 DIAGNOSIS — M51.36 DDD (DEGENERATIVE DISC DISEASE), LUMBAR: ICD-10-CM

## 2020-05-11 DIAGNOSIS — M96.1 POSTLAMINECTOMY SYNDROME OF LUMBAR REGION: ICD-10-CM

## 2020-05-11 DIAGNOSIS — M48.062 LUMBAR STENOSIS WITH NEUROGENIC CLAUDICATION: ICD-10-CM

## 2020-05-11 PROCEDURE — 72114 X-RAY EXAM L-S SPINE BENDING: CPT

## 2020-05-11 PROCEDURE — 62304 MYELOGRAPHY LUMBAR INJECTION: CPT

## 2020-05-11 PROCEDURE — 25010000003 LIDOCAINE 1 % SOLUTION: Performed by: RADIOLOGY

## 2020-05-11 PROCEDURE — 72240 MYELOGRAPHY NECK SPINE: CPT

## 2020-05-11 PROCEDURE — 72132 CT LUMBAR SPINE W/DYE: CPT

## 2020-05-11 PROCEDURE — 0 IOPAMIDOL 41 % SOLUTION: Performed by: RADIOLOGY

## 2020-05-11 PROCEDURE — 73521 X-RAY EXAM HIPS BI 2 VIEWS: CPT

## 2020-05-11 RX ORDER — HYDROCODONE BITARTRATE AND ACETAMINOPHEN 5; 325 MG/1; MG/1
1 TABLET ORAL ONCE AS NEEDED
Status: COMPLETED | OUTPATIENT
Start: 2020-05-11 | End: 2020-05-11

## 2020-05-11 RX ORDER — ALPRAZOLAM 0.5 MG/1
0.5 TABLET ORAL ONCE
Status: COMPLETED | OUTPATIENT
Start: 2020-05-11 | End: 2020-05-11

## 2020-05-11 RX ORDER — LIDOCAINE HYDROCHLORIDE 10 MG/ML
10 INJECTION, SOLUTION INFILTRATION; PERINEURAL
Status: COMPLETED | OUTPATIENT
Start: 2020-05-11 | End: 2020-05-11

## 2020-05-11 RX ADMIN — IOPAMIDOL 15 ML: 408 INJECTION, SOLUTION INTRATHECAL at 09:46

## 2020-05-11 RX ADMIN — HYDROCODONE BITARTRATE AND ACETAMINOPHEN 1 TABLET: 5; 325 TABLET ORAL at 10:51

## 2020-05-11 RX ADMIN — ALPRAZOLAM 0.5 MG: 0.5 TABLET ORAL at 08:23

## 2020-05-11 RX ADMIN — LIDOCAINE HYDROCHLORIDE 4 ML: 10 INJECTION, SOLUTION INFILTRATION; PERINEURAL at 09:47

## 2020-05-11 NOTE — DISCHARGE INSTRUCTIONS
EDUCATION /DISCHARGE INSTRUCTIONS:  A myelogram is a special radiology procedure of the spinal cord, spinal nerves and other related structures.  You will be awake during the examination.  An area of your lower back will be cleansed with an antiseptic solution.  The physician will inject a numbing medication in your lower back.  While your back is numb, a needle will be placed in the lower back area.  A small amount of spinal fluid may be withdrawn and sent to the lab if ordered by your physician. While the needle is in the back, an injection of a contrast material (xray dye) will be given through the needle.  The contrast material will allow the physician to see the spinal cord and spinal nerves.  Once injected, the needle will be removed and a band aid will be placed over the injection site.  The table will be tilted during the process to allow the contrast material to flow to particular areas in the spine.  Following the injection and xrays, you will be taken to the CT scan where more pictures will be taken. After the procedure is finished, the contrast material will be absorbed by your body and eliminated through your kidneys.  The radiologist will study and interpret your myelogram and send the results to your physician.  Procedure risks of a myelogram include, but are not limited to:  *  Bleeding   *  seizure  *  Infection   *  Headache, possibly severe requiring  *  Contrast reaction      a blood patch  *  Nerve or cord injury  *  Paralysis and death  Benefits of the procedure:  *  Best examination for delineating pathology related to spinal cord compression from a disc and/or nerve root compression  Alternatives to the procedure:  MRI - a non invasive procedure requiring intravenous contrast injection.  Cannot be done on patients with certain pacemakers or metal in the body.  MRI risks include possible reaction to the contrast material, movement of metal located in the body.   Benefit to MRI:  Non-invasive  and usually painless procedure.  THIS EDUCATION INFORMATION WAS REVIEWED PRIOR TO THE PROCEDURE AND CONSENT. Patient initials __________________Time_________________  Important information following your myelogram:  *  Lie down with your head elevated no more than 2 pillows for the next 24 hours.   *  Sit up in the car going home.  Sit up to eat and use the restroom only,  for 24 hours.  *  24 HOURS COMPLETE AT ________________________   *  Tomorrow, after 24 hours complete, take it easy and rest.  *  Do not drive for 48 hours following a myelogram  *  You may remove the bandage and shower in the morning  *  Increase your fluids for the next 24 hours.  Caffeinated drinks are encouraged.   Resume taking your blood thinner or Aspirin on tomorrow, May 12 after 1000 am.  Resume taking antipsychotics/antidepressant on tomorrow, May 12 after 1000 am.    CALL YOUR PHYSICIAN FOR THE FOLLOWING:  * Pain at the injection site  * Reddness, swelling, bruising or drainage at the injection site  * A fever by mouth of 101.0  * Any new symptoms  If you have problems with a headache that is not relieved with rest and medication, please call the Radiology Triage Nurse desk  [phone]

## 2020-06-04 ENCOUNTER — OFFICE VISIT (OUTPATIENT)
Dept: PAIN MEDICINE | Facility: CLINIC | Age: 55
End: 2020-06-04

## 2020-06-04 VITALS
OXYGEN SATURATION: 95 % | TEMPERATURE: 97.7 F | BODY MASS INDEX: 25.68 KG/M2 | RESPIRATION RATE: 18 BRPM | HEIGHT: 70 IN | WEIGHT: 179.4 LBS | DIASTOLIC BLOOD PRESSURE: 73 MMHG | HEART RATE: 84 BPM | SYSTOLIC BLOOD PRESSURE: 108 MMHG

## 2020-06-04 DIAGNOSIS — M51.36 DDD (DEGENERATIVE DISC DISEASE), LUMBAR: ICD-10-CM

## 2020-06-04 DIAGNOSIS — M53.3 SACROILIAC JOINT DYSFUNCTION: ICD-10-CM

## 2020-06-04 DIAGNOSIS — M79.18 PIRIFORMIS MUSCLE PAIN: ICD-10-CM

## 2020-06-04 DIAGNOSIS — M96.1 POSTLAMINECTOMY SYNDROME OF LUMBAR REGION: Primary | ICD-10-CM

## 2020-06-04 DIAGNOSIS — M47.816 LUMBAR FACET ARTHROPATHY: ICD-10-CM

## 2020-06-04 PROCEDURE — 99213 OFFICE O/P EST LOW 20 MIN: CPT | Performed by: NURSE PRACTITIONER

## 2020-06-04 NOTE — PROGRESS NOTES
CHIEF COMPLAINT  F/U back pain- patient states that his pain has worsened since his last visit.     Subjective   Eliseo Phan is a 54 y.o. male  who presents for follow-up.He has a history of back pain.  Today his pain is 6/10VAS in severity.  He complains of left sacroiliac pain as well as left radicular pain that radiates down his lateral left leg into his lateral calf.  He states the worse pain is in his buttock.  He does have significant discomfort with compression of the SI joint and piriformis muscle.     Back Pain   This is a chronic problem. The current episode started more than 1 year ago. The problem occurs constantly. The problem has been gradually worsening since onset. The pain is present in the lumbar spine and sacro-iliac. The quality of the pain is described as aching, burning and shooting. The pain radiates to the left thigh (left buttock). The pain is at a severity of 6/10. The symptoms are aggravated by standing and twisting. Associated symptoms include numbness (left leg) and weakness (left leg). Pertinent negatives include no abdominal pain, chest pain, dysuria, fever or headaches. He has tried bed rest, NSAIDs, walking, home exercises, heat and ice (temporary relief with MBB, No relief with RFL) for the symptoms. The treatment provided moderate relief.      PEG Assessment   What number best describes your pain on average in the past week?8  What number best describes how, during the past week, pain has interfered with your enjoyment of life?7  What number best describes how, during the past week, pain has interfered with your general activity?  8    The following portions of the patient's history were reviewed and updated as appropriate: allergies, current medications, past family history, past medical history, past social history, past surgical history and problem list.    Review of Systems   Constitutional: Positive for activity change (dec). Negative for fatigue and fever.   HENT:  "Negative for congestion.    Eyes: Negative for visual disturbance.   Respiratory: Negative for cough, chest tightness, shortness of breath and wheezing.    Cardiovascular: Negative.  Negative for chest pain.   Gastrointestinal: Negative for abdominal pain, constipation and diarrhea.   Genitourinary: Negative for difficulty urinating and dysuria.   Musculoskeletal: Positive for back pain.   Neurological: Positive for weakness (left leg) and numbness (left leg). Negative for dizziness, light-headedness and headaches.   Psychiatric/Behavioral: Positive for sleep disturbance. Negative for agitation and suicidal ideas. The patient is not nervous/anxious.      Vitals:    06/04/20 1309   BP: 108/73   Pulse: 84   Resp: 18   Temp: 97.7 °F (36.5 °C)   SpO2: 95%   Weight: 81.4 kg (179 lb 6.4 oz)   Height: 177.8 cm (70\")   PainSc:   6   PainLoc: Back     Objective   Physical Exam   Constitutional: He is oriented to person, place, and time. Vital signs are normal. He appears well-developed and well-nourished.   HENT:   Head: Normocephalic and atraumatic.   Eyes: Conjunctivae, EOM and lids are normal.   Neck: Trachea normal and normal range of motion.   Cardiovascular: Normal rate.   Pulmonary/Chest: Effort normal.   Abdominal: Normal appearance.   Musculoskeletal:        Lumbar back: He exhibits decreased range of motion, tenderness and pain.   POS Left SI compression  POS Left Gerson  POS Left Thigh Thrust  POS left Piriformis Compression    Neurological: He is alert and oriented to person, place, and time.   Skin: Skin is warm, dry and intact.   Psychiatric: He has a normal mood and affect. His speech is normal and behavior is normal. Judgment normal.   Nursing note and vitals reviewed.    Assessment/Plan   Eliseo was seen today for back pain.    Diagnoses and all orders for this visit:    Postlaminectomy syndrome of lumbar region    Lumbar facet arthropathy    DDD (degenerative disc disease), lumbar    Sacroiliac joint " dysfunction  -     Case Request    Piriformis muscle pain  -     Case Request      --- Left Sacroiliac joint injection and Left piriformis injection x 2, 2-4 weeks apart  Reviewed the procedure at length with the patient.  Included in the review was expectations, complications, risk and benefits.The procedure was described in detail and the risks, benefits and alternatives were discussed with the patient (including but not limited to: bleeding, infection, nerve damage, worsening of pain, inability to perform injection, paralysis, seizures, and death) who agreed to proceed.  Discussed the potential for sedation if warranted/wanted.  The procedure will plan to be performed at [facility] with fluoroscopic guidance(unless ultrasound is indicated) and could potentially have steroids and contrast dye used. Questions were answered and in a way the patient could understand.  Patient verbalized understanding and wishes to proceed.  This intervention will be ordered.  Discussed with patient that all procedures are part of a multimodal plan of care and include either formal PT or a home exercise program.  Patient has no evidence of coagulopathy or current infection.  --- Follow-up after procedure      FRANCISCO REPORT    FRANCISCO report has been reviewed and scanned into the patient's chart.    As the clinician, I personally reviewed the FRANCISCO from 6/4/2020 .    EMR Dragon/Transcription disclaimer:   Much of this encounter note is an electronic transcription/translation of spoken language to printed text. The electronic translation of spoken language may permit erroneous, or at times, nonsensical words or phrases to be inadvertently transcribed; Although I have reviewed the note for such errors, some may still exist.

## 2020-06-04 NOTE — PATIENT INSTRUCTIONS
Sacroiliac Joint Injection    A sacroiliac (SI) joint injection is a procedure to inject a numbing medicine (anesthetic block)--and sometimes a strong anti-inflammatory medicine (steroid)--into the SI joint. The SI joint is the joint between two bones of the pelvis called the sacrum and the ilium. The sacrum is the bone at the base of the spine. The ilium is the large bone that forms the hip.  You may need this procedure if you have pain because of an inflamed or diseased SI joint. Various conditions can cause pain in the SI joint, including rheumatoid arthritis, gout, psoriatic arthritis, infection, or injury. SI joint pain is a common cause of low back pain. It may also cause pain in your buttock or leg.  SI joint injection may be done to:  · Find out if an anesthetic block relieves pain. This can confirm that the SI joint is the cause of pain (diagnostic use).  · Treat a painful SI joint with steroids, anesthetic medicine, or both (therapeutic use).  Tell a health care provider about:  · Any allergies you have.  · All medicines you are taking, including vitamins, herbs, eye drops, creams, and over-the-counter medicines.  · Any problems you or family members have had with anesthetic medicines.  · Any blood disorders you have.  · Any surgeries you have had.  · Any medical conditions you have.  · Whether you are pregnant or may be pregnant.  What are the risks?  Generally, this is a safe procedure. However, problems may occur, including:  · Infection.  · Bleeding.  · Nerve injury.  · Temporary increase in pain.  · Headache.  · Failure of the procedure to relieve pain.  · Bruising or soreness at the joint, in deep tissues, or at the injection site.  · Allergic reactions to medicines or dyes.  · Side effects from the steroid medicine. These may include facial flushing, increased appetite, diarrhea, and increased blood sugar.  What happens before the procedure?  · You may have a physical exam.  · You may have imaging  tests, such as an X-ray, CT scan, or MRI.  · Ask your health care provider about:  ? Changing or stopping your regular medicines. This is especially important if you are taking diabetes medicines or blood thinners.  ? Taking medicines such as aspirin and ibuprofen. These medicines can thin your blood. Do not take these medicines unless your health care provider tells you to take them.  ? Taking over-the-counter medicines, vitamins, herbs, and supplements.  · Plan to have someone take you home from the hospital or clinic.  What happens during the procedure?  · To lower your risk of infection:  ? Your health care team will wash or sanitize their hands.  ? Your skin will be washed with a germ-killing (antiseptic) solution.  · You may be given one or more of the following:  ? A medicine to help you relax (sedative).  ? A medicine to numb the area (local anesthetic). Your health care provider will inject a local anesthetic into the skin above your SI joint.  · You will be placed in the proper position on a procedure table to give the health care team the best access to your SI joint.  · An X-ray machine that produces moving X-ray images (fluoroscopy) will be placed above the procedure table.  · A long, thin needle will be inserted through your skin and down to your SI joint.  · The position of the needle will be checked with fluoroscopy imaging.  · An X-ray dye (contrast media) will be injected to make sure the needle enters the joint space. You may be asked if you feel any pain.  · Long-acting anesthetic medicine will be injected. Long-acting steroid medicine may also be injected.  · The needle will be removed, and a bandage will be placed over the injection site.  The procedure may vary among health care providers and hospitals.  What happens after the procedure?  · Your blood pressure, heart rate, breathing rate, and blood oxygen level will be monitored until the medicines you were given have worn off.  · If dye was  used, you will be told to drink plenty of water to wash (flush) the dye out of your body.  · You may be asked if you have pain relief from the injection.  · You will likely be able to go home shortly after the procedure.  · Your health care provider will give you instructions for taking care of yourself after the procedure. These may include instructions for doing physical therapy exercises.  · Do not drive for 24 hours if you were given a sedative during the procedure.  Summary  · A sacroiliac (SI) joint injection is an injection of a numbing medicine (anesthetic block)--and sometimes a strong anti-inflammatory medicine (steroid)--into the SI joint.  · You will be awake during the procedure, but the injection area will be made numb.  · If you were given a medicine to help you relax (sedative during the procedure, do not drive for at least 24 hours.  This information is not intended to replace advice given to you by your health care provider. Make sure you discuss any questions you have with your health care provider.  Document Released: 09/24/2018 Document Revised: 11/30/2018 Document Reviewed: 09/24/2018  Elsevier Patient Education © 2020 [employer]

## 2020-06-08 ENCOUNTER — LAB REQUISITION (OUTPATIENT)
Dept: LAB | Facility: HOSPITAL | Age: 55
End: 2020-06-08

## 2020-06-08 DIAGNOSIS — Z00.00 ENCOUNTER FOR GENERAL ADULT MEDICAL EXAMINATION WITHOUT ABNORMAL FINDINGS: ICD-10-CM

## 2020-06-09 PROCEDURE — U0004 COV-19 TEST NON-CDC HGH THRU: HCPCS | Performed by: ANESTHESIOLOGY

## 2020-06-10 LAB
REF LAB TEST METHOD: NORMAL
SARS-COV-2 RNA RESP QL NAA+PROBE: NOT DETECTED

## 2020-06-11 ENCOUNTER — TELEPHONE (OUTPATIENT)
Dept: NEUROSURGERY | Facility: CLINIC | Age: 55
End: 2020-06-11

## 2020-06-11 ENCOUNTER — OUTSIDE FACILITY SERVICE (OUTPATIENT)
Dept: PAIN MEDICINE | Facility: CLINIC | Age: 55
End: 2020-06-11

## 2020-06-11 ENCOUNTER — DOCUMENTATION (OUTPATIENT)
Dept: PAIN MEDICINE | Facility: CLINIC | Age: 55
End: 2020-06-11

## 2020-06-11 PROCEDURE — 20552 NJX 1/MLT TRIGGER POINT 1/2: CPT | Performed by: ANESTHESIOLOGY

## 2020-06-11 PROCEDURE — 27096 INJECT SACROILIAC JOINT: CPT | Performed by: ANESTHESIOLOGY

## 2020-06-11 NOTE — PROGRESS NOTES
Sacroiliac Joint Injection & Piriformis Injection  [facility]      PREOPERATIVE DIAGNOSIS:   Sacroiliac joint dysfunction  Left, Piriformis Syndrome Left, Myofascial pain of the piriformis muscles with trigger points    POSTOPERATIVE DIAGNOSIS:  Same as preoperative    PROCEDURE:  Sacroiliac Joint Injection on the Left with fluoroscopic guidance, along with piriformis muscle trigger point injections on the Left.     PRE-PROCEDURE DISCUSSION WITH PATIENT:    Risks and complications were discussed with the patient prior to starting the procedure and informed consent was obtained.  We discussed various topics including but not limited to bleeding, infection, injury, postprocedural site soreness, painful flareup, worsening of clinical picture, paralysis, coma, and death.     SURGEON:  Chacorta Pederson MD    REASON FOR PROCEDURE:      There is evidence of sacroiliac dysfunction on history and physical exam, as well as positive tenderness of the piriformis muscles and positive piriformis test.      SEDATION:  Versed 6mg & Fentanyl 50 mcg IV  ANESTHETIC AGENT:  Marcaine 0.5%  STEROID AGENT:  Methylprednisolone (DEPO MEDROL) 80mg/ml  TOTAL VOLUME OF SOLUTION:  4 mL      DESCRIPTON OF PROCEDURE:  After obtaining informed consent, IV access was obtained in the preoperative area.  The patient was transported to the operative suite and placed in the prone position with a pillow under the pelvic area. EKG, blood pressure, and pulse oximeter were monitored. The lumbosacral area was prepped with Chloraprep and draped in a sterile fashion.     Under fluoroscopic guidance the inferior most portion of the aforementioned SI joint was identified. The overlying skin and subcutaneous tissue was anesthetized with 1% lidocaine. A 22-gauge spinal needle was introduced from the inferior most portion of the joint into the SI joint under fluoroscopic guidance in the AP dimension with slight oblique rotation to the  contralateral side.  Aspiration was negative.  After confirming the position of the needle with fluoroscopy, 1 mL of contrast dye was injected and after seeing appropriate spread into the joint a total of 2mL of the local anesthetic solution as above, was injected very slowly.  Needle was removed intact and redirected to contact the inferiormost lateral edge of the Sacroiliac Joint area, and then walked off the periosteum slightly laterally and caudad, about 1-2mm.  Subtle increase in resistance was noted as needle entered this trigger point.  Contrast dye 1ml was injected and a proper myogram of the piriformis muscle with contrast outlining the muscle toward the hip was visualized.  Then 2ml of the local anesthetic solution was injected into the muscle.        ESTIMATED BLOOD LOSS:  minimal  SPECIMENS:  None  COMPLICATIONS:  No complications were noted., There was no indication of vascular uptake on live injection of contrast dye. and The patient did not have any signs of postprocedure numbness nor weakness.    TOLERANCE & DISCHARGE CONDITION:    The patient tolerated the procedure well.  The patient was transported to the recovery area without difficulties.  The patient was discharged to home under the care of family in stable and satisfactory condition.    PLAN OF CARE:  1. The patient was given our standard instruction sheet and will resume all medications as per the medication reconciliation sheet.  2. The patient will Return to clinic 2 wks.  3. The patient is instructed to keep a pain log hourly for 8 hours after the procedure.

## 2020-06-11 NOTE — TELEPHONE ENCOUNTER
I have left Mr. Phan a message letting him know we have had to change his 6/17 appointment time to 11:45.

## 2020-06-11 NOTE — PROGRESS NOTES
Subjective   History of Present Illness: Eliseo Phan is a 54 y.o. male is here today for follow-up to discuss lumbar myelogram and plain film of lumbar spine and hips done at [facility] 5.6.20.    Patient had televisit 5.6.20 for low back and left leg pain, foot weakness and left leg numbness    Today, Mr. Phan reports constant back pain that radiates into the left buttock/left leg and weakness in the left foot. He also reports numbness and tingling in left lower leg. He denies bladder/bowel incontinence.       You have chosen to receive care through a telephone visit. Do you consent to use a telephone visit for your medical care today? Yes    This was a TeleVisit.  The patient was at home.  I was at my office.  It lasted 15 minutes.  He was supposed to come in today to be examined, but I was running behind and we decided to do a TeleVisit.  The myelogram was done, which showed rather severe disk space collapse at L4-L5 and L5-S1, as well as some foraminal stenosis and more defined nerve root compression on the left at L5-S1 and L4-L5 than was seen on the MRI done in the past.  The whole reason for doing this was to see if a fusion operation would be feasible or reasonable.  He was not enthusiastic about a spinal cord stimulator.  The blocks that Dr. Pederson had been doing have not really been helpful.  He is not taking pain medication.  His HIV disease is stable.  I think it would be reasonable, given all that has happened and the fact that 2 years have gone by and he is not any better, that a transforaminal lumbar interbody fusion from a left-sided approach at L4-L5 and L5-S1 is a reasonable thing to do with pedicle screw fixation at L4-L5 and S1.  I would like to examine him though.  He says that he is weak and I need to assess that one way or the other.  He will come in to see me next week and he will think about moving forward with surgery and we can discuss it then.      Back Pain   The problem occurs  constantly. The problem has been gradually worsening since onset. The pain is present in the lumbar spine. The pain radiates to the left thigh, left knee and left foot. The pain is at a severity of 8/10. The pain is moderate. The symptoms are aggravated by position, sitting and standing (walking). Associated symptoms include leg pain, numbness, tingling and weakness. Pertinent negatives include no bladder incontinence, bowel incontinence or chest pain.       The following portions of the patient's history were reviewed and updated as appropriate: allergies, current medications, past family history, past medical history, past social history, past surgical history and problem list.    Review of Systems   Respiratory: Negative for chest tightness and shortness of breath.    Cardiovascular: Negative for chest pain.   Gastrointestinal: Negative for bowel incontinence.   Genitourinary: Negative for bladder incontinence.   Musculoskeletal: Positive for back pain.   Neurological: Positive for tingling, weakness and numbness.       Objective     There were no vitals filed for this visit.  There is no height or weight on file to calculate BMI.      Physical Exam   Deferred  Neurologic Exam   Deferred        Assessment/Plan   IndepeNdent Review of Radiographic Studies:      I personally reviewed the images from the following studies.    I reviewed the myelogram which showed disc space collapse at L4-L5 and L5-S1 with some foraminal stenosis I think at both levels on the left at L4-L5 and L5-S1.  It seems to be worse at L5-S1.  Agree with the report.    Medical Decision Making:      I think a left-sided transforaminal lumbar interbody fusion with cages at L4-L5 and L5-S1 with pedicle screw fixation from L4-S1 is reasonable.  But I think it is important for me to examine him so he will come in next week between cases so I can do that and we can finalize a decision about whether or not we will move forward with the  surgery.      Eliseo was seen today for follow-up.    Diagnoses and all orders for this visit:    Lumbar stenosis with neurogenic claudication    Neuropathic pain, leg, left    Postlaminectomy syndrome of lumbar region      Return in about 6 days (around 6/23/2020) for Face-to-face visit, between cases, after the second case.

## 2020-06-17 ENCOUNTER — OFFICE VISIT (OUTPATIENT)
Dept: NEUROSURGERY | Facility: CLINIC | Age: 55
End: 2020-06-17

## 2020-06-17 DIAGNOSIS — M96.1 POSTLAMINECTOMY SYNDROME OF LUMBAR REGION: ICD-10-CM

## 2020-06-17 DIAGNOSIS — M48.062 LUMBAR STENOSIS WITH NEUROGENIC CLAUDICATION: Primary | ICD-10-CM

## 2020-06-17 DIAGNOSIS — M79.2 NEUROPATHIC PAIN, LEG, LEFT: ICD-10-CM

## 2020-06-17 PROCEDURE — 99213 OFFICE O/P EST LOW 20 MIN: CPT | Performed by: NEUROLOGICAL SURGERY

## 2020-06-22 NOTE — PROGRESS NOTES
Subjective   History of Present Illness: Eliseo Phan is a 54 y.o. male is here today for follow-up to discuss scheduling surgery.  He quit smoking 4.1.20    Patient had televisit 6.17.20 for left leg and buttock pain and weakness left foot     Patient is currently having constant moderate to severe low back and left leg and buttock pain, left leg weakness, he has intermittent N/T left calf.  He denies urinary incontinence or problems with his balance and gait    The patient returns after a TeleVisit last week. I have been following him for a little over a year. His main problem is back ain but mostly left buttock and radiating leg pain. Not much anterior thigh pain. The right leg is fine. It is the neuropathic pain that bothers him the most. 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 With the myelogram we were finally able to visualize the nerve root  compression that really was not seen on previous MRI's. It is due to disk space collapse and foraminal impingement at L5-S1 as well as at L4-L5. As I said on my previous note that I think a left-sided transforaminal lumbar interbody fusion with cages and pedicle screw fixation from L4 to S1 is reasonable. I think it can improve the quality of life by reducing his back and leg pain. There are risks which I describe below but he did not want to have the spinal cord stimulator and now that we are identifying mechanical problems that are the issue I think it is better to deal with it directly. He does not take any pain medications and I think he should do well with the surgery but the recovery of nerve function will be slow and might take up to 6 months to recover from this surgery. He knows that he will be seen by the nurse practitioners postoperatively but I would like to see him at the 6 month lacey myself. Will move forward with this when he feels he would like to proceed. He is HIV positive but his disease is under control.  "      Back Pain   The problem occurs constantly. The problem is unchanged. The pain is present in the lumbar spine. The pain radiates to the left thigh. The pain is at a severity of 7/10. The pain is moderate. The symptoms are aggravated by standing, sitting, twisting, position, lying down and bending. Associated symptoms include leg pain, numbness and weakness. Pertinent negatives include no fever.   Leg Pain    The pain is present in the left leg. The pain is at a severity of 7/10. The pain is moderate. The pain has been constant since onset. Associated symptoms include numbness.   Extremity Weakness    The pain is present in the left lower leg and left upper leg. The problem occurs constantly. The problem has been unchanged. The pain is at a severity of 6/10. Associated symptoms include numbness. Pertinent negatives include no fever.       The following portions of the patient's history were reviewed and updated as appropriate: allergies, current medications, past family history, past medical history, past social history, past surgical history and problem list.    Review of Systems   Constitutional: Negative for fever.   HENT: Negative.    Eyes: Negative.    Respiratory: Negative.    Cardiovascular: Negative.    Gastrointestinal: Negative.    Endocrine: Negative.    Genitourinary: Negative for urgency.   Musculoskeletal: Positive for back pain and extremity weakness. Negative for arthralgias, gait problem, joint swelling and myalgias.   Allergic/Immunologic: Negative.    Neurological: Positive for weakness and numbness.   Hematological: Negative.    Psychiatric/Behavioral: Negative.        Objective     Vitals:    06/25/20 1435   BP: 120/72   Pulse: 74   Temp: 98.2 °F (36.8 °C)   Height: 177.8 cm (70\")     Body mass index is 25.74 kg/m².      Physical Exam   Constitutional: He is oriented to person, place, and time. He appears well-developed and well-nourished.   HENT:   Head: Normocephalic and atraumatic. "   Eyes: Pupils are equal, round, and reactive to light. Conjunctivae and EOM are normal.   Fundoscopic exam:       The right eye shows no papilledema. The right eye shows venous pulsations.        The left eye shows no papilledema. The left eye shows venous pulsations.   Neck: Carotid bruit is not present.   Neurological: He is oriented to person, place, and time. He has a normal Finger-Nose-Finger Test and a normal Heel to Shin Test. Gait normal.   Reflex Scores:       Tricep reflexes are 2+ on the right side and 2+ on the left side.       Bicep reflexes are 2+ on the right side and 2+ on the left side.       Brachioradialis reflexes are 2+ on the right side and 2+ on the left side.       Patellar reflexes are 2+ on the right side and 2+ on the left side.       Achilles reflexes are 2+ on the right side and 2+ on the left side.  Psychiatric: His speech is normal.     Neurologic Exam     Mental Status   Oriented to person, place, and time.   Registration of memory: Good recent and remote memory.   Attention: normal. Concentration: normal.   Speech: speech is normal   Level of consciousness: alert  Knowledge: consistent with education.     Cranial Nerves     CN II   Visual fields full to confrontation.   Visual acuity: normal    CN III, IV, VI   Pupils are equal, round, and reactive to light.  Extraocular motions are normal.     CN V   Facial sensation intact.   Right corneal reflex: normal  Left corneal reflex: normal    CN VII   Facial expression full, symmetric.   Right facial weakness: none  Left facial weakness: none    CN VIII   Hearing: intact    CN IX, X   Palate: symmetric    CN XI   Right sternocleidomastoid strength: normal  Left sternocleidomastoid strength: normal    CN XII   Tongue: not atrophic  Tongue deviation: none    Motor Exam   Muscle bulk: normal  Right arm tone: normal  Left arm tone: normal  Right leg tone: normal  Left leg tone: normal    Strength   Strength 5/5 except as noted.     Sensory  Exam   Light touch normal.     Gait, Coordination, and Reflexes     Gait  Gait: normal    Coordination   Finger to nose coordination: normal  Heel to shin coordination: normal    Reflexes   Right brachioradialis: 2+  Left brachioradialis: 2+  Right biceps: 2+  Left biceps: 2+  Right triceps: 2+  Left triceps: 2+  Right patellar: 2+  Left patellar: 2+  Right achilles: 2+  Left achilles: 2+  Right : 2+  Left : 2+          Assessment/Plan   Independent Review of Radiographic Studies:      I personally reviewed the images from the following studies.    I reviewed the myelogram which showed disc space collapse at L4-L5 and L5-S1 with some foraminal stenosis I think at both levels on the left at L4-L5 and L5-S1.  It seems to be worse at L5-S1.  Agree with the report.    Medical Decision Making:      I described and recommended a left sided Metrx L4/5, L5/S1 transforaminal lumbar interbody fusion (TLIF) with Capstone cages and percutaneous Sextant pedicle screw fixation from L4 to S1. The goal of surgery is relief of radiating leg pain, improvement of numbness, tingling, and weakness, and reduction in overall low back pain. The risks include, but are not limited to, infection, hemorrhage requiring transfusion or reoperation, CSF leak requiring reoperation, incomplete relief of symptoms, psuedoarthrosis resulting in chronic low back pain, hardward problems requiring revision or removal, potential need for additional surgery in the future, stroke, paralysis, coma, and death. The patient agrees to proceed.       Eliseo was seen today for back pain, leg pain, extremity weakness and numbness.    Diagnoses and all orders for this visit:    Lumbar stenosis with neurogenic claudication    DDD (degenerative disc disease), lumbar    Neuropathic pain, leg, left      Return for 2 weeks with a nurse practitioner.

## 2020-06-25 ENCOUNTER — PREP FOR SURGERY (OUTPATIENT)
Dept: OTHER | Facility: HOSPITAL | Age: 55
End: 2020-06-25

## 2020-06-25 ENCOUNTER — OFFICE VISIT (OUTPATIENT)
Dept: NEUROSURGERY | Facility: CLINIC | Age: 55
End: 2020-06-25

## 2020-06-25 VITALS
SYSTOLIC BLOOD PRESSURE: 120 MMHG | HEIGHT: 70 IN | HEART RATE: 74 BPM | DIASTOLIC BLOOD PRESSURE: 72 MMHG | TEMPERATURE: 98.2 F | BODY MASS INDEX: 25.74 KG/M2

## 2020-06-25 DIAGNOSIS — M48.062 LUMBAR STENOSIS WITH NEUROGENIC CLAUDICATION: Primary | ICD-10-CM

## 2020-06-25 DIAGNOSIS — M51.36 DDD (DEGENERATIVE DISC DISEASE), LUMBAR: ICD-10-CM

## 2020-06-25 DIAGNOSIS — M79.2 NEUROPATHIC PAIN, LEG, LEFT: ICD-10-CM

## 2020-06-25 DIAGNOSIS — Z01.818 PRE-OP TESTING: ICD-10-CM

## 2020-06-25 PROCEDURE — 99214 OFFICE O/P EST MOD 30 MIN: CPT | Performed by: NEUROLOGICAL SURGERY

## 2020-06-29 ENCOUNTER — TELEPHONE (OUTPATIENT)
Dept: NEUROSURGERY | Facility: CLINIC | Age: 55
End: 2020-06-29

## 2020-06-30 ENCOUNTER — TELEPHONE (OUTPATIENT)
Dept: FAMILY MEDICINE CLINIC | Facility: CLINIC | Age: 55
End: 2020-06-30

## 2020-06-30 RX ORDER — TRAZODONE HYDROCHLORIDE 100 MG/1
100 TABLET ORAL NIGHTLY
Qty: 30 TABLET | Refills: 0 | Status: SHIPPED | OUTPATIENT
Start: 2020-06-30 | End: 2020-07-27 | Stop reason: SDUPTHER

## 2020-06-30 NOTE — TELEPHONE ENCOUNTER
PATIENT CALLED TO SET UP AN APPT SINCE IT WAS REQUESTED IN ORDER TO GET MED REFILLS  HIS APPT WAS SET UP FOR 07/05 @ 2:45  PATIENT IS TOTALLY OUT OF traZODone (DESYREL) 100 MG tablet AND WOULD LIKE A TEMPORARY REFILL UNTIL APPT IF POSSIBLE    [id number] - [place], [place] - [address] AT [facility] [address] [facility] - [phone] [place] [phone] FX      PLEASE ADVISE.  [phone]

## 2020-06-30 NOTE — TELEPHONE ENCOUNTER
Is it OK to send a temporary refill? If so please send order to listed pharmacy or send back order and supply and I will pend that back to you.  Thank you

## 2020-07-07 ENCOUNTER — TRANSCRIBE ORDERS (OUTPATIENT)
Dept: PREADMISSION TESTING | Facility: HOSPITAL | Age: 55
End: 2020-07-07

## 2020-07-07 ENCOUNTER — OFFICE VISIT (OUTPATIENT)
Dept: FAMILY MEDICINE CLINIC | Facility: CLINIC | Age: 55
End: 2020-07-07

## 2020-07-07 VITALS
OXYGEN SATURATION: 99 % | HEIGHT: 70 IN | TEMPERATURE: 97.1 F | WEIGHT: 178 LBS | BODY MASS INDEX: 25.48 KG/M2 | HEART RATE: 75 BPM | SYSTOLIC BLOOD PRESSURE: 110 MMHG | DIASTOLIC BLOOD PRESSURE: 70 MMHG

## 2020-07-07 DIAGNOSIS — M51.36 DDD (DEGENERATIVE DISC DISEASE), LUMBAR: ICD-10-CM

## 2020-07-07 DIAGNOSIS — B20 HIV INFECTION, UNSPECIFIED SYMPTOM STATUS (HCC): ICD-10-CM

## 2020-07-07 DIAGNOSIS — E78.1 HYPERTRIGLYCERIDEMIA: ICD-10-CM

## 2020-07-07 DIAGNOSIS — Z01.818 PREOPERATIVE CLEARANCE: ICD-10-CM

## 2020-07-07 DIAGNOSIS — Z00.00 HEALTH MAINTENANCE EXAMINATION: Primary | ICD-10-CM

## 2020-07-07 DIAGNOSIS — Z12.5 PROSTATE CANCER SCREENING: ICD-10-CM

## 2020-07-07 DIAGNOSIS — Z12.11 ENCOUNTER FOR SCREENING COLONOSCOPY: ICD-10-CM

## 2020-07-07 DIAGNOSIS — Z01.818 OTHER SPECIFIED PRE-OPERATIVE EXAMINATION: Primary | ICD-10-CM

## 2020-07-07 DIAGNOSIS — R68.82 LIBIDO, DECREASED: ICD-10-CM

## 2020-07-07 PROCEDURE — 99396 PREV VISIT EST AGE 40-64: CPT | Performed by: NURSE PRACTITIONER

## 2020-07-07 NOTE — PATIENT INSTRUCTIONS
Discharge instructions    Increase fiber  Fluids  Exercise as tolerated  Trial Colace 200 mg nightly or MiraLAX for constipation  Trial  fodmap diet for excessive gas and bloating  Eliminate this for several weeks mostly out of your diet and gradually introduce 1 food at a time as tolerated    If he continued to have gastric issues dyspepsia chronic constipation bloating or other problems follow-up with gastroenterology for evaluation of his

## 2020-07-07 NOTE — PROGRESS NOTES
"Subjective   Eliseo Phan is a 54 y.o. male.     Pleasant gentleman here today for complete physical exam.  He is been doing well except his back with chronic low back pain  Left lumbar radiculopathy pain radiates down his left lateral thigh down his lateral and posterior aspect of his lower leg as well as his dorsum of his foot  No bowel or bladder change no weakness lower extremities he does have some pain that radiates from his lower sacral region to his tests testicle lump but he has no testicular lumps or bulges    sched     Quit smoking April use Chantix    History of HIV infection patient sees infectious disease he is doing quite nicely he is compliant takes his antivirals his T-cell count or in good range  Viral load was undetectable   History of low libido would like his testosterone checked  No loss of facial hair  He reports some mild fatty deposits breast area but no history of true gynecomastia  No nodules or masses                   /70   Pulse 75   Temp 97.1 °F (36.2 °C) (Temporal)   Ht 177.8 cm (70\")   Wt 80.7 kg (178 lb)   SpO2 99%   BMI 25.54 kg/m²       The following portions of the patient's history were reviewed and updated as appropriate: allergies, current medications, past family history, past medical history, past social history, past surgical history and problem list.    Review of Systems   Constitutional: Negative for fatigue and fever.   HENT: Negative.  Negative for trouble swallowing.    Eyes: Negative.    Respiratory: Negative.  Negative for cough and shortness of breath.    Cardiovascular: Negative for chest pain, palpitations and leg swelling.   Gastrointestinal: Negative.  Negative for abdominal pain.   Genitourinary: Negative.    Musculoskeletal: Positive for back pain.   Skin: Negative.    Neurological: Negative.  Negative for dizziness and confusion.   Psychiatric/Behavioral: Negative.        Objective   Physical Exam   Constitutional: He is oriented to person, " place, and time. He appears well-developed and well-nourished. No distress.   HENT:   Head: Normocephalic and atraumatic.   Nose: Nose normal.   Mouth/Throat: Oropharynx is clear and moist.   Eyes: Pupils are equal, round, and reactive to light. Conjunctivae are normal.   Neck: Neck supple. No JVD present.   Cardiovascular: Normal rate, regular rhythm and normal heart sounds.   No murmur heard.  Pulmonary/Chest: Effort normal and breath sounds normal. No respiratory distress. He has no wheezes.   Abdominal: Soft. Bowel sounds are normal. He exhibits no distension and no mass. There is no tenderness. There is no guarding. No hernia.   Genitourinary:   Genitourinary Comments: Patient declines and says is up-to-date he just had a recent exam prostate and testicular set it was normal   Musculoskeletal: He exhibits no edema or tenderness.   Lymphadenopathy:     He has no cervical adenopathy.   Neurological: He is alert and oriented to person, place, and time.   Skin: Skin is warm and dry. He is not diaphoretic.   Psychiatric: He has a normal mood and affect. His behavior is normal. Judgment and thought content normal.   Vitals reviewed.        Assessment/Plan   Eliseo was seen today for annual exam.    Diagnoses and all orders for this visit:    Health maintenance examination  -     CBC & Differential; Future  -     Comprehensive Metabolic Panel; Future  -     Lipid Panel With LDL / HDL Ratio; Future  -     TSH Rfx On Abnormal To Free T4; Future  -     PSA Screen; Future  -     Urinalysis With Microscopic If Indicated (No Culture) - Urine, Clean Catch; Future  -     Testosterone, Free, Total; Future    Encounter for screening colonoscopy  -     Ambulatory Referral For Screening Colonoscopy  -     CBC & Differential; Future  -     Comprehensive Metabolic Panel; Future  -     Lipid Panel With LDL / HDL Ratio; Future  -     TSH Rfx On Abnormal To Free T4; Future  -     PSA Screen; Future  -     Urinalysis With Microscopic  If Indicated (No Culture) - Urine, Clean Catch; Future  -     Testosterone, Free, Total; Future    Libido, decreased  -     CBC & Differential; Future  -     Comprehensive Metabolic Panel; Future  -     Lipid Panel With LDL / HDL Ratio; Future  -     TSH Rfx On Abnormal To Free T4; Future  -     PSA Screen; Future  -     Urinalysis With Microscopic If Indicated (No Culture) - Urine, Clean Catch; Future  -     Testosterone, Free, Total; Future    DDD (degenerative disc disease), lumbar  -     CBC & Differential; Future  -     Comprehensive Metabolic Panel; Future  -     Lipid Panel With LDL / HDL Ratio; Future  -     TSH Rfx On Abnormal To Free T4; Future  -     PSA Screen; Future  -     Urinalysis With Microscopic If Indicated (No Culture) - Urine, Clean Catch; Future  -     Testosterone, Free, Total; Future    HIV infection, unspecified symptom status (CMS/HCC)  -     CBC & Differential; Future  -     Comprehensive Metabolic Panel; Future  -     Lipid Panel With LDL / HDL Ratio; Future  -     TSH Rfx On Abnormal To Free T4; Future  -     PSA Screen; Future  -     Urinalysis With Microscopic If Indicated (No Culture) - Urine, Clean Catch; Future  -     Testosterone, Free, Total; Future    Hypertriglyceridemia  -     CBC & Differential; Future  -     Comprehensive Metabolic Panel; Future  -     Lipid Panel With LDL / HDL Ratio; Future  -     TSH Rfx On Abnormal To Free T4; Future  -     PSA Screen; Future  -     Urinalysis With Microscopic If Indicated (No Culture) - Urine, Clean Catch; Future  -     Testosterone, Free, Total; Future    Preoperative clearance  -     CBC & Differential; Future  -     Comprehensive Metabolic Panel; Future  -     Lipid Panel With LDL / HDL Ratio; Future  -     TSH Rfx On Abnormal To Free T4; Future  -     PSA Screen; Future  -     Urinalysis With Microscopic If Indicated (No Culture) - Urine, Clean Catch; Future  -     Testosterone, Free, Total; Future    Prostate cancer screening  -      CBC & Differential; Future  -     Comprehensive Metabolic Panel; Future  -     Lipid Panel With LDL / HDL Ratio; Future  -     TSH Rfx On Abnormal To Free T4; Future  -     PSA Screen; Future  -     Urinalysis With Microscopic If Indicated (No Culture) - Urine, Clean Catch; Future  -     Testosterone, Free, Total; Future        Complete physical exam  Continue therapeutic lifestyle changes regular exercise  Patient is without fever without cough congestion or other signs or symptoms of infection  Blood pressure is good  He will follow-up 6 months sooner for problems          There are no Patient Instructions on file for this visit.

## 2020-07-09 ENCOUNTER — APPOINTMENT (OUTPATIENT)
Dept: PREADMISSION TESTING | Facility: HOSPITAL | Age: 55
End: 2020-07-09

## 2020-07-09 ENCOUNTER — RESULTS ENCOUNTER (OUTPATIENT)
Dept: FAMILY MEDICINE CLINIC | Facility: CLINIC | Age: 55
End: 2020-07-09

## 2020-07-09 VITALS
HEART RATE: 61 BPM | BODY MASS INDEX: 25.35 KG/M2 | RESPIRATION RATE: 16 BRPM | WEIGHT: 177.1 LBS | SYSTOLIC BLOOD PRESSURE: 112 MMHG | DIASTOLIC BLOOD PRESSURE: 77 MMHG | TEMPERATURE: 98.2 F | OXYGEN SATURATION: 99 % | HEIGHT: 70 IN

## 2020-07-09 DIAGNOSIS — B20 HIV INFECTION, UNSPECIFIED SYMPTOM STATUS (HCC): ICD-10-CM

## 2020-07-09 DIAGNOSIS — Z01.818 PRE-OP TESTING: ICD-10-CM

## 2020-07-09 DIAGNOSIS — Z00.00 HEALTH MAINTENANCE EXAMINATION: ICD-10-CM

## 2020-07-09 DIAGNOSIS — Z12.11 ENCOUNTER FOR SCREENING COLONOSCOPY: ICD-10-CM

## 2020-07-09 DIAGNOSIS — M51.36 DDD (DEGENERATIVE DISC DISEASE), LUMBAR: ICD-10-CM

## 2020-07-09 DIAGNOSIS — Z01.818 PREOPERATIVE CLEARANCE: ICD-10-CM

## 2020-07-09 DIAGNOSIS — Z12.5 PROSTATE CANCER SCREENING: ICD-10-CM

## 2020-07-09 DIAGNOSIS — R68.82 LIBIDO, DECREASED: ICD-10-CM

## 2020-07-09 DIAGNOSIS — E78.1 HYPERTRIGLYCERIDEMIA: ICD-10-CM

## 2020-07-09 LAB
APTT PPP: 30.7 SECONDS (ref 22.7–35.4)
BASOPHILS # BLD AUTO: 0.02 10*3/MM3 (ref 0–0.2)
BASOPHILS NFR BLD AUTO: 0.5 % (ref 0–1.5)
BILIRUB UR QL STRIP: NEGATIVE
CLARITY UR: CLEAR
COLOR UR: YELLOW
DEPRECATED RDW RBC AUTO: 43.8 FL (ref 37–54)
EOSINOPHIL # BLD AUTO: 0.13 10*3/MM3 (ref 0–0.4)
EOSINOPHIL NFR BLD AUTO: 3 % (ref 0.3–6.2)
ERYTHROCYTE [DISTWIDTH] IN BLOOD BY AUTOMATED COUNT: 13.3 % (ref 12.3–15.4)
GLUCOSE UR STRIP-MCNC: NEGATIVE MG/DL
HCT VFR BLD AUTO: 46.1 % (ref 37.5–51)
HGB BLD-MCNC: 16.1 G/DL (ref 13–17.7)
HGB UR QL STRIP.AUTO: NEGATIVE
IMM GRANULOCYTES # BLD AUTO: 0.01 10*3/MM3 (ref 0–0.05)
IMM GRANULOCYTES NFR BLD AUTO: 0.2 % (ref 0–0.5)
INR PPP: 1.01 (ref 0.9–1.1)
KETONES UR QL STRIP: NEGATIVE
LEUKOCYTE ESTERASE UR QL STRIP.AUTO: NEGATIVE
LYMPHOCYTES # BLD AUTO: 1.53 10*3/MM3 (ref 0.7–3.1)
LYMPHOCYTES NFR BLD AUTO: 35.4 % (ref 19.6–45.3)
MCH RBC QN AUTO: 31 PG (ref 26.6–33)
MCHC RBC AUTO-ENTMCNC: 34.9 G/DL (ref 31.5–35.7)
MCV RBC AUTO: 88.7 FL (ref 79–97)
MONOCYTES # BLD AUTO: 0.31 10*3/MM3 (ref 0.1–0.9)
MONOCYTES NFR BLD AUTO: 7.2 % (ref 5–12)
NEUTROPHILS NFR BLD AUTO: 2.32 10*3/MM3 (ref 1.7–7)
NEUTROPHILS NFR BLD AUTO: 53.7 % (ref 42.7–76)
NITRITE UR QL STRIP: NEGATIVE
NRBC BLD AUTO-RTO: 0 /100 WBC (ref 0–0.2)
PH UR STRIP.AUTO: 6 [PH] (ref 5–8)
PLATELET # BLD AUTO: 169 10*3/MM3 (ref 140–450)
PMV BLD AUTO: 8.6 FL (ref 6–12)
PROT UR QL STRIP: NEGATIVE
PROTHROMBIN TIME: 13.2 SECONDS (ref 11.7–14.2)
RBC # BLD AUTO: 5.2 10*6/MM3 (ref 4.14–5.8)
SP GR UR STRIP: 1.02 (ref 1–1.03)
UROBILINOGEN UR QL STRIP: NORMAL
WBC # BLD AUTO: 4.32 10*3/MM3 (ref 3.4–10.8)

## 2020-07-09 PROCEDURE — 81003 URINALYSIS AUTO W/O SCOPE: CPT | Performed by: NEUROLOGICAL SURGERY

## 2020-07-09 PROCEDURE — 36415 COLL VENOUS BLD VENIPUNCTURE: CPT | Performed by: NURSE PRACTITIONER

## 2020-07-09 PROCEDURE — 84402 ASSAY OF FREE TESTOSTERONE: CPT | Performed by: NURSE PRACTITIONER

## 2020-07-09 PROCEDURE — 85730 THROMBOPLASTIN TIME PARTIAL: CPT | Performed by: NEUROLOGICAL SURGERY

## 2020-07-09 PROCEDURE — 84443 ASSAY THYROID STIM HORMONE: CPT | Performed by: NURSE PRACTITIONER

## 2020-07-09 PROCEDURE — 85025 COMPLETE CBC W/AUTO DIFF WBC: CPT | Performed by: NEUROLOGICAL SURGERY

## 2020-07-09 PROCEDURE — 85610 PROTHROMBIN TIME: CPT | Performed by: NEUROLOGICAL SURGERY

## 2020-07-09 PROCEDURE — G0103 PSA SCREENING: HCPCS | Performed by: NURSE PRACTITIONER

## 2020-07-09 PROCEDURE — 80053 COMPREHEN METABOLIC PANEL: CPT | Performed by: NURSE PRACTITIONER

## 2020-07-09 PROCEDURE — 80061 LIPID PANEL: CPT | Performed by: NURSE PRACTITIONER

## 2020-07-09 PROCEDURE — 84403 ASSAY OF TOTAL TESTOSTERONE: CPT | Performed by: NURSE PRACTITIONER

## 2020-07-09 NOTE — DISCHARGE INSTRUCTIONS
Take the following medications the morning of surgery:  PRILOSEC      ARRIVAL TIME TO [facility] OR 6:00 AM      General Instructions:  • Do not eat solid food after midnight the night before surgery.  • You may drink clear liquids day of surgery but must stop at least one hour before your hospital arrival time.  CUTOFF TIME 5:00 AM  • It is beneficial for you to have a clear drink that contains carbohydrates the day of surgery.  We suggest a 12 to 20 ounce bottle of Gatorade or Powerade for non-diabetic patients or a 12 to 20 ounce bottle of G2 or Powerade Zero for diabetic patients. (Pediatric patients, are not advised to drink a 12 to 20 ounce carbohydrate drink)    Clear liquids are liquids you can see through.  Nothing red in color.     Plain water                               Sports drinks  Sodas                                   Gelatin (Jell-O)  Fruit juices without pulp such as white grape juice and apple juice  Popsicles that contain no fruit or yogurt  Tea or coffee (no cream or milk added)  Gatorade / Powerade  G2 / Powerade Zero    • Infants may have breast milk up to four hours before surgery.  • Infants drinking formula may drink formula up to six hours before surgery.   • Patients who avoid smoking, chewing tobacco and alcohol for 4 weeks prior to surgery have a reduced risk of post-operative complications.  Quit smoking as many days before surgery as you can.  • Do not smoke, use chewing tobacco or drink alcohol the day of surgery.   • If applicable bring your C-PAP/ BI-PAP machine.  • Bring any papers given to you in the doctor’s office.  • Wear clean comfortable clothes.  • Do not wear contact lenses, false eyelashes or make-up.  Bring a case for your glasses.   • Bring crutches or walker if applicable.  • Remove all piercings.  Leave jewelry and any other valuables at home.  • Hair extensions with metal clips must be removed prior to surgery.  • The Pre-Admission Testing nurse will instruct you to  bring medications if unable to obtain an accurate list in Pre-Admission Testing.            Preventing a Surgical Site Infection:  • For 2 to 3 days before surgery, avoid shaving with a razor because the razor can irritate skin and make it easier to develop an infection.    • Any areas of open skin can increase the risk of a post-operative wound infection by allowing bacteria to enter and travel throughout the body.  Notify your surgeon if you have any skin wounds / rashes even if it is not near the expected surgical site.  The area will need assessed to determine if surgery should be delayed until it is healed.  • The night prior to surgery shower using a fresh bar of anti-bacterial soap (such as Dial) and clean washcloth.  Sleep in a clean bed with clean clothing.  Do not allow pets to sleep with you.  • Shower on the morning of surgery using a fresh bar of anti-bacterial soap (such as Dial) and clean washcloth.  Dry with a clean towel and dress in clean clothing.  • Ask your surgeon if you will be receiving antibiotics prior to surgery.  • Make sure you, your family, and all healthcare providers clean their hands with soap and water or an alcohol based hand  before caring for you or your wound.    Day of surgery:  Your arrival time is approximately two hours before your scheduled surgery time.  Upon arrival, a Pre-op nurse and Anesthesiologist will review your health history, obtain vital signs, and answer questions you may have.  The only belongings needed at this time will be a list of your home medications and if applicable your C-PAP/BI-PAP machine.  If you are staying overnight your family can leave the rest of your belongings in the car and bring them to your room later.  A Pre-op nurse will start an IV and you may receive medication in preparation for surgery, including something to help you relax.  Your family will be able to see you in the Pre-op area.  Two visitors at a time will be allowed in  the Pre-op room.  While you are in surgery your family should notify the waiting room  if they leave the waiting room area and provide a contact phone number.    Please be aware that surgery does come with discomfort.  We want to make every effort to control your discomfort so please discuss any uncontrolled symptoms with your nurse.   Your doctor will most likely have prescribed pain medications.      If you are going home after surgery you will receive individualized written care instructions before being discharged.  A responsible adult must drive you to and from the hospital on the day of your surgery and stay with you for 24 hours.    If you are staying overnight following surgery, you will be transported to your hospital room following the recovery period.  [facility] has all private rooms.    If you have any questions please call Pre-Admission Testing at [phone].  Deductibles and co-payments are collected on the day of service. Please be prepared to pay the required co-pay, deductible or deposit on the day of service as defined by your plan.    Patient Education for Self-Quarantine Process    Following your COVID testing, we strongly recommend that you do not leave your home after you have been tested for COVID except to get medical care. This includes not going to work, school or to public areas.  If this is not possible for you to do please limit your activities to only required outings.  Be sure to wear a mask when you are with other people, practice social distancing and wash your hands frequently.      The following items provide additional details to keep you safe.  • Wash your hands with soap and water frequently for at least 20 seconds.   • Avoid touching your eyes, nose and mouth with unwashed hands.  • Do not share anything - utensils, towels, food from the same bowl.   • Have your own utensils, drinking glass, dishes, towels and bedding.   • Do not have visitors.    • Do use FaceTime to stay in touch with family and friends.  • You should stay in a specific room away from others if possible.   • Stay at least 6 feet away from others in the home if you cannot have a dedicated room to yourself.   • Do not snuggle with your pet. While the CDC says there is no evidence that pets can spread COVID-19 or be infected from humans, it is probably best to avoid “petting, snuggling, being kissed or licked and sharing food (during self-quarantine)”, according to the CDC.   • Sanitize household surfaces daily. Include all high touch areas (door handles, light switches, phones, countertops, etc.)  • Do not share a bathroom with others, if possible.   • Wear a mask around others in your home if you are unable to stay in a separate room or 6 feet apart. If  you are unable to wear a mask, have your family member wear a mask if they must be within 6 feet of you.   Call your surgeon immediately if you experience any of the following symptoms:  • Sore Throat  • Shortness of Breath or difficulty breathing  • Cough  • Chills  • Body soreness or muscle pain  • Headache  • Fever  • New loss of taste or smell  • Do not arrive for your surgery ill.  Your procedure will need to be rescheduled to another time.  You will need to call your physician before the day of surgery to avoid any unnecessary exposure to hospital staff as well as other patients.

## 2020-07-10 LAB
ALBUMIN SERPL-MCNC: 4.6 G/DL (ref 3.8–4.9)
ALBUMIN/GLOB SERPL: 1.9 {RATIO} (ref 1.2–2.2)
ALP SERPL-CCNC: 44 IU/L (ref 39–117)
ALT SERPL-CCNC: 21 IU/L (ref 0–44)
AST SERPL-CCNC: 22 IU/L (ref 0–40)
BILIRUB SERPL-MCNC: 0.3 MG/DL (ref 0–1.2)
BUN SERPL-MCNC: 14 MG/DL (ref 6–24)
BUN/CREAT SERPL: 10 (ref 9–20)
CALCIUM SERPL-MCNC: 9.4 MG/DL (ref 8.7–10.2)
CHLORIDE SERPL-SCNC: 98 MMOL/L (ref 96–106)
CHOLEST SERPL-MCNC: 179 MG/DL (ref 100–199)
CO2 SERPL-SCNC: 23 MMOL/L (ref 20–29)
CREAT SERPL-MCNC: 1.35 MG/DL (ref 0.76–1.27)
GLOBULIN SER CALC-MCNC: 2.4 G/DL (ref 1.5–4.5)
GLUCOSE SERPL-MCNC: 87 MG/DL (ref 65–99)
HDLC SERPL-MCNC: 46 MG/DL
LDLC SERPL CALC-MCNC: 115 MG/DL (ref 0–99)
LDLC/HDLC SERPL: 2.5 RATIO (ref 0–3.6)
POTASSIUM SERPL-SCNC: 4.3 MMOL/L (ref 3.5–5.2)
PROT SERPL-MCNC: 7 G/DL (ref 6–8.5)
PSA SERPL-MCNC: 0.6 NG/ML (ref 0–4)
SODIUM SERPL-SCNC: 138 MMOL/L (ref 134–144)
TRIGL SERPL-MCNC: 89 MG/DL (ref 0–149)
TSH SERPL-ACNC: 1.07 UIU/ML (ref 0.45–4.5)
VLDLC SERPL-MCNC: 18 MG/DL (ref 5–40)

## 2020-07-11 ENCOUNTER — LAB (OUTPATIENT)
Dept: LAB | Facility: HOSPITAL | Age: 55
End: 2020-07-11

## 2020-07-11 DIAGNOSIS — Z01.818 OTHER SPECIFIED PRE-OPERATIVE EXAMINATION: ICD-10-CM

## 2020-07-11 PROCEDURE — U0004 COV-19 TEST NON-CDC HGH THRU: HCPCS

## 2020-07-11 PROCEDURE — C9803 HOPD COVID-19 SPEC COLLECT: HCPCS

## 2020-07-13 LAB
REF LAB TEST METHOD: NORMAL
SARS-COV-2 RNA RESP QL NAA+PROBE: NOT DETECTED
TESTOST FREE SERPL-MCNC: 9.2 PG/ML (ref 7.2–24)
TESTOST SERPL-MCNC: 582 NG/DL (ref 264–916)

## 2020-07-14 ENCOUNTER — ANESTHESIA EVENT (OUTPATIENT)
Dept: PERIOP | Facility: HOSPITAL | Age: 55
End: 2020-07-14

## 2020-07-14 ENCOUNTER — ANESTHESIA (OUTPATIENT)
Dept: PERIOP | Facility: HOSPITAL | Age: 55
End: 2020-07-14

## 2020-07-14 ENCOUNTER — APPOINTMENT (OUTPATIENT)
Dept: GENERAL RADIOLOGY | Facility: HOSPITAL | Age: 55
End: 2020-07-14

## 2020-07-14 ENCOUNTER — HOSPITAL ENCOUNTER (INPATIENT)
Facility: HOSPITAL | Age: 55
LOS: 3 days | Discharge: HOME | End: 2020-07-17
Attending: NEUROLOGICAL SURGERY | Admitting: NEUROLOGICAL SURGERY

## 2020-07-14 DIAGNOSIS — M79.2 NEUROPATHIC PAIN, LEG, LEFT: ICD-10-CM

## 2020-07-14 DIAGNOSIS — M48.062 LUMBAR STENOSIS WITH NEUROGENIC CLAUDICATION: Primary | ICD-10-CM

## 2020-07-14 DIAGNOSIS — M51.36 DDD (DEGENERATIVE DISC DISEASE), LUMBAR: ICD-10-CM

## 2020-07-14 PROBLEM — M51.26 LUMBAR HERNIATED DISC: Status: ACTIVE | Noted: 2020-07-14

## 2020-07-14 PROCEDURE — 25010000003 CEFAZOLIN IN DEXTROSE 2-4 GM/100ML-% SOLUTION: Performed by: NEUROLOGICAL SURGERY

## 2020-07-14 PROCEDURE — C1713 ANCHOR/SCREW BN/BN,TIS/BN: HCPCS | Performed by: NEUROLOGICAL SURGERY

## 2020-07-14 PROCEDURE — 25010000002 FENTANYL CITRATE (PF) 100 MCG/2ML SOLUTION: Performed by: ANESTHESIOLOGY

## 2020-07-14 PROCEDURE — 25010000002 HYDROMORPHONE 1 MG/ML SOLUTION: Performed by: NURSE ANESTHETIST, CERTIFIED REGISTERED

## 2020-07-14 PROCEDURE — 25010000002 KETOROLAC TROMETHAMINE PER 15 MG: Performed by: NURSE ANESTHETIST, CERTIFIED REGISTERED

## 2020-07-14 PROCEDURE — 25010000002 PROPOFOL 10 MG/ML EMULSION: Performed by: NURSE ANESTHETIST, CERTIFIED REGISTERED

## 2020-07-14 PROCEDURE — 0SB40ZZ EXCISION OF LUMBOSACRAL DISC, OPEN APPROACH: ICD-10-PCS | Performed by: NEUROLOGICAL SURGERY

## 2020-07-14 PROCEDURE — 0SB20ZZ EXCISION OF LUMBAR VERTEBRAL DISC, OPEN APPROACH: ICD-10-PCS | Performed by: NEUROLOGICAL SURGERY

## 2020-07-14 PROCEDURE — 72100 X-RAY EXAM L-S SPINE 2/3 VWS: CPT

## 2020-07-14 PROCEDURE — 22853 INSJ BIOMECHANICAL DEVICE: CPT | Performed by: NEUROLOGICAL SURGERY

## 2020-07-14 PROCEDURE — 25010000002 MIDAZOLAM PER 1 MG: Performed by: ANESTHESIOLOGY

## 2020-07-14 PROCEDURE — 25010000002 NEOSTIGMINE PER 0.5 MG: Performed by: NURSE ANESTHETIST, CERTIFIED REGISTERED

## 2020-07-14 PROCEDURE — 25010000002 METHOCARBAMOL 1000 MG/10ML SOLUTION: Performed by: NEUROLOGICAL SURGERY

## 2020-07-14 PROCEDURE — 22842 INSERT SPINE FIXATION DEVICE: CPT | Performed by: NEUROLOGICAL SURGERY

## 2020-07-14 PROCEDURE — 25010000002 HEPARIN (PORCINE) PER 1000 UNITS: Performed by: NEUROLOGICAL SURGERY

## 2020-07-14 PROCEDURE — 25010000002 PROMETHAZINE PER 50 MG: Performed by: NURSE ANESTHETIST, CERTIFIED REGISTERED

## 2020-07-14 PROCEDURE — 22632 ARTHRD PST TQ 1NTRSPC LM EA: CPT | Performed by: NEUROLOGICAL SURGERY

## 2020-07-14 PROCEDURE — 0SG00AJ FUSION OF LUMBAR VERTEBRAL JOINT WITH INTERBODY FUSION DEVICE, POSTERIOR APPROACH, ANTERIOR COLUMN, OPEN APPROACH: ICD-10-PCS | Performed by: NEUROLOGICAL SURGERY

## 2020-07-14 PROCEDURE — 22630 ARTHRD PST TQ 1NTRSPC LUM: CPT | Performed by: NEUROLOGICAL SURGERY

## 2020-07-14 PROCEDURE — 25010000002 MIDAZOLAM PER 1 MG: Performed by: NURSE ANESTHETIST, CERTIFIED REGISTERED

## 2020-07-14 PROCEDURE — 25010000003 CEFAZOLIN PER 500 MG: Performed by: NEUROLOGICAL SURGERY

## 2020-07-14 PROCEDURE — 25010000002 DEXAMETHASONE PER 1 MG: Performed by: NURSE ANESTHETIST, CERTIFIED REGISTERED

## 2020-07-14 PROCEDURE — 25010000002 PHENYLEPHRINE PER 1 ML: Performed by: NURSE ANESTHETIST, CERTIFIED REGISTERED

## 2020-07-14 PROCEDURE — 25010000002 FENTANYL CITRATE (PF) 100 MCG/2ML SOLUTION: Performed by: NURSE ANESTHETIST, CERTIFIED REGISTERED

## 2020-07-14 PROCEDURE — C1769 GUIDE WIRE: HCPCS | Performed by: NEUROLOGICAL SURGERY

## 2020-07-14 PROCEDURE — 25010000002 HYDROMORPHONE PER 4 MG: Performed by: NURSE ANESTHETIST, CERTIFIED REGISTERED

## 2020-07-14 PROCEDURE — 25010000003 HYDROMORPHONE HCL PF 50 MG/5ML SOLUTION: Performed by: NEUROLOGICAL SURGERY

## 2020-07-14 PROCEDURE — 25010000002 ONDANSETRON PER 1 MG: Performed by: NURSE ANESTHETIST, CERTIFIED REGISTERED

## 2020-07-14 PROCEDURE — 76000 FLUOROSCOPY <1 HR PHYS/QHP: CPT

## 2020-07-14 PROCEDURE — 0SG30AJ FUSION OF LUMBOSACRAL JOINT WITH INTERBODY FUSION DEVICE, POSTERIOR APPROACH, ANTERIOR COLUMN, OPEN APPROACH: ICD-10-PCS | Performed by: NEUROLOGICAL SURGERY

## 2020-07-14 DEVICE — ROD 1475005055 4.75 CCM SEXTANT 55MM
Type: IMPLANTABLE DEVICE | Site: SPINE LUMBAR | Status: FUNCTIONAL
Brand: CD HORIZON® SPINAL SYSTEM

## 2020-07-14 DEVICE — SPACER 3992608 26MM X 8MM
Type: IMPLANTABLE DEVICE | Site: SPINE LUMBAR | Status: FUNCTIONAL
Brand: CAPSTONE PTC™ SPINAL SYSTEM

## 2020-07-14 DEVICE — FLOSEAL HEMOSTATIC MATRIX, 10ML
Type: IMPLANTABLE DEVICE | Site: SPINE LUMBAR | Status: FUNCTIONAL
Brand: FLOSEAL HEMOSTATIC MATRIX

## 2020-07-14 DEVICE — PUTTY DBM GRAFTON 6CC: Type: IMPLANTABLE DEVICE | Site: SPINE LUMBAR | Status: FUNCTIONAL

## 2020-07-14 RX ORDER — ONDANSETRON 2 MG/ML
INJECTION INTRAMUSCULAR; INTRAVENOUS AS NEEDED
Status: DISCONTINUED | OUTPATIENT
Start: 2020-07-14 | End: 2020-07-14 | Stop reason: SURG

## 2020-07-14 RX ORDER — SODIUM CHLORIDE 0.9 % (FLUSH) 0.9 %
3 SYRINGE (ML) INJECTION EVERY 12 HOURS SCHEDULED
Status: DISCONTINUED | OUTPATIENT
Start: 2020-07-14 | End: 2020-07-17 | Stop reason: HOSPADM

## 2020-07-14 RX ORDER — HYDRALAZINE HYDROCHLORIDE 20 MG/ML
5 INJECTION INTRAMUSCULAR; INTRAVENOUS
Status: DISCONTINUED | OUTPATIENT
Start: 2020-07-14 | End: 2020-07-14

## 2020-07-14 RX ORDER — PROMETHAZINE HYDROCHLORIDE 25 MG/1
25 TABLET ORAL ONCE AS NEEDED
Status: DISCONTINUED | OUTPATIENT
Start: 2020-07-14 | End: 2020-07-14

## 2020-07-14 RX ORDER — NALOXONE HCL 0.4 MG/ML
0.2 VIAL (ML) INJECTION AS NEEDED
Status: DISCONTINUED | OUTPATIENT
Start: 2020-07-14 | End: 2020-07-14

## 2020-07-14 RX ORDER — ACETAMINOPHEN 325 MG/1
650 TABLET ORAL EVERY 4 HOURS PRN
Status: DISCONTINUED | OUTPATIENT
Start: 2020-07-14 | End: 2020-07-17 | Stop reason: HOSPADM

## 2020-07-14 RX ORDER — DIPHENHYDRAMINE HCL 25 MG
25 CAPSULE ORAL
Status: DISCONTINUED | OUTPATIENT
Start: 2020-07-14 | End: 2020-07-14

## 2020-07-14 RX ORDER — FENTANYL CITRATE 50 UG/ML
INJECTION, SOLUTION INTRAMUSCULAR; INTRAVENOUS AS NEEDED
Status: DISCONTINUED | OUTPATIENT
Start: 2020-07-14 | End: 2020-07-14 | Stop reason: SURG

## 2020-07-14 RX ORDER — BUPIVACAINE HYDROCHLORIDE AND EPINEPHRINE 2.5; 5 MG/ML; UG/ML
INJECTION, SOLUTION INFILTRATION; PERINEURAL AS NEEDED
Status: DISCONTINUED | OUTPATIENT
Start: 2020-07-14 | End: 2020-07-14 | Stop reason: HOSPADM

## 2020-07-14 RX ORDER — SODIUM CHLORIDE 0.9 % (FLUSH) 0.9 %
10 SYRINGE (ML) INJECTION AS NEEDED
Status: DISCONTINUED | OUTPATIENT
Start: 2020-07-14 | End: 2020-07-17 | Stop reason: HOSPADM

## 2020-07-14 RX ORDER — ROCURONIUM BROMIDE 10 MG/ML
INJECTION, SOLUTION INTRAVENOUS AS NEEDED
Status: DISCONTINUED | OUTPATIENT
Start: 2020-07-14 | End: 2020-07-14 | Stop reason: SURG

## 2020-07-14 RX ORDER — CEFAZOLIN SODIUM 2 G/100ML
2 INJECTION, SOLUTION INTRAVENOUS ONCE
Status: DISCONTINUED | OUTPATIENT
Start: 2020-07-14 | End: 2020-07-14 | Stop reason: HOSPADM

## 2020-07-14 RX ORDER — SODIUM CHLORIDE, SODIUM LACTATE, POTASSIUM CHLORIDE, CALCIUM CHLORIDE 600; 310; 30; 20 MG/100ML; MG/100ML; MG/100ML; MG/100ML
30 INJECTION, SOLUTION INTRAVENOUS CONTINUOUS
Status: DISCONTINUED | OUTPATIENT
Start: 2020-07-14 | End: 2020-07-17 | Stop reason: HOSPADM

## 2020-07-14 RX ORDER — FLUMAZENIL 0.1 MG/ML
0.2 INJECTION INTRAVENOUS AS NEEDED
Status: DISCONTINUED | OUTPATIENT
Start: 2020-07-14 | End: 2020-07-14

## 2020-07-14 RX ORDER — PANTOPRAZOLE SODIUM 40 MG/1
40 TABLET, DELAYED RELEASE ORAL EVERY MORNING
Status: DISCONTINUED | OUTPATIENT
Start: 2020-07-14 | End: 2020-07-17 | Stop reason: HOSPADM

## 2020-07-14 RX ORDER — METHOCARBAMOL 100 MG/ML
1000 INJECTION, SOLUTION INTRAMUSCULAR; INTRAVENOUS ONCE
Status: COMPLETED | OUTPATIENT
Start: 2020-07-14 | End: 2020-07-14

## 2020-07-14 RX ORDER — FAMOTIDINE 10 MG/ML
20 INJECTION, SOLUTION INTRAVENOUS ONCE
Status: COMPLETED | OUTPATIENT
Start: 2020-07-14 | End: 2020-07-14

## 2020-07-14 RX ORDER — GLYCOPYRROLATE 0.2 MG/ML
INJECTION INTRAMUSCULAR; INTRAVENOUS AS NEEDED
Status: DISCONTINUED | OUTPATIENT
Start: 2020-07-14 | End: 2020-07-14 | Stop reason: SURG

## 2020-07-14 RX ORDER — DIAZEPAM 5 MG/1
5 TABLET ORAL EVERY 6 HOURS PRN
Status: DISCONTINUED | OUTPATIENT
Start: 2020-07-14 | End: 2020-07-15

## 2020-07-14 RX ORDER — TRAZODONE HYDROCHLORIDE 100 MG/1
100 TABLET ORAL NIGHTLY
Status: DISCONTINUED | OUTPATIENT
Start: 2020-07-14 | End: 2020-07-17 | Stop reason: HOSPADM

## 2020-07-14 RX ORDER — PROMETHAZINE HYDROCHLORIDE 25 MG/ML
INJECTION, SOLUTION INTRAMUSCULAR; INTRAVENOUS AS NEEDED
Status: DISCONTINUED | OUTPATIENT
Start: 2020-07-14 | End: 2020-07-14 | Stop reason: SURG

## 2020-07-14 RX ORDER — NALOXONE HCL 0.4 MG/ML
0.1 VIAL (ML) INJECTION
Status: DISCONTINUED | OUTPATIENT
Start: 2020-07-14 | End: 2020-07-17 | Stop reason: HOSPADM

## 2020-07-14 RX ORDER — ONDANSETRON 4 MG/1
4 TABLET, FILM COATED ORAL EVERY 6 HOURS PRN
Status: DISCONTINUED | OUTPATIENT
Start: 2020-07-14 | End: 2020-07-17 | Stop reason: HOSPADM

## 2020-07-14 RX ORDER — PROMETHAZINE HYDROCHLORIDE 25 MG/ML
6.25 INJECTION, SOLUTION INTRAMUSCULAR; INTRAVENOUS
Status: DISCONTINUED | OUTPATIENT
Start: 2020-07-14 | End: 2020-07-14

## 2020-07-14 RX ORDER — FENTANYL CITRATE 50 UG/ML
50 INJECTION, SOLUTION INTRAMUSCULAR; INTRAVENOUS
Status: DISCONTINUED | OUTPATIENT
Start: 2020-07-14 | End: 2020-07-14

## 2020-07-14 RX ORDER — MIDAZOLAM HYDROCHLORIDE 1 MG/ML
INJECTION INTRAMUSCULAR; INTRAVENOUS AS NEEDED
Status: DISCONTINUED | OUTPATIENT
Start: 2020-07-14 | End: 2020-07-14 | Stop reason: SURG

## 2020-07-14 RX ORDER — LIDOCAINE HYDROCHLORIDE 20 MG/ML
INJECTION, SOLUTION INFILTRATION; PERINEURAL AS NEEDED
Status: DISCONTINUED | OUTPATIENT
Start: 2020-07-14 | End: 2020-07-14 | Stop reason: SURG

## 2020-07-14 RX ORDER — HYDROCODONE BITARTRATE AND ACETAMINOPHEN 7.5; 325 MG/1; MG/1
1 TABLET ORAL ONCE AS NEEDED
Status: DISCONTINUED | OUTPATIENT
Start: 2020-07-14 | End: 2020-07-14

## 2020-07-14 RX ORDER — LIDOCAINE HYDROCHLORIDE 10 MG/ML
0.5 INJECTION, SOLUTION EPIDURAL; INFILTRATION; INTRACAUDAL; PERINEURAL ONCE AS NEEDED
Status: DISCONTINUED | OUTPATIENT
Start: 2020-07-14 | End: 2020-07-14 | Stop reason: HOSPADM

## 2020-07-14 RX ORDER — DIPHENHYDRAMINE HYDROCHLORIDE 50 MG/ML
12.5 INJECTION INTRAMUSCULAR; INTRAVENOUS
Status: DISCONTINUED | OUTPATIENT
Start: 2020-07-14 | End: 2020-07-14

## 2020-07-14 RX ORDER — MIDAZOLAM HYDROCHLORIDE 1 MG/ML
1 INJECTION INTRAMUSCULAR; INTRAVENOUS
Status: COMPLETED | OUTPATIENT
Start: 2020-07-14 | End: 2020-07-14

## 2020-07-14 RX ORDER — CYCLOBENZAPRINE HCL 10 MG
10 TABLET ORAL 3 TIMES DAILY PRN
Status: DISCONTINUED | OUTPATIENT
Start: 2020-07-14 | End: 2020-07-15

## 2020-07-14 RX ORDER — EPHEDRINE SULFATE 50 MG/ML
5 INJECTION, SOLUTION INTRAVENOUS ONCE AS NEEDED
Status: DISCONTINUED | OUTPATIENT
Start: 2020-07-14 | End: 2020-07-14

## 2020-07-14 RX ORDER — KETOROLAC TROMETHAMINE 30 MG/ML
INJECTION, SOLUTION INTRAMUSCULAR; INTRAVENOUS AS NEEDED
Status: DISCONTINUED | OUTPATIENT
Start: 2020-07-14 | End: 2020-07-14 | Stop reason: SURG

## 2020-07-14 RX ORDER — ONDANSETRON 2 MG/ML
4 INJECTION INTRAMUSCULAR; INTRAVENOUS EVERY 6 HOURS PRN
Status: DISCONTINUED | OUTPATIENT
Start: 2020-07-14 | End: 2020-07-17 | Stop reason: HOSPADM

## 2020-07-14 RX ORDER — HYDROMORPHONE HCL IN 0.9% NACL 10 MG/50ML
PATIENT CONTROLLED ANALGESIA SYRINGE INTRAVENOUS CONTINUOUS
Status: DISPENSED | OUTPATIENT
Start: 2020-07-14 | End: 2020-07-16

## 2020-07-14 RX ORDER — EPHEDRINE SULFATE 50 MG/ML
INJECTION, SOLUTION INTRAVENOUS AS NEEDED
Status: DISCONTINUED | OUTPATIENT
Start: 2020-07-14 | End: 2020-07-14 | Stop reason: SURG

## 2020-07-14 RX ORDER — CEFAZOLIN SODIUM 2 G/100ML
2 INJECTION, SOLUTION INTRAVENOUS EVERY 8 HOURS
Status: COMPLETED | OUTPATIENT
Start: 2020-07-14 | End: 2020-07-15

## 2020-07-14 RX ORDER — ACETAMINOPHEN 325 MG/1
650 TABLET ORAL ONCE AS NEEDED
Status: DISCONTINUED | OUTPATIENT
Start: 2020-07-14 | End: 2020-07-14

## 2020-07-14 RX ORDER — OXYCODONE AND ACETAMINOPHEN 7.5; 325 MG/1; MG/1
1 TABLET ORAL ONCE AS NEEDED
Status: COMPLETED | OUTPATIENT
Start: 2020-07-14 | End: 2020-07-14

## 2020-07-14 RX ORDER — AMOXICILLIN 250 MG
1 CAPSULE ORAL NIGHTLY PRN
Status: DISCONTINUED | OUTPATIENT
Start: 2020-07-14 | End: 2020-07-17 | Stop reason: HOSPADM

## 2020-07-14 RX ORDER — DOCUSATE SODIUM 100 MG/1
100 CAPSULE, LIQUID FILLED ORAL 2 TIMES DAILY PRN
Status: DISCONTINUED | OUTPATIENT
Start: 2020-07-14 | End: 2020-07-15

## 2020-07-14 RX ORDER — PROPOFOL 10 MG/ML
VIAL (ML) INTRAVENOUS AS NEEDED
Status: DISCONTINUED | OUTPATIENT
Start: 2020-07-14 | End: 2020-07-14 | Stop reason: SURG

## 2020-07-14 RX ORDER — ONDANSETRON 2 MG/ML
4 INJECTION INTRAMUSCULAR; INTRAVENOUS ONCE AS NEEDED
Status: DISCONTINUED | OUTPATIENT
Start: 2020-07-14 | End: 2020-07-14

## 2020-07-14 RX ORDER — SODIUM CHLORIDE 0.9 % (FLUSH) 0.9 %
3 SYRINGE (ML) INJECTION EVERY 12 HOURS SCHEDULED
Status: DISCONTINUED | OUTPATIENT
Start: 2020-07-14 | End: 2020-07-14 | Stop reason: HOSPADM

## 2020-07-14 RX ORDER — HYDROCODONE BITARTRATE AND ACETAMINOPHEN 7.5; 325 MG/1; MG/1
1 TABLET ORAL EVERY 4 HOURS PRN
Status: DISCONTINUED | OUTPATIENT
Start: 2020-07-14 | End: 2020-07-14

## 2020-07-14 RX ORDER — PROMETHAZINE HYDROCHLORIDE 25 MG/1
25 SUPPOSITORY RECTAL ONCE AS NEEDED
Status: DISCONTINUED | OUTPATIENT
Start: 2020-07-14 | End: 2020-07-14

## 2020-07-14 RX ORDER — DEXAMETHASONE SODIUM PHOSPHATE 10 MG/ML
INJECTION INTRAMUSCULAR; INTRAVENOUS AS NEEDED
Status: DISCONTINUED | OUTPATIENT
Start: 2020-07-14 | End: 2020-07-14 | Stop reason: SURG

## 2020-07-14 RX ORDER — FENTANYL CITRATE 50 UG/ML
50 INJECTION, SOLUTION INTRAMUSCULAR; INTRAVENOUS
Status: DISCONTINUED | OUTPATIENT
Start: 2020-07-14 | End: 2020-07-14 | Stop reason: HOSPADM

## 2020-07-14 RX ORDER — HYDROMORPHONE HCL 110MG/55ML
PATIENT CONTROLLED ANALGESIA SYRINGE INTRAVENOUS AS NEEDED
Status: DISCONTINUED | OUTPATIENT
Start: 2020-07-14 | End: 2020-07-14

## 2020-07-14 RX ORDER — SODIUM CHLORIDE 0.9 % (FLUSH) 0.9 %
3-10 SYRINGE (ML) INJECTION AS NEEDED
Status: DISCONTINUED | OUTPATIENT
Start: 2020-07-14 | End: 2020-07-14 | Stop reason: HOSPADM

## 2020-07-14 RX ORDER — HYDROCODONE BITARTRATE AND ACETAMINOPHEN 10; 325 MG/1; MG/1
1 TABLET ORAL EVERY 4 HOURS PRN
Status: DISCONTINUED | OUTPATIENT
Start: 2020-07-14 | End: 2020-07-15

## 2020-07-14 RX ORDER — SODIUM CHLORIDE, SODIUM LACTATE, POTASSIUM CHLORIDE, CALCIUM CHLORIDE 600; 310; 30; 20 MG/100ML; MG/100ML; MG/100ML; MG/100ML
9 INJECTION, SOLUTION INTRAVENOUS CONTINUOUS
Status: DISCONTINUED | OUTPATIENT
Start: 2020-07-14 | End: 2020-07-14

## 2020-07-14 RX ORDER — HYDROMORPHONE HYDROCHLORIDE 1 MG/ML
0.5 INJECTION, SOLUTION INTRAMUSCULAR; INTRAVENOUS; SUBCUTANEOUS
Status: DISCONTINUED | OUTPATIENT
Start: 2020-07-14 | End: 2020-07-14

## 2020-07-14 RX ORDER — PROMETHAZINE HYDROCHLORIDE 25 MG/ML
12.5 INJECTION, SOLUTION INTRAMUSCULAR; INTRAVENOUS ONCE AS NEEDED
Status: DISCONTINUED | OUTPATIENT
Start: 2020-07-14 | End: 2020-07-14

## 2020-07-14 RX ORDER — FLUTICASONE PROPIONATE 50 MCG
2 SPRAY, SUSPENSION (ML) NASAL DAILY PRN
Status: DISCONTINUED | OUTPATIENT
Start: 2020-07-14 | End: 2020-07-17 | Stop reason: HOSPADM

## 2020-07-14 RX ADMIN — FENTANYL CITRATE 50 MCG: 50 INJECTION, SOLUTION INTRAMUSCULAR; INTRAVENOUS at 12:52

## 2020-07-14 RX ADMIN — OXYCODONE HYDROCHLORIDE AND ACETAMINOPHEN 1 TABLET: 7.5; 325 TABLET ORAL at 14:06

## 2020-07-14 RX ADMIN — FENTANYL CITRATE 50 MCG: 50 INJECTION, SOLUTION INTRAMUSCULAR; INTRAVENOUS at 07:18

## 2020-07-14 RX ADMIN — FENTANYL CITRATE 50 MCG: 50 INJECTION INTRAMUSCULAR; INTRAVENOUS at 07:53

## 2020-07-14 RX ADMIN — DIAZEPAM 5 MG: 5 TABLET ORAL at 17:23

## 2020-07-14 RX ADMIN — DEXAMETHASONE SODIUM PHOSPHATE 8 MG: 10 INJECTION INTRAMUSCULAR; INTRAVENOUS at 08:27

## 2020-07-14 RX ADMIN — ROCURONIUM BROMIDE 20 MG: 10 INJECTION INTRAVENOUS at 11:23

## 2020-07-14 RX ADMIN — DOLUTEGRAVIR SODIUM 50 MG: 50 TABLET, FILM COATED ORAL at 21:03

## 2020-07-14 RX ADMIN — LIDOCAINE HYDROCHLORIDE 100 MG: 20 INJECTION, SOLUTION INFILTRATION; PERINEURAL at 07:53

## 2020-07-14 RX ADMIN — HYDROMORPHONE HYDROCHLORIDE 0.4 MG: 1 INJECTION, SOLUTION INTRAMUSCULAR; INTRAVENOUS; SUBCUTANEOUS at 11:28

## 2020-07-14 RX ADMIN — TRAZODONE HYDROCHLORIDE 100 MG: 100 TABLET ORAL at 21:04

## 2020-07-14 RX ADMIN — CYCLOBENZAPRINE 10 MG: 10 TABLET, FILM COATED ORAL at 18:50

## 2020-07-14 RX ADMIN — SODIUM CHLORIDE, POTASSIUM CHLORIDE, SODIUM LACTATE AND CALCIUM CHLORIDE 9 ML/HR: 600; 310; 30; 20 INJECTION, SOLUTION INTRAVENOUS at 07:18

## 2020-07-14 RX ADMIN — HYDROMORPHONE HYDROCHLORIDE 0.4 MG: 1 INJECTION, SOLUTION INTRAMUSCULAR; INTRAVENOUS; SUBCUTANEOUS at 09:20

## 2020-07-14 RX ADMIN — ROCURONIUM BROMIDE 40 MG: 10 INJECTION INTRAVENOUS at 07:55

## 2020-07-14 RX ADMIN — KETOROLAC TROMETHAMINE 30 MG: 30 INJECTION, SOLUTION INTRAMUSCULAR; INTRAVENOUS at 11:49

## 2020-07-14 RX ADMIN — MIDAZOLAM 1 MG: 1 INJECTION INTRAMUSCULAR; INTRAVENOUS at 07:19

## 2020-07-14 RX ADMIN — EPHEDRINE SULFATE 5 MG: 50 INJECTION INTRAVENOUS at 08:48

## 2020-07-14 RX ADMIN — METHOCARBAMOL 1000 MG: 100 INJECTION, SOLUTION INTRAMUSCULAR; INTRAVENOUS at 12:54

## 2020-07-14 RX ADMIN — SODIUM CHLORIDE, POTASSIUM CHLORIDE, SODIUM LACTATE AND CALCIUM CHLORIDE: 600; 310; 30; 20 INJECTION, SOLUTION INTRAVENOUS at 12:35

## 2020-07-14 RX ADMIN — FENTANYL CITRATE 50 MCG: 50 INJECTION, SOLUTION INTRAMUSCULAR; INTRAVENOUS at 07:38

## 2020-07-14 RX ADMIN — ROCURONIUM BROMIDE 20 MG: 10 INJECTION INTRAVENOUS at 10:26

## 2020-07-14 RX ADMIN — GLYCOPYRROLATE 0.8 MG: 0.2 INJECTION INTRAMUSCULAR; INTRAVENOUS at 12:07

## 2020-07-14 RX ADMIN — PHENYLEPHRINE HYDROCHLORIDE 100 MCG: 10 INJECTION INTRAVENOUS at 08:17

## 2020-07-14 RX ADMIN — FENTANYL CITRATE 50 MCG: 50 INJECTION, SOLUTION INTRAMUSCULAR; INTRAVENOUS at 13:14

## 2020-07-14 RX ADMIN — CYCLOBENZAPRINE 10 MG: 10 TABLET, FILM COATED ORAL at 14:50

## 2020-07-14 RX ADMIN — PROMETHAZINE HYDROCHLORIDE 12.5 MG: 25 INJECTION INTRAMUSCULAR; INTRAVENOUS at 08:40

## 2020-07-14 RX ADMIN — DOCUSATE SODIUM 100 MG: 100 CAPSULE, LIQUID FILLED ORAL at 21:06

## 2020-07-14 RX ADMIN — FAMOTIDINE 20 MG: 10 INJECTION INTRAVENOUS at 07:18

## 2020-07-14 RX ADMIN — PROPOFOL 150 MG: 10 INJECTION, EMULSION INTRAVENOUS at 07:53

## 2020-07-14 RX ADMIN — FENTANYL CITRATE 50 MCG: 50 INJECTION INTRAMUSCULAR; INTRAVENOUS at 12:11

## 2020-07-14 RX ADMIN — HYDROMORPHONE HYDROCHLORIDE 0.5 MG: 1 INJECTION, SOLUTION INTRAMUSCULAR; INTRAVENOUS; SUBCUTANEOUS at 14:12

## 2020-07-14 RX ADMIN — EMTRICITABINE AND TENOFOVIR ALAFENAMIDE 1 TABLET: 200; 25 TABLET ORAL at 21:03

## 2020-07-14 RX ADMIN — PHENYLEPHRINE HYDROCHLORIDE 100 MCG: 10 INJECTION INTRAVENOUS at 10:36

## 2020-07-14 RX ADMIN — HYDROMORPHONE HYDROCHLORIDE: 10 INJECTION, SOLUTION INTRAMUSCULAR; INTRAVENOUS; SUBCUTANEOUS at 13:42

## 2020-07-14 RX ADMIN — MIDAZOLAM 2 MG: 1 INJECTION INTRAMUSCULAR; INTRAVENOUS at 07:51

## 2020-07-14 RX ADMIN — SODIUM CHLORIDE, POTASSIUM CHLORIDE, SODIUM LACTATE AND CALCIUM CHLORIDE: 600; 310; 30; 20 INJECTION, SOLUTION INTRAVENOUS at 09:11

## 2020-07-14 RX ADMIN — HYDROCODONE BITARTRATE AND ACETAMINOPHEN 1 TABLET: 7.5; 325 TABLET ORAL at 21:02

## 2020-07-14 RX ADMIN — PHENYLEPHRINE HYDROCHLORIDE 100 MCG: 10 INJECTION INTRAVENOUS at 08:27

## 2020-07-14 RX ADMIN — FENTANYL CITRATE 50 MCG: 50 INJECTION INTRAMUSCULAR; INTRAVENOUS at 12:16

## 2020-07-14 RX ADMIN — FENTANYL CITRATE 50 MCG: 50 INJECTION, SOLUTION INTRAMUSCULAR; INTRAVENOUS at 13:36

## 2020-07-14 RX ADMIN — EPHEDRINE SULFATE 10 MG: 50 INJECTION INTRAVENOUS at 11:04

## 2020-07-14 RX ADMIN — ONDANSETRON HYDROCHLORIDE 4 MG: 2 SOLUTION INTRAMUSCULAR; INTRAVENOUS at 11:41

## 2020-07-14 RX ADMIN — FENTANYL CITRATE 50 MCG: 50 INJECTION, SOLUTION INTRAMUSCULAR; INTRAVENOUS at 13:06

## 2020-07-14 RX ADMIN — ROCURONIUM BROMIDE 20 MG: 10 INJECTION INTRAVENOUS at 08:54

## 2020-07-14 RX ADMIN — HYDROCODONE BITARTRATE AND ACETAMINOPHEN 1 TABLET: 7.5; 325 TABLET ORAL at 16:40

## 2020-07-14 RX ADMIN — MIDAZOLAM 1 MG: 1 INJECTION INTRAMUSCULAR; INTRAVENOUS at 07:38

## 2020-07-14 RX ADMIN — HYDROMORPHONE HYDROCHLORIDE 0.5 MG: 1 INJECTION, SOLUTION INTRAMUSCULAR; INTRAVENOUS; SUBCUTANEOUS at 12:53

## 2020-07-14 RX ADMIN — NEOSTIGMINE METHYLSULFATE 5 MG: 1 INJECTION INTRAMUSCULAR; INTRAVENOUS; SUBCUTANEOUS at 12:07

## 2020-07-14 RX ADMIN — FENTANYL CITRATE 25 MCG: 50 INJECTION INTRAMUSCULAR; INTRAVENOUS at 09:24

## 2020-07-14 RX ADMIN — CEFAZOLIN SODIUM 2 G: 2 INJECTION, SOLUTION INTRAVENOUS at 16:42

## 2020-07-14 RX ADMIN — HYDROMORPHONE HYDROCHLORIDE 0.2 MG: 1 INJECTION, SOLUTION INTRAMUSCULAR; INTRAVENOUS; SUBCUTANEOUS at 11:49

## 2020-07-14 RX ADMIN — HYDROMORPHONE HYDROCHLORIDE 0.5 MG: 1 INJECTION, SOLUTION INTRAMUSCULAR; INTRAVENOUS; SUBCUTANEOUS at 13:16

## 2020-07-14 RX ADMIN — FENTANYL CITRATE 25 MCG: 50 INJECTION INTRAMUSCULAR; INTRAVENOUS at 09:02

## 2020-07-14 NOTE — ANESTHESIA POSTPROCEDURE EVALUATION
"Patient: Eliseo Phan    Procedure Summary     Date:  07/14/20 Room / Location:  [facility] OR [address] [place] [address] MAIN OR    Anesthesia Start:  0750 Anesthesia Stop:  1244    Procedure:  LUMBAR 4 TO LUMBAR 5, LUMBAR 5  TO SACRAL 1 LEFT METRX TRANSFORAMINAL LUMBAR INTERBODY FUSION WITH CAGES AND SEXTANT PEDICLE SCREW FIXATION (Left Spine Lumbar) Diagnosis:       Lumbar stenosis with neurogenic claudication      Neuropathic pain, leg, left      DDD (degenerative disc disease), lumbar      (Lumbar stenosis with neurogenic claudication [M48.062])      (Neuropathic pain, leg, left [M79.2])      (DDD (degenerative disc disease), lumbar [M51.36])    Surgeon:  Thomas Torres MD Provider:  Jerrod Dangelo MD    Anesthesia Type:  general ASA Status:  3          Anesthesia Type: general    Vitals  Vitals Value Taken Time   /89 7/14/2020  2:00 PM   Temp 36.6 °C (97.9 °F) 7/14/2020 12:37 PM   Pulse 89 7/14/2020  2:03 PM   Resp 18 7/14/2020  1:30 PM   SpO2 96 % 7/14/2020  2:03 PM   Vitals shown include unvalidated device data.        Post Anesthesia Care and Evaluation    Patient location during evaluation: bedside  Patient participation: complete - patient participated  Level of consciousness: awake and alert  Pain management: adequate  Airway patency: patent  Anesthetic complications: No anesthetic complications    Cardiovascular status: acceptable  Respiratory status: acceptable  Hydration status: acceptable    Comments: /82   Pulse 83   Temp 36.6 °C (97.9 °F) (Oral)   Resp 18   Ht 177.8 cm (70\")   Wt 79.8 kg (175 lb 14.8 oz)   SpO2 97%   BMI 25.24 kg/m²       "

## 2020-07-14 NOTE — ANESTHESIA PROCEDURE NOTES
Airway  Urgency: elective    Date/Time: 7/14/2020 7:57 AM  Airway not difficult    General Information and Staff    Patient location during procedure: OR  Anesthesiologist: Jerrod Dangelo MD  CRNA: Brendan Hughes CRNA    Indications and Patient Condition  Indications for airway management: airway protection    Preoxygenated: yes  Mask difficulty assessment: 1 - vent by mask    Final Airway Details  Final airway type: endotracheal airway      Successful airway: ETT  Cuffed: yes   Successful intubation technique: direct laryngoscopy  Blade: Shahriar  Blade size: 3  ETT size (mm): 8.0  Cormack-Lehane Classification: grade I - full view of glottis  Placement verified by: chest auscultation and capnometry   Measured from: teeth  ETT/EBT  to teeth (cm): 23  Number of attempts at approach: 1  Assessment: lips, teeth, and gum same as pre-op and atraumatic intubation    Additional Comments  Pre 02 100%, SIVI, DL x1, atraumatic intubation, BLBS, Positive ETC02.

## 2020-07-14 NOTE — PLAN OF CARE
See below.    Problem: Patient Care Overview  Goal: Plan of Care Review  Outcome: Ongoing (interventions implemented as appropriate)  Flowsheets  Taken 7/14/2020 1604  Progress: improving  Outcome Summary: 54/M POD#0 L4-L5, L5-S1 left transforaminal fusion w/ cages & screw fixation.  ALOx4, RA, lungs clear but diminished, BS hypoactive but improving, F/C in place per Neurosurgery.  Up x1-2 BRP with walker/gait belt.  2+ pedal pulses noted bilaterally, no c/o numbness/tingling in BLE, dressing CDI.  Pain somewhat controlled with Dilaudid PCA 0.1/8/0.75 and PO pain meds, Hx of substance abuse may impede serrano pain control.  PIV infusing LR @ 100 cc/hr per MD orders with intermittent ABX infusions.  D/C planning in progress.  Taken 7/14/2020 1516  Plan of Care Reviewed With: patient;significant other

## 2020-07-14 NOTE — OP NOTE
Preoperative diagnosis: 1. Recurrent spinal stenosis L4/5, L5/S1 with neurogenic claudication; 2. Recurrent herniated disc left L4/5, left L5/S1 with radiculopathy    Postoperative diagnosis: Same as above    Procedures performed: Left sided Metrx L4/5, L5/S1 transformainal interbody lumbar fusion (TLIF) with Capstone cages L4/5, L5/S1 with percutaneous Sextant pedicle screw/usama fixation L4/5, L5/S1    Surgeon: Brian    First Assistant: Carol Lebron  (She greatly assisted in the exposure, visualization of neural structures, control of bleeding, retraction, and closure of the incision.)    Anesthesia: GET     EBL: 100 cc    Complications: none    Specimen sent: none    Drains: none    Findings: Recurrent spinal stenosis, recurrent herniated disc both on left at L4/5, L5/S1 and disc space collapse at both levels    Postoperative condition: good    Indications for the operation: 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 He had left leg pain for almost a year beforehand and, unfortunately, the surgery never helped. He was seen by his original surgeon who had recommended at that time fusing him at those levels. Initially he came to me for a second opinion, and I disagreed and recommended conservative treatments for a period of time. He transferred his care to me and he underwent pain management, therapy, and blocks. Unfortunately none of them helped. I did not feel, until we got a myelogram, that there was enough root compression to justify doing the surgery, but he did have on the myelogram some recurrent spinal stenosis at L4-L5 in the lateral recesses, as well some evidence particularly at L5-S1 of recurrent disc herniation and disc space collapse, all of which could have resulted in the persistent left buttock and leg pain that he was experiencing. He actually did not immediately have that much back  pain, but because of the myelographic findings, continued radicular pain, and the failure of all conservative treatments, I recommended bringing him back to the operating room for a 2-level lumbar decompression and fusion using the PLIF approach from the left.         Informed consent: He understood that the  goal of surgery is relief of radiating leg pain, improvement of numbness, tingling, and weakness, and reduction in overall low back pain. The risks include, but are not limited to, infection, hemorrhage requiring transfusion or reoperation, CSF leak requiring reoperation, incomplete relief of symptoms, psuedoarthrosis resulting in chronic low back pain, hardward problems requiring revision or removal, potential need for additional surgery in the future, stroke, paralysis, coma, and death. The patient agrees to proceed.         Details of the operation: The patient was taken to the operating room and remained on his gurney in the supine position. After induction and endotracheal intubation, he got 2 g of Kefzol as per the SCIP protocol. SCDs were placed. Adame catheter was placed. Venous access was secured. He was rolled over in the prone position on the radiolucent Yan spinal table. All pressure points were padded including the brachial plexus. We brought in the C-arm and marked out the incision on the left at L5-S1 and L4-L5 about 5 cm out laterally. The lumbar region was then prepped and draped in the usual sterile fashion, as was the lateral C-arm. I brought in the PA C-arm and marked out the pedicle entry sites at L4, L5 and S1 on the right side and on the left, which corresponded to the previously planned incision. There were essentially 2 paramedian incisions planned. We did a surgical timeout. I injected each of those pedicle entry sites bilaterally with a total of 5 mL of 0.25% Marcaine, making that equal 15 mL on the right and 15 mL on the left. Two paramedian incisions were made from L4 to S1 on  the right and the left with #10 skin knife. Hemostasis was obtained with monopolar cautery. Using the modified Jamshidi needle I cannulated first the right then the left L4 pedicle using the transpedicular technique and used a stainless steel guide wire into the vertebral bodies. I repeated the same process bilaterally at L5 and bilaterally at S1. I dilated and tapped and then put a 6.5 x 55 mm screw on the right at L4, a 6.5 x 55 mm screw on the right at L5, and a 6.5 x 50 mm screw on the right at S1. I then placed a 55 mm usama through a superior sextant approach through a separate small stab incision and tightened down the middle screw with the breakaway technique and provisionally tightened the L4 and S1 screw heads in distraction. I then used the PA and lateral C-arm, and using biplanar C-arm fluoroscopy I dilated and made a muscle-sparing approach to the left at L5-S1, docking a 26 x 6 cm tube onto the lateral facet at L5-S1 and another tube on the left at L4-L5. I documented this with biplanar C-arm fluoroscopy. Starting first through the operating microscope, which had been draped and brought into the field and use of which was important for the performance of microneurosurgical technique, I did a complete left L5-S1 facetectomy exposing the L5 nerve root and the shoulder of the S1 nerve root. There was a recurrent amount of spinal stenosis and herniated disc compressing the S1 nerve root. I incised the disc space and did a partial diskectomy but clearing out enough material at L5-S1 to conduct a fusion. Endplates were scraped with angled curets. I then placed some demineralized bone matrix into the interspace and then packed a 26 x 8 mm Capstone cage at the oblique angle at L5-S1 with good position according to PA and lateral C-arm. I then moved on to L4-L5 and then did a lateral facetectomy on the left at L4-L5, decompressing and identifying the left L4 and the left L5 nerve root. Again, like the previous  level, there was some recurrent stenosis and recurrent herniated disc. I incised the disc and did a partial disk removal but removing enough material to place an interbody cage after packing it with demineralized bone matrix. I packed a 26 x 9 mm Capstone cage, again using the oblique angle and verifying with AP and lateral fluoroscopy that I was in good position. The tubes were removed. Microscope was removed. I dilated and tapped over the wires and put a 6.5 x 50 mm screw on the left at L4, a 6.5 x 55 mm screw on the left at L5 and a 6.5 x 50 mm screw on the left at S1. I could not use the sextant device because the 2 screws were so close together at L5-S1 that I could not attach the device itself, so I just through a mini-Rajiv approach I put a 55 mm usama and tightened it down directly with some compression using the breakaway technique. PA and lateral x-rays showed good positioning of the screws and the rods. The incisions were irrigated out with antibiotic irrigation. Floseal was used. The fascia was reapproximated with #0 Vicryl interrupted suture. The subcutaneous layer was closed with 2-0 interrupted Vicryl suture. The skin was closed with a running 4-0 Vicryl subcuticular. The stab incision on the right superiorly was closed with 4-0 Vicryl. Steri-Strips and a clean, dry dressing were placed. The patient tolerated the procedure well and there were no complications. He was rolled over in the supine position, extubated, and taken to the recovery room in satisfactory condition.

## 2020-07-14 NOTE — NURSING NOTE
"Pt requests his cell phone, and his room number. Pt states pain \"is good to go to his room.\" Report called.   "

## 2020-07-14 NOTE — ANESTHESIA PREPROCEDURE EVALUATION
Anesthesia Evaluation     Patient summary reviewed and Nursing notes reviewed   history of anesthetic complications: PONV  NPO Solid Status: > 8 hours  NPO Liquid Status: > 4 hours           Airway   Mallampati: II  TM distance: >3 FB  Neck ROM: full  no difficulty expected  Dental - normal exam     Pulmonary - normal exam   Cardiovascular - normal exam        Neuro/Psych  (+) numbness,     GI/Hepatic/Renal/Endo    (+)  GERD,  hepatitis B, liver disease,     Musculoskeletal     (+) back pain,   Abdominal  - normal exam   Substance History      OB/GYN          Other   arthritis,        Other Comment: + HIV                  Anesthesia Plan    ASA 3     general     intravenous induction     Anesthetic plan, all risks, benefits, and alternatives have been provided, discussed and informed consent has been obtained with: patient.

## 2020-07-14 NOTE — BRIEF OP NOTE
LUMBAR FUSION SEXTANT AND METRIX  Progress Note    Eliseo Phan  7/14/2020    Pre-op Diagnosis:   Lumbar stenosis with neurogenic claudication [M48.062]  Neuropathic pain, leg, left [M79.2]  DDD (degenerative disc disease), lumbar [M51.36]       Post-Op Diagnosis Codes:     * Lumbar stenosis with neurogenic claudication [M48.062]     * Neuropathic pain, leg, left [M79.2]     * DDD (degenerative disc disease), lumbar [M51.36]    Procedure/CPT® Codes:      Procedure(s):  LUMBAR 4 TO LUMBAR 5, LUMBAR 5  TO SACRAL 1 LEFT METRX TRANSFORAMINAL LUMBAR INTERBODY FUSION WITH CAGES AND SEXTANT PEDICLE SCREW FIXATION    Surgeon(s):  Thomas Torres MD    Anesthesia: General    Staff:   Cell Saver : Mamie Lira  Circulator: Kaylee Sue RN; Deanna Rodriguez RN; Carissa Spears RN  Radiology Technologist: Tamanna Schwarz; Amira Hassan, RRT; Migel Joseph RRT  Scrub Person: Irving Ervin Ellery; Juan Carl  Vendor Representative: Hermann Worthy  Assistant: Carol Lebron CSA  Orientee: Anni Heredia    Estimated Blood Loss: 100 cc    Urine Voided: 355 mL    Specimens:              none          Drains:   Urethral Catheter Silicone 16 Fr. (Active)       Findings: disc space collapse, recurrent spinal stenosis, recurrent herniated disc    Complications: none      Thomas Torres MD     Date: 7/14/2020  Time: 12:15

## 2020-07-15 LAB
BASOPHILS # BLD AUTO: 0.01 10*3/MM3 (ref 0–0.2)
BASOPHILS NFR BLD AUTO: 0.1 % (ref 0–1.5)
DEPRECATED RDW RBC AUTO: 41.4 FL (ref 37–54)
EOSINOPHIL # BLD AUTO: 0.03 10*3/MM3 (ref 0–0.4)
EOSINOPHIL NFR BLD AUTO: 0.4 % (ref 0.3–6.2)
ERYTHROCYTE [DISTWIDTH] IN BLOOD BY AUTOMATED COUNT: 12.6 % (ref 12.3–15.4)
HCT VFR BLD AUTO: 37.4 % (ref 37.5–51)
HGB BLD-MCNC: 13.1 G/DL (ref 13–17.7)
IMM GRANULOCYTES # BLD AUTO: 0.03 10*3/MM3 (ref 0–0.05)
IMM GRANULOCYTES NFR BLD AUTO: 0.4 % (ref 0–0.5)
LYMPHOCYTES # BLD AUTO: 1.27 10*3/MM3 (ref 0.7–3.1)
LYMPHOCYTES NFR BLD AUTO: 17.3 % (ref 19.6–45.3)
MCH RBC QN AUTO: 31 PG (ref 26.6–33)
MCHC RBC AUTO-ENTMCNC: 35 G/DL (ref 31.5–35.7)
MCV RBC AUTO: 88.4 FL (ref 79–97)
MONOCYTES # BLD AUTO: 0.44 10*3/MM3 (ref 0.1–0.9)
MONOCYTES NFR BLD AUTO: 6 % (ref 5–12)
NEUTROPHILS NFR BLD AUTO: 5.57 10*3/MM3 (ref 1.7–7)
NEUTROPHILS NFR BLD AUTO: 75.8 % (ref 42.7–76)
NRBC BLD AUTO-RTO: 0 /100 WBC (ref 0–0.2)
PLATELET # BLD AUTO: 156 10*3/MM3 (ref 140–450)
PMV BLD AUTO: 9 FL (ref 6–12)
RBC # BLD AUTO: 4.23 10*6/MM3 (ref 4.14–5.8)
WBC # BLD AUTO: 7.35 10*3/MM3 (ref 3.4–10.8)

## 2020-07-15 PROCEDURE — 25010000003 HYDROMORPHONE HCL PF 50 MG/5ML SOLUTION: Performed by: NEUROLOGICAL SURGERY

## 2020-07-15 PROCEDURE — 99024 POSTOP FOLLOW-UP VISIT: CPT | Performed by: NURSE PRACTITIONER

## 2020-07-15 PROCEDURE — 85025 COMPLETE CBC W/AUTO DIFF WBC: CPT | Performed by: NEUROLOGICAL SURGERY

## 2020-07-15 PROCEDURE — 97162 PT EVAL MOD COMPLEX 30 MIN: CPT

## 2020-07-15 PROCEDURE — 97110 THERAPEUTIC EXERCISES: CPT

## 2020-07-15 PROCEDURE — 25010000003 CEFAZOLIN IN DEXTROSE 2-4 GM/100ML-% SOLUTION: Performed by: NEUROLOGICAL SURGERY

## 2020-07-15 RX ORDER — DIAZEPAM 5 MG/1
5 TABLET ORAL EVERY 6 HOURS
Status: COMPLETED | OUTPATIENT
Start: 2020-07-15 | End: 2020-07-16

## 2020-07-15 RX ORDER — BACLOFEN 10 MG/1
10 TABLET ORAL 3 TIMES DAILY PRN
Status: DISCONTINUED | OUTPATIENT
Start: 2020-07-15 | End: 2020-07-17 | Stop reason: HOSPADM

## 2020-07-15 RX ORDER — BISACODYL 10 MG
10 SUPPOSITORY, RECTAL RECTAL DAILY
Status: DISCONTINUED | OUTPATIENT
Start: 2020-07-16 | End: 2020-07-17 | Stop reason: HOSPADM

## 2020-07-15 RX ORDER — DIAZEPAM 5 MG/1
10 TABLET ORAL EVERY 6 HOURS
Status: DISCONTINUED | OUTPATIENT
Start: 2020-07-15 | End: 2020-07-15

## 2020-07-15 RX ORDER — DOCUSATE SODIUM 100 MG/1
200 CAPSULE, LIQUID FILLED ORAL 2 TIMES DAILY
Status: DISCONTINUED | OUTPATIENT
Start: 2020-07-15 | End: 2020-07-17 | Stop reason: HOSPADM

## 2020-07-15 RX ORDER — OXYCODONE AND ACETAMINOPHEN 7.5; 325 MG/1; MG/1
1 TABLET ORAL EVERY 4 HOURS PRN
Status: DISCONTINUED | OUTPATIENT
Start: 2020-07-15 | End: 2020-07-16

## 2020-07-15 RX ADMIN — DIAZEPAM 5 MG: 5 TABLET ORAL at 19:50

## 2020-07-15 RX ADMIN — HYDROCODONE BITARTRATE AND ACETAMINOPHEN 1 TABLET: 10; 325 TABLET ORAL at 12:01

## 2020-07-15 RX ADMIN — CEFAZOLIN SODIUM 2 G: 2 INJECTION, SOLUTION INTRAVENOUS at 08:14

## 2020-07-15 RX ADMIN — DIAZEPAM 5 MG: 5 TABLET ORAL at 00:37

## 2020-07-15 RX ADMIN — HYDROCODONE BITARTRATE AND ACETAMINOPHEN 1 TABLET: 10; 325 TABLET ORAL at 04:37

## 2020-07-15 RX ADMIN — OXYCODONE HYDROCHLORIDE AND ACETAMINOPHEN 1 TABLET: 7.5; 325 TABLET ORAL at 19:50

## 2020-07-15 RX ADMIN — DOLUTEGRAVIR SODIUM 50 MG: 50 TABLET, FILM COATED ORAL at 19:57

## 2020-07-15 RX ADMIN — HYDROCODONE BITARTRATE AND ACETAMINOPHEN 1 TABLET: 10; 325 TABLET ORAL at 00:37

## 2020-07-15 RX ADMIN — DIAZEPAM 10 MG: 5 TABLET ORAL at 13:28

## 2020-07-15 RX ADMIN — DIAZEPAM 5 MG: 5 TABLET ORAL at 06:26

## 2020-07-15 RX ADMIN — CYCLOBENZAPRINE 10 MG: 10 TABLET, FILM COATED ORAL at 10:55

## 2020-07-15 RX ADMIN — DOCUSATE SODIUM 100 MG: 100 CAPSULE, LIQUID FILLED ORAL at 08:14

## 2020-07-15 RX ADMIN — DOCUSATE SODIUM 200 MG: 100 CAPSULE, LIQUID FILLED ORAL at 19:57

## 2020-07-15 RX ADMIN — CYCLOBENZAPRINE 10 MG: 10 TABLET, FILM COATED ORAL at 03:05

## 2020-07-15 RX ADMIN — BACLOFEN 10 MG: 10 TABLET ORAL at 22:04

## 2020-07-15 RX ADMIN — CEFAZOLIN SODIUM 2 G: 2 INJECTION, SOLUTION INTRAVENOUS at 00:37

## 2020-07-15 RX ADMIN — DOCUSATE SODIUM 200 MG: 100 CAPSULE, LIQUID FILLED ORAL at 14:22

## 2020-07-15 RX ADMIN — SODIUM CHLORIDE, POTASSIUM CHLORIDE, SODIUM LACTATE AND CALCIUM CHLORIDE 100 ML/HR: 600; 310; 30; 20 INJECTION, SOLUTION INTRAVENOUS at 11:45

## 2020-07-15 RX ADMIN — PANTOPRAZOLE SODIUM 40 MG: 40 TABLET, DELAYED RELEASE ORAL at 06:26

## 2020-07-15 RX ADMIN — SODIUM CHLORIDE, POTASSIUM CHLORIDE, SODIUM LACTATE AND CALCIUM CHLORIDE 100 ML/HR: 600; 310; 30; 20 INJECTION, SOLUTION INTRAVENOUS at 00:37

## 2020-07-15 RX ADMIN — HYDROCODONE BITARTRATE AND ACETAMINOPHEN 1 TABLET: 10; 325 TABLET ORAL at 08:14

## 2020-07-15 RX ADMIN — TRAZODONE HYDROCHLORIDE 100 MG: 100 TABLET ORAL at 19:57

## 2020-07-15 RX ADMIN — HYDROMORPHONE HYDROCHLORIDE: 10 INJECTION, SOLUTION INTRAMUSCULAR; INTRAVENOUS; SUBCUTANEOUS at 13:38

## 2020-07-15 RX ADMIN — OXYCODONE HYDROCHLORIDE AND ACETAMINOPHEN 1 TABLET: 7.5; 325 TABLET ORAL at 15:56

## 2020-07-15 RX ADMIN — CYCLOBENZAPRINE 10 MG: 10 TABLET, FILM COATED ORAL at 18:39

## 2020-07-15 RX ADMIN — EMTRICITABINE AND TENOFOVIR ALAFENAMIDE 1 TABLET: 200; 25 TABLET ORAL at 19:56

## 2020-07-15 NOTE — PROGRESS NOTES
NEUROSURGERY POST OPERATIVE VISIT      Complains of expected low back pain with any movement. Leg pain is better since surgery. Still has salazar.        .  Vitals:    07/15/20 1115   BP: 99/61   Pulse: 80   Resp: 18   Temp: 97.8 °F (36.6 °C)   SpO2: 97%       AA&O x 3.   Lumbar dressing dry and intact.   No calf pain or swelling to palpation    .  Results from last 7 days   Lab Units 07/15/20  0344 07/09/20  1227   WBC 10*3/mm3 7.35 4.32   HEMOGLOBIN g/dL 13.1 16.1   HEMATOCRIT % 37.4* 46.1   PLATELETS 10*3/mm3 156 169           Active Hospital Problems    Diagnosis  POA   • Lumbar herniated disc [M51.26]  Yes   • Neuropathic pain, leg, left [M79.2]  Unknown   • Lumbar stenosis with neurogenic claudication [M48.062]  Unknown   • DDD (degenerative disc disease), lumbar [M51.36]  Unknown      Resolved Hospital Problems   No resolved problems to display.         POD 1 L4-5, L5-S1 left Metrix transforaminal lumbar interbody fusion with cage and sextant pedicle screw fixation by Dr. Torres for recurrent disc herniations and spinal stenosis L4-5, L5-S1 with neurogenic claudication.  Postoperative back pain, spasms    Change Norco 10/325 mg to Percocet 7.5/325 mg po q 4 hrs prn pain   Valium 10 mg po now followed by scheduled valium 5 mg q 6 hrs x 5 doses   DC Dilaudid PCA in am  Add stool dofteners, laxatives  Mobilize; PT following  CBC in am

## 2020-07-15 NOTE — PLAN OF CARE
Problem: Patient Care Overview  Goal: Plan of Care Review  Flowsheets (Taken 7/15/2020 0937)  Progress: improving  Plan of Care Reviewed With: patient  Outcome Summary: Pt doing well this morning, presents s/p L4-5, L5-S1 lumbar interbody fusion with fixation. Pt does have post op pain and weakness, however he is moving well and able to ambulate over 400 ft in hallway without difficulty. Pt denies any numbness or tingling in legs. He also states he ambulated several laps yesterday. Pt plans home at [place] and states his brother can help out if needed. ENcouraged pt to continue to ambulate with nsg as able 3-4 x day. He verbalizes understanding. Acute PT no longer indicated.   Patient was wearing a face mask during this therapy encounter. Therapist used appropriate personal protective equipment including mask and gloves.  Mask used was standard procedure mask. Appropriate PPE was worn during the entire therapy session. Hand hygiene was completed before and after therapy session. Patient is not in enhanced droplet precautions.

## 2020-07-15 NOTE — PLAN OF CARE
Problem: Patient Care Overview  Goal: Plan of Care Review  Outcome: Ongoing (interventions implemented as appropriate)  Flowsheets (Taken 7/15/2020 1602)  Progress: improving  Plan of Care Reviewed With: patient  Outcome Summary: vss. kacig cdi. pain well controlled with IV and PO prn medications. ambulating well with assistance. plan to dc home when medically stable. educated on falls prevention

## 2020-07-15 NOTE — THERAPY EVALUATION
Patient Name: Eliseo Phan  : 1965    MRN: [id number]                              Today's Date: 7/15/2020       Admit Date: 2020    Visit Dx: No diagnosis found.  Patient Active Problem List   Diagnosis   • HIV (human immunodeficiency virus infection) (CMS/HCC)   • Acid reflux   • Penis disorder   • Sciatica of left side   • Hepatitis B virus infection   • Colon cancer screening   • Drug addiction in remission (CMS/HCC)   • Acute left-sided low back pain with left-sided sciatica   • DDD (degenerative disc disease), lumbar   • Health maintenance examination   • Lumbar disc herniation with radiculopathy   • Hypogonadism in male   • Symptoms involving urinary system   • Bulging lumbar disc   • Lumbar stenosis with neurogenic claudication   • Herpes zoster without complication   • Postlaminectomy syndrome of lumbar region   • Neuropathic pain, leg, left   • Lumbar facet arthropathy   • Lumbar herniated disc     Past Medical History:   Diagnosis Date   • Acid reflux    • Arthritis    • Back pain    • Hepatitis B    • History of kidney stones    • HIV (human immunodeficiency virus infection) (CMS/HCC)    • PONV (postoperative nausea and vomiting)      Past Surgical History:   Procedure Laterality Date   • APPENDECTOMY     • BONE TUMOR EXCISION Right     right lower leg - as a child   • KNEE ARTHROSCOPY Left    • LUMBAR LAMINECTOMY DISCECTOMY DECOMPRESSION N/A 2018    Procedure: L4-5, L5-S1 Lumbar Laminectomy;  Surgeon: Ernie Curtis MD;  Location: [facility];  Service: Neurosurgery   • TONSILLECTOMY       General Information     Row Name 07/15/20 0933          PT Evaluation Time/Intention    Document Type  evaluation;discharge evaluation/summary  -EJ     Mode of Treatment  physical therapy  -EJ     Row Name 07/15/20 0933          General Information    Patient Profile Reviewed?  yes  -EJ     Prior Level of Function  independent:;ADL's;all household mobility;community mobility  -EJ      Existing Precautions/Restrictions  spinal  -EJ     Barriers to Rehab  none identified  -EJ     Row Name 07/15/20 0933          Relationship/Environment    Lives With  alone brother will be staying with pt  -EJ     Row Name 07/15/20 0933          Resource/Environmental Concerns    Current Living Arrangements  home/apartment/condo  -EJ     Row Name 07/15/20 0933          Home Main Entrance    Number of Stairs, Main Entrance  two  -EJ     Row Name 07/15/20 0933          Stairs Within Home, Primary    Number of Stairs, Within Home, Primary  none  -EJ     Row Name 07/15/20 0933          Cognitive Assessment/Intervention- PT/OT    Orientation Status (Cognition)  oriented x 4  -EJ     Row Name 07/15/20 0933          Safety Issues, Functional Mobility    Impairments Affecting Function (Mobility)  pain  -EJ       User Key  (r) = Recorded By, (t) = Taken By, (c) = Cosigned By    Initials Name Provider Type    Dipika Zayas, PT Physical Therapist        Mobility     Row Name 07/15/20 0935          Bed Mobility Assessment/Treatment    Comment (Bed Mobility)  up in chair  -EJ     [facility] Name 07/15/20 0935          Sit-Stand Transfer    Sit-Stand [place] (Transfers)  verbal cues;stand by assist;contact guard  -EJ     [facility] Name 07/15/20 0935          Gait/Stairs Assessment/Training    Gait/Stairs Assessment/Training  gait/ambulation independence  -EJ     [place] Level (Gait)  verbal cues;stand by assist;contact guard  -EJ     Distance in Feet (Gait)  400  -EJ     Deviations/Abnormal Patterns (Gait)  luis decreased  -EJ     Comment (Gait/Stairs)  no LOB or unsteadiness noted  -EJ       User Key  (r) = Recorded By, (t) = Taken By, (c) = Cosigned By    Initials Name Provider Type    Dipika Zayas, PT Physical Therapist        Obj/Interventions     Row Name 07/15/20 0937          General ROM    GENERAL ROM COMMENTS  WFL  -EJ     Row Name 07/15/20 0937          MMT (Manual Muscle Testing)    General MMT  Comments  post op weakness  -EJ       User Key  (r) = Recorded By, (t) = Taken By, (c) = Cosigned By    Initials Name Provider Type    Dipika Zayas, PT Physical Therapist        Goals/Plan    No documentation.       Clinical Impression     Row Name 07/15/20 0937          Pain Assessment    Additional Documentation  Pain Scale: Numbers Pre/Post-Treatment (Group)  -EJ     Row Name 07/15/20 0937          Pain Scale: Numbers Pre/Post-Treatment    Pain Scale: Numbers, Pretreatment  7/10  -EJ     Pain Scale: Numbers, Post-Treatment  7/10  -EJ     Pain Location  back  -EJ     Pain Intervention(s)  Repositioned;Ambulation/increased activity;Medication (See MAR)  -EJ     Row Name 07/15/20 0937          Plan of Care Review    Plan of Care Reviewed With  patient  -EJ     Progress  improving  -EJ     Outcome Summary  Pt doing well this morning, presents s/p L4-5, L5-S1 lumbar interbody fusion with fixation. Pt does have post op pain and weakness, however he is moving well and able to ambulate over 400 ft in hallway without difficulty. Pt denies any numbness or tingling in legs. He also states he ambulated several laps yesterday. [facility] and states his brother can help out if needed. ENcouraged pt to continue to ambulate with nsg as able 3-4 x day. He verbalizes understanding. Acute PT no longer indicated.  -     Row Name 07/15/20 0937          Physical Therapy Clinical Impression    Patient/Family Goals Statement (PT Clinical Impression)  home   -EJ     Criteria for Skilled Interventions Met (PT Clinical Impression)  no  -EJ     Row Name 07/15/20 0937          Positioning and Restraints    Pre-Treatment Position  sitting in chair/recliner  -EJ     Post Treatment Position  chair  -EJ     In Chair  notified nsg;reclined;call light within reach;encouraged to call for assist;exit alarm on  -EJ       User Key  (r) = Recorded By, (t) = Taken By, (c) = Cosigned By    Initials Name Provider Type    YOSELYN Nuno  Dipika FU, PT Physical Therapist        Outcome Measures     Row Name 07/15/20 0943          How much help from another person do you currently need...    Turning from your back to your side while in flat bed without using bedrails?  3  -EJ     Moving from lying on back to sitting on the side of a flat bed without bedrails?  3  -EJ     Moving to and from a bed to a chair (including a wheelchair)?  3  -EJ     Standing up from a chair using your arms (e.g., wheelchair, bedside chair)?  3  -EJ     Climbing 3-5 steps with a railing?  3  -EJ     To walk in hospital room?  3  -EJ     AM-PAC 6 Clicks Score (PT)  18  -     Row Name 07/15/20 0943          Functional Assessment    Outcome Measure Options  AM-PAC 6 Clicks Basic Mobility (PT)  -       User Key  (r) = Recorded By, (t) = Taken By, (c) = Cosigned By    Initials Name Provider Type    Dipika Zayas, PT Physical Therapist        Physical Therapy Education                 Title: PT OT SLP Therapies (In Progress)     Topic: Physical Therapy (In Progress)     Point: Mobility training (Done)     Description:   Instruct learner(s) on safety and technique for assisting patient out of bed, chair or wheelchair.  Instruct in the proper use of assistive devices, such as walker, crutches, cane or brace.              Patient Friendly Description:   It's important to get you on your feet again, but we need to do so in a way that is safe for you. Falling has serious consequences, and your personal safety is the most important thing of all.        When it's time to get out of bed, one of us or a family member will sit next to you on the bed to give you support.     If your doctor or nurse tells you to use a walker, crutches, a cane, or a brace, be sure you use it every time you get out of bed, even if you think you don't need it.    Learning Progress Summary           Patient Acceptance, E,TB, VU by YOSELYN at 7/15/2020 0943    Comment:  instructed pt to not perform any  excessive bending or twisting of spine                   Point: Home exercise program (Not Started)     Description:   Instruct learner(s) on appropriate technique for monitoring, assisting and/or progressing patient with therapeutic exercises and activities.              Learner Progress:   Not documented in this visit.          Point: Body mechanics (Done)     Description:   Instruct learner(s) on proper positioning and spine alignment for patient and/or caregiver during mobility tasks and/or exercises.              Learning Progress Summary           Patient Acceptance, E,TB, VU by YOSELYN at 7/15/2020 0943    Comment:  instructed pt to not perform any excessive bending or twisting of spine                   Point: Precautions (Done)     Description:   Instruct learner(s) on prescribed precautions during mobility and gait tasks              Learning Progress Summary           Patient Acceptance, E,TB, VU by YOSELYN at 7/15/2020 0943    Comment:  instructed pt to not perform any excessive bending or twisting of spine                               User Key     Initials Effective Dates Name Provider Type Discipline     04/03/18 -  Dipika Nuno, PT Physical Therapist PT              PT Recommendation and Plan     Outcome Summary/Treatment Plan (PT)  Anticipated Discharge Disposition (PT): home with assist, home with OP services  Plan of Care Reviewed With: patient  Progress: improving  Outcome Summary: Pt doing well this morning, presents s/p L4-5, L5-S1 lumbar interbody fusion with fixation. Pt does have post op pain and weakness, however he is moving well and able to ambulate over 400 ft in hallway without difficulty. Pt denies any numbness or tingling in legs. He also states he ambulated several laps yesterday. Pt plans home at [place] and states his brother can help out if needed. ENcouraged pt to continue to ambulate with nsg as able 3-4 x day. He verbalizes understanding. Acute PT no longer indicated.     Time  Calculation:   PT Charges     Row Name 07/15/20 0948             Time Calculation    Start Time  0840  -EJ      Stop Time  0900  -EJ      Time Calculation (min)  20 min  -EJ      PT Received On  07/15/20  -EJ         Time Calculation- PT    Total Timed Code Minutes- PT  10 minute(s)  -EJ        User Key  (r) = Recorded By, (t) = Taken By, (c) = Cosigned By    Initials Name Provider Type    EJ Dipika Nuno, PT Physical Therapist        Therapy Charges for Today     Code Description Service Date Service Provider Modifiers Qty    89315467713 HC PT EVAL MOD COMPLEXITY 2 7/15/2020 Dipika Nuno, PT GP 1    89246750453 HC PT THER PROC EA 15 MIN 7/15/2020 Dipika Nuno, PT GP 1          PT G-Codes  Outcome Measure Options: AM-PAC 6 Clicks Basic Mobility (PT)  AM-PAC 6 Clicks Score (PT): 18    Dipika Nuno, PT  7/15/2020

## 2020-07-15 NOTE — PLAN OF CARE
Problem: Patient Care Overview  Goal: Plan of Care Review  Outcome: Ongoing (interventions implemented as appropriate)  Flowsheets (Taken 7/15/2020 4488)  Progress: improving  Plan of Care Reviewed With: patient  Outcome Summary: Pt remains stable. Giving numerous medications for pain control along with PCA use. MD called for pain control issues and Norco was increased. Pt seems to be more comfortable at this time, dressing is CDI. F/C in place for spine sx. Pt did ambulate in [place]cedes without difficulty. Will continue to monitor.

## 2020-07-16 LAB
DEPRECATED RDW RBC AUTO: 42.2 FL (ref 37–54)
ERYTHROCYTE [DISTWIDTH] IN BLOOD BY AUTOMATED COUNT: 12.9 % (ref 12.3–15.4)
HCT VFR BLD AUTO: 34.8 % (ref 37.5–51)
HGB BLD-MCNC: 12.1 G/DL (ref 13–17.7)
MCH RBC QN AUTO: 30.9 PG (ref 26.6–33)
MCHC RBC AUTO-ENTMCNC: 34.8 G/DL (ref 31.5–35.7)
MCV RBC AUTO: 89 FL (ref 79–97)
PLATELET # BLD AUTO: 160 10*3/MM3 (ref 140–450)
PMV BLD AUTO: 9.2 FL (ref 6–12)
RBC # BLD AUTO: 3.91 10*6/MM3 (ref 4.14–5.8)
WBC # BLD AUTO: 5.91 10*3/MM3 (ref 3.4–10.8)

## 2020-07-16 PROCEDURE — 85027 COMPLETE CBC AUTOMATED: CPT | Performed by: NEUROLOGICAL SURGERY

## 2020-07-16 PROCEDURE — 99024 POSTOP FOLLOW-UP VISIT: CPT | Performed by: NURSE PRACTITIONER

## 2020-07-16 RX ORDER — OXYCODONE AND ACETAMINOPHEN 7.5; 325 MG/1; MG/1
2 TABLET ORAL EVERY 4 HOURS PRN
Status: DISCONTINUED | OUTPATIENT
Start: 2020-07-16 | End: 2020-07-17 | Stop reason: HOSPADM

## 2020-07-16 RX ADMIN — DIAZEPAM 5 MG: 5 TABLET ORAL at 08:16

## 2020-07-16 RX ADMIN — OXYCODONE HYDROCHLORIDE AND ACETAMINOPHEN 2 TABLET: 7.5; 325 TABLET ORAL at 20:26

## 2020-07-16 RX ADMIN — SODIUM CHLORIDE, PRESERVATIVE FREE 3 ML: 5 INJECTION INTRAVENOUS at 20:20

## 2020-07-16 RX ADMIN — DOCUSATE SODIUM 200 MG: 100 CAPSULE, LIQUID FILLED ORAL at 08:16

## 2020-07-16 RX ADMIN — OXYCODONE HYDROCHLORIDE AND ACETAMINOPHEN 1 TABLET: 7.5; 325 TABLET ORAL at 00:02

## 2020-07-16 RX ADMIN — PANTOPRAZOLE SODIUM 40 MG: 40 TABLET, DELAYED RELEASE ORAL at 06:05

## 2020-07-16 RX ADMIN — BACLOFEN 10 MG: 10 TABLET ORAL at 06:05

## 2020-07-16 RX ADMIN — BISACODYL 10 MG: 10 SUPPOSITORY RECTAL at 08:16

## 2020-07-16 RX ADMIN — OXYCODONE HYDROCHLORIDE AND ACETAMINOPHEN 1 TABLET: 7.5; 325 TABLET ORAL at 08:16

## 2020-07-16 RX ADMIN — DOLUTEGRAVIR SODIUM 50 MG: 50 TABLET, FILM COATED ORAL at 20:19

## 2020-07-16 RX ADMIN — DIAZEPAM 5 MG: 5 TABLET ORAL at 02:16

## 2020-07-16 RX ADMIN — DIAZEPAM 5 MG: 5 TABLET ORAL at 14:47

## 2020-07-16 RX ADMIN — OXYCODONE HYDROCHLORIDE AND ACETAMINOPHEN 1 TABLET: 7.5; 325 TABLET ORAL at 12:07

## 2020-07-16 RX ADMIN — OXYCODONE HYDROCHLORIDE AND ACETAMINOPHEN 2 TABLET: 7.5; 325 TABLET ORAL at 16:17

## 2020-07-16 RX ADMIN — OXYCODONE HYDROCHLORIDE AND ACETAMINOPHEN 1 TABLET: 7.5; 325 TABLET ORAL at 04:08

## 2020-07-16 RX ADMIN — BACLOFEN 10 MG: 10 TABLET ORAL at 15:52

## 2020-07-16 RX ADMIN — DIAZEPAM 5 MG: 5 TABLET ORAL at 20:19

## 2020-07-16 RX ADMIN — DOCUSATE SODIUM 200 MG: 100 CAPSULE, LIQUID FILLED ORAL at 20:19

## 2020-07-16 RX ADMIN — EMTRICITABINE AND TENOFOVIR ALAFENAMIDE 1 TABLET: 200; 25 TABLET ORAL at 20:19

## 2020-07-16 RX ADMIN — TRAZODONE HYDROCHLORIDE 100 MG: 100 TABLET ORAL at 20:19

## 2020-07-16 NOTE — PLAN OF CARE
POD #2, PCA dc'd, salazar dc'd, percocet for pain, scheduled valium, baclofen PRN for hiccups, walked out in mercedes, voiding per urinal, plan is to DC tomorrow if medically stable

## 2020-07-16 NOTE — PLAN OF CARE
Problem: Patient Care Overview  Goal: Plan of Care Review  Outcome: Ongoing (interventions implemented as appropriate)  Flowsheets (Taken 7/16/2020 0323)  Progress: improving  Plan of Care Reviewed With: patient  Outcome Summary: Pt remains stable. Controlling pain with PCA utilization and oral PRNs. Baclofen ordered to help with hiccups and pt did receive relief with that. On bowel regimen for constipation. F/C remains in place. Dressing is CDI. Plans to d/c home possibly today. Will continue to monitor.

## 2020-07-16 NOTE — PROGRESS NOTES
Postop   LOS: 2 days   Patient Care Team:  Epley, James, APRN as PCP - General (Family Medicine)  Ernie Curtis MD as Surgeon (Orthopedic Surgery)  Olivia Santoyo MD as Consulting Physician (Infectious Diseases)    Chief Complaint: Postop back pain    Subjective     Resting in bed.  States back pain is actually better this morning.  Denies any leg pain.  Adame cath removed this morning, has not voided yet.    Interval History:     History taken from: patient chart RN    Objective      Alert and oriented  Sensation intact  Moves all extremities well  Lumbar dressing from OR removed, Steri-Strips to incisions intact, no active drainage or erythema, slight swelling to bilateral lower incisions  Lung effort normal  No calf tenderness or swelling noted    Vital Signs  Temp:  [97.6 °F (36.4 °C)-99.8 °F (37.7 °C)] 99.5 °F (37.5 °C)  Heart Rate:  [] 106  Resp:  [16-18] 16  BP: (102-113)/(61-80) 112/80       Results Review:     I reviewed the patient's new clinical results.    .  Results from last 7 days   Lab Units 07/16/20  0341 07/15/20  0344   WBC 10*3/mm3 5.91 7.35   HEMOGLOBIN g/dL 12.1* 13.1   HEMATOCRIT % 34.8* 37.4*   PLATELETS 10*3/mm3 160 156       Assessment/Plan       DDD (degenerative disc disease), lumbar    Lumbar stenosis with neurogenic claudication    Neuropathic pain, leg, left    Lumbar herniated disc      Postop day #2 L4-S1 left Metrix transforaminal lumbar interbody fusion with cage and sextant pedicle screw fixation by Dr. Torres for recurrent disc herniations and spinal stenosis L4-S1 with neurogenic claudication  Back pain has improved.  PCA and Adame cath has been discontinued this morning.  Encourage ambulation today.  As long as he is able to ambulate in the hallways today likely discharge home tomorrow.      BERRY Orellana  07/16/20  12:44

## 2020-07-17 ENCOUNTER — READMISSION MANAGEMENT (OUTPATIENT)
Dept: CALL CENTER | Facility: HOSPITAL | Age: 55
End: 2020-07-17

## 2020-07-17 VITALS
TEMPERATURE: 98.7 F | DIASTOLIC BLOOD PRESSURE: 59 MMHG | RESPIRATION RATE: 14 BRPM | BODY MASS INDEX: 25.25 KG/M2 | HEIGHT: 70 IN | HEART RATE: 92 BPM | OXYGEN SATURATION: 95 % | WEIGHT: 176.37 LBS | SYSTOLIC BLOOD PRESSURE: 117 MMHG

## 2020-07-17 DIAGNOSIS — M48.062 LUMBAR STENOSIS WITH NEUROGENIC CLAUDICATION: ICD-10-CM

## 2020-07-17 DIAGNOSIS — M79.2 NEUROPATHIC PAIN, LEG, LEFT: ICD-10-CM

## 2020-07-17 DIAGNOSIS — M51.36 DDD (DEGENERATIVE DISC DISEASE), LUMBAR: ICD-10-CM

## 2020-07-17 PROCEDURE — 63710000001 ONDANSETRON PER 8 MG: Performed by: NEUROLOGICAL SURGERY

## 2020-07-17 RX ORDER — CALCIUM CARBONATE 200(500)MG
2 TABLET,CHEWABLE ORAL 3 TIMES DAILY PRN
Status: DISCONTINUED | OUTPATIENT
Start: 2020-07-17 | End: 2020-07-17 | Stop reason: HOSPADM

## 2020-07-17 RX ORDER — PSEUDOEPHEDRINE HCL 30 MG
200 TABLET ORAL 2 TIMES DAILY
Qty: 30 EACH | Refills: 0 | Status: SHIPPED | OUTPATIENT
Start: 2020-07-17 | End: 2022-01-13

## 2020-07-17 RX ORDER — OXYCODONE AND ACETAMINOPHEN 7.5; 325 MG/1; MG/1
2 TABLET ORAL EVERY 4 HOURS PRN
Qty: 60 TABLET | Refills: 0 | Status: SHIPPED | OUTPATIENT
Start: 2020-07-17 | End: 2020-07-24 | Stop reason: CLARIF

## 2020-07-17 RX ORDER — LIDOCAINE 50 MG/G
2 PATCH TOPICAL
Qty: 40 PATCH | Refills: 0 | Status: SHIPPED | OUTPATIENT
Start: 2020-07-18 | End: 2022-01-13

## 2020-07-17 RX ORDER — OXYCODONE AND ACETAMINOPHEN 7.5; 325 MG/1; MG/1
2 TABLET ORAL EVERY 4 HOURS PRN
Start: 2020-07-17 | End: 2020-07-17 | Stop reason: SDUPTHER

## 2020-07-17 RX ORDER — DIAZEPAM 5 MG/1
5 TABLET ORAL EVERY 8 HOURS PRN
Qty: 30 TABLET | Refills: 0 | Status: SHIPPED | OUTPATIENT
Start: 2020-07-17 | End: 2022-01-13

## 2020-07-17 RX ORDER — LIDOCAINE 50 MG/G
2 PATCH TOPICAL
Status: DISCONTINUED | OUTPATIENT
Start: 2020-07-17 | End: 2020-07-17 | Stop reason: HOSPADM

## 2020-07-17 RX ADMIN — OXYCODONE HYDROCHLORIDE AND ACETAMINOPHEN 2 TABLET: 7.5; 325 TABLET ORAL at 10:13

## 2020-07-17 RX ADMIN — SODIUM CHLORIDE, PRESERVATIVE FREE 3 ML: 5 INJECTION INTRAVENOUS at 07:51

## 2020-07-17 RX ADMIN — BACLOFEN 10 MG: 10 TABLET ORAL at 01:57

## 2020-07-17 RX ADMIN — BISACODYL 10 MG: 10 SUPPOSITORY RECTAL at 07:51

## 2020-07-17 RX ADMIN — OXYCODONE HYDROCHLORIDE AND ACETAMINOPHEN 2 TABLET: 7.5; 325 TABLET ORAL at 06:18

## 2020-07-17 RX ADMIN — ONDANSETRON HYDROCHLORIDE 4 MG: 4 TABLET, FILM COATED ORAL at 10:17

## 2020-07-17 RX ADMIN — OXYCODONE HYDROCHLORIDE AND ACETAMINOPHEN 2 TABLET: 7.5; 325 TABLET ORAL at 01:57

## 2020-07-17 RX ADMIN — BACLOFEN 10 MG: 10 TABLET ORAL at 09:30

## 2020-07-17 RX ADMIN — PANTOPRAZOLE SODIUM 40 MG: 40 TABLET, DELAYED RELEASE ORAL at 06:11

## 2020-07-17 RX ADMIN — OXYCODONE HYDROCHLORIDE AND ACETAMINOPHEN 2 TABLET: 7.5; 325 TABLET ORAL at 14:07

## 2020-07-17 RX ADMIN — LIDOCAINE 2 PATCH: 50 PATCH CUTANEOUS at 10:13

## 2020-07-17 RX ADMIN — Medication 2 TABLET: at 11:16

## 2020-07-17 RX ADMIN — DOCUSATE SODIUM 200 MG: 100 CAPSULE, LIQUID FILLED ORAL at 07:51

## 2020-07-17 RX ADMIN — SODIUM CHLORIDE, POTASSIUM CHLORIDE, SODIUM LACTATE AND CALCIUM CHLORIDE 500 ML: 600; 310; 30; 20 INJECTION, SOLUTION INTRAVENOUS at 03:20

## 2020-07-17 NOTE — DISCHARGE SUMMARY
Eliseo WALLIS Emilie  1965    Patient Care Team:  Epley, James, APRN as PCP - General (Family Medicine)  Ernie Curtis MD as Surgeon (Orthopedic Surgery)  Olivia Santoyo MD as Consulting Physician (Infectious Diseases)    Date of Admit: 7/14/2020    Date of Discharge:  7/17/2020    Discharge Diagnosis:  DDD (degenerative disc disease), lumbar    Lumbar stenosis with neurogenic claudication    Neuropathic pain, leg, left    Lumbar herniated disc      Procedures Performed  Procedure(s):  LUMBAR 4 TO LUMBAR 5, LUMBAR 5  TO SACRAL 1 LEFT METRX TRANSFORAMINAL LUMBAR INTERBODY FUSION WITH CAGES AND SEXTANT PEDICLE SCREW FIXATION       Complications: None    Consultants:   Consults     No orders found from 6/15/2020 to 7/15/2020.          Condition on Discharge: stable    Discharge disposition: home    Pertinent Test Results: None pending    Brief HPI: Patient evaluated in office for complaints of left leg pain, numbness and weakness. Imaging revealed disc space collapse L4-5 and L5-S1 with foraminal stenosis to the left at both levels. RBAs of treatment were discussed including the above procedure. Patient consented to above procedure.    Hospital Course: Patient admitted for above procedure. The procedure itself was without complication. The patient was transferred to [facility] following recovery.  He tolerated the procedure well although it was difficult to get his pain under control the first 2 nights.  The day of discharge he voices readiness to go home on current regimen of pain medication and muscle relaxants.  He verbalized understanding of discharge instructions.  He will follow-up in the office in 2 weeks as scheduled.  He will call for any questions or concerns.  He is ambulating and voiding well.    Discharge Physical Exam:    Temp:  [98.2 °F (36.8 °C)-99.3 °F (37.4 °C)] 98.7 °F (37.1 °C)  Heart Rate:  [] 92  Resp:  [14-16] 14  BP: ()/(45-79) 117/59    Current labs:  Lab Results  (last 24 hours)     ** No results found for the last 24 hours. **            General Appearance No acute distress   HEENT NC/AT;    Neurological Awake, Alert, and oriented x 3   Cranial nerves CN II-XII intact   Motor Strength 5/5, tone normal   Sensory Intact to light touch     Reflexes  symmetric   Gait and station Ambulating well   Neck Supple, trachea midline   Back  lumbar incisions well approximated, no erythema or drainage   Extremities Moves all extremities well, no edema, no cyanosis, no redness         Discharge Medications  Vern has been reviewed and narcotic consent is on file in the patient's chart.     Your medication list      START taking these medications      Instructions Last Dose Given Next Dose Due   docusate sodium 100 MG capsule      Take 200 mg by mouth 2 (Two) Times a Day.       lidocaine 5 %  Commonly known as:  LIDODERM  Start taking on:  July 18, 2020      Place 2 patches on the skin as directed by provider Daily. Remove & Discard patch within 12 hours or as directed by MD       oxyCODONE-acetaminophen 7.5-325 MG per tablet  Commonly known as:  PERCOCET      Take 2 tablets by mouth Every 4 (Four) Hours As Needed for Severe Pain  for up to 8 days.          CHANGE how you take these medications      Instructions Last Dose Given Next Dose Due   omeprazole 20 MG capsule  Commonly known as:  priLOSEC  What changed:  additional instructions      take 2 capsules by mouth once daily if needed          CONTINUE taking these medications      Instructions Last Dose Given Next Dose Due   Descovy 200-25 MG per tablet  Generic drug:  Emtricitabine-Tenofovir AF      Take 1 tablet by mouth Every Night.       dolutegravir 50 MG tablet  Commonly known as:  TIVICAY      Take 50 mg by mouth Every Night.       fluticasone 50 MCG/ACT nasal spray  Commonly known as:  FLONASE      INSTILL 2 SPRAYS INTO EACH NOSTRIL DAILY AS NEEDED FOR ALLERGIES       traZODone 100 MG tablet  Commonly known as:  DESYREL       "Take 1 tablet by mouth Every Night.       Vitamin D 1000 units tablet      Take 1,000 Units by mouth Daily.             Where to Get Your Medications      These medications were sent to [facility] - [address] [place], [place] [address]    Hours:  7:00AM-6PM Mon-Fri Phone:  [phone]   · docusate sodium 100 MG capsule  · lidocaine 5 %     Information about where to get these medications is not yet available    Ask your nurse or doctor about these medications  · oxyCODONE-acetaminophen 7.5-325 MG per tablet         Discharge Diet:     Diet Order   Procedures   • Diet Regular       Activity at Discharge:    No lifting greater than 5 pounds, no driving.  May shower but do not soak incision.  Pat incision dry.  May apply dry dressing for comfort.  No prolonged sitting.  Call the office for any concerns including but not limited to increasing pain at the incision or extremities, worsening numbness, any weakness, fever greater than 100.5, any redness or drainage from the incision.    Call for: questions or concerns    Follow-up Appointments  Future Appointments   Date Time Provider Department Center   7/29/2020 10:45 AM Thomas Torres MD [facility]     Follow-up Information     Epley, James, APRN .    Specialties:  Nurse Practitioner, Family Medicine  Contact information:  [address]  [facility] [address]  [facility] [address] [phone]                     Test Results Pending at Discharge     None    I discussed the discharge instructions with patient and nursing staff    BERRY Orellana  07/17/20  11:52        \"Dictated utilizing Dragon dictation\".    "

## 2020-07-17 NOTE — PROGRESS NOTES
Continued Stay Note  [facility]     Patient Name: Eliseo Phan  MRN: [id number]  Today's Date: 7/17/2020    Admit Date: 7/14/2020    Discharge Plan     Row Name 07/17/20 1525       Plan    Final Discharge Disposition Code  01 - home or self-care    Final Note  Pt to d/c home. Roula Perez was working on prior auth for pts lidocaine prescription. Pt agreeable with d/c plan and costs utnil we hear back from insurance. Roula to follow up with patient.         Katie Oliveira RN

## 2020-07-17 NOTE — DISCHARGE INSTR - ACTIVITY
No lifting greater than 5 pounds, no driving.  May shower but do not soak incision.  Pat incision dry.  May apply dry dressing for comfort.  No prolonged sitting.  Call the office for any concerns including but not limited to increasing pain at the incision or extremities, worsening numbness, any weakness, fever greater than 100.5, any redness or drainage from the incision.

## 2020-07-17 NOTE — DISCHARGE INSTRUCTIONS
Thomas Torres M.D., F.A.C.S  [address] [facility] [address]  [phone]          Lumbar Fusion with Instrumentation - Post-Operative Instructions          • You should have a post-operative visit scheduled with the Physician Assistant or Nurse Practitioner for approximately 2 to 2 ½ weeks post-operatively.  If you do not have one, call the office when you return home to schedule this visit.  You should be off work at least until the first visit.    • Do not lift anything over five (5) pounds.  No bending, twisting, or squatting should be done until the first visit.  However, walking is allowed and encouraged.  Walking should be done on a flat surface.  The speed and distance should be chosen according to your comfort level and stamina.  In general, short frequent walks are preferred.  Climbing stairs is allowed but should be minimized.  In general, activity restrictions will be lessened following the first visit.      • Sitting, while allowed, should be kept to a minimum until the first visit because it may increase your discomfort.  No more than 15 to 20 minutes three times a day should be done, enough time to eat your meals and use the bathroom.  Otherwise, if you are not walking or standing, you should be lying down on your back or side.  A reclining chair is acceptable but only in the reclined position.        • If you are given a back brace in the hospital, this brace is to be worn at all times when you are up or sitting.  It is not necessary to wear the brace while lying in bed.    • A bone stimulator device may be ordered for you.  If one is ordered you will be contacted by the representative about delivering it to your home. If so, follow the bone growth stimulator instructions that you will receive and wear as directed.    • Smoking is not recommended, as it interferes with the fusion process.      • Please do not drive until the first visit, although you may be driven.    • Refrain from any sexual  activity until after your first visit with the Physician Assistant or Nurse Practitioner.      • Take your dressings off and leave them off after the first day home.  You may get the incisions wet during showering and pat them dry, but do not soak the incisions or rub them vigorously with a towel.  No tub baths or hot tubs allowed.  The incision should be cleaned twice daily with hydrogen peroxide unless otherwise directed by the nurse or physician.      • A prescription for pain medications will be provided at discharge.  This medication is primarily for any pain related to the incisions and should be used only on an as-needed basis.  Sometimes, antibiotics and medication for spasms are also given.  Do not take anti-inflammatory medications such as Ibuprofen, Aspirin, Motrin or Advil.  These medications may interfere with the fusion process of your surgery.  Take medications only as directed by the physician. If a refill of your pain medication is required, please contact your pharmacy for refill approval. Our office will refill you prescription for half the original quantity one time after surgery only when refill is due.     • You have also been prescribed a muscle relaxer to help with post operative muscle pain. You may find that this medication provides more lasting relief. You can incorporate this medication into you regimen by taking four hours after your pain med. You should take the muscle relaxer medication no more than 4 times in a 24 hour period.         • If there are any problems, such as excessive back or leg pain, persistent temperature of 100.5 degrees or greater despite Tylenol, chills, excessive bloody discharge from the wound, redness, swelling or extreme warmth to skin around the incision, clear or yellowish discharge from the wound, severe headaches when sitting or standing, chest pain, shortness of air, difficulty urinating, please call the office.  A small amount of bleeding from the incision  during the first few days is not unusual.      • We look forward to seeing you again in follow-up.  Do no hesitate to call if any questions or concerns arise regarding your surgery.  We would rather you communicate with us regarding such issues early.          Thank You.

## 2020-07-17 NOTE — PLAN OF CARE
Problem: Patient Care Overview  Goal: Plan of Care Review  Outcome: Ongoing (interventions implemented as appropriate)  Flowsheets (Taken 7/17/2020 0231)  Progress: improving  Outcome Summary: vss, back incisions intact, neuro status is unchanged, reports fair pain relief, voids frequently but small residuals, possible discharge home today, will cont to monitor

## 2020-07-18 ENCOUNTER — TRANSITIONAL CARE MANAGEMENT TELEPHONE ENCOUNTER (OUTPATIENT)
Dept: CALL CENTER | Facility: HOSPITAL | Age: 55
End: 2020-07-18

## 2020-07-18 NOTE — OUTREACH NOTE
Prep Survey      Responses   [facility] patient discharged from?  [place]   Is LACE score < 7 ?  No   Eligibility  [facility]   Date of Admission  07/14/20   Date of Discharge  07/17/20   Discharge Disposition  Home or Self Care   Discharge diagnosis  LUMBAR FUSION   COVID-19 Test Status  Negative   Does the patient have one of the following disease processes/diagnoses(primary or secondary)?  General Surgery   Does the patient have Home health ordered?  No   Is there a DME ordered?  No   Prep survey completed?  Yes          Jodi Lynch RN

## 2020-07-18 NOTE — OUTREACH NOTE
Call Center TCM Note      Responses   [facility] patient discharged from?  [place]   Does the patient have one of the following disease processes/diagnoses(primary or secondary)?  General Surgery   TCM attempt successful?  Yes   Call start time  1731   Call Status  Rescheduled [eating dinner, rescheduled]   Call end time  1732   Discharge diagnosis  LUMBAR FUSION          Brynn Regan RN    7/18/2020, 17:33

## 2020-07-23 NOTE — PROGRESS NOTES
Subjective   History of Present Illness: Eliseo Phan is a 54 y.o. male is here today for post op appt.  Patient had surgery 7.14.20, L4/5 L5/[id number] transforaminal lumbar interbody fusion with cages and sextant pedicle screw fixation    He is no longer taking Valium for spasm, he is taking Percocet 10/325 (1) 6 hours and they are not helping much, he feels that his pain maybe muscle spasm, but does not have any other muscle relaxants    Patient is still having intermittent back pain and spasm, he denies leg or buttock pain, no leg weakness or N/T.  No urinary incontinence or problems with his balance and gait. No problems with his incision, no redness, swelling or drainage.    He is about 2 weeks out from a left L4/5 left L5/S1 TLIF with pedicle screw fixation and interbody fusion.  His leg on the left is actually doing quite well.  His postoperative course was characterized mainly by a lot of muscular spasm which was probably aggravated by the fact that I had to do a cutdown through the muscles to attach the rods on both sides.  The hiccups are also better.  They went away after 4 days.  Would residual pain he has is mainly spasm.  The Percocet was not helping and he ran out of Valium so although it is better I think we can still help that by putting him on Flexeril.  Mainly it will be time.  I think it is probably too soon to do physical therapy.  Will wait for another month.  I told him that he can swim although I will probably let him next time.  I showed him simple exercises to do.  Overall I think he is doing quite well particularly with his left leg feeling much better.  We will see him in a month and then will go ahead and start some therapy at that point.  I let him drive.    Back Pain   The problem occurs constantly. The problem has been gradually improving since onset. The pain is present in the lumbar spine. The quality of the pain is described as cramping. The pain does not radiate. The pain is at a  "severity of 4/10. The pain is moderate. The symptoms are aggravated by standing, sitting, twisting, position, lying down and bending. Associated symptoms include a fever. Pertinent negatives include no numbness or weakness.       The following portions of the patient's history were reviewed and updated as appropriate: allergies, current medications, past family history, past medical history, past social history, past surgical history and problem list.    Review of Systems   Constitutional: Positive for fever.   HENT: Negative.    Eyes: Negative.    Respiratory: Negative.    Cardiovascular: Negative.    Gastrointestinal: Negative.    Endocrine: Negative.    Genitourinary: Negative for urgency.   Musculoskeletal: Positive for back pain. Negative for arthralgias, gait problem and myalgias.   Allergic/Immunologic: Negative.    Neurological: Negative for weakness and numbness.   Hematological: Negative.    Psychiatric/Behavioral: Negative.        Objective     Vitals:    07/29/20 1202   BP: 110/73   Pulse: 74   Temp: 97.2 °F (36.2 °C)   TempSrc: Tympanic   Height: 177.8 cm (70\")     Body mass index is 25.31 kg/m².      Physical Exam   Constitutional: He is oriented to person, place, and time. He appears well-developed and well-nourished.   HENT:   Head: Normocephalic and atraumatic.   Eyes: Pupils are equal, round, and reactive to light. Conjunctivae and EOM are normal.   Fundoscopic exam:       The right eye shows no papilledema. The right eye shows venous pulsations.        The left eye shows no papilledema. The left eye shows venous pulsations.   Neck: Carotid bruit is not present.   Neurological: He is oriented to person, place, and time. He has a normal Finger-Nose-Finger Test and a normal Heel to Shin Test. Gait normal.   Reflex Scores:       Tricep reflexes are 2+ on the right side and 2+ on the left side.       Bicep reflexes are 2+ on the right side and 2+ on the left side.       Brachioradialis reflexes are 2+ " on the right side and 2+ on the left side.       Patellar reflexes are 2+ on the right side and 2+ on the left side.       Achilles reflexes are 2+ on the right side and 2+ on the left side.  Psychiatric: His speech is normal.     Neurologic Exam     Mental Status   Oriented to person, place, and time.   Registration of memory: Good recent and remote memory.   Attention: normal. Concentration: normal.   Speech: speech is normal   Level of consciousness: alert  Knowledge: consistent with education.     Cranial Nerves     CN II   Visual fields full to confrontation.   Visual acuity: normal    CN III, IV, VI   Pupils are equal, round, and reactive to light.  Extraocular motions are normal.     CN V   Facial sensation intact.   Right corneal reflex: normal  Left corneal reflex: normal    CN VII   Facial expression full, symmetric.   Right facial weakness: none  Left facial weakness: none    CN VIII   Hearing: intact    CN IX, X   Palate: symmetric    CN XI   Right sternocleidomastoid strength: normal  Left sternocleidomastoid strength: normal    CN XII   Tongue: not atrophic  Tongue deviation: none    Motor Exam   Muscle bulk: normal  Right arm tone: normal  Left arm tone: normal  Right leg tone: normal  Left leg tone: normal    Strength   Strength 5/5 except as noted.     Sensory Exam   Light touch normal.     Gait, Coordination, and Reflexes     Gait  Gait: normal    Coordination   Finger to nose coordination: normal  Heel to shin coordination: normal    Reflexes   Right brachioradialis: 2+  Left brachioradialis: 2+  Right biceps: 2+  Left biceps: 2+  Right triceps: 2+  Left triceps: 2+  Right patellar: 2+  Left patellar: 2+  Right achilles: 2+  Left achilles: 2+  Right : 2+  Left : 2+          Assessment/Plan   Independent Review of Radiographic Studies:      I personally reviewed the images from the following studies.        Medical Decision Making:      Doing well.  We will give him a prescription for  muscle relaxants and have him come back in 1 month.  I think he would be ready to start some formal physical therapy at that point but not quite yet.  I told him not to swim yet.      Eliseo was seen today for back pain.    Diagnoses and all orders for this visit:    Postoperative visit    Muscle spasm    Other orders  -     cyclobenzaprine (FLEXERIL) 10 MG tablet; Take 1 tablet by mouth 3 (Three) Times a Day As Needed for Muscle Spasms.      Return in about 1 month (around 8/29/2020).

## 2020-07-24 ENCOUNTER — TELEPHONE (OUTPATIENT)
Dept: NEUROSURGERY | Facility: CLINIC | Age: 55
End: 2020-07-24

## 2020-07-24 RX ORDER — OXYCODONE AND ACETAMINOPHEN 10; 325 MG/1; MG/1
1 TABLET ORAL EVERY 6 HOURS PRN
Qty: 60 TABLET | Refills: 0 | Status: SHIPPED | OUTPATIENT
Start: 2020-07-24 | End: 2022-01-13

## 2020-07-24 NOTE — TELEPHONE ENCOUNTER
Pt had L4/5 L5/S1 fusion surgery on 7/14/20.  Is currently taking Percocet for pain and will run out before his first P/O visit on 7/29/20.  He does not necessarily want a RF on it--but wants something to get him through to appt.  [phone]

## 2020-07-24 NOTE — TELEPHONE ENCOUNTER
He had surgery 7.14.20 and was given Percocet 7.5/325 (2) q 4 # 60 at discharge on 7.17.20    What do you want to prescribe?

## 2020-07-24 NOTE — TELEPHONE ENCOUNTER
Please sign off on Percocet and he states that he is still having intermittent hiccups since surgery, he was prescribed Thorazine for this in hospital and it was too sedating.    What do you recommend?

## 2020-07-28 ENCOUNTER — READMISSION MANAGEMENT (OUTPATIENT)
Dept: CALL CENTER | Facility: HOSPITAL | Age: 55
End: 2020-07-28

## 2020-07-28 RX ORDER — TRAZODONE HYDROCHLORIDE 100 MG/1
100 TABLET ORAL NIGHTLY
Qty: 30 TABLET | Refills: 5 | Status: SHIPPED | OUTPATIENT
Start: 2020-07-28 | End: 2021-01-18 | Stop reason: SDUPTHER

## 2020-07-28 NOTE — OUTREACH NOTE
General Surgery Week 2 Survey      Responses   [facility] patient discharged from?  [place]   Does the patient have one of the following disease processes/diagnoses(primary or secondary)?  General Surgery   Week 2 attempt successful?  Yes   Call start time  1443   Call end time  1448   Discharge diagnosis  LUMBAR FUSION   Meds reviewed with patient/caregiver?  Yes   Is the patient taking all medications as directed (includes completed medication regime)?  Yes   Does the patient have a follow up appointment scheduled with their surgeon?  Yes   Has the patient kept scheduled appointments due by today?  N/A   Has home health visited the patient within 72 hours of discharge?  N/A   Psychosocial issues?  No   What is the patient's perception of their health status since discharge?  Improving   Nursing interventions  Nurse provided patient education   Is the patient /caregiver able to teach back basic post-op care?  Keep incision areas clean,dry and protected, Do not remove steri-strips, Lifting as instructed by MD in discharge instructions   Is the patient/caregiver able to teach back signs and symptoms of incisional infection?  Increased redness, swelling or pain at the incisonal site, Increased drainage or bleeding, Incisional warmth, Pus or odor from incision, Fever   Is the patient/caregiver able to teach back steps to recovery at home?  Rest and rebuild strength, gradually increase activity   Is the patient/caregiver able to teach back the hierarchy of who to call/visit for symptoms/problems? PCP, Specialist, Home health nurse, Urgent Care, ED, 911  Yes   Additional teach back comments  Pt doing well. Does not have anyone to check incision but he feels like it is doing well. Trying to wean off of narcotics.   Week 2 call completed?  Yes          Esthela Martinez RN

## 2020-07-29 ENCOUNTER — OFFICE VISIT (OUTPATIENT)
Dept: NEUROSURGERY | Facility: CLINIC | Age: 55
End: 2020-07-29

## 2020-07-29 VITALS
TEMPERATURE: 97.2 F | HEART RATE: 74 BPM | BODY MASS INDEX: 25.31 KG/M2 | DIASTOLIC BLOOD PRESSURE: 73 MMHG | HEIGHT: 70 IN | SYSTOLIC BLOOD PRESSURE: 110 MMHG

## 2020-07-29 DIAGNOSIS — Z48.89 POSTOPERATIVE VISIT: Primary | ICD-10-CM

## 2020-07-29 DIAGNOSIS — M62.838 MUSCLE SPASM: ICD-10-CM

## 2020-07-29 PROCEDURE — 99024 POSTOP FOLLOW-UP VISIT: CPT | Performed by: NEUROLOGICAL SURGERY

## 2020-07-29 RX ORDER — CYCLOBENZAPRINE HCL 10 MG
10 TABLET ORAL 3 TIMES DAILY PRN
Qty: 60 TABLET | Refills: 2 | Status: SHIPPED | OUTPATIENT
Start: 2020-07-29 | End: 2022-01-13

## 2020-07-30 ENCOUNTER — TELEPHONE (OUTPATIENT)
Dept: NEUROSURGERY | Facility: CLINIC | Age: 55
End: 2020-07-30

## 2020-07-30 DIAGNOSIS — R79.89 ELEVATED SERUM CREATININE: Primary | ICD-10-CM

## 2020-07-30 NOTE — TELEPHONE ENCOUNTER
PATIENT CALLED WITH ADDITIONAL QUESTIONS FROM HIS RECENT VISIT THAT HE FORGOT TO ASK. PATIENT HAD SURGERY ON 7/14/20 & IS INQUIRING WHEN HE IS ALLOWED TO TAKE BATHS AS WELL AS TRAVEL BY PLANE?    [phone]

## 2020-07-31 ENCOUNTER — TELEPHONE (OUTPATIENT)
Dept: NEUROSURGERY | Facility: CLINIC | Age: 55
End: 2020-07-31

## 2020-07-31 RX ORDER — BACLOFEN 10 MG/1
10 TABLET ORAL 3 TIMES DAILY
Qty: 60 TABLET | Refills: 2 | Status: SHIPPED | OUTPATIENT
Start: 2020-07-31 | End: 2022-01-13

## 2020-07-31 NOTE — TELEPHONE ENCOUNTER
Try some baclofen 10 mg p.o. 3 times daily as needed, #60 with 2 refills.  Tell him to back off on the Percocet if it is knocking him out too much.  The baclofen will probably make him less sleepy.

## 2020-07-31 NOTE — TELEPHONE ENCOUNTER
"Pt called today c/o nausea, dizziness and \"being knocked out\" which started after taking the Flexeril given at his 7/29/20 visit.  He would like to try something else.    Also, he thinks his current dosage of Percocet 10/325 mg q 6 hrs last filled on 7/24/20. He would like something less strong. I did ask him how often he is tanking them and he is religiously taking them every 6 hours. I clarified with pt the instructions were indeed q 6hrs AS NEEDED, and he agreed. I told him that means to only take them AS NEED.    I told him that you would call back.  "

## 2020-08-05 ENCOUNTER — TELEPHONE (OUTPATIENT)
Dept: NEUROSURGERY | Facility: CLINIC | Age: 55
End: 2020-08-05

## 2020-08-05 ENCOUNTER — READMISSION MANAGEMENT (OUTPATIENT)
Dept: CALL CENTER | Facility: HOSPITAL | Age: 55
End: 2020-08-05

## 2020-08-05 NOTE — TELEPHONE ENCOUNTER
Patient called and said that he is having some diarrhea and nausea since his surgery.  He has no fever, no leakage from incision and not hot to touch.  Patient is not sure if he is coming down with something.

## 2020-08-05 NOTE — OUTREACH NOTE
General Surgery Week 3 Survey      Responses   [facility] patient discharged from?  [place]   Does the patient have one of the following disease processes/diagnoses(primary or secondary)?  General Surgery   Week 3 attempt successful?  No   Unsuccessful attempts  Attempt 1          Jodi Zamora RN

## 2020-08-06 ENCOUNTER — READMISSION MANAGEMENT (OUTPATIENT)
Dept: CALL CENTER | Facility: HOSPITAL | Age: 55
End: 2020-08-06

## 2020-08-06 ENCOUNTER — TELEPHONE (OUTPATIENT)
Dept: FAMILY MEDICINE CLINIC | Facility: CLINIC | Age: 55
End: 2020-08-06

## 2020-08-06 RX ORDER — ONDANSETRON 4 MG/1
4 TABLET, FILM COATED ORAL EVERY 6 HOURS PRN
Qty: 20 TABLET | Refills: 1 | Status: SHIPPED | OUTPATIENT
Start: 2020-08-06 | End: 2021-11-08 | Stop reason: SDUPTHER

## 2020-08-06 NOTE — TELEPHONE ENCOUNTER
Patient calling and states he is experiencing some nausea and would like something called in to help. Verified [facility] on [address] [place].    Please call and advise at [phone].

## 2020-08-06 NOTE — OUTREACH NOTE
General Surgery Week 3 Survey      Responses   [facility] [place] patient discharged from?  [place]   Does the patient have one of the following disease processes/diagnoses(primary or secondary)?  General Surgery   Week 3 attempt successful?  No   Unsuccessful attempts  Attempt 2          Payal Bravo LPN

## 2020-08-07 NOTE — TELEPHONE ENCOUNTER
Left voicemail for patient regarding raz Richter sent in Zofran to his pharmacy for n/v OK per HIPAA

## 2020-08-24 NOTE — PROGRESS NOTES
Subjective   History of Present Illness: Eliseo Phan is a 54 y.o. male for televisit post op appt.  Patient had surgery 7.14.20, L4/5 L5/S1 fusion    Patient was last seen 7.29.20 for back pain and spasm and was prescribed Baclofen,he could not tolerate Flexeril    Patient states that he is doing well and ready to start PT.  He is not having any low back pain other than stiffness, no leg pain or weakness, no N/T, urinary incontinence or problems with his balance and gait.  He is not taking any pain meds or muscle relaxants    You have chosen to receive care through a telephone visit. Do you consent to use a telephone visit for your medical care today? Yes    History of Present Illness    The following portions of the patient's history were reviewed and updated as appropriate: allergies, current medications, past family history, past medical history, past social history, past surgical history and problem list.    Review of Systems   Constitutional: Negative.    HENT: Negative.    Eyes: Negative.    Respiratory: Negative.    Gastrointestinal: Negative.    Endocrine: Negative.    Genitourinary: Negative for urgency.   Musculoskeletal: Negative for arthralgias, back pain, gait problem and joint swelling.   Allergic/Immunologic: Negative.    Neurological: Negative for weakness and numbness.   Hematological: Negative.    Psychiatric/Behavioral: Negative.        This was a tele-visit from the office.  It lasted 8 minutes.  The patient was at home.  It is been about 6 weeks since he has 2 level left-sided TLIF at L4-L5 and L5-S1.  He is doing extremely well.  He stopped taking the muscle relaxant.  He is taking Tylenol only.  The diarrhea was probably from a GI virus.  He said he lost 14 pounds before feeling better and gaining it back.  But the incisions not causing him any problems.  His back and his left leg are not bothering him at all.  Were very pleased about that.  I think he is ready to start regular physical  therapy.  I want to delay for a while.  But he is ready for it.  He wants to get back into the pool which is fine.  He can take a bath.  He can travel as long as he does not carry heavy bags.  [facility] will [facility] and will do a tele-visit in 8 weeks.  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  No medicines were needed at this time.        Objective             Physical Exam   Deferred  Neurologic Exam   Deferred        Assessment/Plan   Independent Review of Radiographic Studies:      I personally reviewed the images from the following studies.        Medical Decision Making:      Doing extremely well.  Taking Tylenol only.  We will send him to physical therapy.  He can swim in the pool.  We will do a tele-visit in 8 weeks we can talk about trying to get back into a workout routine at the gym.    Eliseo was seen today for post-op.    Diagnoses and all orders for this visit:    Postoperative visit  -     Ambulatory Referral to Physical Therapy Evaluate and treat      Return in about 2 months (around 10/25/2020) for As a tele-visit.

## 2020-08-25 ENCOUNTER — OFFICE VISIT (OUTPATIENT)
Dept: NEUROSURGERY | Facility: CLINIC | Age: 55
End: 2020-08-25

## 2020-08-25 DIAGNOSIS — Z48.89 POSTOPERATIVE VISIT: Primary | ICD-10-CM

## 2020-08-25 PROCEDURE — 99024 POSTOP FOLLOW-UP VISIT: CPT | Performed by: NEUROLOGICAL SURGERY

## 2020-08-27 ENCOUNTER — TREATMENT (OUTPATIENT)
Dept: PHYSICAL THERAPY | Facility: CLINIC | Age: 55
End: 2020-08-27

## 2020-08-27 DIAGNOSIS — M54.42 ACUTE LEFT-SIDED LOW BACK PAIN WITH LEFT-SIDED SCIATICA: Primary | ICD-10-CM

## 2020-08-27 DIAGNOSIS — Z47.89 ORTHOPEDIC AFTERCARE: ICD-10-CM

## 2020-08-27 DIAGNOSIS — IMO0002 STIFFNESS OF EXTREMITY: ICD-10-CM

## 2020-08-27 DIAGNOSIS — Z98.1 S/P LUMBAR FUSION: ICD-10-CM

## 2020-08-27 PROCEDURE — 97161 PT EVAL LOW COMPLEX 20 MIN: CPT | Performed by: PHYSICAL THERAPIST

## 2020-08-27 PROCEDURE — 97110 THERAPEUTIC EXERCISES: CPT | Performed by: PHYSICAL THERAPIST

## 2020-08-27 NOTE — PROGRESS NOTES
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   Not able to exercise for 4 years secondary to pain but able to swim laps up until surgery   NO recent PHYSICALTHERAPY   Doing a walking program  Lifting restriction now 20#  No sitting  No more than 1.5 hours but   SLEEPING at least 5-6 hours  Side sleeping  Primary complaint is tight and weak in back  l > r   LEGs are tight pain denies pain or numbness tingling in legs    Subjective comment: Feeling much better after surgery  primarily stiff and soreness  Patient Occupation: reitred nurse enesthetist Quality of life: good    Pain  Current pain rating: 3  At best pain ratin  At worst pain ratin (sitting is primary aggrevation)  Quality: dull ache, pressure and radiating  Relieving factors: rest and ice  Aggravating factors: prolonged positioning (sitting)  Progression: improved    Diagnostic Tests  MRI studies: abnormal (mylogram was nerve)    Treatments  Previous treatment: physical therapy  Patient Goals  Patient goals for therapy: decreased pain, increased motion, increased strength and return to sport/leisure activities             Objective          Static Posture     Comments  POSTUREgrossly 50% but not fully  tested secondary to post op stastus  ROM forwared head  protreacted scapula RIGHT > LEFT  Iliac crests level  INCISIONS well healed  MMT  HEEL TOE WALK OK      Hip flexion 5/5 bilaterally  Quad   4/5 LEFT 5/5 RIGHT  Hamstring 5/5 bilaterally   SINGLE LEG STANCE  RIGHT WNL  LEFT 5 sec with noted trunk compensation   SLR  RIGHT  80  LEFT  70  Symptom in hamstring  IRINA  RIGHT stiff at end range  LEFT  25% limited at end range no pain        Sitting and standing posture ed with core activation starting from pelvic floor  Decompression strategy in hook lying with ice  Sleeping posture with pillows  Walking program with core activation  READY( posture ) SET (core) GO (activity) for joint proteciton  EDUCATION for need for spinal stabilization to protect fusion but also level above  WAITERS bow with dowel usama on back for spinal neutral   Golfers lift to  light weight things  NO BENDING   Minimize sittting    Functional outcome score: LUMBAR OSWESTRY score 20%       Assessment & Plan     Assessment  Impairments: abnormal or restricted ROM, activity intolerance, impaired physical strength, lacks appropriate home exercise program and pain with function  Assessment details: Eliseo Phan is a 54 y.o. year-old male referred to physical therapy for S/P 2 level lumbar fusion L4/5 L5S1 July 14, 2020. . He presents with a stable clinical presentation.  He has NO comorbidities  and personal factors that may affect his progress in the plan of care.  Signs and symptoms are consistent with physical therapy diagnosis of S/P lumbar fusion that is well healing.   Prognosis: good  Functional Limitations: carrying objects, lifting, uncomfortable because of pain, sitting and unable to perform repetitive tasks  Goals  Plan Goals: STG: to be met by 6 weeks  1- Patient will report pain < 2 /10 and centralized  with light household activities  2-Patient will be independent with posture/ body mechanics/ joint protection around fusion and symptom management    3-Patient will be independent with walking program/ mat and core HEP without compensation or exacerbation   LTG: to be met by 12 weeks  1-Pt will report pain < 2 /10 and  centralized with recreational activities   2-Pt will increase SLR and IRINA to WNL without  exacerbation   3- Patient will increase LEFT single limb stance to equal RIGHT without compensation   4-Pt will be independent with comprehsive HEP     Plan  Therapy options: will be seen for skilled physical therapy services  Planned therapy interventions: therapeutic activities, stretching, strengthening, postural training, neuromuscular re-education, home exercise program, body mechanics training and abdominal trunk stabilization  Other planned therapy interventions: Aquatic therapy  Duration in weeks: 12  Plan details: DURATION in visits 36        Timed:  Manual Therapy:    0     mins  31672;  Therapeutic Exercise:    25     mins  40933;     Neuromuscular Sharlene:    0    mins  25235;    Therapeutic Activity:     0     mins  53281;     Gait Trainin     mins  68981;     Ultrasound:     0     mins  17990;    Electrical Stimulation:    0     mins  05982 ( );  Iontophoresis    0     mins 11004  Dry Needling   0     mins      Untimed:  Electrical Stimulation:    0     mins  37015 ( );  Mechanical Traction:    0     mins  83833;     Timed Treatment:   25   mins   Total Treatment:     45   mins    PT SIGNATURE: Steffanie Hickman, JEWELL   DATE TREATMENT INITIATED: 2020    Initial Certification  Certification Period: 2020  I certify that the therapy services are furnished while this patient is under my care.  The services outlined above are required by this patient, and will be reviewed every 90 days.     PHYSICIAN: Thomas Torres MD      DATE:     Please sign and return via fax to  .. Thank you, [facility].

## 2020-09-08 ENCOUNTER — TREATMENT (OUTPATIENT)
Dept: PHYSICAL THERAPY | Facility: CLINIC | Age: 55
End: 2020-09-08

## 2020-09-08 DIAGNOSIS — Z98.890 S/P LUMBAR LAMINECTOMY: ICD-10-CM

## 2020-09-08 DIAGNOSIS — Z47.89 ORTHOPEDIC AFTERCARE: ICD-10-CM

## 2020-09-08 DIAGNOSIS — M54.42 ACUTE LEFT-SIDED LOW BACK PAIN WITH LEFT-SIDED SCIATICA: Primary | ICD-10-CM

## 2020-09-08 DIAGNOSIS — IMO0002 STIFFNESS OF EXTREMITY: ICD-10-CM

## 2020-09-08 DIAGNOSIS — Z98.1 S/P LUMBAR FUSION: ICD-10-CM

## 2020-09-08 PROCEDURE — 97110 THERAPEUTIC EXERCISES: CPT | Performed by: PHYSICAL THERAPIST

## 2020-09-08 NOTE — PROGRESS NOTES
Physical Therapy Daily Progress Note    Patient: Eliseo Phan   : 1965  Diagnosis/ICD-10 Code:  Acute left-sided low back pain with left-sided sciatica [M54.42]  Referring practitioner: Thomas Torres MD  Date of Initial Visit: Type: THERAPY  Noted: 2020  Today's Date: 2020  Patient seen for 2 sessions           Subjective     Objective   See Exercise, Manual, and Modality Logs for complete treatment.     Exercises:  -ellipitical (no arms) 5 min  -Matrix seated row, 20 lb 2x10  -Matrix lat pull down (standing), 35 lb 2x10  -Matrix hip add, 50 lb 2x10  -Matrix hip abd, 50 lb 2x10  -ab brace x10 with 5 sec hold  -hip add iso with ab brace, 2x10 with 5 sec hold  -SKTC 3x20 sec  -piriformis stretch 3x20 sec  -hamstring 90/90 stretch 3x20 sec    Assessment/Plan  Pt is interested in re-joining [employer]. Started him on gym exercises today focusing on activating the core as he is lifting. Also demo of body mechanics while  Cooking and doing food prep, etc         Timed:    Manual Therapy:         mins  16660;  Therapeutic Exercise:    40     mins  53777;     Neuromuscular Sharlene:        mins  37522;    Therapeutic Activity:          mins  63884;     Gait Training:           mins  21806;     Ultrasound:          mins  74285;    Electrical Stimulation:         mins  12078 ( );  Iontophoresis         mins 60716;  Aquatic Therapy         mins 23479;  Dry Needling                   mins    Untimed:  Electrical Stimulation:         mins  70411 ( );  Mechanical Traction:         mins  96536;     Timed Treatment:   40   mins   Total Treatment:     40   mins  Yusra Alanis, PT  Physical Therapist

## 2020-09-10 ENCOUNTER — TREATMENT (OUTPATIENT)
Dept: PHYSICAL THERAPY | Facility: CLINIC | Age: 55
End: 2020-09-10

## 2020-09-10 DIAGNOSIS — IMO0002 STIFFNESS OF EXTREMITY: ICD-10-CM

## 2020-09-10 DIAGNOSIS — M54.42 ACUTE LEFT-SIDED LOW BACK PAIN WITH LEFT-SIDED SCIATICA: Primary | ICD-10-CM

## 2020-09-10 DIAGNOSIS — Z47.89 ORTHOPEDIC AFTERCARE: ICD-10-CM

## 2020-09-10 DIAGNOSIS — Z98.1 S/P LUMBAR FUSION: ICD-10-CM

## 2020-09-10 PROCEDURE — 97110 THERAPEUTIC EXERCISES: CPT | Performed by: PHYSICAL THERAPIST

## 2020-09-10 NOTE — PROGRESS NOTES
Physical Therapy Daily Progress Note    Patient: Eliseo Phan   : 1965  Diagnosis/ICD-10 Code:  Acute left-sided low back pain with left-sided sciatica [M54.42]  Referring practitioner: Thomas Torres MD  Date of Initial Visit: Type: THERAPY  Noted: 2020  Today's Date: 9/10/2020  Patient seen for 3 sessions           Subjective I have over done it and flared up my back.  The pain is 6-7/10 in the back area    Objective   EDUCATION for ice decompression strategy body mechanics as well as need to change body mechanics as to not stress L3    MAT  EXERCISE  LTR x 10  SKTC x 5  bilaterally   DKTC x 5  prirformis stretch 20 sec x 3 bilaterally   Dural pumping of hamstring x 10   ADD ankle x 10 2 sets bilaterally   FOAM roller thoracic self mobs     Sitting little thoracic rotations x 10    DOWEL usama on back for tactile cue to practice mini squats x 10 ;  Hip hinging x 10; golfers lift x 10      Assessment/Plan    A: exacerbation of lumbar spine  PLAN: progress therex and body mechanics training next rx       Timed:    Manual Therapy:    0     mins  31711;  Therapeutic Exercise:    40     mins  39479;     Neuromuscular Sharlene:    0    mins  58363;    Therapeutic Activity:     0     mins  42544;     Gait Trainin     mins  87636;     Ultrasound:     0     mins  09657;    Electrical Stimulation:    0     mins  59452 ( );  Iontophoresis    0     mins 36353;  Aquatic Therapy    0     mins 95047;  Dry Needling              0     mins    Untimed:  Electrical Stimulation:    0     mins  66245 ( );  Mechanical Traction:    0     mins  49541;     Timed Treatment:   40   mins   Total Treatment:     40   mins  Steffanie Hickman PT  Physical Therapist

## 2020-09-15 ENCOUNTER — TREATMENT (OUTPATIENT)
Dept: PHYSICAL THERAPY | Facility: CLINIC | Age: 55
End: 2020-09-15

## 2020-09-15 DIAGNOSIS — IMO0002 STIFFNESS OF EXTREMITY: ICD-10-CM

## 2020-09-15 DIAGNOSIS — Z47.89 ORTHOPEDIC AFTERCARE: ICD-10-CM

## 2020-09-15 DIAGNOSIS — Z98.1 S/P LUMBAR FUSION: ICD-10-CM

## 2020-09-15 DIAGNOSIS — M54.42 ACUTE LEFT-SIDED LOW BACK PAIN WITH LEFT-SIDED SCIATICA: Primary | ICD-10-CM

## 2020-09-15 PROCEDURE — 97110 THERAPEUTIC EXERCISES: CPT | Performed by: PHYSICAL THERAPIST

## 2020-09-15 NOTE — PROGRESS NOTES
Physical Therapy Daily Progress Note    Patient: Eliseo Phan   : 1965  Diagnosis/ICD-10 Code:  Acute left-sided low back pain with left-sided sciatica [M54.42]  Referring practitioner: Thomas Torres MD  Date of Initial Visit: Type: THERAPY  Noted: 2020  Today's Date: 9/15/2020  Patient seen for 4 sessions           Subjective I am feeling better today but still not 100%    Objective   Walk 3 laps with verbal cueing for up tall posture  Ellipitcal 2 min with core stabilization  Walk 3 laps with EDUCATION as to need for walking program in HEP    Standing posture work at mirror with verbal cueing for proper core activation from pelvic floor  Core EDUCATION in mirror for lumbar stabilization     Squat body mechanics work with dowel and verbal cueing for core  Body mechanics training for reaching/squatting activities  EDUCATION as to need for adaptive equipment as well as how to use a back brace if needed to lift but not wear it 100% of time to weaken core      Assessment/Plan  A: calming down from exacerbation but poor recruitment of core/ pelvic floor with body mechanics/ squatting  PLAN: if symptom have calmed down, then increase vigor of strengthening         Timed:    Manual Therapy:    0     mins  68251;  Therapeutic Exercise:    44     mins  19538;     Neuromuscular Sharlene:    0    mins  43436;    Therapeutic Activity:     0     mins  16620;     Gait Trainin     mins  48039;     Ultrasound:     0     mins  97714;    Electrical Stimulation:    0     mins  66677 ( );  Iontophoresis    0     mins 69945;  Aquatic Therapy    0     mins 01249;  Dry Needling              0     mins    Untimed:  Electrical Stimulation:    0     mins  41283 ( );  Mechanical Traction:    0     mins  12059;     Timed Treatment:   44   mins   Total Treatment:     44   mins  Steffanie Hickman PT  Physical Therapist

## 2020-09-17 ENCOUNTER — TREATMENT (OUTPATIENT)
Dept: PHYSICAL THERAPY | Facility: CLINIC | Age: 55
End: 2020-09-17

## 2020-09-17 DIAGNOSIS — Z47.89 ORTHOPEDIC AFTERCARE: ICD-10-CM

## 2020-09-17 DIAGNOSIS — IMO0002 STIFFNESS OF EXTREMITY: ICD-10-CM

## 2020-09-17 DIAGNOSIS — M54.42 ACUTE LEFT-SIDED LOW BACK PAIN WITH LEFT-SIDED SCIATICA: Primary | ICD-10-CM

## 2020-09-17 DIAGNOSIS — Z98.1 S/P LUMBAR FUSION: ICD-10-CM

## 2020-09-17 PROCEDURE — 97110 THERAPEUTIC EXERCISES: CPT | Performed by: PHYSICAL THERAPIST

## 2020-09-17 NOTE — PROGRESS NOTES
Physical Therapy Daily Progress Note    Patient: Eliseo Phan   : 1965  Diagnosis/ICD-10 Code:  Acute left-sided low back pain with left-sided sciatica [M54.42]  Referring practitioner: Thomas Torres MD  Date of Initial Visit: Type: THERAPY  Noted: 2020  Today's Date: 2020  Patient seen for 5 sessions           Subjective I am feeling pretty good today    Objective   Walk 3 laps with ed on posture and core ON    MAT work dural pumping as LEFT glut pain remains and RIGHT SLR limited to 45 degrees LEFT 60  Piriformis stretch x 30 sec x 2 bilaterally     Wall sit with verbal cueing for weight on heels 20 sec and EDUCATION as to progress  Alla    HIP ADD 40# x 10 bilaterally; x10 single; + pulse    HIP ABD  30# x 10 bilaterally ; + pulse   Both with verbal cueing for abs and gluts  ROW 30# x 10 + pinch   STANDING LAT PULL 30# x 10 + pinch with verbal cueing for both as to proper scapular stabilization while keeping lumbar stabilization     Modified push ups with verbal cueing for core stabilization in plank position x 10  Pull up as patient has previously done those at home with verbal cueing x5    Assessment/Plan  A: progressing well with strengthening but dural tension remains  PLAN; progress therex and closed chain activities        Timed:    Manual Therapy:    0     mins  68833;  Therapeutic Exercise:    44     mins  55531;     Neuromuscular Sharlene:    0    mins  81880;    Therapeutic Activity:     0     mins  53854;     Gait Trainin     mins  64413;     Ultrasound:     0     mins  77708;    Electrical Stimulation:    0     mins  21000 ( );  Iontophoresis    0     mins 07310;  Aquatic Therapy    0     mins 89238;  Dry Needling              0     mins    Untimed:  Electrical Stimulation:    0     mins  31588 ( );  Mechanical Traction:    0     mins  98399;     Timed Treatment:   44   mins   Total Treatment:     44   mins  Steffanie Hickman PT  Physical Therapist

## 2020-09-22 ENCOUNTER — TREATMENT (OUTPATIENT)
Dept: PHYSICAL THERAPY | Facility: CLINIC | Age: 55
End: 2020-09-22

## 2020-09-22 DIAGNOSIS — IMO0002 STIFFNESS OF EXTREMITY: ICD-10-CM

## 2020-09-22 DIAGNOSIS — Z98.1 S/P LUMBAR FUSION: ICD-10-CM

## 2020-09-22 DIAGNOSIS — Z47.89 ORTHOPEDIC AFTERCARE: ICD-10-CM

## 2020-09-22 DIAGNOSIS — M54.42 ACUTE LEFT-SIDED LOW BACK PAIN WITH LEFT-SIDED SCIATICA: Primary | ICD-10-CM

## 2020-09-22 PROCEDURE — 97110 THERAPEUTIC EXERCISES: CPT | Performed by: PHYSICAL THERAPIST

## 2020-09-22 NOTE — PROGRESS NOTES
Physical Therapy Daily Progress Note    Patient: Eliseo Phan   : 1965  Diagnosis/ICD-10 Code:  Acute left-sided low back pain with left-sided sciatica [M54.42]  Referring practitioner: Thomas Torres MD  Date of Initial Visit: Type: THERAPY  Noted: 2020  Today's Date: 2020  Patient seen for 6 sessions           Subjective I only have 1 sore spot on the left side    Objective   Walk 3 laps with verbal cueing for core stabilization   Elliptical x 8 min with verbal cueing for core stabilization     [place] HIP ADD 50 #x 10 bilaterally; x 5 single + pulse   HIP ABD  30# x 10 bilaterally ; x 5 single + pulse with fatigue    VC for core on    1 leg mini squat kick outx x 10 bilaterally with significant verbal cueing for technique    TRX deep squats with verbal cueing for lumbar stabilization   Arabesque x 10 bilaterally  With verbal cueing for lumbar stabilization   Attempted dynnamic hip hinging with TRX but too difficult will attempt again next week    Neural pumping knee x 10 bilaterally ankle x 10 bilaterally 3 sets   Piriformis stretch 20 sex x 2 bilaterally in sitting and supine     Work with back brace for ADLs to keep lumbar stabilization       Assessment/Plan  A: progressing well with body mechanics and lumbar stabilization with ADLs  PLAN; progress strengthening       Timed:    Manual Therapy:    0     mins  17529;  Therapeutic Exercise:    45     mins  57942;     Neuromuscular Sharlene:    0    mins  42258;    Therapeutic Activity:     0     mins  69581;     Gait Trainin     mins  47408;     Ultrasound:     0     mins  52018;    Electrical Stimulation:    0     mins  45841 ( );  Iontophoresis    0     mins 24282;  Aquatic Therapy    0     mins 86489;  Dry Needling              0     mins    Untimed:  Electrical Stimulation:    0     mins  66349 (MC );  Mechanical Traction:    0     mins  75097;     Timed Treatment:   45   mins   Total Treatment:     45   mins  Steffanie WASSERMAN  Vick, PT  Physical Therapist

## 2020-09-24 ENCOUNTER — TREATMENT (OUTPATIENT)
Dept: PHYSICAL THERAPY | Facility: CLINIC | Age: 55
End: 2020-09-24

## 2020-09-24 DIAGNOSIS — IMO0002 STIFFNESS OF EXTREMITY: ICD-10-CM

## 2020-09-24 DIAGNOSIS — Z98.1 S/P LUMBAR FUSION: ICD-10-CM

## 2020-09-24 DIAGNOSIS — Z47.89 ORTHOPEDIC AFTERCARE: ICD-10-CM

## 2020-09-24 DIAGNOSIS — M54.42 ACUTE LEFT-SIDED LOW BACK PAIN WITH LEFT-SIDED SCIATICA: Primary | ICD-10-CM

## 2020-09-24 PROCEDURE — 97110 THERAPEUTIC EXERCISES: CPT | Performed by: PHYSICAL THERAPIST

## 2020-09-24 NOTE — PROGRESS NOTES
Physical Therapy Daily Progress Note    Patient: Eliseo Phan   : 1965  Diagnosis/ICD-10 Code:  Acute left-sided low back pain with left-sided sciatica [M54.42]  Referring practitioner: Thomas Torres MD  Date of Initial Visit: Type: THERAPY  Noted: 2020  Today's Date: 2020  Patient seen for 7 sessions           Subjective Doing well.  Sore in my gluts, I can tell we have been working on them.     Objective   Warm up elliptical x 5 min with EDUCATION for posture and core stabilization     MAT work   abd dynamic lumbar stabilization  With legs unsupported leg press x 10 3 sets  obliques with dynamic lumbar stabilization  With legs unsupported x 10 2 zests  All with verbal cueing for keeping back flat and breathing  Issued HEP to do daily     MATRIX  Leg press 100# x 10  [place] hip ADD  50# x 10 bilaterally; x 10 single + pulse   [place] HIP ABD 35# x 10 bilaterally ; X 10 single + pulse   Step work point back to 6:00 then hip hinge and march up x 10 bilaterally 2 sets   All with verbal cueing for core stabilization and proper technique     Wall sit x 1 min    Stretches    Neural pumping hip x 10  Ankle x 10 2 sets bilaterally   Supine piriformis stretch h30 sec x 2 bilaterally  Sitting piriformis stretch 30 se c x 2 bilaterally       Assessment/Plan  A: moving very freely in all planes, needs reminders to be conscience of dynamic lumbar stabilization    PLAN; progress strenghtening with EDUCATION as to lumbar stabilization/ joint protection          Timed:    Manual Therapy:    0     mins  19489;  Therapeutic Exercise:    45     mins  24558;     Neuromuscular Sharlene:    0    mins  65133;    Therapeutic Activity:     0     mins  26978;     Gait Trainin     mins  48538;     Ultrasound:     0     mins  95088;    Electrical Stimulation:    0     mins  04889 ( );  Iontophoresis    0     mins 47607;  Aquatic Therapy    0     mins 35214;  Dry Needling              0      mins    Untimed:  Electrical Stimulation:    0     mins  91744 ( );  Mechanical Traction:    0     mins  20027;     Timed Treatment:   45   mins   Total Treatment:     45   mins  Steffanie Hickman, PT  Physical Therapist

## 2020-09-29 ENCOUNTER — TREATMENT (OUTPATIENT)
Dept: PHYSICAL THERAPY | Facility: CLINIC | Age: 55
End: 2020-09-29

## 2020-09-29 DIAGNOSIS — M54.42 ACUTE LEFT-SIDED LOW BACK PAIN WITH LEFT-SIDED SCIATICA: Primary | ICD-10-CM

## 2020-09-29 DIAGNOSIS — Z47.89 ORTHOPEDIC AFTERCARE: ICD-10-CM

## 2020-09-29 DIAGNOSIS — IMO0002 STIFFNESS OF EXTREMITY: ICD-10-CM

## 2020-09-29 DIAGNOSIS — Z98.1 S/P LUMBAR FUSION: ICD-10-CM

## 2020-09-29 PROCEDURE — 97110 THERAPEUTIC EXERCISES: CPT | Performed by: PHYSICAL THERAPIST

## 2020-09-29 NOTE — PROGRESS NOTES
30-Day / 10-Visit Progress Note         Patient: Eliseo Phan   : 1965  Diagnosis/ICD-10 Code:  Acute left-sided low back pain with left-sided sciatica [M54.42]  Referring practitioner: Thomas Torres MD  Date of Initial Visit: Type: THERAPY  Noted: 2020  Today's Date: 2020  Patient seen for 8 sessions      Subjective:     Clinical Progress: improved  Home Program Compliance: Yes  Treatment has included:  therapeutic exercise, neuro-muscular retraining  and patient education with home exercise program     Subjective Sore in the gluts today.  I think from walking so much over the weekend  Objective     MAT work   abd dynamic lumbar stabilization  With legs unsupported leg press x 10 3 sets  obliques with dynamic lumbar stabilization  With legs unsupported x 10 2 zests  All with verbal cueing for keeping back flat and breathing  Issued HEP to do daily   WALL SIT  30 sec x 2 with verbal cueing for technique   MATRIX  Leg press 100# x 10  [place] hip ADD  50# x 10 bilaterally; x 10 single + pulse   [place] HIP ABD 35# x 10 bilaterally ; X 10 single + pulse   Progressivly lower squats L3 x 3; L4 x 3; L5 x 3  Step work point back to 6:00 then hip hinge and march up x 10 bilaterally 2 sets  NEW BACK EXTENSION from 30 flexion to 20 extension with verbal cueing for core on and hip hinging 50# x 5 to learn technique then 70# x 7    SINGLE LEG STANCE arabesques for technique with more difficulty controlling pelvis on LEFT vs RIGHT   Alll with verbal cueing for core stabilization and proper technique     Assessment & Plan     Assessment  Impairments: abnormal or restricted ROM, activity intolerance, impaired physical strength, lacks appropriate home exercise program and pain with function  Assessment details: Eliseo Phan is a 54 y.o. year-old male referred to physical therapy for S/P 2 level lumbar fusion L4/5 L5S1 2020. .He has been seen in PHYSICALTHERAPY for 8 visits for progressing  therex, posture ed and body mechanics retraining.  He is progressing well and feels he has turned a corner and starting to feel back to normal. We are adding aquatic therapy to progress to goal of returning to swimming  Signs and symptoms are consistent with physical therapy diagnosis of S/P lumbar fusion that is well healing and he will benefit from skilled PT  Prognosis: good  Functional Limitations: carrying objects, lifting, uncomfortable because of pain, sitting and unable to perform repetitive tasks  Goals  Plan Goals: STG: to be met by 6 weeks  1- Patient will report pain < 2 /10 and centralized  with light household activities     MET  2-Patient will be independent with posture/ body mechanics/ joint protection around fusion and symptom management      ONGOING integration into functional activities and therex   3-Patient will be independent with walking program/ mat and core HEP without compensation or exacerbation       MET   LTG: to be met by 12 weeks  1-Pt will report pain < 2 /10 and centralized with recreational activities       MET  Pain 0-2/10 with recreational activities  2-Pt will increase SLR and IRINA to WNL without  Exacerbation      ONGOING    3- Patient will increase LEFT single limb stance to equal RIGHT without compensation       ONGOING   4-Pt will be independent with comprehsive HEP     Plan  Therapy options: will be seen for skilled physical therapy services  Planned therapy interventions: therapeutic activities, stretching, strengthening, postural training, neuromuscular re-education, home exercise program, body mechanics training and abdominal trunk stabilization  Other planned therapy interventions: Aquatic therapy  Frequency: 2x week  Duration in weeks: 12  Plan details: DURATION in visits 36           Recommendations: Continue as planned  Timeframe: 2 months  Prognosis to achieve goals: good    PT Signature: Steffanie Hickman, PT      Based upon review of the patient's progress and  continued therapy plan, it is my medical opinion that Eliseo Phan should continue physical therapy treatment at [facility]  [address] [place] [address] [phone].    Signature: __________________________________  Thomas Torres MD    Timed:  Manual Therapy:    0     mins  48914;  Therapeutic Exercise:    40     mins  67249;     Neuromuscular Sharlene:    0    mins  46170;    Therapeutic Activity:     0     mins  84679;     Gait Trainin     mins  44743;     Ultrasound:     0     mins  05720;    Electrical Stimulation:    0     mins  08733 ( );  Iontophoresis    0     mins 12232;  Dry Needling              0     mins    Untimed:  Electrical Stimulation:    0     mins  82216 ( );  Mechanical Traction:    0     mins  33727;     Timed Treatment:   40   mins   Total Treatment:     40   mins

## 2020-09-30 ENCOUNTER — TREATMENT (OUTPATIENT)
Dept: PHYSICAL THERAPY | Facility: CLINIC | Age: 55
End: 2020-09-30

## 2020-09-30 DIAGNOSIS — IMO0002 STIFFNESS OF EXTREMITY: ICD-10-CM

## 2020-09-30 DIAGNOSIS — Z47.89 ORTHOPEDIC AFTERCARE: ICD-10-CM

## 2020-09-30 DIAGNOSIS — M54.42 ACUTE LEFT-SIDED LOW BACK PAIN WITH LEFT-SIDED SCIATICA: Primary | ICD-10-CM

## 2020-09-30 DIAGNOSIS — Z98.1 S/P LUMBAR FUSION: ICD-10-CM

## 2020-09-30 PROCEDURE — 97113 AQUATIC THERAPY/EXERCISES: CPT | Performed by: PHYSICAL THERAPIST

## 2020-09-30 NOTE — PROGRESS NOTES
Physical Therapy Daily Progress Note    Patient: Eliseo Phan   : 1965  Diagnosis/ICD-10 Code:  Acute left-sided low back pain with left-sided sciatica [M54.42]  Referring practitioner: Thomas Torres MD  Date of Initial Visit: Type: THERAPY  Noted: 2020  Today's Date: 2020  Patient seen for 9 sessions             Subjective    Pain level 2-3/10. No new complaints.   Objective   Aquatic Exercise:  Water Walk       100 ft each of fwds, sideways and backwards at depth just above incisions.         Stretch 1    HS w/ lg noodle under ankle 20 sec each               Stretch 2     Calf 20 Sec each           Stretch 3      Piriformis 20 Sec each                 Stretch  4     Quads 20 Sec X 2 w/ LN assist at ankle        Stretch   5     Hip Sweeps w/ LN under flexed knee back support at wall X 10 each        Vertical Traction    2 min.     Abdominals    Large (blue) Foam dumbbell Pushdowns   X 10     Shoulder Abd/Add w/ large (blue) foam dumbbells X 10                        Hip Abd/Add  X 12 ea holding foam noodle for support             SLRs to 50 degrees X 10ea holding foam noodle for support                      Mini Squat  10X, Shallow                      Uni-Squat   --               Uni-Clock   --                Step Ups   --                 Bicycle  suspended on Lg noodle 1 min X 3                                Flutter/Scissor   --                                 Rows w/ closed Paddles X 15     Leg Press w/ lg foam ring X 10 each              Alternate shoulder Flexion/Extension w/ paddles  --      Assessment/Plan   Introduced [place] to aquatic exercise that included LE flexibility, lumbar stabilization and water walking.  Instruction included demonstration and verbal cues to engage core with proper positioning.  Utilized resistive equipment without issue.  Has potential to progress to more advanced exercises.  Plan:  Continue dry land therapy 2X/week, patient is requesting follow up  aquatic appts.  He is considering joining the [facility] to have access to the pool and gym.    Progress strengthening /stabilization /functional activity           Timed:  Aquatic Therapy    45     mins 86360;    Guille Lopez, PT  Physical Therapist

## 2020-10-01 ENCOUNTER — TREATMENT (OUTPATIENT)
Dept: PHYSICAL THERAPY | Facility: CLINIC | Age: 55
End: 2020-10-01

## 2020-10-01 DIAGNOSIS — Z98.1 S/P LUMBAR FUSION: ICD-10-CM

## 2020-10-01 DIAGNOSIS — Z47.89 ORTHOPEDIC AFTERCARE: ICD-10-CM

## 2020-10-01 DIAGNOSIS — M54.42 ACUTE LEFT-SIDED LOW BACK PAIN WITH LEFT-SIDED SCIATICA: Primary | ICD-10-CM

## 2020-10-01 PROCEDURE — 97110 THERAPEUTIC EXERCISES: CPT | Performed by: PHYSICAL THERAPIST

## 2020-10-01 NOTE — PROGRESS NOTES
Physical Therapy Daily Progress Note    Patient: Eliseo Phan   : 1965  Diagnosis/ICD-10 Code:  Acute left-sided low back pain with left-sided sciatica [M54.42]  Referring practitioner: Thomas Torres MD  Date of Initial Visit: Type: THERAPY  Noted: 2020  Today's Date: 10/1/2020  Patient seen for 10 sessions           Subjective I am feeling pretty good except for 1 spot on the LEFT side low back    Objective   MAT work   AB  dynamic lumbar stabilization  With legs unsupported leg press x 10 3 sets  OBLIQUES with dynamic lumbar stabilization  With legs unsupported x 10 2 zests      WALL SIT  30 sec x 2 with verbal cueing for technique     [place] hip ADD  50# x 10 bilaterally; x 10 single + pulse   [place] HIP ABD 35# x 10 bilaterally ; X 10 single + pulse   Progressivly lower squats L3 x 3; L4 x 3; L5 x 3  Step work point back to 6:00 then hip hinge and march up x 10 bilaterally 2 sets with fatigue today from gluts    NEW mat work prone I  T  Y but increased strain/work in back extension  So modified    BIRD DOG arms x 5 each legs x 5 each integration into oppostie arm/leg x 5 each a   All with verbal cueing for proper technique and core stabilization      SINGLE LEG STANCE arabesques for technique with more difficulty controlling pelvis on LEFT vs RIGHT   Alll with verbal cueing for core stabilization and proper technique       Assessment/Plan  A: progressing well with core work but difficulty getting back extension activated without exacerbation   PLAN: progress options for back strengthening           Timed:    Manual Therapy:    0     mins  19723;  Therapeutic Exercise:    44     mins  53898;     Neuromuscular Sharlene:    0    mins  68237;    Therapeutic Activity:     0     mins  07146;     Gait Trainin     mins  28025;     Ultrasound:     0     mins  44483;    Electrical Stimulation:    0     mins  72259 ( );  Iontophoresis    0     mins 53745;  Aquatic Therapy    0     mins  88974;  Dry Needling              0     mins    Untimed:  Electrical Stimulation:    0     mins  52021 ( );  Mechanical Traction:    0     mins  76377;     Timed Treatment:   44   mins   Total Treatment:     44   mins  Steffanie Hickman, PT  Physical Therapist

## 2020-10-05 ENCOUNTER — TREATMENT (OUTPATIENT)
Dept: PHYSICAL THERAPY | Facility: CLINIC | Age: 55
End: 2020-10-05

## 2020-10-05 DIAGNOSIS — Z98.1 S/P LUMBAR FUSION: ICD-10-CM

## 2020-10-05 DIAGNOSIS — Z47.89 ORTHOPEDIC AFTERCARE: ICD-10-CM

## 2020-10-05 DIAGNOSIS — M54.42 ACUTE LEFT-SIDED LOW BACK PAIN WITH LEFT-SIDED SCIATICA: Primary | ICD-10-CM

## 2020-10-05 PROCEDURE — 97113 AQUATIC THERAPY/EXERCISES: CPT | Performed by: PHYSICAL THERAPIST

## 2020-10-05 NOTE — PROGRESS NOTES
Physical Therapy Daily Progress Note    Patient: Eliseo Phan   : 1965  Diagnosis/ICD-10 Code:  Acute left-sided low back pain with left-sided sciatica [M54.42]  Referring practitioner: Thomas Torres MD  Date of Initial Visit: Type: THERAPY  Noted: 2020  Today's Date: 10/5/2020  Patient seen for 11 sessions             Subjective   [facility]m going to [facility].   Objective     Aquatic Exercise:  Water Walk       100 ft each of fwds, sideways and backwards at depth just above incisions.         Stretch 1    HS w/ lg noodle under ankle 20 sec X 2 each               Stretch 2     Calf --          Stretch 3      Piriformis 20 Sec X 2 each                 Stretch  4     Quads 20 Sec X 2 holding foot    Stretch   5     Hip Sweeps w/ LN under flexed knee back support at wall X 10 each        Vertical Traction    3 min. w/ LN support   Abdominals    Large (blue) Foam dumbbell Pushdowns   X 20    Shoulder Abd/Add w/ large (blue) foam dumbbells X 20                     Hip Abd/Add  X 20 ea holding foam noodle for support             SLRs to 50 degrees X 20 each holding foam noodle for support                      Mini Squat  --       Single limb stance with reciprocal arm/knee lift X 5 each leg                Step Ups w/ knee lift on 8 inch box X 10 each leg, also practiced tapping toe vs stepping on floor with moving leg that caused irritation to the left low back     Plank with large noodle UE support in shallow end w/ hip extension 5 sec hold X 5 each leg                            Bicycle  suspended on Lg noodle 3 min.   Flutter/Scissor   --                                 Rows w/ hydrotones  X 15     Leg Press w/ solid  (white)  foam noodle X 20 each    No UE support        Alternate shoulder Flexion/Extension w/ aqua gloves X 10    Shoulder horiz Abd/Add w/ aqua gloves X 10, staggered stance  Hydrorider  - when patient has water shoes   Reviewed ankle resistance available for leg  lifts including ankle aqualogix mini fins and light ankle weights (1.5 pounds)    Assessment/Plan  Jamison was able to increase repetitions on several exercises as well as progress to plank in shallow water supported by large foam noodle and perform alternate Rowing exercise with larger hydrotone bells.  We did work on 8 inch step ups with knee lift and progressed to toe tap on back leg, however this did aggravate his low back when performed with left leg on step.  He did need cueing for neutral pelvis.  This was performed in shallow water, next visit move to deeper water to avoid back irritation.  Also, trial hydrorider when patient obtains water shoes.  He is to get clearance for swimming from surgeon.  Progress per Plan of Care           Timed:  Aquatic Therapy    54     mins 72694;    Guille Lopez, PT  Physical Therapist

## 2020-10-06 ENCOUNTER — TREATMENT (OUTPATIENT)
Dept: PHYSICAL THERAPY | Facility: CLINIC | Age: 55
End: 2020-10-06

## 2020-10-06 DIAGNOSIS — Z47.89 ORTHOPEDIC AFTERCARE: ICD-10-CM

## 2020-10-06 DIAGNOSIS — M54.42 ACUTE LEFT-SIDED LOW BACK PAIN WITH LEFT-SIDED SCIATICA: Primary | ICD-10-CM

## 2020-10-06 DIAGNOSIS — Z98.1 S/P LUMBAR FUSION: ICD-10-CM

## 2020-10-06 PROCEDURE — 97110 THERAPEUTIC EXERCISES: CPT | Performed by: PHYSICAL THERAPIST

## 2020-10-06 PROCEDURE — 97112 NEUROMUSCULAR REEDUCATION: CPT | Performed by: PHYSICAL THERAPIST

## 2020-10-06 NOTE — PROGRESS NOTES
"Physical Therapy Daily Progress Note    Patient: Eliseo Phan   : 1965  Diagnosis/ICD-10 Code:  Acute left-sided low back pain with left-sided sciatica [M54.42]  Referring practitioner: Thomas Torres MD  Date of Initial Visit: Type: THERAPY  Noted: 2020  Today's Date: 10/6/2020  Patient seen for 12 sessions           Subjective I still occasionally have pain in LEFT back/buttock area especially with step type activity    Objective   Work step ups x 10 exacerbation of LEFT gluts  Decompress + LTR x 5   Piriformis stretch x 20 sec x 2  bilaterally    Cross body hip stretch 20 sec x 2   bilaterally   Noticed patient wanting to rotate to pop back   EDUCATION as to not to try and pop back but rotation can be helpful   NEW 3 phase rotation bilaterally x 5  3 sets with verbal cueing for technique    NEW over head then split down into rotation x 3 bilaterally  3 sets with verbal cueing for technique  SINGLE LEG STANCE/ mini squat at mirror to try dynmic 3 point tapping but noted poor hip hinging/mini squat   VERBAL AND TACTILE CUEING with stick for netural spine then mini squat thru legs but then exacerbation LEFT glut pain so hold    Assessment/Plan  A: LEFT single leg work exacerbation of LEFT glut/lower quadrant pain  PLAN\" increase activation of lumbar erecter spinae and multifidus in bilaterally and single limb stance        Timed:    Manual Therapy:    0     mins  78252;  Therapeutic Exercise:    15      mins  84079;     Neuromuscular Sharlene:    30    mins  52355;    Therapeutic Activity:     0     mins  18640;     Gait Trainin     mins  28246;     Ultrasound:     0     mins  24517;    Electrical Stimulation:    0     mins  04157 (MC );  Iontophoresis    0     mins 11147;  Aquatic Therapy    0     mins 64752;  Dry Needling              0     mins    Untimed:  Electrical Stimulation:    0     mins  87299 (MC );  Mechanical Traction:    0     mins  09566;     Timed Treatment:   45   " mins   Total Treatment:     45   mins  Steffanie Hickman, PT  Physical Therapist

## 2020-10-07 NOTE — PROGRESS NOTES
Subjective   History of Present Illness: Eliseo Phan is a 54 y.o. male for televisit follow up after PT. .  Patient had surgery 7.14.20, L4/5 L5/S1 fusion.    Patient had televisit 8.25.20 and was doing well.    You have chosen to receive care through a telephone visit. Do you consent to use a telephone visit for your medical care today? Yes    He is currently going to PT twice a week and noticing great improvement. He is not taking any pain medications.     This was a televisit from my office lasting 8 minutes.  He was at home.    It has been a bit over 3 months since he underwent a left-sided transforaminal lumbar interbody fusion at L4-L5 and L5-S1. He has actually been doing well and has been going to therapy at [facility] and he joined the club. He is swimming and thinking about doing yoga and Pilates. Very little pain, just occasional tightness in his left buttock. No pain medications taken. He wants to reenter the work force next year. I will see him in 3 months with x-rays as a face-to-face visit. If he is stable at that time we could probably keep it open-ended.       Back Pain  The pain is at a severity of 0/10. The patient is experiencing no pain. Pertinent negatives include no numbness or weakness.       The following portions of the patient's history were reviewed and updated as appropriate: allergies, current medications, past family history, past medical history, past social history, past surgical history and problem list.    Review of Systems   Musculoskeletal: Positive for back pain. Negative for gait problem.   Neurological: Negative for weakness and numbness.           Objective             Physical Exam   Deferred  Neurologic Exam   Deferred        Assessment/Plan   Independent Review of Radiographic Studies:      I personally reviewed the images from the following studies.    I reviewed the intraoperative x-rays done on 7/14/2020 which show good position of the cages and the pedicle screws  from L4 to L5-S1.  Agree with the report.        Medical Decision Making:      Doing extremely well.  We will see him in 3 months with x-rays as a face-to-face visit.  At that point he should be ready to reenter the workforce and we can possibly release him if he is doing well.      Diagnoses and all orders for this visit:    1. DDD (degenerative disc disease), lumbar (Primary)  -     XR Spine Lumbar Complete With Flex & Ext; Future    2. History of lumbar spinal fusion  -     XR Spine Lumbar Complete With Flex & Ext; Future      Return in about 3 months (around 1/26/2021) for As a face-to-face visit only.

## 2020-10-08 ENCOUNTER — TREATMENT (OUTPATIENT)
Dept: PHYSICAL THERAPY | Facility: CLINIC | Age: 55
End: 2020-10-08

## 2020-10-08 DIAGNOSIS — Z98.1 S/P LUMBAR FUSION: ICD-10-CM

## 2020-10-08 DIAGNOSIS — Z47.89 ORTHOPEDIC AFTERCARE: ICD-10-CM

## 2020-10-08 DIAGNOSIS — M54.42 ACUTE LEFT-SIDED LOW BACK PAIN WITH LEFT-SIDED SCIATICA: Primary | ICD-10-CM

## 2020-10-08 PROCEDURE — 97110 THERAPEUTIC EXERCISES: CPT | Performed by: PHYSICAL THERAPIST

## 2020-10-08 NOTE — PROGRESS NOTES
Physical Therapy Daily Progress Note    Patient: Eliseo Phan   : 1965  Diagnosis/ICD-10 Code:  Acute left-sided low back pain with left-sided sciatica [M54.42]  Referring practitioner: Thomas Torres MD  Date of Initial Visit: Type: THERAPY  Noted: 2020  Today's Date: 10/8/2020  Patient seen for 13 sessions           Subjective Rested back and feeling better    Objective   Warm up walking; elliptical x 10 min with EDUCATION as to core stabilization and need to protrct joints above and below fusion     Wall sit x 30 sec x 2 with verbal cueing  HIP ABD 40# x 10 then pulse x 20 sec     NO step ups today secondary to LEFT side exacerbation     PRONE work to target lumbar erector spinae and multifidus   Single leg lift x 10   Swimmer x 10   upper extremity only x 10    Upper extremities + upper torso x 10  LEFT side weaker with slower recruitment but imrproved by end of sequence    EDUCATION as to body mechanics and symptom management as patient is going to try and do some hiking for the first time    Assessment/Plan    A; progression into extension strengthening with good recruitment  PLAN: assess extension strengthening      Timed:    Manual Therapy:    0     mins  47560;  Therapeutic Exercise:    40     mins  40011;     Neuromuscular Sharlene:    0    mins  88924;    Therapeutic Activity:     0     mins  70797;     Gait Trainin     mins  34596;     Ultrasound:     0     mins  79166;    Electrical Stimulation:    0     mins  02580 ( );  Iontophoresis    0     mins 73065;  Aquatic Therapy    0     mins 01897;  Dry Needling              0     mins    Untimed:  Electrical Stimulation:    0     mins  50844 ( );  Mechanical Traction:    0     mins  78199;     Timed Treatment:   40   mins   Total Treatment:     40   mins  Steffanie Hickman, PT  Physical Therapist

## 2020-10-13 ENCOUNTER — TREATMENT (OUTPATIENT)
Dept: PHYSICAL THERAPY | Facility: CLINIC | Age: 55
End: 2020-10-13

## 2020-10-13 DIAGNOSIS — M54.42 ACUTE LEFT-SIDED LOW BACK PAIN WITH LEFT-SIDED SCIATICA: Primary | ICD-10-CM

## 2020-10-13 DIAGNOSIS — Z47.89 ORTHOPEDIC AFTERCARE: ICD-10-CM

## 2020-10-13 DIAGNOSIS — Z98.1 S/P LUMBAR FUSION: ICD-10-CM

## 2020-10-13 PROCEDURE — 97110 THERAPEUTIC EXERCISES: CPT | Performed by: PHYSICAL THERAPIST

## 2020-10-13 NOTE — PROGRESS NOTES
Physical Therapy Daily Progress Note    Patient: Eliseo Pahn   : 1965  Diagnosis/ICD-10 Code:  Acute left-sided low back pain with left-sided sciatica [M54.42]  Referring practitioner: Thomas Torres MD  Date of Initial Visit: Type: THERAPY  Noted: 2020  Today's Date: 10/13/2020  Patient seen for 14 sessions           Subjective Little sore this weekend but was not able to walk as much    Objective   Working to increase LEFT lower quadrant (erector spinae and multifidus to fire and strengthening0    PRONE                Single leg lift x 10              Swimmer x 10              upper extremity only x 10               Upper extremities + upper torso x 10  ALL with verbal and tactile cueing to facilitate firing  EDUCATION of thoraco lumbar fascial and how the lat/glut keeps it strong    4 POINT   Legs only x10   Bird dog x 10   ALL with verbal cueing for dynamic lumbar stabilization  As significant rotational motion     Seated piriformis stretch 30 sec x 2 bilaterally         Assessment/Plan  A: moving well with gait and transfers .  Step ups and single limb stance stance affected by lower quadrant weakness  PLAN progress strengthening of back extension and integrate into single limb stance          Timed:    Manual Therapy:    0     mins  52008;  Therapeutic Exercise:    40     mins  92411;     Neuromuscular Sharlene:    0    mins  11374;    Therapeutic Activity:     0     mins  92829;     Gait Trainin     mins  23725;     Ultrasound:     0     mins  97856;    Electrical Stimulation:    0     mins  02091 ( );  Iontophoresis    0     mins 02145;  Aquatic Therapy    0     mins 68313;  Dry Needling              0     mins    Untimed:  Electrical Stimulation:    0     mins  62146 ( );  Mechanical Traction:    0     mins  93554;     Timed Treatment:   40   mins   Total Treatment:     40   mins  Steffanie Hickman, PT  Physical Therapist

## 2020-10-15 ENCOUNTER — TREATMENT (OUTPATIENT)
Dept: PHYSICAL THERAPY | Facility: CLINIC | Age: 55
End: 2020-10-15

## 2020-10-15 DIAGNOSIS — Z47.89 ORTHOPEDIC AFTERCARE: ICD-10-CM

## 2020-10-15 DIAGNOSIS — M54.42 ACUTE LEFT-SIDED LOW BACK PAIN WITH LEFT-SIDED SCIATICA: Primary | ICD-10-CM

## 2020-10-15 DIAGNOSIS — Z98.1 S/P LUMBAR FUSION: ICD-10-CM

## 2020-10-15 PROCEDURE — 97140 MANUAL THERAPY 1/> REGIONS: CPT | Performed by: PHYSICAL THERAPIST

## 2020-10-15 PROCEDURE — 97110 THERAPEUTIC EXERCISES: CPT | Performed by: PHYSICAL THERAPIST

## 2020-10-15 PROCEDURE — 97112 NEUROMUSCULAR REEDUCATION: CPT | Performed by: PHYSICAL THERAPIST

## 2020-10-15 NOTE — PROGRESS NOTES
Physical Therapy Daily Progress Note    Patient: Eliseo Phan   : 1965  Diagnosis/ICD-10 Code:  Acute left-sided low back pain with left-sided sciatica [M54.42]  Referring practitioner: Thomas Torres MD  Date of Initial Visit: Type: THERAPY  Noted: 2020  Today's Date: 10/15/2020  Patient seen for 15 sessions           Subjective I think I feel better when I walk more regularly    Objective   MANUAL THERAPY deep TFM to LEFT erector spinae      PRONE                Single leg lift x 10              Swimmer x 10              upper extremity only x 10               Upper extremities + upper torso x 10  ALL with verbal and tactile cueing to facilitate firing       4 POINT              Legs only x10              Bird dog x 10   ALL with verbal cueing for dynamic lumbar stabilization  As significant rotational motion      Seated piriformis stretch 30 sec x 2 bilaterally      NEW squat work at mirror for proper mechanics as patient compensates with slight flexion and loses natural lumbar lordosis.  Extensive work and use of stick to find netural then HOLD as new exercise to practice holding as a pose up to 1 min hourly      NEW foam roller work in paral ell with stretch to pect in 3 planes   Cross body self mobs for T spine 10       Assessment/Plan  A: progressing well with therex and isolated pain to deep lumbar PVM         Timed:    Manual Therapy:    15     mins  64117;  Therapeutic Exercise:    15     mins  18140;     Neuromuscular Sharlene:    15    mins  06082;    Therapeutic Activity:     0     mins  57245;     Gait Trainin     mins  29727;     Ultrasound:     0     mins  95443;    Electrical Stimulation:    0     mins  81404 ( );  Iontophoresis    0     mins 30199;  Aquatic Therapy    0     mins 62819;  Dry Needling              0     mins    Untimed:  Electrical Stimulation:    0     mins  18655 ( );  Mechanical Traction:    0     mins  04201;     Timed Treatment:   45   mins    Total Treatment:     45   mins  Steffanie Hickman, PT  Physical Therapist

## 2020-10-20 ENCOUNTER — TREATMENT (OUTPATIENT)
Dept: PHYSICAL THERAPY | Facility: CLINIC | Age: 55
End: 2020-10-20

## 2020-10-20 DIAGNOSIS — M54.42 ACUTE LEFT-SIDED LOW BACK PAIN WITH LEFT-SIDED SCIATICA: Primary | ICD-10-CM

## 2020-10-20 DIAGNOSIS — Z98.1 S/P LUMBAR FUSION: ICD-10-CM

## 2020-10-20 DIAGNOSIS — Z47.89 ORTHOPEDIC AFTERCARE: ICD-10-CM

## 2020-10-20 PROCEDURE — 97110 THERAPEUTIC EXERCISES: CPT | Performed by: PHYSICAL THERAPIST

## 2020-10-20 NOTE — PROGRESS NOTES
Physical Therapy Daily Progress Note    Patient: Eliseo Phan   : 1965  Diagnosis/ICD-10 Code:  Acute left-sided low back pain with left-sided sciatica [M54.42]  Referring practitioner: Thomas Torres MD  Date of Initial Visit: Type: THERAPY  Noted: 2020  Today's Date: 10/20/2020  Patient seen for 16 sessions           Subjective  I am feeling better.  I did not get a chance to try swimming yet    Objective   Walk with verbal cueing for core/ pelvic floor activation  3 laps    MATRIX leg press 130 x 10 x 2 sets   [place] hip ABD  40# x 10 bilaterally ; x 10 single   Hip ADD 50# x 10 bilaterally ; x 10 single    Step up + march then tap back on step x 15X without exacerbation of LEFT erector spinae/multifidus pain  Free standing back extension froim 30 degrees flexion to neutral without exacerbation x 10     PLANK 30 sec x 2 with verbal cueing for proper technique     STRETCHES for piriformis in sitting and prone (pigeon pose) without exacerbation 30 sec x 2 each bilaterally       Assessment/Plan  A: able to do transitional motions and lumbar extension strenghtening without exacerbation  PLAN; reassess new exercise        Timed:    Manual Therapy:    0     mins  72323;  Therapeutic Exercise:    39     mins  21567;     Neuromuscular Sharlene:    0    mins  59387;    Therapeutic Activity:     0     mins  83026;     Gait Trainin     mins  16323;     Ultrasound:     0     mins  71262;    Electrical Stimulation:    0     mins  00917 ( );  Iontophoresis    0     mins 87565;  Aquatic Therapy    0     mins 09780;  Dry Needling              0     mins    Untimed:  Electrical Stimulation:    0     mins  87086 ( );  Mechanical Traction:    0     mins  15360;     Timed Treatment:   39   mins   Total Treatment:     39   mins  Steffanie Hickman PT  Physical Therapist

## 2020-10-26 ENCOUNTER — OFFICE VISIT (OUTPATIENT)
Dept: NEUROSURGERY | Facility: CLINIC | Age: 55
End: 2020-10-26

## 2020-10-26 DIAGNOSIS — Z98.1 HISTORY OF LUMBAR SPINAL FUSION: ICD-10-CM

## 2020-10-26 DIAGNOSIS — M51.36 DDD (DEGENERATIVE DISC DISEASE), LUMBAR: Primary | ICD-10-CM

## 2020-10-26 PROCEDURE — 99441 PR PHYS/QHP TELEPHONE EVALUATION 5-10 MIN: CPT | Performed by: NEUROLOGICAL SURGERY

## 2020-10-27 ENCOUNTER — TREATMENT (OUTPATIENT)
Dept: PHYSICAL THERAPY | Facility: CLINIC | Age: 55
End: 2020-10-27

## 2020-10-27 DIAGNOSIS — Z98.1 S/P LUMBAR FUSION: ICD-10-CM

## 2020-10-27 DIAGNOSIS — Z47.89 ORTHOPEDIC AFTERCARE: ICD-10-CM

## 2020-10-27 DIAGNOSIS — M54.42 ACUTE LEFT-SIDED LOW BACK PAIN WITH LEFT-SIDED SCIATICA: Primary | ICD-10-CM

## 2020-10-27 PROCEDURE — 97112 NEUROMUSCULAR REEDUCATION: CPT | Performed by: PHYSICAL THERAPIST

## 2020-10-27 PROCEDURE — 97110 THERAPEUTIC EXERCISES: CPT | Performed by: PHYSICAL THERAPIST

## 2020-10-28 NOTE — PROGRESS NOTES
30-Day / 10-Visit Progress Note         Patient: Eliseo Phan   : 1965  Diagnosis/ICD-10 Code:  Acute left-sided low back pain with left-sided sciatica [M54.42]  Referring practitioner: Thomas Torres MD  Date of Initial Visit: Type: THERAPY  Noted: 2020  Today's Date: 10/27/2020  Patient seen for 17 sessions      Subjective:     Clinical Progress: improved  Home Program Compliance: Yes  Treatment has included:  therapeutic exercise, neuro-muscular retraining  and patient education with home exercise program     Subjective I am feeling much better   Objective   Walk with verbal cueing for core/ pelvic floor activation  3 laps     MATRIX leg press 130 x 10 x 2 sets   [place] hip ABD  40# x 10 bilaterally ; x 10 single              Hip ADD 50# x 10 bilaterally ; x 10 single     Step up + march then tap back on step x 15X without exacerbation of LEFT erector spinae/multifidus pain  Free standing back extension froim 30 degrees flexion to neutral without exacerbation x 10      PLANK 30 sec x 2 with verbal cueing for proper technique      STRETCHES for piriformis in sitting and prone (pigeon pose) without exacerbation 30 sec x 2 each bilaterally            Assessment & Plan     Assessment  Impairments: abnormal or restricted ROM, activity intolerance, impaired physical strength, lacks appropriate home exercise program and pain with function  Assessment details: Eliseo Phan is a 54 y.o. year-old male referred to physical therapy for S/P 2 level lumbar fusion L4/5 L5S1 2020. .He has been seen in PHYSICALTHERAPY for 12 visits for progressing therex, posture ed and body mechanics retraining.  He is progressing well with a flare up of LEFT lower quadrant pain that has resolved nicely with therex and walking.  We are progressing vigor of strengthening with verbal cueing for joint safety/ spine protection.  He will benefit from continued PHYSICALTHERAPY   Prognosis: good  Functional Limitations:  carrying objects, lifting, uncomfortable because of pain, sitting and unable to perform repetitive tasks  Goals  Plan Goals: STG: to be met by 6 weeks  1- Patient will report pain < 2 /10 and centralized  with light household activities     MET  2-Patient will be independent with posture/ body mechanics/ joint protection around fusion and symptom management      ONGOING integration into strengthening   3-Patient will be independent with walking program/ mat and core HEP without compensation or exacerbation       MET   LTG: to be met by 12 weeks  1-Pt will report pain < 2 /10 and centralized with recreational activities       MET    2-Pt will increase SLR and IRINA to WNL without  Exacerbation      ONGOING    3- Patient will increase LEFT single limb stance to equal RIGHT without compensation       ONGOING   4-Pt will be independent with comprehsive HEP     Plan  Therapy options: will be seen for skilled physical therapy services  Planned therapy interventions: therapeutic activities, stretching, strengthening, postural training, neuromuscular re-education, home exercise program, body mechanics training and abdominal trunk stabilization  Other planned therapy interventions: Aquatic therapy  Frequency: 2x week  Duration in weeks: 12  Plan details: DURATION in visits 36           Recommendations: Continue as planned  Timeframe: 2 months  Prognosis to achieve goals: good    PT Signature: Steffanie Hickman PT      Based upon review of the patient's progress and continued therapy plan, it is my medical opinion that Eliseo Phan should continue physical therapy treatment at [facility]  [address] [place] [address] [address] [place] [address] [phone].    Signature: __________________________________  Thomas Torres MD    Timed:  Manual Therapy:    0     mins  74932;  Therapeutic Exercise:    30     mins  71896;     Neuromuscular Sharlene:    10    mins  11113;    Therapeutic  Activity:     0     mins  75855;     Gait Trainin     mins  95849;     Ultrasound:     0     mins  94897;    Electrical Stimulation:    0     mins  92351 ( );  Iontophoresis    0     mins 65138;  Dry Needling              0     mins    Untimed:  Electrical Stimulation:    0     mins  50069 ( );  Mechanical Traction:    0     mins  88733;     Timed Treatment:   40   mins   Total Treatment:     40   mins

## 2020-10-29 ENCOUNTER — TREATMENT (OUTPATIENT)
Dept: PHYSICAL THERAPY | Facility: CLINIC | Age: 55
End: 2020-10-29

## 2020-10-29 DIAGNOSIS — Z47.89 ORTHOPEDIC AFTERCARE: ICD-10-CM

## 2020-10-29 DIAGNOSIS — Z98.1 S/P LUMBAR FUSION: ICD-10-CM

## 2020-10-29 DIAGNOSIS — M54.42 ACUTE LEFT-SIDED LOW BACK PAIN WITH LEFT-SIDED SCIATICA: Primary | ICD-10-CM

## 2020-10-29 PROCEDURE — 97110 THERAPEUTIC EXERCISES: CPT | Performed by: PHYSICAL THERAPIST

## 2020-10-29 NOTE — PROGRESS NOTES
"Physical Therapy Daily Progress Note    Patient: Eliseo Phan   : 1965  Diagnosis/ICD-10 Code:  Acute left-sided low back pain with left-sided sciatica [M54.42]  Referring practitioner: Thomas Torres MD  Date of Initial Visit: Type: THERAPY  Noted: 2020  Today's Date: 10/29/2020  Patient seen for 18 sessions           Subjective I felt fine after strengthening last time    Objective   Walk independently for warm  Up    EBONY       Leg press without bumpers to increase ROM and facilitate gluts 120# x 10 verbal cueing for knee/foot alignment       HIP ADD  60# x 10 bilaterally ; x 10 single   HIP ABD 40# x 10 bilaterally; x 10 single   MULTI hip [place] single leg hip hinge with verbal and tactile cueing for lumbar stabilization x 10 bilaterally  This is a difficulty exercise for him to control    Calf riser x 10  calf strech history 30 sec x 2   FREE STANDING back extension with arms crossed 30 flexion to neutral with EDUCATION as to back extension recruitment  And advance arm position to challenge core      SIT TO STAND progressively lower seat 3  4  5  6  X 3 each with verbal cueing as to how to adjust machines correctionly without compromising body mechnics     MATRIX   ROW  50# x 10 + pinch 5 sec x 10   Standing lat pull down with verbal cueing for lumbar stabilization 30#-25# x 10 each + pinch    tricep 40# x 10    Bicep 10# x 10 with verbal cueing for stand to improve lumbar stabilization    \"V\" pattern rhythmic work with 1# and verbal cueing for lumbar stabilization        Assessment/Plan  A: progressing with proper technique with strengthening but tends to stay with varum knees and posterior pelvic tilt   PLAN: progress strengthening and integrate into functional closed chain activities          Timed:    Manual Therapy:    0     mins  08306;  Therapeutic Exercise:    41     mins  12235;     Neuromuscular Sharlene:    0    mins  12727;    Therapeutic Activity:     0     mins  18886;   "   Gait Trainin     mins  66873;     Ultrasound:     0     mins  62654;    Electrical Stimulation:    0     mins  75975 ( );  Iontophoresis    0     mins 72352;  Aquatic Therapy    0     mins 04494;  Dry Needling              0     mins    Untimed:  Electrical Stimulation:    0     mins  18529 ( );  Mechanical Traction:    0     mins  17307;     Timed Treatment:   41   mins   Total Treatment:     41   mins  Steffanie Hickman, PT  Physical Therapist

## 2020-10-30 RX ORDER — OMEPRAZOLE 20 MG/1
CAPSULE, DELAYED RELEASE ORAL
Qty: 60 CAPSULE | Refills: 5 | Status: SHIPPED | OUTPATIENT
Start: 2020-10-30 | End: 2021-07-30 | Stop reason: SDUPTHER

## 2020-11-10 ENCOUNTER — TELEPHONE (OUTPATIENT)
Dept: PHYSICAL THERAPY | Facility: CLINIC | Age: 55
End: 2020-11-10

## 2020-11-12 ENCOUNTER — TREATMENT (OUTPATIENT)
Dept: PHYSICAL THERAPY | Facility: CLINIC | Age: 55
End: 2020-11-12

## 2020-11-12 DIAGNOSIS — M54.42 ACUTE LEFT-SIDED LOW BACK PAIN WITH LEFT-SIDED SCIATICA: Primary | ICD-10-CM

## 2020-11-12 DIAGNOSIS — Z98.1 S/P LUMBAR FUSION: ICD-10-CM

## 2020-11-12 DIAGNOSIS — Z47.89 ORTHOPEDIC AFTERCARE: ICD-10-CM

## 2020-11-12 PROCEDURE — 97112 NEUROMUSCULAR REEDUCATION: CPT | Performed by: PHYSICAL THERAPIST

## 2020-11-12 PROCEDURE — 97110 THERAPEUTIC EXERCISES: CPT | Performed by: PHYSICAL THERAPIST

## 2020-11-12 NOTE — PROGRESS NOTES
"Physical Therapy Daily Progress Note    Patient: Eliseo Phan   : 1965  Diagnosis/ICD-10 Code:  Acute left-sided low back pain with left-sided sciatica [M54.42]  Referring practitioner: Thomas Torres MD  Date of Initial Visit: Type: THERAPY  Noted: 2020  Today's Date: 2020  Patient seen for 19 sessions           Subjective Sorry I missed last appt.  Feeling good today     Objective     Leg press without bumpers  120# x 10 bilaterally x 10 alternating  verbal cueing for knee/foot alignment       HIP ADD  60# x 10 bilaterally ; x 10 single              HIP ABD 40# x 10 bilaterally; x 10 single              MULTI hip Lars single leg hip hinge with verbal and tactile cueing for lumbar stabilization x 10 bilaterally  verbal cueing for up tall posture and not to tuck pelvis                           Calf riser x 10  calf strech history 30 sec x 2              FREE STANDING back extension with arms crossed 30 flexion to neutral verbal cueing for neck in neutral      SIT TO STAND progressively lower seat   5  6  X 5 each with verbal cueing as to how to adjust machines correctionly without compromising body mechnics      MATRIX              ROW  50# x 10 + pinch 5 sec x 10              Standing lat pull down with verbal cueing for lumbar stabilization 30#-25# x 10 each + pinch               tricep 40# x 10               Bicep 10# x 10 with verbal cueing for stand to improve lumbar stabilization               \"V\" pattern rhythmic work with 1# and verbal cueing for lumbar stabilization  1# for 60 sec                  Assessment/Plan  A: progressing well with strengthening but requires verbal cueing for proper technique  Pl progress strengthening and dynamic closed chain activities   REASSESS for additional authorization          Timed:    Manual Therapy:    0     mins  58302;  Therapeutic Exercise:    25     mins  93757;     Neuromuscular Sharlene:    20    mins  87226;    Therapeutic Activity:     " 0     mins  78373;     Gait Trainin     mins  07480;     Ultrasound:     0     mins  70930;    Electrical Stimulation:    0     mins  59924 ( );  Iontophoresis    0     mins 14459;  Aquatic Therapy    0     mins 41653;  Dry Needling              0     mins    Untimed:  Electrical Stimulation:    0     mins  73905 ( );  Mechanical Traction:    0     mins  66059;     Timed Treatment:   45   mins   Total Treatment:     45   mins  Steffanie Hickman, PT  Physical Therapist

## 2020-11-17 ENCOUNTER — TREATMENT (OUTPATIENT)
Dept: PHYSICAL THERAPY | Facility: CLINIC | Age: 55
End: 2020-11-17

## 2020-11-17 DIAGNOSIS — Z47.89 ORTHOPEDIC AFTERCARE: ICD-10-CM

## 2020-11-17 DIAGNOSIS — M54.42 ACUTE LEFT-SIDED LOW BACK PAIN WITH LEFT-SIDED SCIATICA: Primary | ICD-10-CM

## 2020-11-17 DIAGNOSIS — Z98.1 S/P LUMBAR FUSION: ICD-10-CM

## 2020-11-17 PROCEDURE — 97112 NEUROMUSCULAR REEDUCATION: CPT | Performed by: PHYSICAL THERAPIST

## 2020-11-17 PROCEDURE — 97110 THERAPEUTIC EXERCISES: CPT | Performed by: PHYSICAL THERAPIST

## 2020-11-17 NOTE — PROGRESS NOTES
Physical Therapy Re-certification         Patient: Eliseo Phan   : 1965  Diagnosis/ICD-10 Code:  Acute left-sided low back pain with left-sided sciatica [M54.42]  Referring practitioner: Thomas Torres MD  Date of Initial Visit: Type: THERAPY  Noted: 2020  Today's Date: 2020  Patient seen for 20 sessions      Subjective:     Clinical Progress: improved  Home Program Compliance: Yes  Treatment has included:  therapeutic exercise, manual therapy, therapeutic activity, neuro-muscular retraining  and patient education with home exercise program     Subjective  Feeling better about 80% back to household level activities and walking program  Still avoiding heavy lifting like water jug for water cooler  Standing tolerance 30-40 min  Walking up to 5 miles  Sitting still bother after approximately 1 hour gets really stiff affecting ability to get up and go  Sleeping good   PAIN average 2/10   Center lumbar spine  Occasional pain into LEFT buttock low level ache daily intermittentClosure of Physical  DENiess numbness or tingling  Occasional pain with sneezing  Back to swimming without exacerbation      Therapy Encounter            Steffanie Hickman, PT  Physical Therapist based on activity    Objective          Static Posture     Comments  POSTURE  RIGHT scapular elevated  Crests level with slight forward head  ROM Flextion WNL can almost touch toes limited by hamstring tightness  EXTENSION 50% with stiffness      Side flexion grossly 50% with primarily T spine motion   MMT  HEEL TOE WALK OK      Hip flexion 5/5 bilaterally  Quad  5/5 LEFT 4+/5 RIGHT  Hamstring 5/5 bilaterally        side lying leg lift LEFT  4/5  Irritation in hip/glut area   RIGHT  5/5   SINGLE LEG STANCE  20 sec bilaterally without trunk compensation  SLR  RIGHT  80  LEFT  80  Symptom in hamstring  IRINA  RIGHT WNL  LEFT  20% limited at end range no pain  LUNGE  Poor control of lumbar lorsosis and poor hip knee mechanics bilaterally  but LEFT  > RIGHT   SINGLE LEG ARABESQUE single limb hip hinge with noted trunk compensation with rotation bilaterally          Free standing back extension x 10 with verbal cueing for glut activation   EDUCATION as to progression into dynamic activities and progress weight amount able to lift and still keep good control of lumbar lordosis      Functional outcome score: LUMBAR oswestry 6%      Assessment & Plan     Assessment  Impairments: abnormal or restricted ROM, activity intolerance, impaired physical strength, lacks appropriate home exercise program and pain with function  Assessment details: Eliseo Phan is a 54 y.o. year-old male referred to physical therapy for S/P 2 level lumbar fusion L4/5 L5S1 July 14, 2020. .He has been seen in PHYSICALTHERAPY for 20 visits for progressing therex, posture ed and body mechanics retraining.  He is progressing well with occasionalLEFT lower quadrant pain that is intermittent depending on activity   We are progressing vigor of strengthening with closed chain activities / dynamic body mechanics with verbal cueing to control lumbar lordosis safely   He will benefit from continued PHYSICALTHERAPY   Prognosis: good  Functional Limitations: carrying objects, lifting, uncomfortable because of pain, sitting and unable to perform repetitive tasks  Goals  Plan Goals: STG: to be met by 6 weeks  1- Patient will report pain < 2 /10 and centralized  with light household activities     MET  2-Patient will be independent with posture/ body mechanics/ joint protection around fusion and symptom management      ONGOING integration into strengthening   3-Patient will be independent with walking program/ mat and core HEP without compensation or exacerbation       MET   LTG: to be met by 12 weeks  1-Pt will report pain < 2 /10 and centralized with recreational activities       MET    2-Pt will increase SLR and IRINA to WNL without  Exacerbation      ONGOING    3- Patient will increase LEFT  single limb stance to equal RIGHT without compensation       ONGOING   4-Pt will be independent with comprehsive HEP     Plan  Therapy options: will be seen for skilled physical therapy services  Planned therapy interventions: therapeutic activities, stretching, strengthening, postural training, neuromuscular re-education, home exercise program, body mechanics training and abdominal trunk stabilization  Other planned therapy interventions: Aquatic therapy  Frequency: 2x week  Duration in weeks: 12  Plan details: DURATION in visits 36           Recommendations: Continue as planned  Timeframe: 2 months  Prognosis to achieve goals: good    PT Signature: Steffanie Hickman, JEWELL      Based upon review of the patient's progress and continued therapy plan, it is my medical opinion that Eliseo Phan should continue physical therapy treatment at [facility]  [address]   [place] [address] [phone]. office phone  [phone] office fax      Re-certification  Certification Period: 2/15/2021  I certify that the therapy services are furnished while this patient is under my care.  The services outlined above are required by this patient, and will be reviewed every 90 days.     PHYSICIAN: Thomas Torres MD      DATE:     Signature: __________________________________  Thomas Torres MD    Please sign and return via fax to [phone]   Thank you, [facility].    Timed:  Manual Therapy:    0     mins  89816;  Therapeutic Exercise:    30     mins  15202;     Neuromuscular Sharlene:    10    mins  58953;    Therapeutic Activity:     0     mins  85058;     Gait Trainin     mins  44670;     Ultrasound:     0     mins  65167;    Electrical Stimulation:    0     mins  40509 ( );  Iontophoresis    0     mins 02345;  Orthotic Mgmt/training  0     mins 77488;  Orthotic check out  0     mins 23284;  Canalith Repositioning  0     mins  17279;  Aquatic Therapy    0     mins 74517;  Dry Needling              0     mins    Untimed:  Electrical Stimulation:    0     mins  89157 ( );  Mechanical Traction:    0     mins  73026;     Timed Treatment:   40   mins   Total Treatment:     40   mins

## 2020-11-19 ENCOUNTER — TREATMENT (OUTPATIENT)
Dept: PHYSICAL THERAPY | Facility: CLINIC | Age: 55
End: 2020-11-19

## 2020-11-19 DIAGNOSIS — M54.42 ACUTE LEFT-SIDED LOW BACK PAIN WITH LEFT-SIDED SCIATICA: Primary | ICD-10-CM

## 2020-11-19 DIAGNOSIS — Z98.1 S/P LUMBAR FUSION: ICD-10-CM

## 2020-11-19 DIAGNOSIS — Z47.89 ORTHOPEDIC AFTERCARE: ICD-10-CM

## 2020-11-19 PROCEDURE — 97110 THERAPEUTIC EXERCISES: CPT | Performed by: PHYSICAL THERAPIST

## 2020-11-19 PROCEDURE — 97112 NEUROMUSCULAR REEDUCATION: CPT | Performed by: PHYSICAL THERAPIST

## 2020-11-19 NOTE — PROGRESS NOTES
Physical Therapy Daily Progress Note    Patient: Eliseo Phan   : 1965  Diagnosis/ICD-10 Code:  Acute left-sided low back pain with left-sided sciatica [M54.42]  Referring practitioner: Thomas Torres MD  Date of Initial Visit: Type: THERAPY  Noted: 2020  Today's Date: 2020  Patient seen for 21 sessions           Subjective Feeling a little stiff on the LEFT side     Objective   REFORMER  FOOT WORK in parallel; heels together, 2nd position x 5 each with cueing for position of feet and verbal cueing to correct lateral shift               FEET IN STRAPS- 1 red 1 white     Bend and stretch with verbal cueing for keeping legs in 45  pushing thru heels to engage gluts x 10     HIP circles  BOTH directions with feet in parallel x 4;  ER x 4;  IR x 4      CHAIR step ups with 2 heavy springs x 10 bilaterally with upper extremities assist    Single limb stance work at mirror with verbal and tactile cueing and various props to try and achieve arabesque position and work hip hinging but poor motor coordination      Orange ball for protected flexion stretch x 3     Sidelying upper torso rotation ROM x 5 bilaterally     Assessment/Plan    A: progressing with integration but difficulty with hip hinging in single limb stance  PLAN: progress therex        Timed:    Manual Therapy:    0     mins  75583;  Therapeutic Exercise:    20     mins  49951;     Neuromuscular Sharlene:    25    mins  56904;    Therapeutic Activity:     0     mins  51224;     Gait Trainin     mins  61755;     Ultrasound:     0     mins  96746;    Electrical Stimulation:    0     mins  04320 ( );  Iontophoresis    0     mins 76189;  Aquatic Therapy    0     mins 58505;  Dry Needling              0     mins    Untimed:  Electrical Stimulation:    0     mins  91178 ( );  Mechanical Traction:    0     mins  06213;     Timed Treatment:   45   mins   Total Treatment:     45   mins  Steffanie Hickman PT  Physical Therapist

## 2020-11-24 ENCOUNTER — TREATMENT (OUTPATIENT)
Dept: PHYSICAL THERAPY | Facility: CLINIC | Age: 55
End: 2020-11-24

## 2020-11-24 DIAGNOSIS — M54.42 ACUTE LEFT-SIDED LOW BACK PAIN WITH LEFT-SIDED SCIATICA: Primary | ICD-10-CM

## 2020-11-24 DIAGNOSIS — Z47.89 ORTHOPEDIC AFTERCARE: ICD-10-CM

## 2020-11-24 DIAGNOSIS — Z98.1 S/P LUMBAR FUSION: ICD-10-CM

## 2020-11-24 PROCEDURE — 97110 THERAPEUTIC EXERCISES: CPT | Performed by: PHYSICAL THERAPIST

## 2020-11-24 PROCEDURE — 97112 NEUROMUSCULAR REEDUCATION: CPT | Performed by: PHYSICAL THERAPIST

## 2020-11-24 NOTE — PROGRESS NOTES
"Physical Therapy Daily Progress Note    Patient: Eliseo Phan   : 1965  Diagnosis/ICD-10 Code:  Acute left-sided low back pain with left-sided sciatica [M54.42]  Referring practitioner: Thomas Torres MD  Date of Initial Visit: Type: THERAPY  Noted: 2020  Today's Date: 2020  Patient seen for 22 sessions           Subjective Doing well except for stiffness on LEFT side     Objective     Gerson decreased 40-50% on LEFT vs RIGHT with noted pelvic rotation and incrased stiffness/soreness    LEFT HIP SEQUENCE    1- PRETZEL STRETCH  20-30 SEC  3X  2- FROG STRETCH  1-2 MIN  3- FIGURE 4 STRETCH with yoga block in 3-5 positions with 10-30 sec each   2 sets  4- LEFT LEG BENT KNEE FALL OUT WITH RED BAND TIED AROUND THIGHS  15X     Verbal cueing to Squeeze gluts  5- SIDE LYING CLAM  15X  6- SIDELYING  KNEE TAP IN FRONT    TOE TAP BEHIND (knee pointing to ceiling)  10-15x  7- SIDLEYING LEG LIFT 10-15X  1-2 SETS  Verbal and tactile cueing Semi prone lift leg up and slightly behind you  8- BRIDGE WITH RED BAND ACROSS PELVIS  a. Stay up  b. COORDINATE  squeeze R glut and softly march L foot  c. Reverse  5-10x  1-2 sets       EDUCATION into stance phase body mechanics for netural spine and not posterior pelvic tilt \"bum tucking\"  Old stabilization habit for protection     Assessment/Plan  A: progressing well with therex but limited in LEFT lower quarter ROM affect body mechanics and pain/soreness  PLAN; reasssess new exercise and integrate into closed chain activities          Timed:    Manual Therapy:    0     mins  43767;  Therapeutic Exercise:    30     mins  65712;     Neuromuscular Sharlene:    15    mins  19739;    Therapeutic Activity:     0     mins  51483;     Gait Trainin     mins  73470;     Ultrasound:     0     mins  49999;    Electrical Stimulation:    0     mins  31851 ( );  Iontophoresis    0     mins 24193;  Aquatic Therapy    0     mins 65141;  Dry Needling              0     " mins    Untimed:  Electrical Stimulation:    0     mins  71447 ( );  Mechanical Traction:    0     mins  05272;     Timed Treatment:   45   mins   Total Treatment:     45   mins  Steffanie Hickman, PT  Physical Therapist

## 2020-12-08 NOTE — TELEPHONE ENCOUNTER
Patient called again and would like for James Epley to return his call.   
Patient called today regarding lab results. May patient have a phone call back today if and when possible?   
Please clarify patient's questions regarding his labs  Thank you  
PAIN SCALE 0 OF 10.

## 2020-12-10 ENCOUNTER — TREATMENT (OUTPATIENT)
Dept: PHYSICAL THERAPY | Facility: CLINIC | Age: 55
End: 2020-12-10

## 2020-12-10 DIAGNOSIS — Z98.1 S/P LUMBAR FUSION: ICD-10-CM

## 2020-12-10 DIAGNOSIS — Z47.89 ORTHOPEDIC AFTERCARE: ICD-10-CM

## 2020-12-10 DIAGNOSIS — M54.42 ACUTE LEFT-SIDED LOW BACK PAIN WITH LEFT-SIDED SCIATICA: Primary | ICD-10-CM

## 2020-12-10 PROCEDURE — 97110 THERAPEUTIC EXERCISES: CPT | Performed by: PHYSICAL THERAPIST

## 2020-12-10 NOTE — PROGRESS NOTES
Physical Therapy Daily Progress Note    Patient: Eliseo Phan   : 1965  Diagnosis/ICD-10 Code:  Acute left-sided low back pain with left-sided sciatica [M54.42]  Referring practitioner: Thomas Torres MD  Date of Initial Visit: Type: THERAPY  Noted: 2020  Today's Date: 12/10/2020  Patient seen for 23 sessions           Subjective doing well with walking, lifting, swimming, but primary complaint of stiffness/achy pain across low back     Objective   EDUCATION for posture, joint protection, need to keep up with stretches and mat work     MAT   Lower trunk rotation 2 positions  Hip stretch   Cross body and piriformis stretch   REVIEW DYNAMIC LUMBAR STABILIZATION  With legs unsupported     NEW side lying over ball fascial stretch    Upper torso rotation x 5 bilaterally   Upper and lower torso dynamic ROM x 10 bilaterally    Both with verbal and tactile cueing for proper technique   NEW ball under sacrum   Scissor legs   Hip abd/add   integration combination 4 count    All x 10 2 sets with verbal and tactile cueing for proper technique     Stiffness/achy symptom resolved after stretching ball work     Assessment/Plan  A: new stretches resolved stiffness.  Progressing well with therex   PLAN: progress fascial work to manage symptoms and prep for DC after possibly 2 visits          Timed:    Manual Therapy:    0     mins  31668;  Therapeutic Exercise:    41     mins  34252;     Neuromuscular Sharlene:    0    mins  52334;    Therapeutic Activity:     0     mins  08051;     Gait Trainin     mins  76873;     Ultrasound:     0     mins  50602;    Electrical Stimulation:    0     mins  43300 ( );  Iontophoresis    0     mins 53146;  Aquatic Therapy    0     mins 74376;  Dry Needling              0     mins    Untimed:  Electrical Stimulation:    0     mins  73610 ( );  Mechanical Traction:    0     mins  45694;     Timed Treatment:   41   mins   Total Treatment:     41   mins  Steffanie WASSERMAN  Vick, PT  Physical Therapist

## 2020-12-15 ENCOUNTER — TREATMENT (OUTPATIENT)
Dept: PHYSICAL THERAPY | Facility: CLINIC | Age: 55
End: 2020-12-15

## 2020-12-15 DIAGNOSIS — M54.42 ACUTE LEFT-SIDED LOW BACK PAIN WITH LEFT-SIDED SCIATICA: Primary | ICD-10-CM

## 2020-12-15 DIAGNOSIS — Z98.1 S/P LUMBAR FUSION: ICD-10-CM

## 2020-12-15 DIAGNOSIS — Z47.89 ORTHOPEDIC AFTERCARE: ICD-10-CM

## 2020-12-15 PROCEDURE — 97112 NEUROMUSCULAR REEDUCATION: CPT | Performed by: PHYSICAL THERAPIST

## 2020-12-15 PROCEDURE — 97110 THERAPEUTIC EXERCISES: CPT | Performed by: PHYSICAL THERAPIST

## 2020-12-17 ENCOUNTER — TREATMENT (OUTPATIENT)
Dept: PHYSICAL THERAPY | Facility: CLINIC | Age: 55
End: 2020-12-17

## 2020-12-17 DIAGNOSIS — M54.42 ACUTE LEFT-SIDED LOW BACK PAIN WITH LEFT-SIDED SCIATICA: Primary | ICD-10-CM

## 2020-12-17 DIAGNOSIS — Z98.1 S/P LUMBAR FUSION: ICD-10-CM

## 2020-12-17 DIAGNOSIS — Z47.89 ORTHOPEDIC AFTERCARE: ICD-10-CM

## 2020-12-17 PROCEDURE — 97112 NEUROMUSCULAR REEDUCATION: CPT | Performed by: PHYSICAL THERAPIST

## 2020-12-17 PROCEDURE — 97110 THERAPEUTIC EXERCISES: CPT | Performed by: PHYSICAL THERAPIST

## 2020-12-17 NOTE — PROGRESS NOTES
Physical Therapy Daily Progress Note    Patient: Eliseo Phan   : 1965  Diagnosis/ICD-10 Code:  Acute left-sided low back pain with left-sided sciatica [M54.42]  Referring practitioner: Thomas Torres MD  Date of Initial Visit: Type: THERAPY  Noted: 2020  Today's Date: 2020  Patient seen for 25 sessions           Subjective I was a little sore after last time.  Better today but havent been able to walk as much because of the weather     Objective   LTR x 5 bilaterally in 2 foot positions  HIP stretch              Cross body and piriformis stretch   REVIEW DYNAMIC LUMBAR STABILIZATION  With legs unsupported       side lying over ball fascial stretch               Upper torso rotation x 5 bilaterally              Upper and lower torso dynamic ROM x 10 bilaterally ADD red resistance loop              Both with verbal and tactile cueing for proper technique    ball under sacrum              Scissor legs              Hip abd/add              integration combination 4 count     hip  Circles both directions x 6 each 2 sets               All x 10 2 sets with verbal and tactile cueing for proper technique      Bureau hip with RED band loop on LEFT lower extremity with verbal and tactile cueing for proper technique able to perform INDEPENDENT on RIGHT but band facillitates LEFT so able to do for the first time and feel the work in the gluts      Assessment/Plan    A:  Progressing well with closed chain activities and proper glut and multifidus facilitation  PLAN: progress HEP and EDUCATION for symptom management and joint protection        Timed:    Manual Therapy:    0     mins  11330;  Therapeutic Exercise:    30     mins  62714;     Neuromuscular Sharlene:    15    mins  83590;    Therapeutic Activity:     0     mins  85832;     Gait Trainin     mins  52426;     Ultrasound:     0     mins  66917;    Electrical Stimulation:    0     mins  85835 ( );  Iontophoresis    0     mins  80851;  Aquatic Therapy    0     mins 18986;  Dry Needling              0     mins    Untimed:  Electrical Stimulation:    0     mins  22474 ( );  Mechanical Traction:    0     mins  31010;     Timed Treatment:   45   mins   Total Treatment:     45   mins  Steffanie Hickman, PT  Physical Therapist

## 2020-12-22 ENCOUNTER — TREATMENT (OUTPATIENT)
Dept: PHYSICAL THERAPY | Facility: CLINIC | Age: 55
End: 2020-12-22

## 2020-12-22 DIAGNOSIS — Z98.1 S/P LUMBAR FUSION: ICD-10-CM

## 2020-12-22 DIAGNOSIS — Z47.89 ORTHOPEDIC AFTERCARE: ICD-10-CM

## 2020-12-22 DIAGNOSIS — M54.42 ACUTE LEFT-SIDED LOW BACK PAIN WITH LEFT-SIDED SCIATICA: Primary | ICD-10-CM

## 2020-12-22 PROCEDURE — 97112 NEUROMUSCULAR REEDUCATION: CPT | Performed by: PHYSICAL THERAPIST

## 2020-12-22 PROCEDURE — 97110 THERAPEUTIC EXERCISES: CPT | Performed by: PHYSICAL THERAPIST

## 2020-12-22 NOTE — PROGRESS NOTES
"Physical Therapy Daily Progress Note    Patient: Eliseo Phan   : 1965  Diagnosis/ICD-10 Code:  Acute left-sided low back pain with left-sided sciatica [M54.42]  Referring practitioner: Thomas Torres MD  Date of Initial Visit: Type: THERAPY  Noted: 2020  Today's Date: 2020  Patient seen for 26 sessions           Subjective I feel better than I thought I could.  I am doing most things without pain and walking and swimming feel good    Objective   LTR x 5 bilaterally in 2 foot positions  HIP stretch              Cross body and piriformis stretch   REVIEW DYNAMIC LUMBAR STABILIZATION  With legs unsupported       side lying over ball fascial stretch               Upper torso rotation x 5 bilaterally              Upper and lower torso dynamic ROM x 10 bilaterally ADD red resistance loop              Both with verbal and tactile cueing for proper technique    ball under sacrum              Scissor legs              Hip abd/add              integration combination 4 count                hip  Circles both directions x 6 each 2 sets               All x 10 2 sets with verbal and tactile cueing for proper technique     SINGLE LEG STANCE work for glut and multifidus facilitation but continue to exacerbation soreness as he likes to tuck into posterior pelvic tilt     SIGNIFICANT verbal cueing with \"erick\" on step    1 leg mini squat kick out with tb x 10  3 sets with verbal cueing tor proper technique but able to work muscle without exacerbation         Assessment/Plan  A: NDEPENDENT with HEP   Patient to call if questoins or exacerbation or need to return to PHYSICALTHERAPY   PLAN:  HOLD chart for 30 day then if patient does not call to return then DC          Timed:    Manual Therapy:    0     mins  47270;  Therapeutic Exercise:    25     mins  49171;     Neuromuscular Sharlene:    15    mins  31184;    Therapeutic Activity:     0     mins  99897;     Gait Trainin     mins  38406;   "   Ultrasound:     0     mins  97189;    Electrical Stimulation:    0     mins  62936 ( );  Iontophoresis    0     mins 73736;  Aquatic Therapy    0     mins 99716;  Dry Needling              0     mins    Untimed:  Electrical Stimulation:    0     mins  50641 ( );  Mechanical Traction:    0     mins  48877;     Timed Treatment:  4 0   mins   Total Treatment:     40   mins  Steffanie Hickman, PT  Physical Therapist

## 2021-01-18 RX ORDER — TRAZODONE HYDROCHLORIDE 100 MG/1
100 TABLET ORAL NIGHTLY
Qty: 90 TABLET | Refills: 1 | Status: SHIPPED | OUTPATIENT
Start: 2021-01-18 | End: 2021-09-02 | Stop reason: SDUPTHER

## 2021-01-21 ENCOUNTER — TELEPHONE (OUTPATIENT)
Dept: NEUROSURGERY | Facility: CLINIC | Age: 56
End: 2021-01-21

## 2021-01-21 NOTE — PROGRESS NOTES
Subjective   Patient ID: Eliseo Phan is a 55 y.o. male is here today for follow-up.      Patient was last seen 10.26.20    Patient is here as a follow up. Patient states he still has a little back pain, lumbar area, no numbness or tingling.    He is 6 months out from an L4-S1 decompression and fusion from TLIF approach from the left.  He is doing very well and the radicular pain is gone.  He has some nondebilitating low back pain which he thinks he can live with.  I reviewed his x-rays with him which look fine.  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  At this point we can keep it open-ended.  He is aware of the risk of recurrent symptoms and if that happens he can certainly come to see me.          back    Back Pain  The pain is present in the lumbar spine and sacro-iliac. The quality of the pain is described as aching. The pain does not radiate. The pain is at a severity of 5/10. The symptoms are aggravated by bending and twisting (heavy lifting). Pertinent negatives include no bladder incontinence, bowel incontinence, fever, leg pain, numbness, pelvic pain or tingling. He has tried ice and heat for the symptoms. The treatment provided mild relief.       The following portions of the patient's history were reviewed and updated as appropriate: allergies, current medications, past family history, past medical history, past social history, past surgical history and problem list.    Review of Systems   Constitutional: Negative for chills and fever.   HENT: Negative for congestion.    Gastrointestinal: Negative for bowel incontinence.   Genitourinary: Negative for bladder incontinence and pelvic pain.   Musculoskeletal: Positive for back pain. Negative for gait problem.   Neurological: Negative for tingling and numbness.   All other systems reviewed and are negative.          Objective     Vitals:    01/25/21  "1352   BP: 126/81   Pulse: 72   Temp: 99.1 °F (37.3 °C)   Weight: 78.6 kg (173 lb 3.2 oz)   Height: 177.8 cm (70\")     Body mass index is 24.85 kg/m².      Physical Exam  Constitutional:       Appearance: He is well-developed.   HENT:      Head: Normocephalic and atraumatic.   Eyes:      Extraocular Movements: EOM normal.      Conjunctiva/sclera: Conjunctivae normal.      Pupils: Pupils are equal, round, and reactive to light.   Neck:      Vascular: No carotid bruit.   Neurological:      Mental Status: He is oriented to person, place, and time.      Coordination: Finger-Nose-Finger Test and Heel to Shin Test normal.      Gait: Gait is intact.      Deep Tendon Reflexes:      Reflex Scores:       Tricep reflexes are 2+ on the right side and 2+ on the left side.       Bicep reflexes are 2+ on the right side and 2+ on the left side.       Brachioradialis reflexes are 2+ on the right side and 2+ on the left side.       Patellar reflexes are 2+ on the right side and 2+ on the left side.       Achilles reflexes are 2+ on the right side and 2+ on the left side.  Psychiatric:         Speech: Speech normal.       Neurologic Exam     Mental Status   Oriented to person, place, and time.   Registration of memory: Good recent and remote memory.   Attention: normal. Concentration: normal.   Speech: speech is normal   Level of consciousness: alert  Knowledge: consistent with education.     Cranial Nerves     CN II   Visual fields full to confrontation.   Visual acuity: normal    CN III, IV, VI   Pupils are equal, round, and reactive to light.  Extraocular motions are normal.     CN V   Facial sensation intact.   Right corneal reflex: normal  Left corneal reflex: normal    CN VII   Facial expression full, symmetric.   Right facial weakness: none  Left facial weakness: none    CN VIII   Hearing: intact    CN IX, X   Palate: symmetric    CN XI   Right sternocleidomastoid strength: normal  Left sternocleidomastoid strength: " normal    CN XII   Tongue: not atrophic  Tongue deviation: none    Motor Exam   Muscle bulk: normal  Right arm tone: normal  Left arm tone: normal  Right leg tone: normal  Left leg tone: normal    Strength   Strength 5/5 except as noted.     Sensory Exam   Light touch normal.     Gait, Coordination, and Reflexes     Gait  Gait: normal    Coordination   Finger to nose coordination: normal  Heel to shin coordination: normal    Reflexes   Right brachioradialis: 2+  Left brachioradialis: 2+  Right biceps: 2+  Left biceps: 2+  Right triceps: 2+  Left triceps: 2+  Right patellar: 2+  Left patellar: 2+  Right achilles: 2+  Left achilles: 2+  Right : 2+  Left : 2+          Assessment/Plan   Independent Review of Radiographic Studies:      I personally reviewed the images from the following studies.    I reviewed the x-rays done on 1/23/2021 which show good position of the screws at 4 5 and 1 bilaterally and good position of the rods and cages.  Agree with the report.        Medical Decision Making:      At this point, we can keep it open-ended.  He is aware of the risk of adjacent level disease and the potential need for additional surgeries in the future.      Diagnoses and all orders for this visit:    1. DDD (degenerative disc disease), lumbar (Primary)    2. History of lumbar spinal fusion      Return if symptoms worsen or fail to improve.

## 2021-01-23 ENCOUNTER — HOSPITAL ENCOUNTER (OUTPATIENT)
Dept: GENERAL RADIOLOGY | Facility: HOSPITAL | Age: 56
Discharge: HOME | End: 2021-01-23
Admitting: NEUROLOGICAL SURGERY

## 2021-01-23 DIAGNOSIS — Z98.1 HISTORY OF LUMBAR SPINAL FUSION: ICD-10-CM

## 2021-01-23 DIAGNOSIS — M51.36 DDD (DEGENERATIVE DISC DISEASE), LUMBAR: ICD-10-CM

## 2021-01-23 PROCEDURE — 72114 X-RAY EXAM L-S SPINE BENDING: CPT

## 2021-01-25 ENCOUNTER — OFFICE VISIT (OUTPATIENT)
Dept: NEUROSURGERY | Facility: CLINIC | Age: 56
End: 2021-01-25

## 2021-01-25 VITALS
HEIGHT: 70 IN | TEMPERATURE: 99.1 F | WEIGHT: 173.2 LBS | BODY MASS INDEX: 24.79 KG/M2 | SYSTOLIC BLOOD PRESSURE: 126 MMHG | HEART RATE: 72 BPM | DIASTOLIC BLOOD PRESSURE: 81 MMHG

## 2021-01-25 DIAGNOSIS — M51.36 DDD (DEGENERATIVE DISC DISEASE), LUMBAR: Primary | ICD-10-CM

## 2021-01-25 DIAGNOSIS — Z98.1 HISTORY OF LUMBAR SPINAL FUSION: ICD-10-CM

## 2021-01-25 PROCEDURE — 99213 OFFICE O/P EST LOW 20 MIN: CPT | Performed by: NEUROLOGICAL SURGERY

## 2021-03-24 ENCOUNTER — BULK ORDERING (OUTPATIENT)
Dept: CASE MANAGEMENT | Facility: OTHER | Age: 56
End: 2021-03-24

## 2021-03-24 DIAGNOSIS — Z23 IMMUNIZATION DUE: ICD-10-CM

## 2021-05-07 ENCOUNTER — TELEPHONE (OUTPATIENT)
Dept: GASTROENTEROLOGY | Facility: CLINIC | Age: 56
End: 2021-05-07

## 2021-05-07 NOTE — TELEPHONE ENCOUNTER
Screening colonoscopy--no personal or family hx of polyps or colon ca-- no ASA or blood thinners-- medications:      Biktarvy  Prilosec  Probiotic  Trazadone    OA form scanned into media

## 2021-05-10 ENCOUNTER — PREP FOR SURGERY (OUTPATIENT)
Dept: OTHER | Facility: HOSPITAL | Age: 56
End: 2021-05-10

## 2021-05-10 DIAGNOSIS — Z12.11 ENCOUNTER FOR SCREENING FOR MALIGNANT NEOPLASM OF COLON: Primary | ICD-10-CM

## 2021-05-10 RX ORDER — SODIUM CHLORIDE, SODIUM LACTATE, POTASSIUM CHLORIDE, CALCIUM CHLORIDE 600; 310; 30; 20 MG/100ML; MG/100ML; MG/100ML; MG/100ML
30 INJECTION, SOLUTION INTRAVENOUS CONTINUOUS
Status: CANCELLED | OUTPATIENT
Start: 2021-07-29

## 2021-05-17 ENCOUNTER — TELEPHONE (OUTPATIENT)
Dept: GASTROENTEROLOGY | Facility: CLINIC | Age: 56
End: 2021-05-17

## 2021-06-17 ENCOUNTER — TELEPHONE (OUTPATIENT)
Dept: GASTROENTEROLOGY | Facility: CLINIC | Age: 56
End: 2021-06-17

## 2021-06-17 NOTE — TELEPHONE ENCOUNTER
Returned patient's phone call. He states in the past 4-5 days has past bright red blood. States he does have an external hemorrhoid. Denies any pain, he does have an occasional lower abdominal cramping. Denies any change in bowel habit. Patient knows he is due a colonoscopy and would like to schedule it.   Advised will send an update to Dr. Goldman regarding his bleeding and will have a  call him to schedule his scope. He verb understanding and is agreeable to the plan.

## 2021-06-17 NOTE — TELEPHONE ENCOUNTER
----- Message from Pernell Morales Rep sent at 6/17/2021  1:54 PM EDT -----  Regarding: Issues  Contact: [phone]  Pt is having some issues he would like to discuss

## 2021-06-21 ENCOUNTER — TELEPHONE (OUTPATIENT)
Dept: FAMILY MEDICINE CLINIC | Facility: CLINIC | Age: 56
End: 2021-06-21

## 2021-06-21 NOTE — TELEPHONE ENCOUNTER
Caller: Eliseo Phan    Relationship: Self    Best call back number: [phone]    What medication are you requesting: DOXYCYCLINE       If a prescription is needed, what is your preferred pharmacy and phone number: [id number] - [place] [address] [facility] AT [facility] - [phone] [facility] [phone]      Additional notes:    PATIENT WENT CAMPING THIS PAST WEEKEND AND TODAY NOTICED A TIC BITE ON HIS INNER THIGH.  STATES HE WAS ABLE TO GET THE BODY OUT BUT THE HEAD IS STILL ATTACHED. PATIENT STATES A FRIEND OF HIS GETS THESE AND SUGGESTED THE ABOVE MEDICATION    PLEASE CALL AND ADVISE PATIENT

## 2021-06-22 ENCOUNTER — TELEPHONE (OUTPATIENT)
Dept: FAMILY MEDICINE CLINIC | Facility: CLINIC | Age: 56
End: 2021-06-22

## 2021-06-22 RX ORDER — DOXYCYCLINE HYCLATE 100 MG/1
200 CAPSULE ORAL ONCE
Qty: 2 CAPSULE | Refills: 0 | Status: SHIPPED | OUTPATIENT
Start: 2021-06-22 | End: 2021-06-22

## 2021-06-22 NOTE — TELEPHONE ENCOUNTER
Pt states got tick bite on Saturday and would like to be prescribed something, pt complains he called yesterday after we where closed and has yet to get a response. [place] and needs an antibiotic called in, because he states his friends doctor told them they need to take medication with 72 hours. PT states he got the head out, but it is still red. Pt has appointment set up tomorrow at 10am to see you.

## 2021-06-22 NOTE — TELEPHONE ENCOUNTER
PT cancelled appt for tomorrow stating he had to work and he couldn't take off. PT will  Doxycycline. If he gets worse and not better  He will call us.

## 2021-06-22 NOTE — TELEPHONE ENCOUNTER
I gave him doxycycline 200 mg which will be 2 capsules x 1 this is a prophylaxis dose to decrease risk of Lyme disease after a tick bite  He should take this immediately    Should he have any infection however  Or if he thinks he may have Lyme then this would not be sufficient and he would need an evaluation    Thanks!

## 2021-06-30 PROBLEM — Z12.11 ENCOUNTER FOR SCREENING FOR MALIGNANT NEOPLASM OF COLON: Status: ACTIVE | Noted: 2021-06-30

## 2021-07-22 ENCOUNTER — TELEPHONE (OUTPATIENT)
Dept: FAMILY MEDICINE CLINIC | Facility: CLINIC | Age: 56
End: 2021-07-22

## 2021-07-22 DIAGNOSIS — Z12.11 SCREEN FOR COLON CANCER: Primary | ICD-10-CM

## 2021-07-22 NOTE — TELEPHONE ENCOUNTER
Caller: Eliseo Phan    Relationship: Self    Best call back number: [phone]    What was the call regarding: PATIENT IS REQUESTING TO ONBOARD WITH LEYDA MARINO IF SHE IS WILLING     Do you require a callback:YES

## 2021-07-22 NOTE — TELEPHONE ENCOUNTER
He may qualify Cologuard every 3 years only if the following apply    Screening for colon cancer cannot be used as a diagnostic test  That he is having no chronic abdominal pain or other issues  Or any history of positive Hemoccults etc.  That he has no history of previous polyps or abnormal colonoscopies and he has no family history of colon cancer    The gold standard test is the colonoscopy it is better than the Cologuard    About a 10% false positive rate, with a Cologuard approximately more  In those patients get a diagnostic colonoscopy with diagnostic co-pays      If he meets the above criteria and still wants to Cologuard I be happy to sign this order thank you otherwise let me know

## 2021-07-22 NOTE — TELEPHONE ENCOUNTER
PATIENT IS WANTING TO KNOW IF YOU COULD ORDER THE COLOGARD TEST FOR HIM. HE IS TRYING TO AVOID A COLONOSCOPY THAT IS SCHEDULED FOR NEXT WEEK.    PLEASE ADVISE  [phone]

## 2021-07-23 NOTE — TELEPHONE ENCOUNTER
I don't know him - please call and let him know I am not taking new patients.   NEW PATIENT    The patient is 47 year old male here today for evaluation of left shoulder pain. He describes a 3-4 month history of left shoulder pain. He points to the lateral aspect of the shoulder as the primary location of pain. He complains of most of his pain at night. He also has pain with abduction of the arm beyond 80-90°.    PAST MEDICAL HISTORY:  There is no previous medical history on file.    PAST SURGICAL HISTORY:  There is no previous surgical history on file.    CURRENT MEDICATIONS:  No current outpatient prescriptions on file.     No current facility-administered medications for this visit.        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    PHYSICAL EXAMINATION:  Vital Signs: There were no vitals taken for this visit.  General: The patient is alert and oriented times 3. He is in no acute distress. He is well groomed and cooperative with the exam.  On physical examination of the left non-dominant shoulder, there is no atrophy or deformity. Gentle range of motion of the cervical spine does not exacerbate pain in the shoulder area.   On palpation, there is no significant tenderness at the left acromioclavicular joint, and minimal tenderness over the biceps groove, impingement area, and lateral bursa. There is no significant tenderness over the posterior capsule.  Active assisted forward  elevation 170°. Abduction 170°. He has mild pain at terminal motion.  External rotation 45°. Internal rotation is to the mid lumbar spine. Scapula moves well with range of motion assessment, and no dyskinesia is noted.  Resisted internal rotation reveals good strength with minimal discomfort.  Belly press test is negative.  Resisted external rotation demonstrates 2+ pain and 1+ weakness.  Resisted supraspinatus testing reveals 2+ pain and 1+ weakness.  Resisted biceps testing (Speed's test) reveals 1+ pain and minimal weakness.  Lynch test 1-2+.  With the patient supine and the shoulder abducted 90°, the patient has 80° of external rotation and 60° of internal rotation.  Sensory and motor exams are intact. Distal pulses are palpable.    IMAGING:  X-rays of the left shoulder demonstrate very subtle evidence of upward humeral migration as well as a small inferior humeral neck osteophyte    Impression:  Dieter has signs and symptoms as well as physical exam findings consistent with a torn rotator cuff.    PLAN:  We discussed treatment alternatives today. I recommend further diagnostic imaging with MRI scan. We discussed injection. He states that he would rather live with his current symptoms more so than reducing symptoms with injection. I explained that I would not feel comfortable giving him a cortisone injection unless I had a better understanding of the anatomy. I will see him back for follow or discussed the findings with him over the phone.

## 2021-07-26 DIAGNOSIS — Z12.11 COLON CANCER SCREENING: Primary | ICD-10-CM

## 2021-07-26 NOTE — TELEPHONE ENCOUNTER
Spoke with patient he does not have a family history colon cancer or polyps. He would like to have cologuard.

## 2021-07-26 NOTE — TELEPHONE ENCOUNTER
PATIENT CALLED BACK STATING HE HAS YET TO HEAR ABOUT RECEIVING A COLOGAURD TEST.     PLEASE ADVISE     PT CALL BACK   [phone]

## 2021-07-30 ENCOUNTER — OFFICE VISIT (OUTPATIENT)
Dept: FAMILY MEDICINE CLINIC | Facility: CLINIC | Age: 56
End: 2021-07-30

## 2021-07-30 VITALS
OXYGEN SATURATION: 95 % | TEMPERATURE: 97.1 F | DIASTOLIC BLOOD PRESSURE: 72 MMHG | HEART RATE: 75 BPM | WEIGHT: 169 LBS | HEIGHT: 70 IN | BODY MASS INDEX: 24.2 KG/M2 | SYSTOLIC BLOOD PRESSURE: 106 MMHG

## 2021-07-30 DIAGNOSIS — N52.8 OTHER MALE ERECTILE DYSFUNCTION: ICD-10-CM

## 2021-07-30 DIAGNOSIS — R68.82 DECREASED LIBIDO: ICD-10-CM

## 2021-07-30 DIAGNOSIS — Z00.00 HEALTH MAINTENANCE EXAMINATION: ICD-10-CM

## 2021-07-30 DIAGNOSIS — R14.0 ABDOMINAL BLOATING: ICD-10-CM

## 2021-07-30 DIAGNOSIS — Z72.0 TOBACCO ABUSE: Primary | ICD-10-CM

## 2021-07-30 DIAGNOSIS — R10.13 DYSPEPSIA: ICD-10-CM

## 2021-07-30 DIAGNOSIS — Z79.899 HIGH RISK MEDICATION USE: ICD-10-CM

## 2021-07-30 DIAGNOSIS — Z12.5 PROSTATE CANCER SCREENING: ICD-10-CM

## 2021-07-30 PROCEDURE — 99214 OFFICE O/P EST MOD 30 MIN: CPT | Performed by: NURSE PRACTITIONER

## 2021-07-30 RX ORDER — BUPROPION HYDROCHLORIDE 150 MG/1
150 TABLET, EXTENDED RELEASE ORAL 2 TIMES DAILY
Qty: 180 TABLET | Refills: 1 | Status: SHIPPED | OUTPATIENT
Start: 2021-07-30 | End: 2022-01-13

## 2021-07-30 RX ORDER — OMEPRAZOLE 20 MG/1
20 CAPSULE, DELAYED RELEASE ORAL DAILY
Qty: 90 CAPSULE | Refills: 3 | Status: SHIPPED | OUTPATIENT
Start: 2021-07-30 | End: 2021-11-30

## 2021-07-30 RX ORDER — BICTEGRAVIR SODIUM, EMTRICITABINE, AND TENOFOVIR ALAFENAMIDE FUMARATE 50; 200; 25 MG/1; MG/1; MG/1
1 TABLET ORAL DAILY
COMMUNITY
Start: 2021-03-11 | End: 2022-02-18 | Stop reason: SDUPTHER

## 2021-08-04 LAB
ALBUMIN SERPL-MCNC: 4.6 G/DL (ref 3.5–5.2)
ALBUMIN/GLOB SERPL: 1.6 G/DL
ALP SERPL-CCNC: 55 U/L (ref 39–117)
ALT SERPL-CCNC: 18 U/L (ref 1–41)
APPEARANCE UR: CLEAR
AST SERPL-CCNC: 22 U/L (ref 1–40)
BASOPHILS # BLD AUTO: 0.02 10*3/MM3 (ref 0–0.2)
BASOPHILS NFR BLD AUTO: 0.5 % (ref 0–1.5)
BILIRUB SERPL-MCNC: 0.3 MG/DL (ref 0–1.2)
BILIRUB UR QL STRIP: NEGATIVE
BUN SERPL-MCNC: 14 MG/DL (ref 6–20)
BUN/CREAT SERPL: 10.5 (ref 7–25)
CALCIUM SERPL-MCNC: 9.9 MG/DL (ref 8.6–10.5)
CHLORIDE SERPL-SCNC: 103 MMOL/L (ref 98–107)
CHOLEST SERPL-MCNC: 186 MG/DL (ref 0–200)
CO2 SERPL-SCNC: 26.6 MMOL/L (ref 22–29)
COLOR UR: YELLOW
CREAT SERPL-MCNC: 1.33 MG/DL (ref 0.76–1.27)
EOSINOPHIL # BLD AUTO: 0.13 10*3/MM3 (ref 0–0.4)
EOSINOPHIL NFR BLD AUTO: 3.5 % (ref 0.3–6.2)
ERYTHROCYTE [DISTWIDTH] IN BLOOD BY AUTOMATED COUNT: 15.5 % (ref 12.3–15.4)
GLIADIN PEPTIDE IGA SER-ACNC: 5 UNITS (ref 0–19)
GLOBULIN SER CALC-MCNC: 2.8 GM/DL
GLUCOSE SERPL-MCNC: 92 MG/DL (ref 65–99)
GLUCOSE UR QL: NEGATIVE
HCT VFR BLD AUTO: 48.9 % (ref 37.5–51)
HDLC SERPL-MCNC: 45 MG/DL (ref 40–60)
HGB BLD-MCNC: 15.9 G/DL (ref 13–17.7)
HGB UR QL STRIP: NEGATIVE
IGA SERPL-MCNC: 191 MG/DL (ref 90–386)
IMM GRANULOCYTES # BLD AUTO: 0.01 10*3/MM3 (ref 0–0.05)
IMM GRANULOCYTES NFR BLD AUTO: 0.3 % (ref 0–0.5)
KETONES UR QL STRIP: NEGATIVE
LDLC SERPL CALC-MCNC: 119 MG/DL (ref 0–100)
LDLC/HDLC SERPL: 2.58 {RATIO}
LEUKOCYTE ESTERASE UR QL STRIP: NEGATIVE
LYMPHOCYTES # BLD AUTO: 1.46 10*3/MM3 (ref 0.7–3.1)
LYMPHOCYTES NFR BLD AUTO: 39.2 % (ref 19.6–45.3)
MCH RBC QN AUTO: 27.6 PG (ref 26.6–33)
MCHC RBC AUTO-ENTMCNC: 32.5 G/DL (ref 31.5–35.7)
MCV RBC AUTO: 84.9 FL (ref 79–97)
MONOCYTES # BLD AUTO: 0.29 10*3/MM3 (ref 0.1–0.9)
MONOCYTES NFR BLD AUTO: 7.8 % (ref 5–12)
NEUTROPHILS # BLD AUTO: 1.81 10*3/MM3 (ref 1.7–7)
NEUTROPHILS NFR BLD AUTO: 48.7 % (ref 42.7–76)
NITRITE UR QL STRIP: NEGATIVE
NRBC BLD AUTO-RTO: 0 /100 WBC (ref 0–0.2)
PH UR STRIP: 6.5 [PH] (ref 5–8)
PLATELET # BLD AUTO: 235 10*3/MM3 (ref 140–450)
POTASSIUM SERPL-SCNC: 4.6 MMOL/L (ref 3.5–5.2)
PROT SERPL-MCNC: 7.4 G/DL (ref 6–8.5)
PROT UR QL STRIP: NEGATIVE
PSA SERPL-MCNC: 0.64 NG/ML (ref 0–4)
RBC # BLD AUTO: 5.76 10*6/MM3 (ref 4.14–5.8)
SODIUM SERPL-SCNC: 141 MMOL/L (ref 136–145)
SP GR UR: 1.01 (ref 1–1.03)
TESTOST FREE SERPL-MCNC: 9.8 PG/ML (ref 7.2–24)
TESTOST SERPL-MCNC: 588 NG/DL (ref 264–916)
TRIGL SERPL-MCNC: 124 MG/DL (ref 0–150)
TSH SERPL DL<=0.005 MIU/L-ACNC: 1 UIU/ML (ref 0.27–4.2)
TTG IGA SER-ACNC: <2 U/ML (ref 0–3)
UROBILINOGEN UR STRIP-MCNC: NORMAL MG/DL
VLDLC SERPL CALC-MCNC: 22 MG/DL (ref 5–40)
WBC # BLD AUTO: 3.72 10*3/MM3 (ref 3.4–10.8)

## 2021-08-09 ENCOUNTER — TELEPHONE (OUTPATIENT)
Dept: FAMILY MEDICINE CLINIC | Facility: CLINIC | Age: 56
End: 2021-08-09

## 2021-08-09 NOTE — TELEPHONE ENCOUNTER
Caller: Eliseo Phan    Relationship: Self    Best call back number: [phone] (H)    What is the medical concern/diagnosis: KIDNEY     What specialty or service is being requested: NEPHROLOGY    What is the provider, practice or medical service name: [facility]    What is the office location:     What is the office phone number:     Any additional details: PATIENT CALLED TO REQUEST A REFERRAL TO SEE [facility].     PLEASE CONTACT PATIENT TO ADVISE.       THANKS

## 2021-08-10 ENCOUNTER — TELEPHONE (OUTPATIENT)
Dept: FAMILY MEDICINE CLINIC | Facility: CLINIC | Age: 56
End: 2021-08-10

## 2021-08-10 NOTE — TELEPHONE ENCOUNTER
PATIENT CALLED STATING THAT WHILE IN THE OFFICE WITH JAMES EPLEY HE HAD REQUESTED WELLBUTRIN TO ASSIST WITH NOT SMOKING, HOWEVER, THE PATIENT FORGOT THAT A YEAR OR SO AGO HE WAS GIVEN CHANTIX BY JAMES EPLEY TO ASSIST WITH THE SAME ISSUE, WHICH HELPED WONDERFULLY.    PATIENT HAD ALREADY FILLED THE WELLBUTRIN BEFORE HE REALIZED THIS AND WOULD LIKE TO SEE WHAT JAMES EPLEY WOULD SUGGEST.    PATIENT IS WILLING TO APPROPRIATELY DISCARD THE WELLBUTRIN IS SOMEBODY WILL CALL HIM AND EXPLAIN HOW TO SAFELY DO THAT.    PLEASE ADVISE  [phone]     [facility] [address] [place] [address] [address] [facility] [address] - [phone]  - [phone] FX  [phone]

## 2021-08-11 DIAGNOSIS — N18.30 CHRONIC RENAL INSUFFICIENCY, STAGE 3 (MODERATE) (HCC): Primary | ICD-10-CM

## 2021-08-11 NOTE — TELEPHONE ENCOUNTER
Excellent idea but there is a recall on Chantix right now,  When it is available have him reach out to me I will be happy to fill it he can check with his pharmacy thank you

## 2021-09-02 RX ORDER — TRAZODONE HYDROCHLORIDE 100 MG/1
100 TABLET ORAL NIGHTLY
Qty: 90 TABLET | Refills: 1 | Status: SHIPPED | OUTPATIENT
Start: 2021-09-02 | End: 2022-03-07

## 2021-09-02 NOTE — TELEPHONE ENCOUNTER
Rx Refill Note  Requested Prescriptions     Pending Prescriptions Disp Refills   • traZODone (DESYREL) 100 MG tablet 90 tablet 1     Sig: Take 1 tablet by mouth Every Night.      Last office visit with prescribing clinician: 7/30/2021      Next office visit with prescribing clinician: Visit date not found            Pernell Mahoney Rep  09/02/21, 14:37 EDT

## 2021-09-03 ENCOUNTER — DOCUMENTATION (OUTPATIENT)
Dept: PHYSICAL THERAPY | Facility: CLINIC | Age: 56
End: 2021-09-03

## 2021-09-03 DIAGNOSIS — M54.42 ACUTE LEFT-SIDED LOW BACK PAIN WITH LEFT-SIDED SCIATICA: Primary | ICD-10-CM

## 2021-09-03 DIAGNOSIS — Z47.89 ORTHOPEDIC AFTERCARE: ICD-10-CM

## 2021-09-03 DIAGNOSIS — Z98.1 S/P LUMBAR FUSION: ICD-10-CM

## 2021-09-23 ENCOUNTER — TELEPHONE (OUTPATIENT)
Dept: FAMILY MEDICINE CLINIC | Facility: CLINIC | Age: 56
End: 2021-09-23

## 2021-09-23 NOTE — TELEPHONE ENCOUNTER
Caller: Eliseo Phan    Relationship to patient: Self    Best call back number: [phone]    Date of positive COVID19 test:09/09/2021 RECEIVED NEGATIVE TEST ON 09/17/2021    COVID19 symptoms: WHEEZING, SHORTNESS OF BREATH, FATIGUED, CONGESTIONS      Additional information or concerns: PATIENT STATES HE FEELS WORSE THAN WHEN HE WAS POSITIVE FOR COVID.  HE WANTS TO KNOW IF HE CAN GET A STEROID TO HELP WITH THE BREATHING ISSUES    What is the patients preferred pharmacy: [facility] [address] [place] [address] [address] [place] [address] [address] AT [facility] [address] - [phone] PH - [phone] FX  [phone]    PLEASE CALL AND ADVISE

## 2021-09-23 NOTE — TELEPHONE ENCOUNTER
Covid 2 weeks ago  Have antibody therapy had all 3 vaccines  HIV, positive    No fever but more shortness of breath more fatigue last 3 days    He is to urgent care now for chest x-ray and evaluation  Increased shortness of breath high fever chest pain emergency room    We should avoid steroid with him it may blunt his immune response  However if he has signs or symptoms of cytokine storm or late ARDS,  Of course he would be dexamethasone bolus    Patient will go to urgent care today for evaluation    He was alert oriented speaking clearly without notable dyspnea

## 2021-09-24 ENCOUNTER — TELEPHONE (OUTPATIENT)
Dept: FAMILY MEDICINE CLINIC | Facility: CLINIC | Age: 56
End: 2021-09-24

## 2021-09-24 RX ORDER — PREDNISONE 10 MG/1
40 TABLET ORAL DAILY
Qty: 28 TABLET | Refills: 0 | Status: SHIPPED | OUTPATIENT
Start: 2021-09-24 | End: 2021-10-01

## 2021-09-24 NOTE — TELEPHONE ENCOUNTER
Caller: Eliseo Phan    Relationship: Self    Best call back number: [phone] (H)    What is the best time to reach you: ANYTIME    Who are you requesting to speak with (clinical staff, provider,  specific staff member): JAMES EPLEY    Do you know the name of the person who called:     What was the call regarding: PATIENT CALLED AND STATES THAT HE WAS TOLD TO CONTACT JAMES EPLEY TODAY TO GO OVER HIS X-RAY DONE ON 09/23/21, AND POSSIBLY GETTING A STEROID PACK TO HELP HIM POST COVID.     Do you require a callback: YES        THANKS

## 2021-09-24 NOTE — TELEPHONE ENCOUNTER
Please call patient his chest x-ray was clear only could see the report not the actual film    I gave him prednisone 40 mg a day for 7 days  Make sure he uses albuterol I would just use 1 schedule IV times a day  If he is not feeling better check with me  This week in the office any worsening emergency room    Thank you

## 2021-09-24 NOTE — TELEPHONE ENCOUNTER
Called both numbers listed and LVM in detail about results and prescription. Let pt know that if he has any questions or concerns to give the office a call back, and if he gets worse to go to the ER

## 2021-11-08 RX ORDER — ONDANSETRON 4 MG/1
4 TABLET, FILM COATED ORAL EVERY 6 HOURS PRN
Qty: 20 TABLET | Refills: 1 | Status: SHIPPED | OUTPATIENT
Start: 2021-11-08 | End: 2022-09-19 | Stop reason: SDUPTHER

## 2021-11-08 NOTE — TELEPHONE ENCOUNTER
Rx Refill Note  Requested Prescriptions     Pending Prescriptions Disp Refills   • ondansetron (Zofran) 4 MG tablet 20 tablet 1     Sig: Take 1 tablet by mouth Every 6 (Six) Hours As Needed for Nausea or Vomiting.      Last office visit with prescribing clinician: 7/30/2021      Next office visit with prescribing clinician: Visit date not found            Pernell Mahoney Rep  11/08/21, 15:21 EST

## 2021-11-08 NOTE — TELEPHONE ENCOUNTER
Medication requested (name and dose): ondansetron (Zofran) 4 MG tablet    Pharmacy where request should be sent: [facility] [address] [place] [address] [address] [facility] [address] - [phone]  - [phone] FX     Additional details provided by patient: PT IS OUT OF MEDICATION     Best call back number: [phone]    Does the patient have less than a 3 day supply:  [x] Yes  [] No    Melissa Hammond  11/08/21, 14:08 EST

## 2021-11-30 RX ORDER — OMEPRAZOLE 40 MG/1
40 CAPSULE, DELAYED RELEASE ORAL DAILY
Qty: 90 CAPSULE | Refills: 1 | Status: SHIPPED | OUTPATIENT
Start: 2021-11-30 | End: 2022-06-01

## 2022-01-13 ENCOUNTER — OFFICE VISIT (OUTPATIENT)
Dept: FAMILY MEDICINE CLINIC | Facility: CLINIC | Age: 57
End: 2022-01-13

## 2022-01-13 VITALS
SYSTOLIC BLOOD PRESSURE: 123 MMHG | OXYGEN SATURATION: 95 % | TEMPERATURE: 97 F | DIASTOLIC BLOOD PRESSURE: 74 MMHG | HEIGHT: 70 IN | WEIGHT: 170 LBS | BODY MASS INDEX: 24.34 KG/M2 | RESPIRATION RATE: 12 BRPM | HEART RATE: 76 BPM

## 2022-01-13 DIAGNOSIS — E78.00 ELEVATED LDL CHOLESTEROL LEVEL: ICD-10-CM

## 2022-01-13 DIAGNOSIS — R79.89 LOW TESTOSTERONE LEVEL IN MALE: ICD-10-CM

## 2022-01-13 DIAGNOSIS — B20 HIV INFECTION, UNSPECIFIED SYMPTOM STATUS: ICD-10-CM

## 2022-01-13 DIAGNOSIS — S39.94XD: ICD-10-CM

## 2022-01-13 DIAGNOSIS — N48.6 PEYRONIE'S DISEASE: ICD-10-CM

## 2022-01-13 DIAGNOSIS — Z00.00 HEALTH MAINTENANCE EXAMINATION: Primary | ICD-10-CM

## 2022-01-13 PROCEDURE — 93000 ELECTROCARDIOGRAM COMPLETE: CPT | Performed by: NURSE PRACTITIONER

## 2022-01-13 PROCEDURE — 99396 PREV VISIT EST AGE 40-64: CPT | Performed by: NURSE PRACTITIONER

## 2022-01-13 RX ORDER — TADALAFIL 5 MG/1
5 TABLET ORAL DAILY
Qty: 90 TABLET | Refills: 3 | Status: SHIPPED | OUTPATIENT
Start: 2022-01-13 | End: 2023-03-10 | Stop reason: SDUPTHER

## 2022-01-13 NOTE — PROGRESS NOTES
Pleasant patient here today complains of some mild tenderness left areola  For last week or 2, has had no fever no chills  No chest pain or shortness of breath he has no strong family history of breast cancer in his family, no nipple discharge no injury.

## 2022-01-13 NOTE — PATIENT INSTRUCTIONS
Discharge instructions    Continue to push fluids avoid anti-inflammatories,      Follow-up urology  Your testosterone and other issues we discussed.    Reschedule your renal ultrasound    Consider CT calcium score coronary arteries    Consider vascular screenings aorta carotid lower extremity      Lets give the tenderness left chest breast 4 to 6 weeks if it does not resolve, if not if not fully resolved if you still have tenderness or any palpable lump  Please let me know so I will refer you for a mammogram  And further evaluation

## 2022-01-13 NOTE — PROGRESS NOTES
Procedure   Procedures     Adult ECG Report     Name: Eliseo Phan   Age: 56 y.o.   Gender: male       Rate: 58   Rhythm: sinus bradycardia   QRS Axis: nml   AK Interval: 179   QRS Duration: 109   QTc: 374   Voltages: .   Conduction Disturbances: none   Other Abnormalities: none     Narrative Interpretation: Sinus bradycardia otherwise normal EKG, indication health maintenance no prior EKG available for comparison

## 2022-01-13 NOTE — PROGRESS NOTES
"Chief Complaint  Annual Exam (physical)    Subjective          Eliseo Phan presents to [facility]  Pleasant gentleman here today for complete physical exam as well as   complains of some mild tenderness left areola  For last week or 2, has had no fever no chills  No chest pain or shortness of breath he has no strong family history of breast cancer in his family, no nipple discharge no injury.    History of low testosterone, as well as Peyronie's disease injured his penis quite a few years ago he has a narrowing and angulation makes intimacy difficult.  25 immediate physicians recommended taking Cialis 5 mg a day he thought this is helped quite a bit and he would like to continue he wants to know if I can fill this for him and well can I write the testosterone  He is having no chest pain shortness of breath,  Does not smoke no history of CAD he has no contraindications to Cialis  Does not take nitrates for chest pain    Fully vaccinated for COVID  He needs a new infectious disease referral as well his HIV viral levels have been undetectable    He has some mild chronic renal insufficiency and he was told it is related to his HIV medications likely  He did not follow through with the ultrasound however he should do this        Objective   Vital Signs:   /74   Pulse 76   Temp 97 °F (36.1 °C) (Infrared)   Resp 12   Ht 177.8 cm (70\")   Wt 77.1 kg (170 lb)   SpO2 95%   BMI 24.39 kg/m²     Physical Exam  Vitals reviewed.   Constitutional:       Appearance: He is well-developed.   HENT:      Head: Normocephalic.      Nose: Nose normal.   Eyes:      General: No scleral icterus.     Conjunctiva/sclera: Conjunctivae normal.      Pupils: Pupils are equal, round, and reactive to light.   Neck:      Thyroid: No thyromegaly.      Vascular: No JVD.      Comments: Carotids clear  Cardiovascular:      Rate and Rhythm: Normal rate and regular rhythm.      Heart sounds: Normal heart " sounds. No murmur heard.  No friction rub. No gallop.    Pulmonary:      Effort: Pulmonary effort is normal. No respiratory distress.      Breath sounds: Normal breath sounds. No stridor. No wheezing or rales.   Abdominal:      General: Bowel sounds are normal. There is no distension.      Palpations: Abdomen is soft.      Tenderness: There is no abdominal tenderness.      Comments: No hepatosplenomegaly, no ascites,   Musculoskeletal:         General: No tenderness.      Cervical back: Neck supple.      Comments: Chest exam breast exam no palpable lumps left areola but he has some mild tenderness no redness no discharge axilla clear no lymphadenopathy chest equals bilateral   Lymphadenopathy:      Cervical: No cervical adenopathy.   Skin:     General: Skin is warm and dry.      Findings: No erythema or rash.   Neurological:      Mental Status: He is alert and oriented to person, place, and time.      Deep Tendon Reflexes: Reflexes are normal and symmetric.   Psychiatric:         Behavior: Behavior normal.         Thought Content: Thought content normal.         Judgment: Judgment normal.        Result Review :                 Assessment and Plan    Diagnoses and all orders for this visit:    1. Health maintenance examination (Primary)  -     ECG 12 Lead  -     CBC & Differential; Future  -     Comprehensive Metabolic Panel; Future  -     Lipid Panel With LDL / HDL Ratio; Future  -     TSH Rfx On Abnormal To Free T4; Future  -     Urinalysis With Microscopic If Indicated (No Culture) - Urine, Clean Catch; Future    2. Low testosterone level in male  -     Ambulatory Referral to Urology  -     CBC & Differential; Future  -     Comprehensive Metabolic Panel; Future  -     Lipid Panel With LDL / HDL Ratio; Future  -     TSH Rfx On Abnormal To Free T4; Future  -     Urinalysis With Microscopic If Indicated (No Culture) - Urine, Clean Catch; Future    3. Penis injury, subsequent encounter  -     Ambulatory Referral to  Urology  -     CBC & Differential; Future  -     Comprehensive Metabolic Panel; Future  -     Lipid Panel With LDL / HDL Ratio; Future  -     TSH Rfx On Abnormal To Free T4; Future  -     Urinalysis With Microscopic If Indicated (No Culture) - Urine, Clean Catch; Future    4. Peyronie's disease  -     Ambulatory Referral to Urology  -     CBC & Differential; Future  -     Comprehensive Metabolic Panel; Future  -     Lipid Panel With LDL / HDL Ratio; Future  -     TSH Rfx On Abnormal To Free T4; Future  -     Urinalysis With Microscopic If Indicated (No Culture) - Urine, Clean Catch; Future    5. HIV infection, unspecified symptom status (HCC)  -     Ambulatory Referral to Infectious Disease  -     CBC & Differential; Future  -     Comprehensive Metabolic Panel; Future  -     Lipid Panel With LDL / HDL Ratio; Future  -     TSH Rfx On Abnormal To Free T4; Future  -     Urinalysis With Microscopic If Indicated (No Culture) - Urine, Clean Catch; Future    6. Elevated LDL cholesterol level  -     ECG 12 Lead  -     CBC & Differential; Future  -     Comprehensive Metabolic Panel; Future  -     Lipid Panel With LDL / HDL Ratio; Future  -     TSH Rfx On Abnormal To Free T4; Future  -     Urinalysis With Microscopic If Indicated (No Culture) - Urine, Clean Catch; Future    Other orders  -     tadalafil (Cialis) 5 MG tablet; Take 1 tablet by mouth Daily.  Dispense: 90 tablet; Refill: 3        Follow Up   Return Return office fasting lab, then schedule annual physical and labs prior to next appointment please, for Annual physical.  Patient was given instructions and counseling regarding his condition or for health maintenance advice. Please see specific information pulled into the AVS if appropriate.     Patient has some mild tenderness left areola no chronic complaints no fever chills no palpable nodule  Offered mammogram referral  But I think conservative monitoring with good follow-up would be appropriate he is reliable  He  will recheck this in a few weeks if is not resolved in 6 weeks he will reach out to me or recheck in the office I will refer him for mammogram and further evaluation patient agrees he will let me know          Discharge instructions    Continue to push fluids avoid anti-inflammatories,      Follow-up urology  Your testosterone and other issues we discussed.    Reschedule your renal ultrasound    Consider CT calcium score coronary arteries    Consider vascular screenings aorta carotid lower extremity      Lets give the tenderness left chest breast 4 to 6 weeks if it does not resolve, if not if not fully resolved if you still have tenderness or any palpable lump  Please let me know so I will refer you for a mammogram  And further evaluation

## 2022-02-18 ENCOUNTER — LAB (OUTPATIENT)
Dept: LAB | Facility: HOSPITAL | Age: 57
End: 2022-02-18

## 2022-02-18 ENCOUNTER — OFFICE VISIT (OUTPATIENT)
Dept: INFECTIOUS DISEASES | Facility: CLINIC | Age: 57
End: 2022-02-18

## 2022-02-18 VITALS
SYSTOLIC BLOOD PRESSURE: 132 MMHG | TEMPERATURE: 97.9 F | HEART RATE: 64 BPM | DIASTOLIC BLOOD PRESSURE: 86 MMHG | BODY MASS INDEX: 25.34 KG/M2 | HEIGHT: 70 IN | WEIGHT: 177 LBS | RESPIRATION RATE: 18 BRPM

## 2022-02-18 DIAGNOSIS — Z21 ASYMPTOMATIC HIV INFECTION, WITH NO HISTORY OF HIV-RELATED ILLNESS: Primary | ICD-10-CM

## 2022-02-18 LAB
ANION GAP SERPL CALCULATED.3IONS-SCNC: 8 MMOL/L (ref 5–15)
BASOPHILS # BLD AUTO: 0.02 10*3/MM3 (ref 0–0.2)
BASOPHILS NFR BLD AUTO: 0.6 % (ref 0–1.5)
BUN SERPL-MCNC: 15 MG/DL (ref 6–20)
BUN/CREAT SERPL: 10.8 (ref 7–25)
CALCIUM SPEC-SCNC: 9.2 MG/DL (ref 8.6–10.5)
CHLORIDE SERPL-SCNC: 104 MMOL/L (ref 98–107)
CO2 SERPL-SCNC: 26 MMOL/L (ref 22–29)
CREAT SERPL-MCNC: 1.39 MG/DL (ref 0.76–1.27)
DEPRECATED RDW RBC AUTO: 41.2 FL (ref 37–54)
EOSINOPHIL # BLD AUTO: 0.1 10*3/MM3 (ref 0–0.4)
EOSINOPHIL NFR BLD AUTO: 3.2 % (ref 0.3–6.2)
ERYTHROCYTE [DISTWIDTH] IN BLOOD BY AUTOMATED COUNT: 14 % (ref 12.3–15.4)
GFR SERPL CREATININE-BSD FRML MDRD: 53 ML/MIN/1.73
GLUCOSE SERPL-MCNC: 93 MG/DL (ref 65–99)
HCT VFR BLD AUTO: 42.4 % (ref 37.5–51)
HGB BLD-MCNC: 14.1 G/DL (ref 13–17.7)
IMM GRANULOCYTES # BLD AUTO: 0.01 10*3/MM3 (ref 0–0.05)
IMM GRANULOCYTES NFR BLD AUTO: 0.3 % (ref 0–0.5)
LYMPHOCYTES # BLD AUTO: 1.2 10*3/MM3 (ref 0.7–3.1)
LYMPHOCYTES NFR BLD AUTO: 38.6 % (ref 19.6–45.3)
MCH RBC QN AUTO: 27.2 PG (ref 26.6–33)
MCHC RBC AUTO-ENTMCNC: 33.3 G/DL (ref 31.5–35.7)
MCV RBC AUTO: 81.7 FL (ref 79–97)
MONOCYTES # BLD AUTO: 0.27 10*3/MM3 (ref 0.1–0.9)
MONOCYTES NFR BLD AUTO: 8.7 % (ref 5–12)
NEUTROPHILS NFR BLD AUTO: 1.51 10*3/MM3 (ref 1.7–7)
NEUTROPHILS NFR BLD AUTO: 48.6 % (ref 42.7–76)
NRBC BLD AUTO-RTO: 0 /100 WBC (ref 0–0.2)
PLATELET # BLD AUTO: 186 10*3/MM3 (ref 140–450)
PMV BLD AUTO: 9.1 FL (ref 6–12)
POTASSIUM SERPL-SCNC: 4.1 MMOL/L (ref 3.5–5.2)
RBC # BLD AUTO: 5.19 10*6/MM3 (ref 4.14–5.8)
RPR SER QL: REACTIVE
RPR SER-TITR: ABNORMAL {TITER}
SODIUM SERPL-SCNC: 138 MMOL/L (ref 136–145)
WBC NRBC COR # BLD: 3.11 10*3/MM3 (ref 3.4–10.8)

## 2022-02-18 PROCEDURE — 36415 COLL VENOUS BLD VENIPUNCTURE: CPT | Performed by: STUDENT IN AN ORGANIZED HEALTH CARE EDUCATION/TRAINING PROGRAM

## 2022-02-18 PROCEDURE — 86593 SYPHILIS TEST NON-TREP QUANT: CPT | Performed by: STUDENT IN AN ORGANIZED HEALTH CARE EDUCATION/TRAINING PROGRAM

## 2022-02-18 PROCEDURE — 80048 BASIC METABOLIC PNL TOTAL CA: CPT | Performed by: STUDENT IN AN ORGANIZED HEALTH CARE EDUCATION/TRAINING PROGRAM

## 2022-02-18 PROCEDURE — 86592 SYPHILIS TEST NON-TREP QUAL: CPT | Performed by: STUDENT IN AN ORGANIZED HEALTH CARE EDUCATION/TRAINING PROGRAM

## 2022-02-18 PROCEDURE — 85025 COMPLETE CBC W/AUTO DIFF WBC: CPT | Performed by: STUDENT IN AN ORGANIZED HEALTH CARE EDUCATION/TRAINING PROGRAM

## 2022-02-18 PROCEDURE — 86361 T CELL ABSOLUTE COUNT: CPT | Performed by: STUDENT IN AN ORGANIZED HEALTH CARE EDUCATION/TRAINING PROGRAM

## 2022-02-18 PROCEDURE — 99204 OFFICE O/P NEW MOD 45 MIN: CPT | Performed by: STUDENT IN AN ORGANIZED HEALTH CARE EDUCATION/TRAINING PROGRAM

## 2022-02-18 PROCEDURE — 86780 TREPONEMA PALLIDUM: CPT | Performed by: STUDENT IN AN ORGANIZED HEALTH CARE EDUCATION/TRAINING PROGRAM

## 2022-02-18 PROCEDURE — 87536 HIV-1 QUANT&REVRSE TRNSCRPJ: CPT | Performed by: STUDENT IN AN ORGANIZED HEALTH CARE EDUCATION/TRAINING PROGRAM

## 2022-02-18 RX ORDER — BICTEGRAVIR SODIUM, EMTRICITABINE, AND TENOFOVIR ALAFENAMIDE FUMARATE 50; 200; 25 MG/1; MG/1; MG/1
1 TABLET ORAL DAILY
Qty: 30 TABLET | Refills: 5 | Status: SHIPPED | OUTPATIENT
Start: 2022-02-18 | End: 2022-08-22 | Stop reason: SDUPTHER

## 2022-02-18 NOTE — PROGRESS NOTES
CC: NEW PATIENT      HIV Initial Visit: Eliseo Phan is a 56 y.o. male is here for initial HIV evaluation.  He was originally diagnosed in approximately 2010.   The reason for the test was exposure.   His transmission risk factor includes homosexual contact.   Patient reports he originally was placed on Atripla and was on this until 7/2017 when he was switched to Tivicay and Descovy.  He then continued on this until approximately August 2021 when he was placed on Biktarvy.    Patient reports tolerating Biktarvy very well without any difficulties.  Most recent laboratory data showed undetectable viral load and CD4 462.  He reports not missing any doses of his medication and has no difficulty receiving this through his pharmacy.  Denies any side effects with the medication that he is aware of.  Denies any fever or chills, skin rash, abdominal pain, swelling, or shortness of breath.    Past medical history:  Past Medical History:   Diagnosis Date   • Acid reflux    • Arthritis    • Back pain    • Hepatitis B    • History of kidney stones    • HIV (human immunodeficiency virus infection) ([facility]) 2011   • PONV (postoperative nausea and vomiting)        Medications:   Current Outpatient Medications:   •  Bictegravir-Emtricitab-Tenofov (Biktarvy) -25 MG per tablet, Take 1 tablet by mouth Daily., Disp: 30 tablet, Rfl: 5  •  fluticasone (FLONASE) 50 MCG/ACT nasal spray, INSTILL 2 SPRAYS INTO EACH NOSTRIL DAILY AS NEEDED FOR ALLERGIES, Disp: 16 g, Rfl: 2  •  omeprazole (priLOSEC) 40 MG capsule, Take 1 capsule by mouth Daily., Disp: 90 capsule, Rfl: 1  •  ondansetron (Zofran) 4 MG tablet, Take 1 tablet by mouth Every 6 (Six) Hours As Needed for Nausea or Vomiting., Disp: 20 tablet, Rfl: 1  •  tadalafil (Cialis) 5 MG tablet, Take 1 tablet by mouth Daily., Disp: 90 tablet, Rfl: 3  •  traZODone (DESYREL) 100 MG tablet, Take 1 tablet by mouth Every Night., Disp: 90 tablet, Rfl: 1    Allergies:  has No Known  "Allergies.    Family History: family history includes Anxiety disorder in his father and mother; Depression in his father and mother; Diabetes in his mother; Heart disease in his mother; Hypertension in his father and mother; Nephrolithiasis in his brother; Thyroid disease in his mother.    Social History:  reports that he quit smoking about 22 months ago. His smoking use included cigarettes. He has a 5.00 pack-year smoking history. He has never used smokeless tobacco. He reports current drug use. Drug: Other. He reports that he does not drink alcohol.    Review of Systems: All other reviewed and negative except as per HPI    Blood pressure 132/86, pulse 64, temperature 97.9 °F (36.6 °C), resp. rate 18, height 177.8 cm (70\"), weight 80.3 kg (177 lb).  GENERAL: Awake and alert, in no acute distress.   HEENT: Oropharynx is clear. Hearing is grossly normal.   EYES: PERRL. No conjunctival injection. No lid lag.   LYMPHATICS: No lymphadenopathy of the neck or axillary regions.   HEART: Regular rate and rhythm. No peripheral edema.   LUNGS: Clear to auscultation anteriorly with normal respiratory effort.   ABDOMEN: Soft, nontender, nondistended. No appreciable organomegaly.   SKIN: Warm and dry without cutaneous eruptions   PSYCHIATRIC: Appropriate mood, affect, insight, and judgment.       DIAGNOSTICS:  Lab Results   Component Value Date    WBC 4.26 (L) 08/31/2021    WBC 4.26 08/31/2021    HGB 13.7 08/31/2021    HCT 42.2 08/31/2021     08/31/2021     No results found for: CRP  No results found for: SEDRATE  Lab Results   Component Value Date    GLUCOSE 92 07/30/2021    BUN 14 07/30/2021    CREATININE 1.33 (H) 07/30/2021    EGFRIFNONA 56 (L) 07/30/2021    EGFRIFAFRI 68 07/30/2021    BCR 10.5 07/30/2021    CO2 26.6 07/30/2021    CALCIUM 9.9 07/30/2021    PROTENTOTREF 7.4 07/30/2021    ALBUMIN 4.60 07/30/2021    LABIL2 1.6 07/30/2021    AST 22 07/30/2021    ALT 18 07/30/2021     ART history:  2010 through 2017 " Atripla  2017 through August 2021 Tivicay plus Descovy  8/2021 through now Biktarvy    CD4 history:  -5/23/2012 597  -9/24/2021 566  -1/14/2016 494  -7/24/2017 542  -9/27/2017 527  -7/11/2018 564  -1/9/2019 536  -7/9/2019 465  -10/4/2019 744  -1/6/2020 508  -11/10/2020 513  -2/24/2021 696  -4/2/2021 513  -8/31/2021 462      Assessment and Plan:    Will continue treatment with Biktarvy.  Medication adherence education provided by MD.  See lab orders.  Counseling provided on the prevention of transmission of HIV.  See diagnoses,orders and patient instructions.  Total Duration of Visit: 1 Hour.  Total Counseling Time: 45 Minutes, counseling the patient regarding HIV prognosis, prevention of HIV transmittal to others, compliance with treatment plan, daily disease management, risk factor reduction and meaning of diagnostic test results.   Follow up in 6 month.    Diagnoses and all orders for this visit:    1. Asymptomatic HIV infection, with no history of HIV-related illness (HCC) (Primary)  Overview:  Diagnosed 2010, after initiation antivirals viral load undetectable    Orders:  -     Basic Metabolic Panel  -     CBC & Differential  -     HIV RNA Real Time PCR (Non-Graph)  -     RPR  -     T-helper Cells (CD4) Count    Other orders  -     Bictegravir-Emtricitab-Tenofov (Biktarvy) -25 MG per tablet; Take 1 tablet by mouth Daily.  Dispense: 30 tablet; Refill: 5           Parent's questions answered fully.

## 2022-02-19 LAB
HIV1 RNA # SERPL NAA+PROBE: <20 COPIES/ML
HIV1 RNA SERPL NAA+PROBE-LOG#: NORMAL {LOG_COPIES}/ML
TREPONEMA PALLIDUM IGG+IGM AB [PRESENCE] IN SERUM OR PLASMA BY IMMUNOASSAY: REACTIVE

## 2022-02-21 LAB
BASOPHILS # BLD AUTO: 0 X10E3/UL (ref 0–0.2)
BASOPHILS NFR BLD AUTO: 1 %
CD3+CD4+ CELLS # BLD: 419 /UL (ref 359–1519)
CD3+CD4+ CELLS NFR BLD: 32.2 % (ref 30.8–58.5)
EOSINOPHIL # BLD AUTO: 0.1 X10E3/UL (ref 0–0.4)
EOSINOPHIL NFR BLD AUTO: 3 %
ERYTHROCYTE [DISTWIDTH] IN BLOOD BY AUTOMATED COUNT: 13.8 % (ref 11.6–15.4)
HCT VFR BLD AUTO: 42.8 % (ref 37.5–51)
HGB BLD-MCNC: 14.1 G/DL (ref 13–17.7)
IMM GRANULOCYTES # BLD AUTO: 0 X10E3/UL (ref 0–0.1)
IMM GRANULOCYTES NFR BLD AUTO: 0 %
LYMPHOCYTES # BLD AUTO: 1.3 X10E3/UL (ref 0.7–3.1)
LYMPHOCYTES NFR BLD AUTO: 41 %
MCH RBC QN AUTO: 26.7 PG (ref 26.6–33)
MCHC RBC AUTO-ENTMCNC: 32.9 G/DL (ref 31.5–35.7)
MCV RBC AUTO: 81 FL (ref 79–97)
MONOCYTES # BLD AUTO: 0.3 X10E3/UL (ref 0.1–0.9)
MONOCYTES NFR BLD AUTO: 9 %
NEUTROPHILS # BLD AUTO: 1.4 X10E3/UL (ref 1.4–7)
NEUTROPHILS NFR BLD AUTO: 46 %
PLATELET # BLD AUTO: 197 X10E3/UL (ref 150–450)
RBC # BLD AUTO: 5.28 X10E6/UL (ref 4.14–5.8)
WBC # BLD AUTO: 3.1 X10E3/UL (ref 3.4–10.8)

## 2022-03-07 RX ORDER — TRAZODONE HYDROCHLORIDE 100 MG/1
TABLET ORAL
Qty: 90 TABLET | Refills: 1 | Status: SHIPPED | OUTPATIENT
Start: 2022-03-07 | End: 2022-09-06

## 2022-03-21 ENCOUNTER — TELEPHONE (OUTPATIENT)
Dept: FAMILY MEDICINE CLINIC | Facility: CLINIC | Age: 57
End: 2022-03-21

## 2022-03-21 DIAGNOSIS — R07.9 LEFT-SIDED CHEST PAIN: ICD-10-CM

## 2022-03-21 DIAGNOSIS — N64.4 MASTALGIA: Primary | ICD-10-CM

## 2022-03-21 NOTE — TELEPHONE ENCOUNTER
I ordered a chest x-ray get this outpatient any [facility], [place], operated by [facility] diagnostic center or here downstairs  Someone should call you you should get a mammogram as well diagnostic mammogram  Make sure you call me for results    Thanks

## 2022-03-21 NOTE — TELEPHONE ENCOUNTER
Caller: Eliseo Phan    Relationship: Self    Best call back number: [phone]    What orders are you requesting (i.e. lab or imaging): CHEST XRAY    In what timeframe would the patient need to come in: ASAP    Where will you receive your lab/imaging services: [facility]    Additional notes: PATIENT STATES AT HIS LAST APPOINTMENT, THEY DISCUSSED WATCHING A PLACE ON HIS LEFT SIDE OF HIS CHEST.  HE STATES IT IS NOT CHANGING AND HE WOULD LIKE TO GO AHEAD WITH THE CHEST XRAY    PLEASE CALL AND ADVISE

## 2022-03-24 ENCOUNTER — TRANSCRIBE ORDERS (OUTPATIENT)
Dept: ADMINISTRATIVE | Facility: HOSPITAL | Age: 57
End: 2022-03-24

## 2022-03-24 DIAGNOSIS — N64.4 BREAST PAIN: Primary | ICD-10-CM

## 2022-04-22 ENCOUNTER — TELEPHONE (OUTPATIENT)
Dept: FAMILY MEDICINE CLINIC | Facility: CLINIC | Age: 57
End: 2022-04-22

## 2022-04-22 RX ORDER — BACLOFEN 10 MG/1
10 TABLET ORAL 3 TIMES DAILY
Qty: 60 TABLET | Refills: 2 | Status: SHIPPED | OUTPATIENT
Start: 2022-04-22

## 2022-04-22 NOTE — TELEPHONE ENCOUNTER
PATIENT IS WANTING TO KNOW IF APRN EPLEY WILL WRITE A PRESCRIPTION FOR THE  BACLOFEN 10MG  FOR A SLIPPED DISC    PHARMACY:   [facility] [address] [facility] [address] - [phone]  - [phone]   [phone]    PLEASE ADVISE  Eliseo Phan (Self) [phone] (H)     PREVIOUSLY WRITTEN BY   Thomas Torres MD

## 2022-04-22 NOTE — TELEPHONE ENCOUNTER
Give him baclofen 10 mg    1 p.o. 3 times daily as needed spasm  Caution sedation possible  Dispense  60+2 refills

## 2022-04-27 ENCOUNTER — APPOINTMENT (OUTPATIENT)
Dept: ULTRASOUND IMAGING | Facility: HOSPITAL | Age: 57
End: 2022-04-27

## 2022-04-27 ENCOUNTER — HOSPITAL ENCOUNTER (OUTPATIENT)
Dept: MAMMOGRAPHY | Facility: HOSPITAL | Age: 57
Discharge: HOME | End: 2022-04-27
Admitting: NURSE PRACTITIONER

## 2022-04-27 PROCEDURE — 77066 DX MAMMO INCL CAD BI: CPT

## 2022-06-01 RX ORDER — OMEPRAZOLE 40 MG/1
CAPSULE, DELAYED RELEASE ORAL
Qty: 90 CAPSULE | Refills: 3 | Status: SHIPPED | OUTPATIENT
Start: 2022-06-01

## 2022-06-01 NOTE — TELEPHONE ENCOUNTER
Rx Refill Note  Requested Prescriptions     Pending Prescriptions Disp Refills   • omeprazole (priLOSEC) 40 MG capsule [Pharmacy Med Name: OMEPRAZOLE DR 40MG CAPSULE, UU] 30 capsule      Sig: TAKE ONE CAPSULE BY MOUTH DAILY      Last office visit with prescribing clinician: 1/13/2022      Next office visit with prescribing clinician: 1/16/2023            Pernell Mahoney Rep  06/01/22, 13:48 EDT

## 2022-08-22 ENCOUNTER — LAB (OUTPATIENT)
Dept: LAB | Facility: HOSPITAL | Age: 57
End: 2022-08-22

## 2022-08-22 ENCOUNTER — OFFICE VISIT (OUTPATIENT)
Dept: INFECTIOUS DISEASES | Facility: CLINIC | Age: 57
End: 2022-08-22

## 2022-08-22 VITALS
SYSTOLIC BLOOD PRESSURE: 136 MMHG | DIASTOLIC BLOOD PRESSURE: 84 MMHG | TEMPERATURE: 97.8 F | BODY MASS INDEX: 24.71 KG/M2 | HEART RATE: 62 BPM | WEIGHT: 172.6 LBS | HEIGHT: 70 IN | RESPIRATION RATE: 18 BRPM

## 2022-08-22 DIAGNOSIS — L23.9 ALLERGIC DERMATITIS: ICD-10-CM

## 2022-08-22 DIAGNOSIS — B18.1 CHRONIC HEPATITIS B: ICD-10-CM

## 2022-08-22 DIAGNOSIS — B20 HIV INFECTION, UNSPECIFIED SYMPTOM STATUS: ICD-10-CM

## 2022-08-22 DIAGNOSIS — B20 HIV INFECTION, UNSPECIFIED SYMPTOM STATUS: Primary | ICD-10-CM

## 2022-08-22 LAB
ALBUMIN SERPL-MCNC: 4.7 G/DL (ref 3.5–5.2)
ALBUMIN/GLOB SERPL: 1.7 G/DL
ALP SERPL-CCNC: 53 U/L (ref 39–117)
ALT SERPL W P-5'-P-CCNC: 18 U/L (ref 1–41)
ANION GAP SERPL CALCULATED.3IONS-SCNC: 8.3 MMOL/L (ref 5–15)
AST SERPL-CCNC: 23 U/L (ref 1–40)
BASOPHILS # BLD AUTO: 0.02 10*3/MM3 (ref 0–0.2)
BASOPHILS NFR BLD AUTO: 0.5 % (ref 0–1.5)
BILIRUB SERPL-MCNC: 0.3 MG/DL (ref 0–1.2)
BILIRUB UR QL STRIP: NEGATIVE
BUN SERPL-MCNC: 9 MG/DL (ref 6–20)
BUN/CREAT SERPL: 7.4 (ref 7–25)
CALCIUM SPEC-SCNC: 9.4 MG/DL (ref 8.6–10.5)
CHLORIDE SERPL-SCNC: 105 MMOL/L (ref 98–107)
CHOLEST SERPL-MCNC: 168 MG/DL (ref 0–200)
CLARITY UR: CLEAR
CO2 SERPL-SCNC: 27.7 MMOL/L (ref 22–29)
COLOR UR: YELLOW
CREAT SERPL-MCNC: 1.21 MG/DL (ref 0.76–1.27)
DEPRECATED RDW RBC AUTO: 47.4 FL (ref 37–54)
EGFRCR SERPLBLD CKD-EPI 2021: 70.3 ML/MIN/1.73
EOSINOPHIL # BLD AUTO: 0.16 10*3/MM3 (ref 0–0.4)
EOSINOPHIL NFR BLD AUTO: 4.3 % (ref 0.3–6.2)
ERYTHROCYTE [DISTWIDTH] IN BLOOD BY AUTOMATED COUNT: 16 % (ref 12.3–15.4)
GLOBULIN UR ELPH-MCNC: 2.7 GM/DL
GLUCOSE SERPL-MCNC: 83 MG/DL (ref 65–99)
GLUCOSE UR STRIP-MCNC: NEGATIVE MG/DL
HCT VFR BLD AUTO: 44.4 % (ref 37.5–51)
HDLC SERPL-MCNC: 49 MG/DL (ref 40–60)
HGB BLD-MCNC: 14.7 G/DL (ref 13–17.7)
HGB UR QL STRIP.AUTO: NEGATIVE
IMM GRANULOCYTES # BLD AUTO: 0.01 10*3/MM3 (ref 0–0.05)
IMM GRANULOCYTES NFR BLD AUTO: 0.3 % (ref 0–0.5)
KETONES UR QL STRIP: NEGATIVE
LDLC SERPL CALC-MCNC: 94 MG/DL (ref 0–100)
LDLC/HDLC SERPL: 1.85 {RATIO}
LEUKOCYTE ESTERASE UR QL STRIP.AUTO: NEGATIVE
LYMPHOCYTES # BLD AUTO: 1.34 10*3/MM3 (ref 0.7–3.1)
LYMPHOCYTES NFR BLD AUTO: 35.8 % (ref 19.6–45.3)
MCH RBC QN AUTO: 27.3 PG (ref 26.6–33)
MCHC RBC AUTO-ENTMCNC: 33.1 G/DL (ref 31.5–35.7)
MCV RBC AUTO: 82.4 FL (ref 79–97)
MONOCYTES # BLD AUTO: 0.31 10*3/MM3 (ref 0.1–0.9)
MONOCYTES NFR BLD AUTO: 8.3 % (ref 5–12)
NEUTROPHILS NFR BLD AUTO: 1.9 10*3/MM3 (ref 1.7–7)
NEUTROPHILS NFR BLD AUTO: 50.8 % (ref 42.7–76)
NITRITE UR QL STRIP: NEGATIVE
NRBC BLD AUTO-RTO: 0 /100 WBC (ref 0–0.2)
PH UR STRIP.AUTO: 6.5 [PH] (ref 5–8)
PLATELET # BLD AUTO: 198 10*3/MM3 (ref 140–450)
PMV BLD AUTO: 9 FL (ref 6–12)
POTASSIUM SERPL-SCNC: 4.5 MMOL/L (ref 3.5–5.2)
PROT SERPL-MCNC: 7.4 G/DL (ref 6–8.5)
PROT UR QL STRIP: NEGATIVE
RBC # BLD AUTO: 5.39 10*6/MM3 (ref 4.14–5.8)
RPR SER QL: REACTIVE
RPR SER-TITR: ABNORMAL {TITER}
SODIUM SERPL-SCNC: 141 MMOL/L (ref 136–145)
SP GR UR STRIP: <=1.005 (ref 1–1.03)
TRIGL SERPL-MCNC: 141 MG/DL (ref 0–150)
UROBILINOGEN UR QL STRIP: NORMAL
VLDLC SERPL-MCNC: 25 MG/DL (ref 5–40)
WBC NRBC COR # BLD: 3.74 10*3/MM3 (ref 3.4–10.8)

## 2022-08-22 PROCEDURE — 85025 COMPLETE CBC W/AUTO DIFF WBC: CPT

## 2022-08-22 PROCEDURE — 86780 TREPONEMA PALLIDUM: CPT

## 2022-08-22 PROCEDURE — 80061 LIPID PANEL: CPT

## 2022-08-22 PROCEDURE — 99214 OFFICE O/P EST MOD 30 MIN: CPT | Performed by: STUDENT IN AN ORGANIZED HEALTH CARE EDUCATION/TRAINING PROGRAM

## 2022-08-22 PROCEDURE — 86706 HEP B SURFACE ANTIBODY: CPT

## 2022-08-22 PROCEDURE — 86704 HEP B CORE ANTIBODY TOTAL: CPT

## 2022-08-22 PROCEDURE — 87491 CHLMYD TRACH DNA AMP PROBE: CPT

## 2022-08-22 PROCEDURE — 86480 TB TEST CELL IMMUN MEASURE: CPT

## 2022-08-22 PROCEDURE — 36415 COLL VENOUS BLD VENIPUNCTURE: CPT

## 2022-08-22 PROCEDURE — 87536 HIV-1 QUANT&REVRSE TRNSCRPJ: CPT

## 2022-08-22 PROCEDURE — 87340 HEPATITIS B SURFACE AG IA: CPT

## 2022-08-22 PROCEDURE — 87591 N.GONORRHOEAE DNA AMP PROB: CPT

## 2022-08-22 PROCEDURE — 86592 SYPHILIS TEST NON-TREP QUAL: CPT

## 2022-08-22 PROCEDURE — 81003 URINALYSIS AUTO W/O SCOPE: CPT

## 2022-08-22 PROCEDURE — 86361 T CELL ABSOLUTE COUNT: CPT

## 2022-08-22 PROCEDURE — 86593 SYPHILIS TEST NON-TREP QUANT: CPT

## 2022-08-22 PROCEDURE — 80053 COMPREHEN METABOLIC PANEL: CPT

## 2022-08-22 RX ORDER — HYDROCORTISONE 25 MG/ML
LOTION TOPICAL 2 TIMES DAILY
Qty: 59 ML | Refills: 1 | Status: SHIPPED | OUTPATIENT
Start: 2022-08-22 | End: 2022-09-19

## 2022-08-22 RX ORDER — BICTEGRAVIR SODIUM, EMTRICITABINE, AND TENOFOVIR ALAFENAMIDE FUMARATE 50; 200; 25 MG/1; MG/1; MG/1
1 TABLET ORAL DAILY
Qty: 30 TABLET | Refills: 5 | Status: SHIPPED | OUTPATIENT
Start: 2022-08-22 | End: 2023-02-20 | Stop reason: SDUPTHER

## 2022-08-22 NOTE — PROGRESS NOTES
Follow-up      HIV Follow-up Visit: Eliseo Phan is a 56 y.o. male here for follow-up HIV visit. He is feeling unchanged since his last visit.  Patient reports he is doing well with no difficulty tolerating his medication.  Denies any fevers or chills.  States he has had no difficulties taking Biktarvy and is taking it daily.  No missed doses.  No new sexual contacts.  Not sexually active.    Did recently go hiking with a rash breaking out on bilateral thighs.  Reports he thought it may have been a bug bite however did not see any bugs.  Reports the left thigh was itching originally and now is resolved however now the right thigh itches with a red raised rash.         Past medical history:  Past Medical History:   Diagnosis Date   • Acid reflux    • Arthritis    • Back pain    • Hepatitis B    • History of kidney stones    • HIV (human immunodeficiency virus infection) (HCC) 2011   • PONV (postoperative nausea and vomiting)        Medications:   Current Outpatient Medications:   •  Bictegravir-Emtricitab-Tenofov (Biktarvy) -25 MG per tablet, Take 1 tablet by mouth Daily., Disp: 30 tablet, Rfl: 5  •  fluticasone (FLONASE) 50 MCG/ACT nasal spray, INSTILL 2 SPRAYS INTO EACH NOSTRIL DAILY AS NEEDED FOR ALLERGIES, Disp: 16 g, Rfl: 2  •  omeprazole (priLOSEC) 40 MG capsule, TAKE ONE CAPSULE BY MOUTH DAILY, Disp: 90 capsule, Rfl: 3  •  ondansetron (Zofran) 4 MG tablet, Take 1 tablet by mouth Every 6 (Six) Hours As Needed for Nausea or Vomiting., Disp: 20 tablet, Rfl: 1  •  tadalafil (Cialis) 5 MG tablet, Take 1 tablet by mouth Daily., Disp: 90 tablet, Rfl: 3  •  traZODone (DESYREL) 100 MG tablet, TAKE ONE TABLET BY MOUTH ONCE NIGHTLY, Disp: 90 tablet, Rfl: 1  •  baclofen (LIORESAL) 10 MG tablet, Take 1 tablet by mouth 3 (Three) Times a Day., Disp: 60 tablet, Rfl: 2  •  hydrocortisone 2.5 % lotion, Apply  topically to the appropriate area as directed 2 (Two) Times a Day., Disp: 59 mL, Rfl: 1    Allergies:  has No  "Known Allergies.    Family History: family history includes Anxiety disorder in his father and mother; Breast cancer in his mother; Depression in his father and mother; Diabetes in his mother; Heart disease in his mother; Hypertension in his father and mother; Nephrolithiasis in his brother; Thyroid disease in his mother.    Social History:  reports that he quit smoking about 2 years ago. His smoking use included cigarettes. He has a 5.00 pack-year smoking history. He has never used smokeless tobacco. He reports current drug use. Drug: Other. He reports that he does not drink alcohol.    Review of Systems: All other reviewed and negative except as per HPI    Blood pressure 136/84, pulse 62, temperature 97.8 °F (36.6 °C), resp. rate 18, height 177.8 cm (70\"), weight 78.3 kg (172 lb 9.6 oz).  GENERAL: Awake and alert, in no acute distress.   HEENT: Oropharynx is clear. Hearing is grossly normal.   EYES: PERRL. No conjunctival injection. No lid lag.   LYMPHATICS: No lymphadenopathy of the neck or axillary regions.   HEART: Regular rate and rhythm. No peripheral edema.   LUNGS: Clear to auscultation anteriorly with normal respiratory effort.   ABDOMEN: Soft, nontender, nondistended. No appreciable organomegaly.   SKIN: Right thigh with raised erythematous rash.  No discharge or purulence.  No vesicles.  PSYCHIATRIC: Appropriate mood, affect, insight, and judgment.       DIAGNOSTICS:  Lab Results   Component Value Date    WBC 3.1 (L) 02/18/2022    WBC 3.11 (L) 02/18/2022    HGB 14.1 02/18/2022    HGB 14.1 02/18/2022    HCT 42.8 02/18/2022    HCT 42.4 02/18/2022     02/18/2022     02/18/2022     No results found for: CRP  No results found for: SEDRATE  Lab Results   Component Value Date    GLUCOSE 93 02/18/2022    BUN 15 02/18/2022    CREATININE 1.39 (H) 02/18/2022    EGFRIFNONA 53 (L) 02/18/2022    EGFRIFAFRI 68 07/30/2021    BCR 10.8 02/18/2022    CO2 26.0 02/18/2022    CALCIUM 9.2 02/18/2022    " PROTENTOTREF 7.4 07/30/2021    ALBUMIN 4.60 07/30/2021    LABIL2 1.6 07/30/2021    AST 22 07/30/2021    ALT 18 07/30/2021       ART history:  2010 through 2017 Atripla  2017 through August 2021 Tivicay plus Descovy  8/2021 through now Biktarvy     Lab history:  -5/23/2012 597  -9/24/2021 566  -1/14/2016 494  -7/24/2017 542  -9/27/2017 527  -7/11/2018 564  -1/9/2019 536  -7/9/2019 465  -10/4/2019 744  -1/6/2020 508  -11/10/2020 513  -2/24/2021 696  -4/2/2021 513  -8/31/2021 462  - 2/18/2022 419, viral load less than 20    (B20) HIV infection, unspecified symptom status (HCC) - Plan: Hepatitis B Virus (HBV) Screening and Diagnosis, T-helper Cells (CD4) Count, HIV-1 RNA, Quantitative, PCR (graph), CBC & Differential, Comprehensive Metabolic Panel    (B18.1) Chronic hepatitis B (HCC)    (L23.9) Allergic dermatitis    Steroid cream with hydrocortisone 2.5% sent into his pharmacy for allergic contact dermatitis.    Will continue treatment with Biktarvy.  Medication adherence education provided by MD.  See lab orders.  Counseling provided on the prevention of transmission of HIV.  See diagnoses,orders and patient instructions.  Total Duration of Visit: 30 Minutes.  Total Counseling Time: 15 Minutes, counseling the patient regarding HIV prognosis, prevention of HIV transmittal to others, compliance with treatment plan, daily disease management, risk factor reduction and meaning of diagnostic test results.   Follow up in 6 months.      Discussed with patient HIV diagnosis, goals of treatment, lab monitoring, means to reduce transmission, need for adherence to therapy as well as treatment plan, and ways to militate against the progression of disease.  Patient understands and all questions answered.

## 2022-08-23 LAB
BASOPHILS # BLD AUTO: 0 X10E3/UL (ref 0–0.2)
BASOPHILS NFR BLD AUTO: 1 %
C TRACH RRNA SPEC QL NAA+PROBE: NEGATIVE
CD3+CD4+ CELLS # BLD: 402 /UL (ref 359–1519)
CD3+CD4+ CELLS NFR BLD: 28.7 % (ref 30.8–58.5)
EOSINOPHIL # BLD AUTO: 0.2 X10E3/UL (ref 0–0.4)
EOSINOPHIL NFR BLD AUTO: 4 %
ERYTHROCYTE [DISTWIDTH] IN BLOOD BY AUTOMATED COUNT: 15.9 % (ref 11.6–15.4)
HBV CORE AB SERPL QL IA: POSITIVE
HBV SURFACE AB SER QL: REACTIVE
HBV SURFACE AG SERPL QL IA: NEGATIVE
HCT VFR BLD AUTO: 45.6 % (ref 37.5–51)
HGB BLD-MCNC: 14.9 G/DL (ref 13–17.7)
HIV1 RNA # SERPL NAA+PROBE: 30 COPIES/ML
HIV1 RNA SERPL NAA+PROBE-LOG#: 1.48 LOG10COPY/ML
IMM GRANULOCYTES # BLD AUTO: 0 X10E3/UL (ref 0–0.1)
IMM GRANULOCYTES NFR BLD AUTO: 0 %
IMP & REVIEW OF LAB RESULTS: ABNORMAL
LABORATORY COMMENT REPORT: ABNORMAL
LYMPHOCYTES # BLD AUTO: 1.4 X10E3/UL (ref 0.7–3.1)
LYMPHOCYTES NFR BLD AUTO: 36 %
MCH RBC QN AUTO: 26.3 PG (ref 26.6–33)
MCHC RBC AUTO-ENTMCNC: 32.7 G/DL (ref 31.5–35.7)
MCV RBC AUTO: 81 FL (ref 79–97)
MONOCYTES # BLD AUTO: 0.3 X10E3/UL (ref 0.1–0.9)
MONOCYTES NFR BLD AUTO: 8 %
N GONORRHOEA RRNA SPEC QL NAA+PROBE: NEGATIVE
NEUTROPHILS # BLD AUTO: 2 X10E3/UL (ref 1.4–7)
NEUTROPHILS NFR BLD AUTO: 51 %
PLATELET # BLD AUTO: 218 X10E3/UL (ref 150–450)
RBC # BLD AUTO: 5.66 X10E6/UL (ref 4.14–5.8)
TREPONEMA PALLIDUM IGG+IGM AB [PRESENCE] IN SERUM OR PLASMA BY IMMUNOASSAY: REACTIVE
WBC # BLD AUTO: 3.8 X10E3/UL (ref 3.4–10.8)

## 2022-08-24 LAB
HIV1 RNA # SERPL NAA+PROBE: 150 COPIES/ML
HIV1 RNA SERPL NAA+PROBE-LOG#: 2.18 LOG10COPY/ML

## 2022-08-25 LAB
GAMMA INTERFERON BACKGROUND BLD IA-ACNC: 0.19 IU/ML
M TB IFN-G BLD-IMP: NEGATIVE
M TB IFN-G CD4+ BCKGRND COR BLD-ACNC: 0.1 IU/ML
M TB IFN-G CD4+CD8+ BCKGRND COR BLD-ACNC: 0.07 IU/ML
MITOGEN IGNF BLD-ACNC: >10 IU/ML
SERVICE CMNT-IMP: NORMAL

## 2022-09-06 RX ORDER — TRAZODONE HYDROCHLORIDE 100 MG/1
TABLET ORAL
Qty: 90 TABLET | Refills: 1 | Status: SHIPPED | OUTPATIENT
Start: 2022-09-06 | End: 2023-03-10 | Stop reason: SDUPTHER

## 2022-09-06 NOTE — TELEPHONE ENCOUNTER
Rx Refill Note  Requested Prescriptions     Pending Prescriptions Disp Refills   • traZODone (DESYREL) 100 MG tablet [Pharmacy Med Name: traZODone 100 MG TABLET] 90 tablet 1     Sig: TAKE ONE TABLET BY MOUTH ONCE NIGHTLY      Last office visit with prescribing clinician: 1/13/2022      Next office visit with prescribing clinician: 1/16/2023            Pernell Mahoney Rep  09/06/22, 10:58 EDT

## 2022-09-19 ENCOUNTER — OFFICE VISIT (OUTPATIENT)
Dept: INTERNAL MEDICINE | Facility: CLINIC | Age: 57
End: 2022-09-19

## 2022-09-19 VITALS
SYSTOLIC BLOOD PRESSURE: 124 MMHG | DIASTOLIC BLOOD PRESSURE: 80 MMHG | HEIGHT: 70 IN | HEART RATE: 67 BPM | RESPIRATION RATE: 16 BRPM | TEMPERATURE: 98.6 F | OXYGEN SATURATION: 98 % | BODY MASS INDEX: 23.26 KG/M2 | WEIGHT: 162.5 LBS

## 2022-09-19 DIAGNOSIS — R11.0 NAUSEA: ICD-10-CM

## 2022-09-19 DIAGNOSIS — Z00.00 HEALTHCARE MAINTENANCE: ICD-10-CM

## 2022-09-19 DIAGNOSIS — R14.0 BLOATING: Primary | ICD-10-CM

## 2022-09-19 DIAGNOSIS — K64.9 HEMORRHOIDS, UNSPECIFIED HEMORRHOID TYPE: ICD-10-CM

## 2022-09-19 PROBLEM — Z21 HIV INFECTION: Status: ACTIVE | Noted: 2022-09-19

## 2022-09-19 PROBLEM — B20 HIV INFECTION: Status: ACTIVE | Noted: 2022-09-19

## 2022-09-19 PROBLEM — N18.30 STAGE 3 CHRONIC KIDNEY DISEASE (HCC): Status: ACTIVE | Noted: 2021-10-21

## 2022-09-19 PROCEDURE — 99396 PREV VISIT EST AGE 40-64: CPT | Performed by: INTERNAL MEDICINE

## 2022-09-19 PROCEDURE — 99214 OFFICE O/P EST MOD 30 MIN: CPT | Performed by: INTERNAL MEDICINE

## 2022-09-19 RX ORDER — ONDANSETRON 4 MG/1
4 TABLET, FILM COATED ORAL EVERY 6 HOURS PRN
Qty: 20 TABLET | Refills: 1 | Status: SHIPPED | OUTPATIENT
Start: 2022-09-19

## 2022-09-19 RX ORDER — METRONIDAZOLE 500 MG/1
500 TABLET ORAL 2 TIMES DAILY
Qty: 28 TABLET | Refills: 0 | Status: SHIPPED | OUTPATIENT
Start: 2022-09-19 | End: 2022-10-03

## 2022-09-19 NOTE — PROGRESS NOTES
Chief Complaint  Annual Exam (Establish care )    Subjective        Eliseo Phan presents to [facility]IC[facility]RICS  History of Present Illness  Here to establish care.     We discussed the following things today:   1. Hemorrhoid, external: not currently flared but feels more like redundant tissue. He would like to explore options for removal.   2. Chronic bloating: after meals fairly frequent now, no specific food triggers, celiac panel checked last year negative, considering food sensitivity testing, he does have a friend who was recently diagnosed with what sounds like SIBO vs fructose malabsorption. No diarrhea, he is quite regular usually with 1-2 bm daily, open to trial of flagyl for symptoms, low FODMAPs diet   3. Undetectable HIV on Biktarvy, following with Dr. Delgado with [facility], [place], operated by [facility] ID, labs just checked last month, CD4 >400, HIV , 30, CBC/CMP unremarkable, lipid panel normal, follows with Dr. Delgado q6m  4. HM: reviewed and updated; he did complete covid vaccine x3, will get booster and flu shot this fall, holding on monkeypox vaccine as not sexually active and cases are declining, Hep B natural immunity, has cologuard for CRC screening, consider c-scope once he checks with insurance about this.         Current Outpatient Medications:   •  baclofen (LIORESAL) 10 MG tablet, Take 1 tablet by mouth 3 (Three) Times a Day., Disp: 60 tablet, Rfl: 2  •  Bictegravir-Emtricitab-Tenofov (Biktarvy) -25 MG per tablet, Take 1 tablet by mouth Daily., Disp: 30 tablet, Rfl: 5  •  fluticasone (FLONASE) 50 MCG/ACT nasal spray, INSTILL 2 SPRAYS INTO EACH NOSTRIL DAILY AS NEEDED FOR ALLERGIES, Disp: 16 g, Rfl: 2  •  omeprazole (priLOSEC) 40 MG capsule, TAKE ONE CAPSULE BY MOUTH DAILY, Disp: 90 capsule, Rfl: 3  •  ondansetron (Zofran) 4 MG tablet, Take 1 tablet by mouth Every 6 (Six) Hours As Needed for Nausea or Vomiting., Disp: 20 tablet, Rfl: 1  •  tadalafil (Cialis) 5 MG  tablet, Take 1 tablet by mouth Daily., Disp: 90 tablet, Rfl: 3  •  traZODone (DESYREL) 100 MG tablet, TAKE ONE TABLET BY MOUTH ONCE NIGHTLY, Disp: 90 tablet, Rfl: 1  •  metroNIDAZOLE (Flagyl) 500 MG tablet, Take 1 tablet by mouth 2 (Two) Times a Day for 14 days., Disp: 28 tablet, Rfl: 0  Past Medical History:   Diagnosis Date   • Acid reflux    • Arthritis    • Back pain    • Hepatitis B    • History of kidney stones    • HIV (human immunodeficiency virus infection) ([facility]ly [facility])    • PONV (postoperative nausea and vomiting)      Past Surgical History:   Procedure Laterality Date   • APPENDECTOMY     • BONE TUMOR EXCISION Right     right lower leg - as a child   • KNEE ARTHROSCOPY Left    • LUMBAR FUSION Left 2020    Procedure: LUMBAR 4 TO LUMBAR 5, LUMBAR 5  TO SACRAL 1 LEFT METRX TRANSFORAMINAL LUMBAR INTERBODY FUSION WITH CAGES AND SEXTANT PEDICLE SCREW FIXATION;  Surgeon: Thomas Torres MD;  Location: [facility];  Service: Neurosurgery;  Laterality: Left;   • LUMBAR LAMINECTOMY DISCECTOMY DECOMPRESSION N/A 2018    Procedure: L4-5, L5-S1 Lumbar Laminectomy;  Surgeon: Ernie Curtis MD;  Location: [facility];  Service: Neurosurgery   • TONSILLECTOMY  1967     Family History   Problem Relation Age of Onset   • Breast cancer Mother    • Heart disease Mother    • Hypertension Mother    • Diabetes Mother    • Thyroid disease Mother    • Depression Mother    • Anxiety disorder Mother    • Hypertension Father    • Depression Father    • Anxiety disorder Father    • Nephrolithiasis Brother    • Malig Hyperthermia Neg Hx      Social History     Socioeconomic History   • Marital status:    • Highest education level: Master's degree (e.g., MA, MS, [place], MEd, MSW, FANI)   Tobacco Use   • Smoking status: Former Smoker     Packs/day: 0.50     Years: 10.00     Pack years: 5.00     Types: Cigarettes     Quit date: 2020     Years since quittin.4   • Smokeless tobacco: Never Used   Vaping Use   •  "Vaping Use: Never used   Substance and Sexual Activity   • Alcohol use: No   • Drug use: Yes     Types: Other     Comment: OPIATES STOPPED 2011   • Sexual activity: Not Currently     Partners: Male       HM reviewed and updated    Objective   Vital Signs:  /80   Pulse 67   Temp 98.6 °F (37 °C)   Resp 16   Ht 177.8 cm (70\")   Wt 73.7 kg (162 lb 8 oz)   SpO2 98%   BMI 23.32 kg/m²   Estimated body mass index is 23.32 kg/m² as calculated from the following:    Height as of this encounter: 177.8 cm (70\").    Weight as of this encounter: 73.7 kg (162 lb 8 oz).    BMI is within normal parameters. No other follow-up for BMI required.      Physical Exam  Vitals and nursing note reviewed.   Constitutional:       General: He is not in acute distress.     Appearance: Normal appearance. He is not toxic-appearing.   HENT:      Head: Normocephalic and atraumatic.      Right Ear: Tympanic membrane, ear canal and external ear normal.      Left Ear: Tympanic membrane, ear canal and external ear normal.      Nose: Nose normal. No congestion or rhinorrhea.      Mouth/Throat:      Mouth: Mucous membranes are moist.      Pharynx: No oropharyngeal exudate.   Eyes:      General: No scleral icterus.        Right eye: No discharge.         Left eye: No discharge.      Extraocular Movements: Extraocular movements intact.      Conjunctiva/sclera: Conjunctivae normal.      Pupils: Pupils are equal, round, and reactive to light.   Cardiovascular:      Rate and Rhythm: Normal rate and regular rhythm.      Pulses: Normal pulses.      Heart sounds: Normal heart sounds. No murmur heard.  Pulmonary:      Effort: Pulmonary effort is normal. No respiratory distress.      Breath sounds: Normal breath sounds. No wheezing.   Abdominal:      General: Abdomen is flat. Bowel sounds are normal. There is no distension.      Palpations: Abdomen is soft.      Tenderness: There is no abdominal tenderness. There is no guarding.   Musculoskeletal:    "      General: No swelling, tenderness or deformity. Normal range of motion.      Cervical back: Normal range of motion and neck supple. No rigidity or tenderness.   Lymphadenopathy:      Cervical: No cervical adenopathy.   Skin:     General: Skin is warm.      Capillary Refill: Capillary refill takes less than 2 seconds.   Neurological:      General: No focal deficit present.      Mental Status: He is alert and oriented to person, place, and time. Mental status is at baseline.      Cranial Nerves: No cranial nerve deficit.      Gait: Gait normal.   Psychiatric:         Mood and Affect: Mood normal.         Behavior: Behavior normal.         Thought Content: Thought content normal.         Judgment: Judgment normal.        Result Review :  The following data was reviewed by: Papa Carey MD on 09/19/2022:  Common labs    Common Labsle 2/18/22 2/18/22 2/18/22 8/22/22 8/22/22 8/22/22 8/22/22    1123 1123 1123 1144 1144 1144 1144   Glucose  93   83     BUN  15   9     Creatinine  1.39 (A)   1.21     eGFR Non  Am  53 (A)        Sodium  138   141     Potassium  4.1   4.5     Chloride  104   105     Calcium  9.2   9.4     Albumin     4.70     Total Bilirubin     0.3     Alkaline Phosphatase     53     AST (SGOT)     23     ALT (SGPT)     18     WBC 3.11 (A)  3.1 (A) 3.74   3.8   Hemoglobin 14.1  14.1 14.7   14.9   Hematocrit 42.4  42.8 44.4   45.6   Platelets 186  197 198   218   Total Cholesterol      168    Triglycerides      141    HDL Cholesterol      49    LDL Cholesterol       94    (A) Abnormal value            Data reviewed: most recent ID note reviewed          Assessment and Plan      Eliseo Phan is a 56 y.o. male presenting to [facility] care. No major issues apart from chronic bloating with meals, he is on PPI, will trial flagyl as below to treat for SIBO should this be playing a role. HM reviewed and updated as below. Labs completed at last ID visit, I will see yearly for annual or sooner if  needed.    Diagnoses and all orders for this visit:    1. Bloating (Primary)  -     metroNIDAZOLE (Flagyl) 500 MG tablet; Take 1 tablet by mouth 2 (Two) Times a Day for 14 days.  Dispense: 28 tablet; Refill: 0  - trial flagyl for sibo  - low FODMAPs diet, info provided  - add probiotic  - consider GI referral if symptoms persist or worsen or if new red flag symptoms.    2. Nausea  -     ondansetron (Zofran) 4 MG tablet; Take 1 tablet by mouth Every 6 (Six) Hours As Needed for Nausea or Vomiting.  Dispense: 20 tablet; Refill: 1    3. Hemorrhoids, unspecified hemorrhoid type  -     Ambulatory Referral to General Surgery    4. Healthcare maintenance  - vaccines reviewed; will get covid booster and flu this fall, holding on smallpox given cases low in [place] and on the downfall now   - PSA at next annual (prior was normal and currently asymptomatic), lipids done 8/2022  - CRC screening: has cologuard kit at home, considering colonoscopy will let me know.   - follows with ID q6m for his labs and med management      I spent 60 minutes caring for Eliseo on this date of service. This time includes time spent by me in the following activities:preparing for the visit, reviewing tests, obtaining and/or reviewing a separately obtained history, performing a medically appropriate examination and/or evaluation , counseling and educating the patient/family/caregiver, ordering medications, tests, or procedures and documenting information in the medical record  Follow Up   Return in about 1 year (around 9/19/2023) for Recheck.  Patient was given instructions and counseling regarding his condition or for health maintenance advice. Please see specific information pulled into the AVS if appropriate.     Papa Carey MD  [facility] Internal Medicine and Pediatrics

## 2022-09-23 ENCOUNTER — PATIENT ROUNDING (BHMG ONLY) (OUTPATIENT)
Dept: INTERNAL MEDICINE | Facility: CLINIC | Age: 57
End: 2022-09-23

## 2022-09-23 NOTE — PROGRESS NOTES
A My-Chart message has been sent to the patient for Patient Rounding with [facility].

## 2022-09-28 ENCOUNTER — OFFICE VISIT (OUTPATIENT)
Dept: INTERNAL MEDICINE | Facility: CLINIC | Age: 57
End: 2022-09-28

## 2022-09-28 VITALS
OXYGEN SATURATION: 100 % | HEART RATE: 50 BPM | BODY MASS INDEX: 25.48 KG/M2 | WEIGHT: 178 LBS | HEIGHT: 70 IN | TEMPERATURE: 96.9 F | SYSTOLIC BLOOD PRESSURE: 122 MMHG | RESPIRATION RATE: 16 BRPM | DIASTOLIC BLOOD PRESSURE: 74 MMHG

## 2022-09-28 DIAGNOSIS — L03.818 CELLULITIS OF OTHER SPECIFIED SITE: ICD-10-CM

## 2022-09-28 DIAGNOSIS — Z23 NEED FOR IMMUNIZATION AGAINST INFLUENZA: ICD-10-CM

## 2022-09-28 DIAGNOSIS — R21 RASH AND NONSPECIFIC SKIN ERUPTION: Primary | ICD-10-CM

## 2022-09-28 PROCEDURE — 90686 IIV4 VACC NO PRSV 0.5 ML IM: CPT | Performed by: INTERNAL MEDICINE

## 2022-09-28 PROCEDURE — 90471 IMMUNIZATION ADMIN: CPT | Performed by: INTERNAL MEDICINE

## 2022-09-28 PROCEDURE — 99214 OFFICE O/P EST MOD 30 MIN: CPT | Performed by: INTERNAL MEDICINE

## 2022-09-28 RX ORDER — CEPHALEXIN 500 MG/1
500 CAPSULE ORAL 2 TIMES DAILY
Qty: 14 CAPSULE | Refills: 0 | Status: SHIPPED | OUTPATIENT
Start: 2022-09-28 | End: 2022-10-05

## 2022-09-28 RX ORDER — PREDNISONE 20 MG/1
TABLET ORAL
Qty: 25 TABLET | Refills: 0 | Status: SHIPPED | OUTPATIENT
Start: 2022-09-28 | End: 2023-02-20

## 2022-09-28 RX ORDER — TRIAMCINOLONE ACETONIDE 1 MG/G
1 CREAM TOPICAL 3 TIMES DAILY PRN
Qty: 30 G | Refills: 0 | Status: SHIPPED | OUTPATIENT
Start: 2022-09-28 | End: 2022-10-08

## 2022-09-28 NOTE — PROGRESS NOTES
"Chief Complaint  Rash (On legs )    Subjective        Eliseo Phan presents to [facility] INTER[facility] MEDIC[facility] & PEDIATRICS  History of Present Illness  Here with rash on R thigh, was present last month, resolved with IM steroid injection and 6 day taper, at that time he did think there may have been some poison oak exposure. Rash returned 4 days ago in same distribution, not quite as widespread, now warm to touch, no pus or draining, no fluctuance. No fevers or systemic symptoms, but states he doesn't feel quite his normal. He did see ID last month too who gave him some topical hydrocortisone that didn't seem to do much this time.       Objective   Vital Signs:  /74 (BP Location: Left arm, Patient Position: Sitting, Cuff Size: Large Adult)   Pulse 50   Temp 96.9 °F (36.1 °C) (Temporal)   Resp 16   Ht 177.8 cm (70\")   Wt 80.7 kg (178 lb)   SpO2 100%   BMI 25.54 kg/m²   Estimated body mass index is 25.54 kg/m² as calculated from the following:    Height as of this encounter: 177.8 cm (70\").    Weight as of this encounter: 80.7 kg (178 lb).    BMI is >= 25 and <30. (Overweight) The following options were offered after discussion;: none (medical contraindication)      Physical Exam  Vitals and nursing note reviewed.   Constitutional:       Appearance: Normal appearance. He is normal weight. He is not toxic-appearing or diaphoretic.   HENT:      Head: Normocephalic and atraumatic.      Right Ear: External ear normal.      Left Ear: External ear normal.      Nose: Nose normal.   Eyes:      General:         Right eye: No discharge.         Left eye: No discharge.      Extraocular Movements: Extraocular movements intact.      Conjunctiva/sclera: Conjunctivae normal.      Pupils: Pupils are equal, round, and reactive to light.   Skin:     Findings: Rash present.      Comments: See image below - well demarcated erythematous and warm to touch circular lesion on anterior right thigh, mildly " pruritic   Neurological:      General: No focal deficit present.      Mental Status: He is alert and oriented to person, place, and time. Mental status is at baseline.      Cranial Nerves: No cranial nerve deficit.   Psychiatric:         Mood and Affect: Mood normal.         Thought Content: Thought content normal.            Result Review :  The following data was reviewed by: Papa Carey MD on 09/28/2022:  Common labs    Common Labs 2/18/22 2/18/22 2/18/22 8/22/22 8/22/22 8/22/22 8/22/22    1123 1123 1123 1144 1144 1144 1144   Glucose  93   83     BUN  15   9     Creatinine  1.39 (A)   1.21     eGFR Non  Am  53 (A)        Sodium  138   141     Potassium  4.1   4.5     Chloride  104   105     Calcium  9.2   9.4     Albumin     4.70     Total Bilirubin     0.3     Alkaline Phosphatase     53     AST (SGOT)     23     ALT (SGPT)     18     WBC 3.11 (A)  3.1 (A) 3.74   3.8   Hemoglobin 14.1  14.1 14.7   14.9   Hematocrit 42.4  42.8 44.4   45.6   Platelets 186  197 198   218   Total Cholesterol      168    Triglycerides      141    HDL Cholesterol      49    LDL Cholesterol       94    (A) Abnormal value            Data reviewed: prior office notes reviewed          Assessment and Plan      Eliseo Phan is a 56 y.o. male presenting with rash on anterior right thigh, was present last month and resolved with steroid injection and medrol, possible exposure to poison oak then but not now, I do wonder if this is rebound given poison oak dermatitis should be treated with at least 2 week steroid taper rather than short course. Also warm to touch and fairly well delineated, start keflex for cellulitis. RTC precautions discussed.     Diagnoses and all orders for this visit:    1. Rash and nonspecific skin eruption (Primary)  -     predniSONE (DELTASONE) 20 MG tablet; Take 2 tablets by mouth for 5 days, then take 1.5 tablets by mouth for 5 days, then take 1 tablet by mouth for 5 days, then take 0.5 tablets by mouth for  5 days.  Dispense: 25 tablet; Refill: 0  -     triamcinolone (KENALOG) 0.1 % cream; Apply 1 application topically to the appropriate area as directed 3 (Three) Times a Day As Needed for Rash (pruritis) for up to 10 days.  Dispense: 30 g; Refill: 0    2. Cellulitis of other specified site  -     cephalexin (Keflex) 500 MG capsule; Take 1 capsule by mouth 2 (Two) Times a Day for 7 days.  Dispense: 14 capsule; Refill: 0    3. Need for immunization against influenza  -     FluLaval/Fluzone >6 mos ([id number])    I spent 30 minutes caring for Eliseo on this date of service. This time includes time spent by me in the following activities:preparing for the visit, reviewing tests, obtaining and/or reviewing a separately obtained history, performing a medically appropriate examination and/or evaluation , counseling and educating the patient/family/caregiver, ordering medications, tests, or procedures and documenting information in the medical record  Follow Up   Return for Next scheduled follow up.  Patient was given instructions and counseling regarding his condition or for health maintenance advice. Please see specific information pulled into the AVS if appropriate.

## 2022-11-01 ENCOUNTER — TELEPHONE (OUTPATIENT)
Dept: FAMILY MEDICINE CLINIC | Facility: CLINIC | Age: 57
End: 2022-11-01

## 2022-11-08 ENCOUNTER — TELEPHONE (OUTPATIENT)
Dept: INTERNAL MEDICINE | Facility: CLINIC | Age: 57
End: 2022-11-08

## 2022-11-08 DIAGNOSIS — J40 BRONCHITIS: Primary | ICD-10-CM

## 2022-11-08 RX ORDER — AZITHROMYCIN 250 MG/1
TABLET, FILM COATED ORAL
Qty: 6 TABLET | Refills: 0 | Status: SHIPPED | OUTPATIENT
Start: 2022-11-08 | End: 2023-02-20

## 2022-11-08 NOTE — TELEPHONE ENCOUNTER
Caller: Eliseo Phan    Relationship: Self    Best call back number: [phone]    What medication are you requesting: ANTIBIOTIC    What are your current symptoms: COUGH,  CONGESTION, RUNNY NOSE    How long have you been experiencing symptoms:4 DAYS    Have you had these symptoms before:    [x] Yes  [] No    Have you been treated for these symptoms before:   [x] Yes  [] No    If a prescription is needed, what is your preferred pharmacy and phone number: [facility] [id number] - [facility] [address] [place] [address] [address] [facility] [address] - [phone]  - [phone] FX     Additional notes:

## 2022-11-08 NOTE — TELEPHONE ENCOUNTER
Called spoke with patient. He does not feel like coming in. He states he just wants something to make him feel better. He has tested himself for covid @ home and it was negative. I advised him that you may want to see him in the office he says he would do a telehealth if needed.

## 2022-11-11 ENCOUNTER — TELEPHONE (OUTPATIENT)
Dept: INTERNAL MEDICINE | Facility: CLINIC | Age: 57
End: 2022-11-11

## 2022-11-11 NOTE — TELEPHONE ENCOUNTER
Caller: Eliseo Phan    Relationship: Self    Best call back number: [phone]    What is the best time to reach you: ANY    Who are you requesting to speak with (clinical staff, provider,  specific staff member): CLINICAL      What was the call regarding: WOULD LIKE TO LET DOCTOR CLINT KNOW THAT THE RASH IS BACK ON HIS RIGHT THIGH    Do you require a callback: YES

## 2023-02-20 ENCOUNTER — OFFICE VISIT (OUTPATIENT)
Dept: INFECTIOUS DISEASES | Facility: CLINIC | Age: 58
End: 2023-02-20
Payer: COMMERCIAL

## 2023-02-20 ENCOUNTER — LAB (OUTPATIENT)
Dept: LAB | Facility: HOSPITAL | Age: 58
End: 2023-02-20
Payer: COMMERCIAL

## 2023-02-20 VITALS
SYSTOLIC BLOOD PRESSURE: 124 MMHG | BODY MASS INDEX: 25.48 KG/M2 | TEMPERATURE: 97.3 F | DIASTOLIC BLOOD PRESSURE: 79 MMHG | WEIGHT: 177.6 LBS | RESPIRATION RATE: 16 BRPM | HEART RATE: 71 BPM

## 2023-02-20 DIAGNOSIS — B20 HIV INFECTION, UNSPECIFIED SYMPTOM STATUS: Primary | ICD-10-CM

## 2023-02-20 DIAGNOSIS — B20 HIV INFECTION, UNSPECIFIED SYMPTOM STATUS: ICD-10-CM

## 2023-02-20 DIAGNOSIS — R63.5 WEIGHT GAIN: ICD-10-CM

## 2023-02-20 DIAGNOSIS — Z00.8 NUTRITIONAL ASSESSMENT: ICD-10-CM

## 2023-02-20 LAB
ALBUMIN SERPL-MCNC: 4.3 G/DL (ref 3.5–5.2)
ALBUMIN/GLOB SERPL: 1.5 G/DL
ALP SERPL-CCNC: 49 U/L (ref 39–117)
ALT SERPL W P-5'-P-CCNC: 15 U/L (ref 1–41)
ANION GAP SERPL CALCULATED.3IONS-SCNC: 8.9 MMOL/L (ref 5–15)
AST SERPL-CCNC: 15 U/L (ref 1–40)
BASOPHILS # BLD AUTO: 0.02 10*3/MM3 (ref 0–0.2)
BASOPHILS NFR BLD AUTO: 0.4 % (ref 0–1.5)
BILIRUB SERPL-MCNC: <0.2 MG/DL (ref 0–1.2)
BUN SERPL-MCNC: 13 MG/DL (ref 6–20)
BUN/CREAT SERPL: 10 (ref 7–25)
CALCIUM SPEC-SCNC: 9.4 MG/DL (ref 8.6–10.5)
CHLORIDE SERPL-SCNC: 106 MMOL/L (ref 98–107)
CO2 SERPL-SCNC: 26.1 MMOL/L (ref 22–29)
CREAT SERPL-MCNC: 1.3 MG/DL (ref 0.76–1.27)
DEPRECATED RDW RBC AUTO: 42.5 FL (ref 37–54)
EGFRCR SERPLBLD CKD-EPI 2021: 64.1 ML/MIN/1.73
EOSINOPHIL # BLD AUTO: 0.13 10*3/MM3 (ref 0–0.4)
EOSINOPHIL NFR BLD AUTO: 2.6 % (ref 0.3–6.2)
ERYTHROCYTE [DISTWIDTH] IN BLOOD BY AUTOMATED COUNT: 14.3 % (ref 12.3–15.4)
GLOBULIN UR ELPH-MCNC: 2.8 GM/DL
GLUCOSE SERPL-MCNC: 93 MG/DL (ref 65–99)
HCT VFR BLD AUTO: 40.6 % (ref 37.5–51)
HGB BLD-MCNC: 13.4 G/DL (ref 13–17.7)
IMM GRANULOCYTES # BLD AUTO: 0.02 10*3/MM3 (ref 0–0.05)
IMM GRANULOCYTES NFR BLD AUTO: 0.4 % (ref 0–0.5)
LYMPHOCYTES # BLD AUTO: 1.78 10*3/MM3 (ref 0.7–3.1)
LYMPHOCYTES NFR BLD AUTO: 35.7 % (ref 19.6–45.3)
MCH RBC QN AUTO: 27.2 PG (ref 26.6–33)
MCHC RBC AUTO-ENTMCNC: 33 G/DL (ref 31.5–35.7)
MCV RBC AUTO: 82.4 FL (ref 79–97)
MONOCYTES # BLD AUTO: 0.36 10*3/MM3 (ref 0.1–0.9)
MONOCYTES NFR BLD AUTO: 7.2 % (ref 5–12)
NEUTROPHILS NFR BLD AUTO: 2.67 10*3/MM3 (ref 1.7–7)
NEUTROPHILS NFR BLD AUTO: 53.7 % (ref 42.7–76)
NRBC BLD AUTO-RTO: 0 /100 WBC (ref 0–0.2)
PLATELET # BLD AUTO: 231 10*3/MM3 (ref 140–450)
PMV BLD AUTO: 9 FL (ref 6–12)
POTASSIUM SERPL-SCNC: 4.2 MMOL/L (ref 3.5–5.2)
PROT SERPL-MCNC: 7.1 G/DL (ref 6–8.5)
RBC # BLD AUTO: 4.93 10*6/MM3 (ref 4.14–5.8)
SODIUM SERPL-SCNC: 141 MMOL/L (ref 136–145)
WBC NRBC COR # BLD: 4.98 10*3/MM3 (ref 3.4–10.8)

## 2023-02-20 PROCEDURE — 80053 COMPREHEN METABOLIC PANEL: CPT

## 2023-02-20 PROCEDURE — 85025 COMPLETE CBC W/AUTO DIFF WBC: CPT

## 2023-02-20 PROCEDURE — 87536 HIV-1 QUANT&REVRSE TRNSCRPJ: CPT

## 2023-02-20 PROCEDURE — 36415 COLL VENOUS BLD VENIPUNCTURE: CPT

## 2023-02-20 PROCEDURE — 86361 T CELL ABSOLUTE COUNT: CPT

## 2023-02-20 PROCEDURE — 99214 OFFICE O/P EST MOD 30 MIN: CPT | Performed by: STUDENT IN AN ORGANIZED HEALTH CARE EDUCATION/TRAINING PROGRAM

## 2023-02-20 RX ORDER — BICTEGRAVIR SODIUM, EMTRICITABINE, AND TENOFOVIR ALAFENAMIDE FUMARATE 50; 200; 25 MG/1; MG/1; MG/1
1 TABLET ORAL DAILY
Qty: 30 TABLET | Refills: 5 | Status: SHIPPED | OUTPATIENT
Start: 2023-02-20

## 2023-02-20 NOTE — PROGRESS NOTES
Follow-up      HIV Follow-up Visit: Eliseo Phan is a 57 y.o. male here for follow-up HIV visit. He is feeling unchanged since his last visit.  Patient reports he is doing well with no difficulty tolerating his medication.  He reports he has been sexually active with 1 partner and no anal sex but did have oral sex.  Denies any complaints of sexually transmitted infection.  Denies any fevers or chills.  Reports he has been taking Biktarvy daily without any difficulty.  Denies any missed doses.  He is still slightly concerned with his weight.    Past medical history:  Past Medical History:   Diagnosis Date   • Acid reflux    • Arthritis    • Back pain    • Hepatitis B    • History of kidney stones    • HIV (human immunodeficiency virus infection) ([facility]) 2011   • PONV (postoperative nausea and vomiting)        Medications:   Current Outpatient Medications:   •  baclofen (LIORESAL) 10 MG tablet, Take 1 tablet by mouth 3 (Three) Times a Day. (Patient taking differently: Take 10 mg by mouth As Needed.), Disp: 60 tablet, Rfl: 2  •  Bictegravir-Emtricitab-Tenofov (Biktarvy) -25 MG per tablet, Take 1 tablet by mouth Daily., Disp: 30 tablet, Rfl: 5  •  fluticasone (FLONASE) 50 MCG/ACT nasal spray, INSTILL 2 SPRAYS INTO EACH NOSTRIL DAILY AS NEEDED FOR ALLERGIES, Disp: 16 g, Rfl: 2  •  omeprazole (priLOSEC) 40 MG capsule, TAKE ONE CAPSULE BY MOUTH DAILY, Disp: 90 capsule, Rfl: 3  •  ondansetron (Zofran) 4 MG tablet, Take 1 tablet by mouth Every 6 (Six) Hours As Needed for Nausea or Vomiting., Disp: 20 tablet, Rfl: 1  •  tadalafil (Cialis) 5 MG tablet, Take 1 tablet by mouth Daily., Disp: 90 tablet, Rfl: 3  •  traZODone (DESYREL) 100 MG tablet, TAKE ONE TABLET BY MOUTH ONCE NIGHTLY, Disp: 90 tablet, Rfl: 1    Allergies:  has No Known Allergies.    Family History: family history includes Anxiety disorder in his father and mother; Breast cancer in his mother; Depression in his father and mother; Diabetes in his mother;  Heart disease in his mother; Hypertension in his father and mother; Nephrolithiasis in his brother; Thyroid disease in his mother.    Social History:  reports that he quit smoking about 2 years ago. His smoking use included cigarettes. He has a 5.00 pack-year smoking history. He has never used smokeless tobacco. He reports current drug use. Drug: Other. He reports that he does not drink alcohol.    Review of Systems: All other reviewed and negative except as per HPI    Blood pressure 124/79, pulse 71, temperature 97.3 °F (36.3 °C), resp. rate 16, weight 80.6 kg (177 lb 9.6 oz).  GENERAL: Awake and alert, in no acute distress.   HEENT: Oropharynx is clear. Hearing is grossly normal.   EYES: PERRL. No conjunctival injection. No lid lag.   LYMPHATICS: No lymphadenopathy of the neck or axillary regions.   HEART: Regular rate and rhythm. No peripheral edema.   LUNGS: Clear to auscultation anteriorly with normal respiratory effort.   ABDOMEN: Soft, nontender, nondistended. No appreciable organomegaly.   SKIN: Right thigh with raised erythematous rash.  No discharge or purulence.  No vesicles.  PSYCHIATRIC: Appropriate mood, affect, insight, and judgment.       DIAGNOSTICS:  Lab Results   Component Value Date    WBC 3.8 08/22/2022    WBC 3.74 08/22/2022    HGB 14.9 08/22/2022    HGB 14.7 08/22/2022    HCT 45.6 08/22/2022    HCT 44.4 08/22/2022     08/22/2022     08/22/2022     No results found for: CRP  No results found for: SEDRATE  Lab Results   Component Value Date    GLUCOSE 83 08/22/2022    BUN 9 08/22/2022    CREATININE 1.21 08/22/2022    EGFRIFNONA 53 (L) 02/18/2022    EGFRIFAFRI 68 07/30/2021    BCR 7.4 08/22/2022    CO2 27.7 08/22/2022    CALCIUM 9.4 08/22/2022    PROTENTOTREF 7.4 07/30/2021    ALBUMIN 4.70 08/22/2022    LABIL2 1.6 07/30/2021    AST 23 08/22/2022    ALT 18 08/22/2022       ART history:  2010 through 2017 Atripla  2017 through August 2021 Tivicay plus Descovy  8/2021 through now  Biktarvy     Lab history:  -5/23/2012 597  -9/24/2021 566  -1/14/2016 494  -7/24/2017 542  -9/27/2017 527  -7/11/2018 564  -1/9/2019 536  -7/9/2019 465  -10/4/2019 744  -1/6/2020 508  -11/10/2020 513  -2/24/2021 696  -4/2/2021 513  -8/31/2021 462  - 2/18/2022 419, viral load less than 20  -8/22/2022 CD4 402, 28% and viral load 30    (B20) HIV infection, unspecified symptom status (HCC) - Plan: Ambulatory Referral to Nutrition Services, T-helper Cells (CD4) Count, HIV-1 RNA, Quantitative, PCR (graph), CBC & Differential, Comprehensive Metabolic Panel  Leana diet and exercise today.  He is interested in nutrition services to help him make a meal plan.  We will place referral for nutrition.    Will continue treatment with Biktarvy.  Medication adherence education provided by MD.  See lab orders.  Counseling provided on the prevention of transmission of HIV.  See diagnoses,orders and patient instructions.  Total Duration of Visit: 30 Minutes.  Total Counseling Time: 15 Minutes, counseling the patient regarding HIV prognosis, prevention of HIV transmittal to others, compliance with treatment plan, daily disease management, risk factor reduction and meaning of diagnostic test results.   Follow up in 6 months.      Discussed with patient HIV diagnosis, goals of treatment, lab monitoring, means to reduce transmission, need for adherence to therapy as well as treatment plan, and ways to militate against the progression of disease.  Patient understands and all questions answered.

## 2023-02-21 LAB
BASOPHILS # BLD AUTO: 0 X10E3/UL (ref 0–0.2)
BASOPHILS NFR BLD AUTO: 0 %
CD3+CD4+ CELLS # BLD: 533 /UL (ref 359–1519)
CD3+CD4+ CELLS NFR BLD: 29.6 % (ref 30.8–58.5)
EOSINOPHIL # BLD AUTO: 0.1 X10E3/UL (ref 0–0.4)
EOSINOPHIL NFR BLD AUTO: 2 %
ERYTHROCYTE [DISTWIDTH] IN BLOOD BY AUTOMATED COUNT: 14.1 % (ref 11.6–15.4)
HCT VFR BLD AUTO: 41.7 % (ref 37.5–51)
HGB BLD-MCNC: 13.7 G/DL (ref 13–17.7)
IMM GRANULOCYTES # BLD AUTO: 0 X10E3/UL (ref 0–0.1)
IMM GRANULOCYTES NFR BLD AUTO: 0 %
LYMPHOCYTES # BLD AUTO: 1.8 X10E3/UL (ref 0.7–3.1)
LYMPHOCYTES NFR BLD AUTO: 36 %
MCH RBC QN AUTO: 27 PG (ref 26.6–33)
MCHC RBC AUTO-ENTMCNC: 32.9 G/DL (ref 31.5–35.7)
MCV RBC AUTO: 82 FL (ref 79–97)
MONOCYTES # BLD AUTO: 0.3 X10E3/UL (ref 0.1–0.9)
MONOCYTES NFR BLD AUTO: 6 %
NEUTROPHILS # BLD AUTO: 2.8 X10E3/UL (ref 1.4–7)
NEUTROPHILS NFR BLD AUTO: 56 %
PLATELET # BLD AUTO: 246 X10E3/UL (ref 150–450)
RBC # BLD AUTO: 5.08 X10E6/UL (ref 4.14–5.8)
WBC # BLD AUTO: 5 X10E3/UL (ref 3.4–10.8)

## 2023-02-22 LAB
HIV1 RNA # SERPL NAA+PROBE: 40 COPIES/ML
HIV1 RNA SERPL NAA+PROBE-LOG#: 1.6 LOG10COPY/ML

## 2023-03-10 NOTE — TELEPHONE ENCOUNTER
Caller: Eliseo Phan    Relationship: Self    Best call back number: [phone]     Requested Prescriptions:   Requested Prescriptions     Pending Prescriptions Disp Refills   • traZODone (DESYREL) 100 MG tablet 90 tablet 1     Sig: Take 1 tablet by mouth Every Night.   • tadalafil (Cialis) 5 MG tablet 90 tablet 3     Sig: Take 1 tablet by mouth Daily.        Pharmacy where request should be sent: [facility] #[id number] [address] [phone] [facility] [phone] FX     Additional details provided by patient: PATIENT WONT HAVE ENOUGH OF THE TRAZODONE FOR THE WEEKEND.     PATIENT ALSO NEEDS TRIAMCINOLONE ACETONIDE CREAM    Does the patient have less than a 3 day supply:  [x] Yes  [] No    Would you like a call back once the refill request has been completed: [x] Yes [] No    If the office needs to give you a call back, can they leave a voicemail: [x] Yes [] No    Pernell Paiz Rep   03/10/23 15:57 EST

## 2023-03-11 RX ORDER — TRAZODONE HYDROCHLORIDE 100 MG/1
100 TABLET ORAL NIGHTLY
Qty: 90 TABLET | Refills: 1 | Status: SHIPPED | OUTPATIENT
Start: 2023-03-11

## 2023-03-11 RX ORDER — TADALAFIL 5 MG/1
5 TABLET ORAL DAILY
Qty: 90 TABLET | Refills: 3 | Status: SHIPPED | OUTPATIENT
Start: 2023-03-11 | End: 2023-03-27 | Stop reason: SDUPTHER

## 2023-03-27 DIAGNOSIS — N52.9 ERECTILE DYSFUNCTION, UNSPECIFIED ERECTILE DYSFUNCTION TYPE: Primary | ICD-10-CM

## 2023-03-27 RX ORDER — TADALAFIL 5 MG/1
5 TABLET ORAL DAILY
Qty: 90 TABLET | Refills: 3 | Status: SHIPPED | OUTPATIENT
Start: 2023-03-27

## 2023-04-27 ENCOUNTER — OFFICE VISIT (OUTPATIENT)
Dept: INTERNAL MEDICINE | Facility: CLINIC | Age: 58
End: 2023-04-27
Payer: COMMERCIAL

## 2023-04-27 VITALS
HEIGHT: 70 IN | DIASTOLIC BLOOD PRESSURE: 70 MMHG | SYSTOLIC BLOOD PRESSURE: 122 MMHG | WEIGHT: 182 LBS | TEMPERATURE: 97.5 F | HEART RATE: 78 BPM | OXYGEN SATURATION: 97 % | BODY MASS INDEX: 26.05 KG/M2 | RESPIRATION RATE: 16 BRPM

## 2023-04-27 DIAGNOSIS — E55.9 VITAMIN D DEFICIENCY: Primary | ICD-10-CM

## 2023-04-27 DIAGNOSIS — L65.9 HAIR LOSS: ICD-10-CM

## 2023-04-27 DIAGNOSIS — S39.840D FRACTURE OF CORPUS CAVERNOSUM PENIS, SUBSEQUENT ENCOUNTER: ICD-10-CM

## 2023-04-27 DIAGNOSIS — N52.9 ERECTILE DYSFUNCTION, UNSPECIFIED ERECTILE DYSFUNCTION TYPE: ICD-10-CM

## 2023-04-27 DIAGNOSIS — R63.5 WEIGHT GAIN: ICD-10-CM

## 2023-04-27 RX ORDER — TADALAFIL 20 MG/1
20 TABLET ORAL DAILY PRN
Qty: 30 TABLET | Refills: 11 | Status: SHIPPED | OUTPATIENT
Start: 2023-04-27

## 2023-04-27 RX ORDER — PNV NO.95/FERROUS FUM/FOLIC AC 28MG-0.8MG
1 TABLET ORAL DAILY
Qty: 90 TABLET | Refills: 3 | Status: SHIPPED | OUTPATIENT
Start: 2023-04-27

## 2023-04-27 RX ORDER — CHOLECALCIFEROL (VITAMIN D3) 50 MCG
2000 TABLET ORAL DAILY
Qty: 90 TABLET | Refills: 3 | Status: SHIPPED | OUTPATIENT
Start: 2023-04-27

## 2023-04-27 RX ORDER — FINASTERIDE 1 MG/1
1 TABLET, FILM COATED ORAL DAILY
Qty: 30 TABLET | Refills: 11 | Status: SHIPPED | OUTPATIENT
Start: 2023-04-27

## 2023-04-27 NOTE — PROGRESS NOTES
"Chief Complaint  Personal Problem (Discuss a few personal issues)    Subjective        Eliseo Phan presents to [facility]  History of Present Illness  Here to discuss a few things:     1. History of penile fracture - now on cialis daily, would like to increase dose, no noted side effects.   2. Vitamin d def - request script be sent to [facility] in [place]  3. Male pattern baldness - never been on anything previously. Still has good amount of hair but thinning no significant bald spots   4. Weight gain - felt to be 2/2 biktarvy, he is working with , we discussed weight loss options.     Objective   Vital Signs:  /70   Pulse 78   Temp 97.5 °F (36.4 °C)   Resp 16   Ht 177.8 cm (70\")   Wt 82.6 kg (182 lb)   SpO2 97%   BMI 26.11 kg/m²   Estimated body mass index is 26.11 kg/m² as calculated from the following:    Height as of this encounter: 177.8 cm (70\").    Weight as of this encounter: 82.6 kg (182 lb).         Physical Exam  Vitals and nursing note reviewed.   Constitutional:       General: He is not in acute distress.     Appearance: Normal appearance. He is not toxic-appearing.   HENT:      Head: Normocephalic and atraumatic.      Right Ear: External ear normal.      Left Ear: External ear normal.      Nose: Nose normal.   Eyes:      General: No scleral icterus.        Right eye: No discharge.         Left eye: No discharge.      Extraocular Movements: Extraocular movements intact.      Conjunctiva/sclera: Conjunctivae normal.      Pupils: Pupils are equal, round, and reactive to light.   Cardiovascular:      Rate and Rhythm: Normal rate and regular rhythm.      Pulses: Normal pulses.      Heart sounds: Normal heart sounds. No murmur heard.  Pulmonary:      Effort: Pulmonary effort is normal.   Musculoskeletal:         General: Normal range of motion.   Skin:     General: Skin is warm.      Capillary Refill: Capillary refill takes less than 2 " seconds.   Neurological:      General: No focal deficit present.      Mental Status: He is alert and oriented to person, place, and time. Mental status is at baseline.      Cranial Nerves: No cranial nerve deficit.      Gait: Gait normal.   Psychiatric:         Mood and Affect: Mood normal.         Behavior: Behavior normal.         Thought Content: Thought content normal.         Judgment: Judgment normal.        Result Review :  The following data was reviewed by: Papa Carey MD on 04/27/2023:  Common labs        8/22/2022    11:44 2/20/2023    11:46   Common Labs   Glucose 83   93     BUN 9   13     Creatinine 1.21   1.30     Sodium 141   141     Potassium 4.5   4.2     Chloride 105   106     Calcium 9.4   9.4     Albumin 4.70   4.3     Total Bilirubin 0.3   <0.2     Alkaline Phosphatase 53   49     AST (SGOT) 23   15     ALT (SGPT) 18   15     WBC 3.8      3.74   5.0      4.98     Hemoglobin 14.9      14.7   13.7      13.4     Hematocrit 45.6      44.4   41.7      40.6     Platelets 218      198   246      231     Total Cholesterol 168      Triglycerides 141      HDL Cholesterol 49      LDL Cholesterol  94        Data reviewed: prior office notes reviewed             Assessment and Plan      Eliseo Phan is a 57 y.o. male presenting with a few different issues as outlined below. Check labs for potential triggers for weight gain, has been on biktarvy for ~3 years, r/o hypogonadism, hypothyroidism, consider short course of phentermine to reset if labs unrevealing. Start propecia and topical minoxidil for male pattern hair loss.     Diagnoses and all orders for this visit:    1. Vitamin D deficiency (Primary)  -     Cholecalciferol (Vitamin D) 50 MCG (2000 UT) tablet; Take 2,000 Units by mouth Daily.  Dispense: 90 tablet; Refill: 3  -     Prenatal Vit-Fe Fumarate-FA (Prenatal Vitamin) 27-0.8 MG tablet; Take 1 tablet by mouth Daily.  Dispense: 90 tablet; Refill: 3    2. Erectile dysfunction, unspecified erectile  dysfunction type  -     tadalafil (Cialis) 20 MG tablet; Take 1 tablet by mouth Daily As Needed for Erectile Dysfunction.  Dispense: 30 tablet; Refill: 11    3. Fracture of corpus cavernosum penis, subsequent encounter  -     tadalafil (Cialis) 20 MG tablet; Take 1 tablet by mouth Daily As Needed for Erectile Dysfunction.  Dispense: 30 tablet; Refill: 11    4. Weight gain  -     Hemoglobin A1c  -     Lipid panel  -     TSH  -     Testosterone, Free, Total  -     FSH & LH    5. Hair loss  -     finasteride (PROPECIA) 1 MG tablet; Take 1 tablet by mouth Daily.  Dispense: 30 tablet; Refill: 11  -     Minoxidil 5 % foam; Apply to scalp topically daily.  Dispense: 60 g; Refill: 2           I spent 25 minutes caring for Eliseo on this date of service. This time includes time spent by me in the following activities:preparing for the visit, reviewing tests, obtaining and/or reviewing a separately obtained history, performing a medically appropriate examination and/or evaluation , counseling and educating the patient/family/caregiver, ordering medications, tests, or procedures and documenting information in the medical record  Follow Up   Return for Next scheduled follow up.  Patient was given instructions and counseling regarding his condition or for health maintenance advice. Please see specific information pulled into the AVS if appropriate.     Papa Carey MD  [facility]  [address]  Phone: [phone]

## 2023-04-28 LAB
CHOLEST SERPL-MCNC: 169 MG/DL (ref 0–200)
FSH SERPL-ACNC: 3.2 MIU/ML (ref 1.5–12.4)
HBA1C MFR BLD: 5.5 % (ref 4.8–5.6)
HDLC SERPL-MCNC: 46 MG/DL (ref 40–60)
LDLC SERPL CALC-MCNC: 105 MG/DL (ref 0–100)
LH SERPL-ACNC: 10.1 MIU/ML (ref 1.7–8.6)
TESTOST FREE SERPL-MCNC: 6.8 PG/ML (ref 7.2–24)
TESTOST SERPL-MCNC: 440 NG/DL (ref 264–916)
TRIGL SERPL-MCNC: 95 MG/DL (ref 0–150)
TSH SERPL DL<=0.005 MIU/L-ACNC: 1.29 UIU/ML (ref 0.27–4.2)
VLDLC SERPL CALC-MCNC: 18 MG/DL (ref 5–40)

## 2023-05-03 DIAGNOSIS — R63.5 WEIGHT GAIN: Primary | ICD-10-CM

## 2023-05-03 RX ORDER — PHENTERMINE HYDROCHLORIDE 37.5 MG/1
37.5 TABLET ORAL
Qty: 30 TABLET | Refills: 0 | Status: SHIPPED | OUTPATIENT
Start: 2023-05-03

## 2023-08-16 ENCOUNTER — OFFICE VISIT (OUTPATIENT)
Dept: INFECTIOUS DISEASES | Facility: CLINIC | Age: 58
End: 2023-08-16
Payer: COMMERCIAL

## 2023-08-16 ENCOUNTER — LAB (OUTPATIENT)
Dept: LAB | Facility: HOSPITAL | Age: 58
End: 2023-08-16
Payer: COMMERCIAL

## 2023-08-16 VITALS
RESPIRATION RATE: 18 BRPM | BODY MASS INDEX: 24.34 KG/M2 | WEIGHT: 170 LBS | HEIGHT: 70 IN | TEMPERATURE: 97.7 F | HEART RATE: 60 BPM | DIASTOLIC BLOOD PRESSURE: 83 MMHG | SYSTOLIC BLOOD PRESSURE: 131 MMHG

## 2023-08-16 DIAGNOSIS — Z21 ASYMPTOMATIC HIV INFECTION, WITH NO HISTORY OF HIV-RELATED ILLNESS: ICD-10-CM

## 2023-08-16 DIAGNOSIS — Z21 ASYMPTOMATIC HIV INFECTION, WITH NO HISTORY OF HIV-RELATED ILLNESS: Primary | ICD-10-CM

## 2023-08-16 LAB
ALBUMIN SERPL-MCNC: 4.4 G/DL (ref 3.5–5.2)
ALBUMIN/GLOB SERPL: 1.7 G/DL
ALP SERPL-CCNC: 53 U/L (ref 39–117)
ALT SERPL W P-5'-P-CCNC: 16 U/L (ref 1–41)
ANION GAP SERPL CALCULATED.3IONS-SCNC: 11 MMOL/L (ref 5–15)
AST SERPL-CCNC: 16 U/L (ref 1–40)
BASOPHILS # BLD AUTO: 0.03 10*3/MM3 (ref 0–0.2)
BASOPHILS NFR BLD AUTO: 0.7 % (ref 0–1.5)
BILIRUB SERPL-MCNC: 0.4 MG/DL (ref 0–1.2)
BUN SERPL-MCNC: 13 MG/DL (ref 6–20)
BUN/CREAT SERPL: 9.2 (ref 7–25)
CALCIUM SPEC-SCNC: 10.1 MG/DL (ref 8.6–10.5)
CHLORIDE SERPL-SCNC: 102 MMOL/L (ref 98–107)
CO2 SERPL-SCNC: 26 MMOL/L (ref 22–29)
CREAT SERPL-MCNC: 1.41 MG/DL (ref 0.76–1.27)
DEPRECATED RDW RBC AUTO: 51 FL (ref 37–54)
EGFRCR SERPLBLD CKD-EPI 2021: 58.1 ML/MIN/1.73
EOSINOPHIL # BLD AUTO: 0.11 10*3/MM3 (ref 0–0.4)
EOSINOPHIL NFR BLD AUTO: 2.7 % (ref 0.3–6.2)
ERYTHROCYTE [DISTWIDTH] IN BLOOD BY AUTOMATED COUNT: 16.4 % (ref 12.3–15.4)
GLOBULIN UR ELPH-MCNC: 2.6 GM/DL
GLUCOSE SERPL-MCNC: 93 MG/DL (ref 65–99)
HCT VFR BLD AUTO: 44.6 % (ref 37.5–51)
HGB BLD-MCNC: 14.9 G/DL (ref 13–17.7)
IMM GRANULOCYTES # BLD AUTO: 0.01 10*3/MM3 (ref 0–0.05)
IMM GRANULOCYTES NFR BLD AUTO: 0.2 % (ref 0–0.5)
LYMPHOCYTES # BLD AUTO: 1.42 10*3/MM3 (ref 0.7–3.1)
LYMPHOCYTES NFR BLD AUTO: 35.2 % (ref 19.6–45.3)
MCH RBC QN AUTO: 28.1 PG (ref 26.6–33)
MCHC RBC AUTO-ENTMCNC: 33.4 G/DL (ref 31.5–35.7)
MCV RBC AUTO: 84.2 FL (ref 79–97)
MONOCYTES # BLD AUTO: 0.25 10*3/MM3 (ref 0.1–0.9)
MONOCYTES NFR BLD AUTO: 6.2 % (ref 5–12)
NEUTROPHILS NFR BLD AUTO: 2.21 10*3/MM3 (ref 1.7–7)
NEUTROPHILS NFR BLD AUTO: 55 % (ref 42.7–76)
NRBC BLD AUTO-RTO: 0 /100 WBC (ref 0–0.2)
PLATELET # BLD AUTO: 196 10*3/MM3 (ref 140–450)
PMV BLD AUTO: 9.1 FL (ref 6–12)
POTASSIUM SERPL-SCNC: 4.2 MMOL/L (ref 3.5–5.2)
PROT SERPL-MCNC: 7 G/DL (ref 6–8.5)
RBC # BLD AUTO: 5.3 10*6/MM3 (ref 4.14–5.8)
RPR SER QL: REACTIVE
RPR SER-TITR: ABNORMAL {TITER}
SODIUM SERPL-SCNC: 139 MMOL/L (ref 136–145)
WBC NRBC COR # BLD: 4.03 10*3/MM3 (ref 3.4–10.8)

## 2023-08-16 PROCEDURE — 85025 COMPLETE CBC W/AUTO DIFF WBC: CPT

## 2023-08-16 PROCEDURE — 80053 COMPREHEN METABOLIC PANEL: CPT

## 2023-08-16 PROCEDURE — 86592 SYPHILIS TEST NON-TREP QUAL: CPT

## 2023-08-16 PROCEDURE — 36415 COLL VENOUS BLD VENIPUNCTURE: CPT

## 2023-08-16 PROCEDURE — 86780 TREPONEMA PALLIDUM: CPT

## 2023-08-16 PROCEDURE — 86593 SYPHILIS TEST NON-TREP QUANT: CPT

## 2023-08-16 PROCEDURE — 87536 HIV-1 QUANT&REVRSE TRNSCRPJ: CPT

## 2023-08-16 PROCEDURE — 99214 OFFICE O/P EST MOD 30 MIN: CPT | Performed by: STUDENT IN AN ORGANIZED HEALTH CARE EDUCATION/TRAINING PROGRAM

## 2023-08-16 PROCEDURE — 86361 T CELL ABSOLUTE COUNT: CPT

## 2023-08-16 PROCEDURE — 86480 TB TEST CELL IMMUN MEASURE: CPT

## 2023-08-16 RX ORDER — BICTEGRAVIR SODIUM, EMTRICITABINE, AND TENOFOVIR ALAFENAMIDE FUMARATE 50; 200; 25 MG/1; MG/1; MG/1
1 TABLET ORAL DAILY
Qty: 30 TABLET | Refills: 5 | Status: SHIPPED | OUTPATIENT
Start: 2023-08-16

## 2023-08-16 NOTE — PROGRESS NOTES
Follow-up      HIV Follow-up Visit: Eliseo Phan is a 57 y.o. male here for follow-up HIV visit. He is feeling unchanged since his last visit.  Patient reports he is doing very well.  Tolerating Biktarvy without any difficulty.  States he is lost 15 pounds exercising and cutting out sugar.  Denies any recent medication changes.  Takes Biktarvy daily without any missed doses.  No difficulties obtaining the medication.  States he is not sexually active.  Reports no concern for any sexually transmitted infections today.    Past medical history:  Past Medical History:   Diagnosis Date    Acid reflux     Arthritis     Back pain     Hepatitis B     History of kidney stones     HIV (human immunodeficiency virus infection) 2011    PONV (postoperative nausea and vomiting)        Medications:   Current Outpatient Medications:     baclofen (LIORESAL) 10 MG tablet, Take 1 tablet by mouth 3 (Three) Times a Day. (Patient taking differently: Take 1 tablet by mouth As Needed.), Disp: 60 tablet, Rfl: 2    Bictegravir-Emtricitab-Tenofov (Biktarvy) -25 MG per tablet, Take 1 tablet by mouth Daily., Disp: 30 tablet, Rfl: 5    Cholecalciferol (Vitamin D) 50 MCG (2000 UT) tablet, Take 2,000 Units by mouth Daily., Disp: 90 tablet, Rfl: 3    fluticasone (FLONASE) 50 MCG/ACT nasal spray, INSTILL 2 SPRAYS INTO EACH NOSTRIL DAILY AS NEEDED FOR ALLERGIES, Disp: 16 g, Rfl: 2    omeprazole (priLOSEC) 40 MG capsule, Take 1 capsule by mouth Daily., Disp: 90 capsule, Rfl: 3    ondansetron (Zofran) 4 MG tablet, Take 1 tablet by mouth Every 6 (Six) Hours As Needed for Nausea or Vomiting., Disp: 20 tablet, Rfl: 1    Prenatal Vit-Fe Fumarate-FA (Prenatal Vitamin) 27-0.8 MG tablet, Take 1 tablet by mouth Daily., Disp: 90 tablet, Rfl: 3    tadalafil (Cialis) 20 MG tablet, Take 1 tablet by mouth Daily As Needed for Erectile Dysfunction., Disp: 30 tablet, Rfl: 11    traZODone (DESYREL) 100 MG tablet, Take 1 tablet by mouth Every Night., Disp: 90  "tablet, Rfl: 1    Allergies:  has No Known Allergies.    Family History: family history includes Anxiety disorder in his father and mother; Breast cancer in his mother; Depression in his father and mother; Diabetes in his mother; Heart disease in his mother; Hypertension in his father and mother; Nephrolithiasis in his brother; Thyroid disease in his mother.    Social History:  reports that he quit smoking about 3 years ago. His smoking use included cigarettes. He has a 5.00 pack-year smoking history. He has never used smokeless tobacco. He reports current drug use. Drug: Other. He reports that he does not drink alcohol.    Review of Systems: All other reviewed and negative except as per HPI    Blood pressure 131/83, pulse 60, temperature 97.7 øF (36.5 øC), temperature source Oral, resp. rate 18, height 177.8 cm (70\"), weight 77.1 kg (170 lb).  GENERAL: Awake and alert, in no acute distress.   HEENT: Oropharynx is clear. Hearing is grossly normal.   EYES: PERRL. No conjunctival injection. No lid lag.   HEART: Regular rate. No peripheral edema.   LUNGS: Clear to auscultation anteriorly with normal respiratory effort.   ABDOMEN: Soft, nontender, nondistended. No appreciable organomegaly.   SKIN: Right thigh with raised erythematous rash.  No discharge or purulence.  No vesicles.  PSYCHIATRIC: Appropriate mood, affect, insight, and judgment.       DIAGNOSTICS:  Lab Results   Component Value Date    WBC 5.0 02/20/2023    WBC 4.98 02/20/2023    HGB 13.7 02/20/2023    HGB 13.4 02/20/2023    HCT 41.7 02/20/2023    HCT 40.6 02/20/2023     02/20/2023     02/20/2023     No results found for: CRP  No results found for: SEDRATE  Lab Results   Component Value Date    GLUCOSE 93 02/20/2023    BUN 13 02/20/2023    CREATININE 1.30 (H) 02/20/2023    EGFRIFNONA 53 (L) 02/18/2022    EGFRIFAFRI 68 07/30/2021    BCR 10.0 02/20/2023    CO2 26.1 02/20/2023    CALCIUM 9.4 02/20/2023    PROTENTOTREF 7.4 07/30/2021    ALBUMIN " 4.3 02/20/2023    LABIL2 1.6 07/30/2021    AST 15 02/20/2023    ALT 15 02/20/2023       ART history:  2010 through 2017 Atripla  2017 through August 2021 Tivicay plus Descovy  8/2021 through now Biktarvy     Lab history:  -5/23/2012 597  -9/24/2021 566  -1/14/2016 494  -7/24/2017 542  -9/27/2017 527  -7/11/2018 564  -1/9/2019 536  -7/9/2019 465  -10/4/2019 744  -1/6/2020 508  -11/10/2020 513  -2/24/2021 696  -4/2/2021 513  -8/31/2021 462  - 2/18/2022 419, viral load less than 20  -8/22/2022 CD4 402, 28% and viral load 30  -2/20/2023 CD4 533, 29% and viral load 40    (Z21) Asymptomatic HIV infection, with no history of HIV-related illness - Plan: T-helper Cells (CD4) Count, HIV-1 RNA, Quantitative, PCR (graph), CBC & Differential, Comprehensive Metabolic Panel, QuantiFERON TB Gold, RPR  Patient doing well today and counseled on taking his prenatal vitamin in the morning and Biktarvy at night to separate them.    Will continue treatment with Biktarvy.  Medication adherence education provided by MD.  See lab orders.  Counseling provided on the prevention of transmission of HIV.  See diagnoses,orders and patient instructions.  Total Duration of Visit: 30 Minutes.  Total Counseling Time: 15 Minutes, counseling the patient regarding HIV prognosis, prevention of HIV transmittal to others, compliance with treatment plan, daily disease management, risk factor reduction and meaning of diagnostic test results.   Follow up in 6 months.      Discussed with patient HIV diagnosis, goals of treatment, lab monitoring, means to reduce transmission, need for adherence to therapy as well as treatment plan, and ways to militate against the progression of disease.  Patient understands and all questions answered.

## 2023-08-17 LAB
BASOPHILS # BLD AUTO: 0 X10E3/UL (ref 0–0.2)
BASOPHILS NFR BLD AUTO: 1 %
CD3+CD4+ CELLS # BLD: 454 /UL (ref 359–1519)
CD3+CD4+ CELLS NFR BLD: 32.4 % (ref 30.8–58.5)
EOSINOPHIL # BLD AUTO: 0.1 X10E3/UL (ref 0–0.4)
EOSINOPHIL NFR BLD AUTO: 3 %
ERYTHROCYTE [DISTWIDTH] IN BLOOD BY AUTOMATED COUNT: 16.1 % (ref 11.6–15.4)
HCT VFR BLD AUTO: 44.3 % (ref 37.5–51)
HGB BLD-MCNC: 14.8 G/DL (ref 13–17.7)
IMM GRANULOCYTES # BLD AUTO: 0 X10E3/UL (ref 0–0.1)
IMM GRANULOCYTES NFR BLD AUTO: 0 %
LYMPHOCYTES # BLD AUTO: 1.4 X10E3/UL (ref 0.7–3.1)
LYMPHOCYTES NFR BLD AUTO: 35 %
MCH RBC QN AUTO: 28.1 PG (ref 26.6–33)
MCHC RBC AUTO-ENTMCNC: 33.4 G/DL (ref 31.5–35.7)
MCV RBC AUTO: 84 FL (ref 79–97)
MONOCYTES # BLD AUTO: 0.3 X10E3/UL (ref 0.1–0.9)
MONOCYTES NFR BLD AUTO: 8 %
NEUTROPHILS # BLD AUTO: 2.1 X10E3/UL (ref 1.4–7)
NEUTROPHILS NFR BLD AUTO: 53 %
PLATELET # BLD AUTO: 204 X10E3/UL (ref 150–450)
RBC # BLD AUTO: 5.26 X10E6/UL (ref 4.14–5.8)
WBC # BLD AUTO: 3.9 X10E3/UL (ref 3.4–10.8)

## 2023-08-18 LAB
HIV1 RNA # SERPL NAA+PROBE: <20 COPIES/ML
HIV1 RNA SERPL NAA+PROBE-LOG#: NORMAL LOG10COPY/ML
TREPONEMA PALLIDUM IGG+IGM AB [PRESENCE] IN SERUM OR PLASMA BY IMMUNOASSAY: REACTIVE

## 2023-08-19 LAB
GAMMA INTERFERON BACKGROUND BLD IA-ACNC: 0.03 IU/ML
M TB IFN-G BLD-IMP: NEGATIVE
M TB IFN-G CD4+ T-CELLS BLD-ACNC: 0.03 IU/ML
M TBIFN-G CD4+ CD8+T-CELLS BLD-ACNC: 0.05 IU/ML
MITOGEN IGNF BLD-ACNC: >10 IU/ML
QUANTIFERON INCUBATION: NORMAL
SERVICE CMNT-IMP: NORMAL

## 2023-09-05 RX ORDER — TRAZODONE HYDROCHLORIDE 100 MG/1
TABLET ORAL
Qty: 90 TABLET | Refills: 1 | Status: SHIPPED | OUTPATIENT
Start: 2023-09-05

## 2023-09-05 NOTE — TELEPHONE ENCOUNTER
Rx Refill Note  Requested Prescriptions     Pending Prescriptions Disp Refills    traZODone (DESYREL) 100 MG tablet [Pharmacy Med Name: TRAZODONE 100 MG TABLET] 90 tablet 1     Sig: TAKE 1 TABLET BY MOUTH EVERY DAY AT NIGHT      Last office visit with prescribing clinician: 4/27/2023   Last telemedicine visit with prescribing clinician: Visit date not found   Next office visit with prescribing clinician: Visit date not found                         Would you like a call back once the refill request has been completed: [] Yes [] No    If the office needs to give you a call back, can they leave a voicemail: [] Yes [] No    Ravinder Beckman MA  09/05/23, 09:05 EDT

## 2023-11-14 ENCOUNTER — TELEPHONE (OUTPATIENT)
Dept: INTERNAL MEDICINE | Facility: CLINIC | Age: 58
End: 2023-11-14

## 2023-11-14 NOTE — TELEPHONE ENCOUNTER
Caller: Eliseo Phan    Relationship: Self    Best call back number: [phone]    What medication are you requesting: VARENICLINE AND TRIAMCINOLONE ACETONIDE .1 %    What are your current symptoms: SMOKING       If a prescription is needed, what is your preferred pharmacy and phone number: [id number] - [place], [place] - [address] [phone] [facility] [phone]      Additional notes: PATIENT STATES THAT HE HAS TAKEN THIS MEDICATION TO STOP SMOKING BEFORE AND IT HAS WORKED FOR HIM.     PATIENT WOULD LIKE TO TRY THIS MEDICATION AGAIN.     PATIENT WOULD LIKE TO TRY THE CREAM AGAIN FOR A RASH LOCATED ON HIS LEG.

## 2023-12-13 ENCOUNTER — OFFICE VISIT (OUTPATIENT)
Dept: INTERNAL MEDICINE | Facility: CLINIC | Age: 58
End: 2023-12-13
Payer: COMMERCIAL

## 2023-12-13 VITALS
RESPIRATION RATE: 16 BRPM | WEIGHT: 171.5 LBS | HEIGHT: 70 IN | BODY MASS INDEX: 24.55 KG/M2 | HEART RATE: 73 BPM | DIASTOLIC BLOOD PRESSURE: 70 MMHG | OXYGEN SATURATION: 100 % | SYSTOLIC BLOOD PRESSURE: 124 MMHG

## 2023-12-13 DIAGNOSIS — K64.4 EXTERNAL HEMORRHOID: ICD-10-CM

## 2023-12-13 DIAGNOSIS — Z71.6 ENCOUNTER FOR SMOKING CESSATION COUNSELING: ICD-10-CM

## 2023-12-13 DIAGNOSIS — Z12.11 COLON CANCER SCREENING: Primary | ICD-10-CM

## 2023-12-13 RX ORDER — VARENICLINE TARTRATE 1 MG/1
1 TABLET, FILM COATED ORAL 2 TIMES DAILY
Qty: 56 TABLET | Refills: 1 | Status: SHIPPED | OUTPATIENT
Start: 2024-01-10 | End: 2024-03-06

## 2023-12-13 RX ORDER — VARENICLINE TARTRATE 0.5 (11)-1
KIT ORAL
Qty: 1 EACH | Refills: 0 | Status: SHIPPED | OUTPATIENT
Start: 2023-12-13 | End: 2024-01-10

## 2023-12-13 NOTE — PROGRESS NOTES
"Chief Complaint  Med Refill (Would like a Chantix prescription//Needs a GI referral for a colonoscopy)    Subjective        Eliseo Phan presents to [facility]  History of Present Illness  Here for follow up   Needs GI referral for colonoscopy, he also has external hemorrhoids that he would like evaluated for removal.   He has been vaping and issues getting off, did do chantix previously and tolerated well with no significant side effects would like to restart this.    Objective   Vital Signs:  /70   Pulse 73   Resp 16   Ht 177.8 cm (70\")   Wt 77.8 kg (171 lb 8 oz)   SpO2 100%   BMI 24.61 kg/m²   Estimated body mass index is 24.61 kg/m² as calculated from the following:    Height as of this encounter: 177.8 cm (70\").    Weight as of this encounter: 77.8 kg (171 lb 8 oz).       BMI is within normal parameters. No other follow-up for BMI required.      Physical Exam  Vitals and nursing note reviewed.   Constitutional:       General: He is not in acute distress.     Appearance: Normal appearance. He is not toxic-appearing.   HENT:      Head: Normocephalic and atraumatic.      Right Ear: External ear normal.      Left Ear: External ear normal.      Nose: Nose normal.   Eyes:      General: No scleral icterus.        Right eye: No discharge.         Left eye: No discharge.      Extraocular Movements: Extraocular movements intact.      Conjunctiva/sclera: Conjunctivae normal.      Pupils: Pupils are equal, round, and reactive to light.   Cardiovascular:      Rate and Rhythm: Normal rate and regular rhythm.      Pulses: Normal pulses.      Heart sounds: Normal heart sounds. No murmur heard.  Pulmonary:      Effort: Pulmonary effort is normal.   Musculoskeletal:         General: Normal range of motion.   Skin:     General: Skin is warm.      Capillary Refill: Capillary refill takes less than 2 seconds.   Neurological:      General: No focal deficit present.      " Mental Status: He is alert and oriented to person, place, and time. Mental status is at baseline.      Cranial Nerves: No cranial nerve deficit.      Gait: Gait normal.   Psychiatric:         Mood and Affect: Mood normal.         Behavior: Behavior normal.         Thought Content: Thought content normal.         Judgment: Judgment normal.        Result Review :  The following data was reviewed by: Papa Carey MD on 12/13/2023:  Common labs          2/20/2023    11:46 4/27/2023    11:44 8/16/2023    10:37   Common Labs   Glucose 93   93    BUN 13   13    Creatinine 1.30   1.41    Sodium 141   139    Potassium 4.2   4.2    Chloride 106   102    Calcium 9.4   10.1    Albumin 4.3   4.4    Total Bilirubin <0.2   0.4    Alkaline Phosphatase 49   53    AST (SGOT) 15   16    ALT (SGPT) 15   16    WBC 5.0     4.98   3.9     4.03    Hemoglobin 13.7     13.4   14.8     14.9    Hematocrit 41.7     40.6   44.3     44.6    Platelets 246     231   204     196    Total Cholesterol  169     Triglycerides  95     HDL Cholesterol  46     LDL Cholesterol   105     Hemoglobin A1C  5.50       Data reviewed : prior office notes reviewed             Assessment and Plan   Diagnoses and all orders for this visit:    1. Colon cancer screening (Primary)  -     Ambulatory Referral For Screening Colonoscopy    2. Encounter for smoking cessation counseling  -     Varenicline Tartrate, Starter, 0.5 MG X 11 & 1 MG X 42 tablet therapy pack; Take 0.5 mg by mouth Daily for 3 days, THEN 0.5 mg 2 (Two) Times a Day for 4 days, THEN 1 mg 2 (Two) Times a Day for 21 days. Take 0.5 mg po daily x 3 days, then 0.5 mg po bid x 4 days, then 1 mg po bid  Dispense: 1 each; Refill: 0  -     varenicline (Chantix Continuing Month Panfilo) 1 MG tablet; Take 1 tablet by mouth 2 (Two) Times a Day for 56 days.  Dispense: 56 tablet; Refill: 1    3. External hemorrhoid  -     Ambulatory Referral For Screening Colonoscopy    Start chantix for nicotine use disorder. Discussed  risk/benefits/side effects. Return precautions discussed.       I spent 15 minutes caring for Eliseo on this date of service. This time includes time spent by me in the following activities:preparing for the visit, reviewing tests, obtaining and/or reviewing a separately obtained history, performing a medically appropriate examination and/or evaluation , counseling and educating the patient/family/caregiver, ordering medications, tests, or procedures, and documenting information in the medical record  Follow Up   Return for Next scheduled follow up.  Patient was given instructions and counseling regarding his condition or for health maintenance advice. Please see specific information pulled into the AVS if appropriate.     Papa Carey MD  [facility]  [address] [place] [address]  Phone: [phone]

## 2023-12-28 ENCOUNTER — OFFICE VISIT (OUTPATIENT)
Dept: SURGERY | Facility: CLINIC | Age: 58
End: 2023-12-28
Payer: COMMERCIAL

## 2023-12-28 VITALS
BODY MASS INDEX: 25.31 KG/M2 | OXYGEN SATURATION: 94 % | HEIGHT: 70 IN | WEIGHT: 176.8 LBS | SYSTOLIC BLOOD PRESSURE: 124 MMHG | DIASTOLIC BLOOD PRESSURE: 78 MMHG | HEART RATE: 72 BPM

## 2023-12-28 DIAGNOSIS — Z12.11 COLON CANCER SCREENING: Primary | ICD-10-CM

## 2023-12-28 DIAGNOSIS — K64.4 ANAL SKIN TAG: ICD-10-CM

## 2023-12-28 PROCEDURE — 99203 OFFICE O/P NEW LOW 30 MIN: CPT | Performed by: SURGERY

## 2023-12-28 NOTE — PROGRESS NOTES
Colorectal & General Surgery  Consultation    Patient: Eliseo Phan  YOB: 1965  MRN: [id number]      Assessment  Eliseo Phan is a 58 y.o. male who presents with external anal skin tag secondary to prior hemorrhoids as well as the need for colon cancer screening.  He has a few episodes of loose stools on a daily basis but otherwise no other concerning clinical findings related to his risk for colon cancer.  He does not currently have any trouble with hemorrhoids.  He wishes to proceed with a screening colonoscopy.  We discussed the risk, benefits, alternatives to the procedure.    Following his colonoscopy, we will discuss possible excision of his anal skin tags    Referring Provider: Papa Carey MD     Reason for Consultation: Colon cancer screening    History of Present Illness   Eliseo Phan is a 58 y.o. male with well-controlled HIV who presents to the office to discuss colon cancer screening as well as some external anal skin tags at site of previous external hemorrhoids.  He denies hematochezia, melena, tenesmus, constipation.  He says he has 3-4 bowel movements a day, the first of which is solid and 2 or 3 after that are looser.  He denies any unintentional weight loss.  He has no personal or family history of colon cancer.    Most recent colonoscopy: Never    Past Medical History   Past Medical History:   Diagnosis Date    Acid reflux     Arthritis     Back pain     Hepatitis B     History of kidney stones     HIV (human immunodeficiency virus infection) 2011    PONV (postoperative nausea and vomiting)         Past Surgical History   Past Surgical History:   Procedure Laterality Date    APPENDECTOMY      BONE TUMOR EXCISION Right     right lower leg - as a child    KNEE ARTHROSCOPY Left     LUMBAR FUSION Left 7/14/2020    Procedure: LUMBAR 4 TO LUMBAR 5, LUMBAR 5  TO SACRAL 1 LEFT METRX TRANSFORAMINAL LUMBAR INTERBODY FUSION WITH CAGES AND SEXTANT PEDICLE SCREW FIXATION;  Surgeon:  Thomas Torres MD;  Location: [facility];  Service: Neurosurgery;  Laterality: Left;    LUMBAR LAMINECTOMY DISCECTOMY DECOMPRESSION N/A 4/5/2018    Procedure: L4-5, L5-S1 Lumbar Laminectomy;  Surgeon: Ernie Curtis MD;  Location: [facility];  Service: Neurosurgery    TONSILLECTOMY  1967       Social History  Social History     Socioeconomic History    Marital status: Single    Highest education level: Master's degree (e.g., MA, MS, [place], MEd, MSW, FAIN)   Tobacco Use    Smoking status: Former     Packs/day: 0.50     Years: 10.00     Additional pack years: 0.00     Total pack years: 5.00     Types: Cigarettes     Quit date: 4/1/2020     Years since quitting: 3.7    Smokeless tobacco: Never   Vaping Use    Vaping Use: Every day    Substances: Nicotine   Substance and Sexual Activity    Alcohol use: No    Drug use: Not Currently     Types: Amphetamines, Fentanyl, Other     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    Sexual activity: Not Currently     Partners: Male       Family History  Family History   Problem Relation Age of Onset    Breast cancer Mother     Heart disease Mother     Hypertension Mother     Diabetes Mother     Thyroid disease Mother     Depression Mother     Anxiety disorder Mother     Alcohol abuse Mother     Arthritis Mother     Cancer Mother         Breast cancer    Hypertension Father     Depression Father     Anxiety disorder Father     Arthritis Father     Nephrolithiasis Brother     Asthma Brother     Arthritis Maternal Grandmother     Malig Hyperthermia Neg Hx        Colorectal cancer family history: None    Review of Systems  Negative except as documented in the HPI.     Allergies  No Known Allergies    Medications    Current Outpatient Medications:     Bictegravir-Emtricitab-Tenofov (Biktarvy) -25 MG per tablet, Take 1 tablet by mouth Daily., Disp: 30 tablet, Rfl: 5    Cholecalciferol (Vitamin D) 50 MCG (2000 UT) tablet, Take 2,000 Units by mouth Daily., Disp: 90  tablet, Rfl: 3    fluticasone (FLONASE) 50 MCG/ACT nasal spray, INSTILL 2 SPRAYS INTO EACH NOSTRIL DAILY AS NEEDED FOR ALLERGIES, Disp: 16 g, Rfl: 2    omeprazole (priLOSEC) 40 MG capsule, Take 1 capsule by mouth Daily., Disp: 90 capsule, Rfl: 3    ondansetron (Zofran) 4 MG tablet, Take 1 tablet by mouth Every 6 (Six) Hours As Needed for Nausea or Vomiting., Disp: 20 tablet, Rfl: 1    Prenatal Vit-Fe Fumarate-FA (Prenatal Vitamin) 27-0.8 MG tablet, Take 1 tablet by mouth Daily., Disp: 90 tablet, Rfl: 3    tadalafil (Cialis) 20 MG tablet, Take 1 tablet by mouth Daily As Needed for Erectile Dysfunction., Disp: 30 tablet, Rfl: 11    traZODone (DESYREL) 100 MG tablet, TAKE 1 TABLET BY MOUTH EVERY DAY AT NIGHT, Disp: 90 tablet, Rfl: 1    [START ON 1/10/2024] varenicline (Chantix Continuing Month Panfilo) 1 MG tablet, Take 1 tablet by mouth 2 (Two) Times a Day for 56 days., Disp: 56 tablet, Rfl: 1    Varenicline Tartrate, Starter, 0.5 MG X 11 & 1 MG X 42 tablet therapy pack, Take 0.5 mg by mouth Daily for 3 days, THEN 0.5 mg 2 (Two) Times a Day for 4 days, THEN 1 mg 2 (Two) Times a Day for 21 days. Take 0.5 mg po daily x 3 days, then 0.5 mg po bid x 4 days, then 1 mg po bid, Disp: 1 each, Rfl: 0    Vital Signs  Vitals:    12/28/23 1514   BP: 124/78   Pulse: 72   SpO2: 94%        Physical Exam  Constitutional: Resting comfortably, no acute distress  Neck: Supple, trachea midline  Respiratory: No increased work of breathing, Symmetric excursion  Cardiovascular: Well pefursed, no jugular venous distention evident   Abdominal:  Soft, non-tender, non-distended  Lymphatics: No cervical or suprascapular adenopathy  Skin: Warm, dry, no rash on visualized skin surfaces  Musculoskeletal: Symmetric strength, no obvious gross abnormalities  Psychiatric: Alert and oriented ×3, normal affect     Laboratory Results  I have personally reviewed laboratory results from August 16, 2023 demonstrate CBC with WBC 3.9, hemoglobin 14, platelets 204.   CMP with creatinine 1.41, albumin 4.4, total bilirubin 0.4.         Rafal Berman MD  Colorectal & General Surgery  [facility]    [address], [address]  [place], [place], [address]  P: [phone]  F: [phone]

## 2023-12-28 NOTE — H&P (VIEW-ONLY)
Colorectal & General Surgery  Consultation    Patient: Eliseo Phan  YOB: 1965  MRN: [id number]      Assessment  Eliseo Phan is a 58 y.o. male who presents with external anal skin tag secondary to prior hemorrhoids as well as the need for colon cancer screening.  He has a few episodes of loose stools on a daily basis but otherwise no other concerning clinical findings related to his risk for colon cancer.  He does not currently have any trouble with hemorrhoids.  He wishes to proceed with a screening colonoscopy.  We discussed the risk, benefits, alternatives to the procedure.    Following his colonoscopy, we will discuss possible excision of his anal skin tags    Referring Provider: Papa Carey MD     Reason for Consultation: Colon cancer screening    History of Present Illness   Eliseo Phan is a 58 y.o. male with well-controlled HIV who presents to the office to discuss colon cancer screening as well as some external anal skin tags at site of previous external hemorrhoids.  He denies hematochezia, melena, tenesmus, constipation.  He says he has 3-4 bowel movements a day, the first of which is solid and 2 or 3 after that are looser.  He denies any unintentional weight loss.  He has no personal or family history of colon cancer.    Most recent colonoscopy: Never    Past Medical History   Past Medical History:   Diagnosis Date    Acid reflux     Arthritis     Back pain     Hepatitis B     History of kidney stones     HIV (human immunodeficiency virus infection) 2011    PONV (postoperative nausea and vomiting)         Past Surgical History   Past Surgical History:   Procedure Laterality Date    APPENDECTOMY      BONE TUMOR EXCISION Right     right lower leg - as a child    KNEE ARTHROSCOPY Left     LUMBAR FUSION Left 7/14/2020    Procedure: LUMBAR 4 TO LUMBAR 5, LUMBAR 5  TO SACRAL 1 LEFT METRX TRANSFORAMINAL LUMBAR INTERBODY FUSION WITH CAGES AND SEXTANT PEDICLE SCREW FIXATION;  Surgeon:  Thomas Torres MD;  Location: [facility];  Service: Neurosurgery;  Laterality: Left;    LUMBAR LAMINECTOMY DISCECTOMY DECOMPRESSION N/A 4/5/2018    Procedure: L4-5, L5-S1 Lumbar Laminectomy;  Surgeon: Ernie Curtis MD;  Location: [facility];  Service: Neurosurgery    TONSILLECTOMY  1967       Social History  Social History     Socioeconomic History    Marital status: Single    Highest education level: Master's degree (e.g., MA, MS, [place], MEd, MSW, FANI)   Tobacco Use    Smoking status: Former     Packs/day: 0.50     Years: 10.00     Additional pack years: 0.00     Total pack years: 5.00     Types: Cigarettes     Quit date: 4/1/2020     Years since quitting: 3.7    Smokeless tobacco: Never   Vaping Use    Vaping Use: Every day    Substances: Nicotine   Substance and Sexual Activity    Alcohol use: No    Drug use: Not Currently     Types: Amphetamines, Fentanyl, Other     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    Sexual activity: Not Currently     Partners: Male       Family History  Family History   Problem Relation Age of Onset    Breast cancer Mother     Heart disease Mother     Hypertension Mother     Diabetes Mother     Thyroid disease Mother     Depression Mother     Anxiety disorder Mother     Alcohol abuse Mother     Arthritis Mother     Cancer Mother         Breast cancer    Hypertension Father     Depression Father     Anxiety disorder Father     Arthritis Father     Nephrolithiasis Brother     Asthma Brother     Arthritis Maternal Grandmother     Malig Hyperthermia Neg Hx        Colorectal cancer family history: None    Review of Systems  Negative except as documented in the HPI.     Allergies  No Known Allergies    Medications    Current Outpatient Medications:     Bictegravir-Emtricitab-Tenofov (Biktarvy) -25 MG per tablet, Take 1 tablet by mouth Daily., Disp: 30 tablet, Rfl: 5    Cholecalciferol (Vitamin D) 50 MCG (2000 UT) tablet, Take 2,000 Units by mouth Daily., Disp: 90  tablet, Rfl: 3    fluticasone (FLONASE) 50 MCG/ACT nasal spray, INSTILL 2 SPRAYS INTO EACH NOSTRIL DAILY AS NEEDED FOR ALLERGIES, Disp: 16 g, Rfl: 2    omeprazole (priLOSEC) 40 MG capsule, Take 1 capsule by mouth Daily., Disp: 90 capsule, Rfl: 3    ondansetron (Zofran) 4 MG tablet, Take 1 tablet by mouth Every 6 (Six) Hours As Needed for Nausea or Vomiting., Disp: 20 tablet, Rfl: 1    Prenatal Vit-Fe Fumarate-FA (Prenatal Vitamin) 27-0.8 MG tablet, Take 1 tablet by mouth Daily., Disp: 90 tablet, Rfl: 3    tadalafil (Cialis) 20 MG tablet, Take 1 tablet by mouth Daily As Needed for Erectile Dysfunction., Disp: 30 tablet, Rfl: 11    traZODone (DESYREL) 100 MG tablet, TAKE 1 TABLET BY MOUTH EVERY DAY AT NIGHT, Disp: 90 tablet, Rfl: 1    [START ON 1/10/2024] varenicline (Chantix Continuing Month Panfilo) 1 MG tablet, Take 1 tablet by mouth 2 (Two) Times a Day for 56 days., Disp: 56 tablet, Rfl: 1    Varenicline Tartrate, Starter, 0.5 MG X 11 & 1 MG X 42 tablet therapy pack, Take 0.5 mg by mouth Daily for 3 days, THEN 0.5 mg 2 (Two) Times a Day for 4 days, THEN 1 mg 2 (Two) Times a Day for 21 days. Take 0.5 mg po daily x 3 days, then 0.5 mg po bid x 4 days, then 1 mg po bid, Disp: 1 each, Rfl: 0    Vital Signs  Vitals:    12/28/23 1514   BP: 124/78   Pulse: 72   SpO2: 94%        Physical Exam  Constitutional: Resting comfortably, no acute distress  Neck: Supple, trachea midline  Respiratory: No increased work of breathing, Symmetric excursion  Cardiovascular: Well pefursed, no jugular venous distention evident   Abdominal:  Soft, non-tender, non-distended  Lymphatics: No cervical or suprascapular adenopathy  Skin: Warm, dry, no rash on visualized skin surfaces  Musculoskeletal: Symmetric strength, no obvious gross abnormalities  Psychiatric: Alert and oriented ×3, normal affect     Laboratory Results  I have personally reviewed laboratory results from August 16, 2023 demonstrate CBC with WBC 3.9, hemoglobin 14, platelets 204.   CMP with creatinine 1.41, albumin 4.4, total bilirubin 0.4.         Rafal Berman MD  Colorectal & General Surgery  [facility]    [address], [address]  [place], [place], [address]  P: [phone]  F: [phone]

## 2024-01-12 ENCOUNTER — HOSPITAL ENCOUNTER (OUTPATIENT)
Facility: HOSPITAL | Age: 59
Setting detail: HOSPITAL OUTPATIENT SURGERY
Discharge: HOME | End: 2024-01-12
Attending: SURGERY | Admitting: SURGERY
Payer: COMMERCIAL

## 2024-01-12 ENCOUNTER — ANESTHESIA (OUTPATIENT)
Dept: GASTROENTEROLOGY | Facility: HOSPITAL | Age: 59
End: 2024-01-12
Payer: COMMERCIAL

## 2024-01-12 ENCOUNTER — ANESTHESIA EVENT (OUTPATIENT)
Dept: GASTROENTEROLOGY | Facility: HOSPITAL | Age: 59
End: 2024-01-12
Payer: COMMERCIAL

## 2024-01-12 VITALS
OXYGEN SATURATION: 94 % | DIASTOLIC BLOOD PRESSURE: 61 MMHG | HEIGHT: 70 IN | RESPIRATION RATE: 16 BRPM | SYSTOLIC BLOOD PRESSURE: 109 MMHG | BODY MASS INDEX: 24.62 KG/M2 | HEART RATE: 68 BPM | WEIGHT: 172 LBS

## 2024-01-12 DIAGNOSIS — Z12.11 COLON CANCER SCREENING: ICD-10-CM

## 2024-01-12 PROCEDURE — 88305 TISSUE EXAM BY PATHOLOGIST: CPT | Performed by: SURGERY

## 2024-01-12 PROCEDURE — 25010000002 PROPOFOL 10 MG/ML EMULSION: Performed by: NURSE ANESTHETIST, CERTIFIED REGISTERED

## 2024-01-12 PROCEDURE — 45380 COLONOSCOPY AND BIOPSY: CPT | Performed by: SURGERY

## 2024-01-12 PROCEDURE — 25810000003 LACTATED RINGERS PER 1000 ML: Performed by: SURGERY

## 2024-01-12 RX ORDER — MIDAZOLAM HYDROCHLORIDE 1 MG/ML
1 INJECTION INTRAMUSCULAR; INTRAVENOUS
Status: CANCELLED | OUTPATIENT
Start: 2024-01-12

## 2024-01-12 RX ORDER — LIDOCAINE HYDROCHLORIDE 20 MG/ML
INJECTION, SOLUTION INFILTRATION; PERINEURAL AS NEEDED
Status: DISCONTINUED | OUTPATIENT
Start: 2024-01-12 | End: 2024-01-12 | Stop reason: SURG

## 2024-01-12 RX ORDER — SODIUM CHLORIDE 0.9 % (FLUSH) 0.9 %
10 SYRINGE (ML) INJECTION EVERY 12 HOURS SCHEDULED
Status: DISCONTINUED | OUTPATIENT
Start: 2024-01-12 | End: 2024-01-12 | Stop reason: HOSPADM

## 2024-01-12 RX ORDER — LIDOCAINE HYDROCHLORIDE 10 MG/ML
0.5 INJECTION, SOLUTION INFILTRATION; PERINEURAL ONCE AS NEEDED
Status: CANCELLED | OUTPATIENT
Start: 2024-01-12

## 2024-01-12 RX ORDER — SODIUM CHLORIDE, SODIUM LACTATE, POTASSIUM CHLORIDE, CALCIUM CHLORIDE 600; 310; 30; 20 MG/100ML; MG/100ML; MG/100ML; MG/100ML
30 INJECTION, SOLUTION INTRAVENOUS CONTINUOUS PRN
Status: DISCONTINUED | OUTPATIENT
Start: 2024-01-12 | End: 2024-01-12 | Stop reason: HOSPADM

## 2024-01-12 RX ORDER — PROPOFOL 10 MG/ML
VIAL (ML) INTRAVENOUS CONTINUOUS PRN
Status: DISCONTINUED | OUTPATIENT
Start: 2024-01-12 | End: 2024-01-12 | Stop reason: SURG

## 2024-01-12 RX ADMIN — PROPOFOL 100 MG: 10 INJECTION, EMULSION INTRAVENOUS at 13:14

## 2024-01-12 RX ADMIN — PROPOFOL 180 MCG/KG/MIN: 10 INJECTION, EMULSION INTRAVENOUS at 13:15

## 2024-01-12 RX ADMIN — SODIUM CHLORIDE, POTASSIUM CHLORIDE, SODIUM LACTATE AND CALCIUM CHLORIDE 30 ML/HR: 600; 310; 30; 20 INJECTION, SOLUTION INTRAVENOUS at 13:08

## 2024-01-12 RX ADMIN — LIDOCAINE HYDROCHLORIDE 60 MG: 20 INJECTION, SOLUTION INFILTRATION; PERINEURAL at 13:15

## 2024-01-12 NOTE — ANESTHESIA PREPROCEDURE EVALUATION
Anesthesia Evaluation     Patient summary reviewed and Nursing notes reviewed   history of anesthetic complications:  PONV  NPO Solid Status: > 8 hours  NPO Liquid Status: > 4 hours           Airway   Mallampati: II  Neck ROM: full  No difficulty expected  Dental      Comment: caps    Pulmonary     breath sounds clear to auscultation  (+) a smoker Former,  Cardiovascular     Rhythm: regular        Neuro/Psych  GI/Hepatic/Renal/Endo    (+) GERD, hepatitis B, liver disease, renal disease-    Musculoskeletal     (+) back pain  Abdominal    Substance History      OB/GYN          Other   arthritis,       Other Comment: HIV              Anesthesia Plan    ASA 2     MAC     intravenous induction     Anesthetic plan, risks, benefits, and alternatives have been provided, discussed and informed consent has been obtained with: patient.    CODE STATUS:

## 2024-01-12 NOTE — ANESTHESIA POSTPROCEDURE EVALUATION
"Patient: Eliseo Phan    Procedure Summary       Date: 01/12/24 Room / Location:  [facility]    Anesthesia Start: 1311 Anesthesia Stop: 1337    Procedure: COLONOSCOPY to cecum with cold polypectomy Diagnosis:       Colon cancer screening      (Colon cancer screening [Z12.11])    Surgeons: Levi Berman MD Provider: Nawaf Shahid MD    Anesthesia Type: MAC ASA Status: 2            Anesthesia Type: MAC    Vitals  Vitals Value Taken Time   /61 01/12/24 1400   Temp     Pulse 68 01/12/24 1405   Resp 16 01/12/24 1359   SpO2 94 % 01/12/24 1404   Vitals shown include unfiled device data.        Post Anesthesia Care and Evaluation    Patient location during evaluation: bedside  Patient participation: complete - patient participated  Level of consciousness: sleepy but conscious  Pain score: 0  Pain management: adequate    Airway patency: patent  Anesthetic complications: No anesthetic complications    Cardiovascular status: acceptable  Respiratory status: acceptable  Hydration status: acceptable    Comments: /61 (BP Location: Left arm, Patient Position: Lying)   Pulse 68   Resp 16   Ht 177.8 cm (70\")   Wt 78 kg (172 lb)   SpO2 94%   BMI 24.68 kg/m²       "

## 2024-01-12 NOTE — OP NOTE
Colorectal & General Surgery  Operative Report    Patient: Eliseo Phan  YOB: 1965  MRN: [id number]  DATE OF PROCEDURE: 01/12/24     PREOPERATIVE DIAGNOSIS:  Colon cancer screening    POSTOPERATIVE DIAGNOSIS:  Sigmoid polyp, 4 mm, sessile, completely resected  Significant diverticulosis with significant angulation of the sigmoid colon    PROCEDURE:  Colonoscopy to cecum with  Cold forcep polypectomy of sigmoid polyp    FINDINGS:  Sigmoid colon polyp, 4 mm, sessile, completely resected  Significant diverticulosis within the sigmoid colon but significant angulation of the sigmoid colon    RECOMMENDATIONS:   Await pathology results    SURGEON:  Rafal Berman MD    ANESTHESIA:  Monitored anesthesia care    EBL:  None    SPECIMEN:  Sigmoid colon polyp    OPERATIVE DESCRIPTION:  The patient was brought to the endoscopy suite under the care of the nursing staff.  A preoperative timeout was performed.  Monitored anesthesia care was initiated.  A digital rectal examination was performed.  The endoscope was inserted into the anus and advanced carefully to the sigmoid colon.  I was unable to traverse his diverticular disease and the severe angulation of the sigmoid colon with the adult scope, switched to a pediatric scope at this time.  The endoscope was advanced from the anus to the cecum..  The endoscope was then withdrawn and the entire mucosal surface of the colon and rectum were visualized.  4 mm sigmoid polyp was identified.  Completely resected piecemeal with cold forcep.  Hemostatic.  Retrieved.  Retroflexion was performed in the rectum.  The scope was then withdrawn.    The patient tolerated the procedure well and was transferred to the postanesthesia care unit in stable condition.    Rafal Berman M.D.  Colorectal & General Surgery  [address], [address]  [place], [place], [facility][note 3]  P: [phone]  F: [phone]

## 2024-01-12 NOTE — DISCHARGE INSTRUCTIONS
For the next 24 hours patient needs to be with a responsible adult.    For 24 hours DO NOT drive, operate machinery, appliances, drink alcohol, make important decisions or sign legal documents.    Start with a light or bland diet if you are feeling sick to your stomach otherwise advance to regular diet as tolerated.    Follow recommendations on procedure report if provided by your doctor.    Call Dr Berman for problems [phone]    Problems may include but not limited to: large amounts of bleeding, trouble breathing, repeated vomiting, severe unrelieved pain, fever or chills.

## 2024-01-15 LAB
LAB AP CASE REPORT: NORMAL
PATH REPORT.FINAL DX SPEC: NORMAL
PATH REPORT.GROSS SPEC: NORMAL

## 2024-01-16 ENCOUNTER — TELEPHONE (OUTPATIENT)
Dept: SURGERY | Facility: CLINIC | Age: 59
End: 2024-01-16
Payer: COMMERCIAL

## 2024-01-16 ENCOUNTER — DOCUMENTATION (OUTPATIENT)
Dept: SURGERY | Facility: CLINIC | Age: 59
End: 2024-01-16
Payer: COMMERCIAL

## 2024-01-16 NOTE — TELEPHONE ENCOUNTER
I called [facility] to discuss his colonoscopy results.    He had a small sigmoid hyperplastic polyp.  That does not advance his colorectal cancer screening frequency.  Plan for repeat colonoscopy in 10 years for colon cancer screening purposes.    He also had a relatively significant diverticular stricture in the sigmoid colon.  I was able to traverse it with a smaller bore endoscope.  He does have some bloating.  I recommended that he take fiber daily to see if that will help.  I suspect that he will eventually require sigmoid colectomy.  We discussed that briefly.  He will call the office if he continues to have bloating and loose stools frequently or feels that he is worsening.

## 2024-02-16 ENCOUNTER — OFFICE VISIT (OUTPATIENT)
Dept: INFECTIOUS DISEASES | Facility: CLINIC | Age: 59
End: 2024-02-16
Payer: COMMERCIAL

## 2024-02-16 ENCOUNTER — LAB (OUTPATIENT)
Dept: LAB | Facility: HOSPITAL | Age: 59
End: 2024-02-16
Payer: COMMERCIAL

## 2024-02-16 VITALS
TEMPERATURE: 97.1 F | RESPIRATION RATE: 20 BRPM | HEART RATE: 67 BPM | DIASTOLIC BLOOD PRESSURE: 78 MMHG | WEIGHT: 172.4 LBS | BODY MASS INDEX: 24.74 KG/M2 | SYSTOLIC BLOOD PRESSURE: 122 MMHG

## 2024-02-16 DIAGNOSIS — Z21 ASYMPTOMATIC HIV INFECTION, WITH NO HISTORY OF HIV-RELATED ILLNESS: Primary | ICD-10-CM

## 2024-02-16 DIAGNOSIS — Z21 ASYMPTOMATIC HIV INFECTION, WITH NO HISTORY OF HIV-RELATED ILLNESS: ICD-10-CM

## 2024-02-16 LAB
ALBUMIN SERPL-MCNC: 4.6 G/DL (ref 3.5–5.2)
ALBUMIN/GLOB SERPL: 1.8 G/DL
ALP SERPL-CCNC: 50 U/L (ref 39–117)
ALT SERPL W P-5'-P-CCNC: 33 U/L (ref 1–41)
ANION GAP SERPL CALCULATED.3IONS-SCNC: 6.9 MMOL/L (ref 5–15)
AST SERPL-CCNC: 27 U/L (ref 1–40)
BASOPHILS # BLD AUTO: 0.03 10*3/MM3 (ref 0–0.2)
BASOPHILS NFR BLD AUTO: 0.8 % (ref 0–1.5)
BILIRUB SERPL-MCNC: 0.4 MG/DL (ref 0–1.2)
BUN SERPL-MCNC: 17 MG/DL (ref 6–20)
BUN/CREAT SERPL: 11.6 (ref 7–25)
CALCIUM SPEC-SCNC: 9.9 MG/DL (ref 8.6–10.5)
CHLORIDE SERPL-SCNC: 102 MMOL/L (ref 98–107)
CO2 SERPL-SCNC: 27.1 MMOL/L (ref 22–29)
CREAT SERPL-MCNC: 1.46 MG/DL (ref 0.76–1.27)
DEPRECATED RDW RBC AUTO: 38.4 FL (ref 37–54)
EGFRCR SERPLBLD CKD-EPI 2021: 55.4 ML/MIN/1.73
EOSINOPHIL # BLD AUTO: 0.11 10*3/MM3 (ref 0–0.4)
EOSINOPHIL NFR BLD AUTO: 2.9 % (ref 0.3–6.2)
ERYTHROCYTE [DISTWIDTH] IN BLOOD BY AUTOMATED COUNT: 12 % (ref 12.3–15.4)
GLOBULIN UR ELPH-MCNC: 2.6 GM/DL
GLUCOSE SERPL-MCNC: 88 MG/DL (ref 65–99)
HCT VFR BLD AUTO: 47.5 % (ref 37.5–51)
HGB BLD-MCNC: 15.9 G/DL (ref 13–17.7)
IMM GRANULOCYTES # BLD AUTO: 0.01 10*3/MM3 (ref 0–0.05)
IMM GRANULOCYTES NFR BLD AUTO: 0.3 % (ref 0–0.5)
LYMPHOCYTES # BLD AUTO: 1.5 10*3/MM3 (ref 0.7–3.1)
LYMPHOCYTES NFR BLD AUTO: 40.1 % (ref 19.6–45.3)
MCH RBC QN AUTO: 29.4 PG (ref 26.6–33)
MCHC RBC AUTO-ENTMCNC: 33.5 G/DL (ref 31.5–35.7)
MCV RBC AUTO: 88 FL (ref 79–97)
MONOCYTES # BLD AUTO: 0.25 10*3/MM3 (ref 0.1–0.9)
MONOCYTES NFR BLD AUTO: 6.7 % (ref 5–12)
NEUTROPHILS NFR BLD AUTO: 1.84 10*3/MM3 (ref 1.7–7)
NEUTROPHILS NFR BLD AUTO: 49.2 % (ref 42.7–76)
NRBC BLD AUTO-RTO: 0 /100 WBC (ref 0–0.2)
PLATELET # BLD AUTO: 197 10*3/MM3 (ref 140–450)
PMV BLD AUTO: 8.8 FL (ref 6–12)
POTASSIUM SERPL-SCNC: 4.9 MMOL/L (ref 3.5–5.2)
PROT SERPL-MCNC: 7.2 G/DL (ref 6–8.5)
RBC # BLD AUTO: 5.4 10*6/MM3 (ref 4.14–5.8)
SODIUM SERPL-SCNC: 136 MMOL/L (ref 136–145)
WBC NRBC COR # BLD AUTO: 3.74 10*3/MM3 (ref 3.4–10.8)

## 2024-02-16 PROCEDURE — 36415 COLL VENOUS BLD VENIPUNCTURE: CPT

## 2024-02-16 PROCEDURE — 87536 HIV-1 QUANT&REVRSE TRNSCRPJ: CPT

## 2024-02-16 PROCEDURE — 80053 COMPREHEN METABOLIC PANEL: CPT

## 2024-02-16 PROCEDURE — 86361 T CELL ABSOLUTE COUNT: CPT

## 2024-02-16 PROCEDURE — 85025 COMPLETE CBC W/AUTO DIFF WBC: CPT

## 2024-02-16 PROCEDURE — 99214 OFFICE O/P EST MOD 30 MIN: CPT | Performed by: STUDENT IN AN ORGANIZED HEALTH CARE EDUCATION/TRAINING PROGRAM

## 2024-02-16 RX ORDER — BICTEGRAVIR SODIUM, EMTRICITABINE, AND TENOFOVIR ALAFENAMIDE FUMARATE 50; 200; 25 MG/1; MG/1; MG/1
1 TABLET ORAL DAILY
Qty: 30 TABLET | Refills: 5 | Status: SHIPPED | OUTPATIENT
Start: 2024-02-16

## 2024-02-19 LAB
BASOPHILS # BLD AUTO: 0 X10E3/UL (ref 0–0.2)
BASOPHILS NFR BLD AUTO: 1 %
CD3+CD4+ CELLS # BLD: 468 /UL (ref 359–1519)
CD3+CD4+ CELLS NFR BLD: 31.2 % (ref 30.8–58.5)
EOSINOPHIL # BLD AUTO: 0.1 X10E3/UL (ref 0–0.4)
EOSINOPHIL NFR BLD AUTO: 3 %
ERYTHROCYTE [DISTWIDTH] IN BLOOD BY AUTOMATED COUNT: 12.1 % (ref 11.6–15.4)
HCT VFR BLD AUTO: 48 % (ref 37.5–51)
HGB BLD-MCNC: 16.2 G/DL (ref 13–17.7)
HIV1 RNA # SERPL NAA+PROBE: <20 COPIES/ML
HIV1 RNA SERPL NAA+PROBE-LOG#: NORMAL LOG10COPY/ML
IMM GRANULOCYTES # BLD AUTO: 0 X10E3/UL (ref 0–0.1)
IMM GRANULOCYTES NFR BLD AUTO: 0 %
LYMPHOCYTES # BLD AUTO: 1.5 X10E3/UL (ref 0.7–3.1)
LYMPHOCYTES NFR BLD AUTO: 40 %
MCH RBC QN AUTO: 29.9 PG (ref 26.6–33)
MCHC RBC AUTO-ENTMCNC: 33.8 G/DL (ref 31.5–35.7)
MCV RBC AUTO: 89 FL (ref 79–97)
MONOCYTES # BLD AUTO: 0.3 X10E3/UL (ref 0.1–0.9)
MONOCYTES NFR BLD AUTO: 7 %
NEUTROPHILS # BLD AUTO: 1.9 X10E3/UL (ref 1.4–7)
NEUTROPHILS NFR BLD AUTO: 49 %
PLATELET # BLD AUTO: 214 X10E3/UL (ref 150–450)
RBC # BLD AUTO: 5.41 X10E6/UL (ref 4.14–5.8)
WBC # BLD AUTO: 3.8 X10E3/UL (ref 3.4–10.8)

## 2024-02-20 ENCOUNTER — TELEPHONE (OUTPATIENT)
Dept: INTERNAL MEDICINE | Facility: CLINIC | Age: 59
End: 2024-02-20
Payer: COMMERCIAL

## 2024-02-20 NOTE — TELEPHONE ENCOUNTER
"  Caller: Eliseo Phan \"KOFFI\"    Relationship: Self    Best call back number:[phone]    Who are you requesting to speak with (clinical staff, provider,  specific staff member): DR JASSO    What was the call regarding: CONGESTION, FATIGUE, HEADACHE, TAKEN 2 COVID TESTS, BOTH NEGATIVE. PATIENT STATES HE HAS BEEN SICK FOR 5 WEEKS   PLEASE ADVISE           "

## 2024-02-21 RX ORDER — AMOXICILLIN AND CLAVULANATE POTASSIUM 875; 125 MG/1; MG/1
1 TABLET, FILM COATED ORAL 2 TIMES DAILY
Qty: 20 TABLET | Refills: 0 | Status: SHIPPED | OUTPATIENT
Start: 2024-02-21

## 2024-02-23 DIAGNOSIS — Z12.2 SCREENING FOR LUNG CANCER: Primary | ICD-10-CM

## 2024-02-26 ENCOUNTER — APPOINTMENT (OUTPATIENT)
Dept: GENERAL RADIOLOGY | Facility: HOSPITAL | Age: 59
End: 2024-02-26
Payer: COMMERCIAL

## 2024-02-26 ENCOUNTER — HOSPITAL ENCOUNTER (EMERGENCY)
Facility: HOSPITAL | Age: 59
Discharge: HOME | End: 2024-02-26
Attending: STUDENT IN AN ORGANIZED HEALTH CARE EDUCATION/TRAINING PROGRAM | Admitting: STUDENT IN AN ORGANIZED HEALTH CARE EDUCATION/TRAINING PROGRAM
Payer: COMMERCIAL

## 2024-02-26 ENCOUNTER — TELEPHONE (OUTPATIENT)
Dept: INTERNAL MEDICINE | Facility: CLINIC | Age: 59
End: 2024-02-26
Payer: COMMERCIAL

## 2024-02-26 VITALS
SYSTOLIC BLOOD PRESSURE: 121 MMHG | OXYGEN SATURATION: 96 % | HEART RATE: 53 BPM | TEMPERATURE: 97.1 F | WEIGHT: 167.99 LBS | HEIGHT: 70 IN | BODY MASS INDEX: 24.05 KG/M2 | RESPIRATION RATE: 16 BRPM | DIASTOLIC BLOOD PRESSURE: 82 MMHG

## 2024-02-26 DIAGNOSIS — R06.09 EXERTIONAL DYSPNEA: Primary | ICD-10-CM

## 2024-02-26 LAB
ALBUMIN SERPL-MCNC: 4.2 G/DL (ref 3.5–5.2)
ALBUMIN/GLOB SERPL: 1.5 G/DL
ALP SERPL-CCNC: 55 U/L (ref 39–117)
ALT SERPL W P-5'-P-CCNC: 29 U/L (ref 1–41)
ANION GAP SERPL CALCULATED.3IONS-SCNC: 11.5 MMOL/L (ref 5–15)
AST SERPL-CCNC: 26 U/L (ref 1–40)
B PARAPERT DNA SPEC QL NAA+PROBE: NOT DETECTED
B PERT DNA SPEC QL NAA+PROBE: NOT DETECTED
BASOPHILS # BLD AUTO: 0.02 10*3/MM3 (ref 0–0.2)
BASOPHILS NFR BLD AUTO: 0.4 % (ref 0–1.5)
BILIRUB SERPL-MCNC: 0.2 MG/DL (ref 0–1.2)
BUN SERPL-MCNC: 14 MG/DL (ref 6–20)
BUN/CREAT SERPL: 9.4 (ref 7–25)
C PNEUM DNA NPH QL NAA+NON-PROBE: NOT DETECTED
CALCIUM SPEC-SCNC: 9.5 MG/DL (ref 8.6–10.5)
CHLORIDE SERPL-SCNC: 102 MMOL/L (ref 98–107)
CO2 SERPL-SCNC: 24.5 MMOL/L (ref 22–29)
CREAT SERPL-MCNC: 1.49 MG/DL (ref 0.76–1.27)
DEPRECATED RDW RBC AUTO: 38.1 FL (ref 37–54)
EGFRCR SERPLBLD CKD-EPI 2021: 54.1 ML/MIN/1.73
EOSINOPHIL # BLD AUTO: 0.09 10*3/MM3 (ref 0–0.4)
EOSINOPHIL NFR BLD AUTO: 1.6 % (ref 0.3–6.2)
ERYTHROCYTE [DISTWIDTH] IN BLOOD BY AUTOMATED COUNT: 12 % (ref 12.3–15.4)
FLUAV SUBTYP SPEC NAA+PROBE: NOT DETECTED
FLUBV RNA ISLT QL NAA+PROBE: NOT DETECTED
GLOBULIN UR ELPH-MCNC: 2.8 GM/DL
GLUCOSE SERPL-MCNC: 111 MG/DL (ref 65–99)
HADV DNA SPEC NAA+PROBE: NOT DETECTED
HCOV 229E RNA SPEC QL NAA+PROBE: NOT DETECTED
HCOV HKU1 RNA SPEC QL NAA+PROBE: DETECTED
HCOV NL63 RNA SPEC QL NAA+PROBE: NOT DETECTED
HCOV OC43 RNA SPEC QL NAA+PROBE: NOT DETECTED
HCT VFR BLD AUTO: 44.7 % (ref 37.5–51)
HGB BLD-MCNC: 15.3 G/DL (ref 13–17.7)
HMPV RNA NPH QL NAA+NON-PROBE: NOT DETECTED
HOLD SPECIMEN: NORMAL
HOLD SPECIMEN: NORMAL
HPIV1 RNA ISLT QL NAA+PROBE: NOT DETECTED
HPIV2 RNA SPEC QL NAA+PROBE: NOT DETECTED
HPIV3 RNA NPH QL NAA+PROBE: NOT DETECTED
HPIV4 P GENE NPH QL NAA+PROBE: NOT DETECTED
IMM GRANULOCYTES # BLD AUTO: 0.01 10*3/MM3 (ref 0–0.05)
IMM GRANULOCYTES NFR BLD AUTO: 0.2 % (ref 0–0.5)
LYMPHOCYTES # BLD AUTO: 1.42 10*3/MM3 (ref 0.7–3.1)
LYMPHOCYTES NFR BLD AUTO: 25.5 % (ref 19.6–45.3)
M PNEUMO IGG SER IA-ACNC: NOT DETECTED
MCH RBC QN AUTO: 29.8 PG (ref 26.6–33)
MCHC RBC AUTO-ENTMCNC: 34.2 G/DL (ref 31.5–35.7)
MCV RBC AUTO: 87 FL (ref 79–97)
MONOCYTES # BLD AUTO: 0.36 10*3/MM3 (ref 0.1–0.9)
MONOCYTES NFR BLD AUTO: 6.5 % (ref 5–12)
NEUTROPHILS NFR BLD AUTO: 3.66 10*3/MM3 (ref 1.7–7)
NEUTROPHILS NFR BLD AUTO: 65.8 % (ref 42.7–76)
NRBC BLD AUTO-RTO: 0 /100 WBC (ref 0–0.2)
PLATELET # BLD AUTO: 173 10*3/MM3 (ref 140–450)
PMV BLD AUTO: 8.6 FL (ref 6–12)
POTASSIUM SERPL-SCNC: 5.3 MMOL/L (ref 3.5–5.2)
PROT SERPL-MCNC: 7 G/DL (ref 6–8.5)
QT INTERVAL: 365 MS
QTC INTERVAL: 377 MS
RBC # BLD AUTO: 5.14 10*6/MM3 (ref 4.14–5.8)
RHINOVIRUS RNA SPEC NAA+PROBE: NOT DETECTED
RSV RNA NPH QL NAA+NON-PROBE: NOT DETECTED
SARS-COV-2 RNA NPH QL NAA+NON-PROBE: NOT DETECTED
SODIUM SERPL-SCNC: 138 MMOL/L (ref 136–145)
TROPONIN T SERPL HS-MCNC: 10 NG/L
WBC NRBC COR # BLD AUTO: 5.56 10*3/MM3 (ref 3.4–10.8)
WHOLE BLOOD HOLD COAG: NORMAL
WHOLE BLOOD HOLD SPECIMEN: NORMAL

## 2024-02-26 PROCEDURE — 93005 ELECTROCARDIOGRAM TRACING: CPT | Performed by: STUDENT IN AN ORGANIZED HEALTH CARE EDUCATION/TRAINING PROGRAM

## 2024-02-26 PROCEDURE — 0202U NFCT DS 22 TRGT SARS-COV-2: CPT | Performed by: PHYSICIAN ASSISTANT

## 2024-02-26 PROCEDURE — 36415 COLL VENOUS BLD VENIPUNCTURE: CPT

## 2024-02-26 PROCEDURE — 85025 COMPLETE CBC W/AUTO DIFF WBC: CPT

## 2024-02-26 PROCEDURE — 93005 ELECTROCARDIOGRAM TRACING: CPT

## 2024-02-26 PROCEDURE — 80053 COMPREHEN METABOLIC PANEL: CPT | Performed by: STUDENT IN AN ORGANIZED HEALTH CARE EDUCATION/TRAINING PROGRAM

## 2024-02-26 PROCEDURE — 93010 ELECTROCARDIOGRAM REPORT: CPT | Performed by: INTERNAL MEDICINE

## 2024-02-26 PROCEDURE — 71045 X-RAY EXAM CHEST 1 VIEW: CPT

## 2024-02-26 PROCEDURE — 99284 EMERGENCY DEPT VISIT MOD MDM: CPT

## 2024-02-26 PROCEDURE — 84484 ASSAY OF TROPONIN QUANT: CPT | Performed by: STUDENT IN AN ORGANIZED HEALTH CARE EDUCATION/TRAINING PROGRAM

## 2024-02-26 RX ORDER — ASPIRIN 325 MG
325 TABLET ORAL ONCE
Status: DISCONTINUED | OUTPATIENT
Start: 2024-02-26 | End: 2024-02-26 | Stop reason: HOSPADM

## 2024-02-26 RX ORDER — ALBUTEROL SULFATE 90 UG/1
2 AEROSOL, METERED RESPIRATORY (INHALATION) EVERY 4 HOURS PRN
Qty: 8 G | Refills: 0 | Status: SHIPPED | OUTPATIENT
Start: 2024-02-26

## 2024-02-26 RX ORDER — SODIUM CHLORIDE 0.9 % (FLUSH) 0.9 %
10 SYRINGE (ML) INJECTION AS NEEDED
Status: DISCONTINUED | OUTPATIENT
Start: 2024-02-26 | End: 2024-02-26 | Stop reason: HOSPADM

## 2024-02-26 NOTE — DISCHARGE INSTRUCTIONS
Follow-up with PCP.  Follow-up with Dr. Swanson to schedule a cardiac workup.  Use albuterol inhaler every 4-6 hours as needed for shortness of breath.  Your respiratory swab results will appear on MyChart.  Return to emergency department for any worsening symptoms.

## 2024-02-26 NOTE — ED PROVIDER NOTES
EMERGENCY DEPARTMENT ENCOUNTER  Room Number:  02/02  PCP: Papa Carey MD  Independent Historians: Patient      HPI:  Chief Complaint: had concerns including Shortness of Breath.     A complete HPI/ROS/PMH/PSH/SH/FH are unobtainable due to: None    Chronic or social conditions impacting patient care (Social Determinants of Health): None      Context: Eliseo Phan is a 58 y.o. male with a medical history of HIV, GERD, and CKD who presents to the ED c/o acute dyspnea.  Patient reports symptoms have been ongoing for 5 weeks.  Has had associated cough.  Symptoms sometimes worsened with exertion.  Patient denies injury or trauma to the chest.  Patient was started on amoxicillin 5 days ago per his PCP, has not improved his symptoms.  Patient went to urgent care today and was encouraged to seek evaluation in the ED to rule out cardiac component.  Patient does have HIV with undetectable levels.  Positive family history of CAD.  No pleuritic component.  No history of DVT/PE.  Patient denies anticoagulation.  Patient denies fever, syncope, vomiting, abdominal pain, leg swelling, unilateral numbness/weakness, or any other systemic complaints.  Former cigarette smoker, patient does vape occasionally.      Review of prior external notes (non-ED) -and- Review of prior external test results outside of this encounter:  Patient seen at urgent care for exertional dyspnea.  Patient referred to ED for further workup.  Reviewed labs collected on 2/16/2024.  CBC with hemoglobin 15.9, CMP with creatinine 1.46.    Prescription drug monitoring program review:     N/A    PAST MEDICAL HISTORY  Active Ambulatory Problems     Diagnosis Date Noted    HIV (human immunodeficiency virus infection) 06/14/2017    Acid reflux 06/14/2017    Penis disorder 06/14/2017    Sciatica of left side 06/14/2017    Hepatitis B virus infection 06/14/2017    Colon cancer screening 06/14/2017    Drug addiction in remission 06/14/2017    Acute left-sided low back  pain with left-sided sciatica 08/29/2017    DDD (degenerative disc disease), lumbar 08/29/2017    Health maintenance examination 08/31/2017    Lumbar disc herniation with radiculopathy 08/31/2017    Hypogonadism in male 08/31/2017    Symptoms involving urinary system 08/31/2017    Bulging lumbar disc 09/15/2017    Lumbar stenosis with neurogenic claudication 04/03/2018    Herpes zoster without complication 09/26/2018    Postlaminectomy syndrome of lumbar region 05/28/2019    Neuropathic pain, leg, left 05/28/2019    Lumbar facet arthropathy 01/20/2020    Lumbar herniated disc 07/14/2020    Postoperative visit 07/29/2020    Muscle spasm 07/29/2020    History of lumbar spinal fusion 10/26/2020    Encounter for screening for malignant neoplasm of colon 06/30/2021    Stage 3 chronic kidney disease 10/21/2021    HIV infection 09/19/2022    Anal skin tag 12/28/2023     Resolved Ambulatory Problems     Diagnosis Date Noted    No Resolved Ambulatory Problems     Past Medical History:   Diagnosis Date    Arthritis     Back pain     Hepatitis B     History of kidney stones     PONV (postoperative nausea and vomiting)          PAST SURGICAL HISTORY  Past Surgical History:   Procedure Laterality Date    APPENDECTOMY      BONE TUMOR EXCISION Right     right lower leg - as a child    COLONOSCOPY N/A 1/12/2024    Procedure: COLONOSCOPY to cecum with cold polypectomy;  Surgeon: Levi Berman MD;  Location: [facility] ENDOSCOPY;  Service: General;  Laterality: N/A;  PRE - screening  POST - diverticulosis, polyp    KNEE ARTHROSCOPY Left     LUMBAR FUSION Left 07/14/2020    Procedure: LUMBAR 4 TO LUMBAR 5, LUMBAR 5  TO SACRAL 1 LEFT METRX TRANSFORAMINAL LUMBAR INTERBODY FUSION WITH CAGES AND SEXTANT PEDICLE SCREW FIXATION;  Surgeon: Thomas Torres MD;  Location: [facility] MAIN OR;  Service: Neurosurgery;  Laterality: Left;    LUMBAR LAMINECTOMY DISCECTOMY DECOMPRESSION N/A 04/05/2018    Procedure: L4-5, L5-S1 Lumbar  Laminectomy;  Surgeon: Ernie Curtis MD;  Location: [facility] OR;  Service: Neurosurgery    TONSILLECTOMY  1967         FAMILY HISTORY  Family History   Problem Relation Age of Onset    Breast cancer Mother     Heart disease Mother     Hypertension Mother     Diabetes Mother     Thyroid disease Mother     Depression Mother     Anxiety disorder Mother     Alcohol abuse Mother     Arthritis Mother     Cancer Mother         Breast cancer    Hypertension Father     Depression Father     Anxiety disorder Father     Arthritis Father     Nephrolithiasis Brother     Asthma Brother     Arthritis Maternal Grandmother     Malig Hyperthermia Neg Hx          SOCIAL HISTORY  Social History     Socioeconomic History    Marital status: Single    Highest education level: Master's degree (e.g., MA, MS, [place], MEd, MSW, FANI)   Tobacco Use    Smoking status: Former     Packs/day: 0.50     Years: 10.00     Additional pack years: 0.00     Total pack years: 5.00     Types: Cigarettes     Quit date: 4/1/2020     Years since quitting: 3.9     Passive exposure: Never    Smokeless tobacco: Never   Vaping Use    Vaping Use: Former   Substance and Sexual Activity    Alcohol use: No    Drug use: Not Currently     Types: Amphetamines, Fentanyl, Other     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    Sexual activity: Not Currently     Partners: Male         ALLERGIES  Patient has no known allergies.      REVIEW OF SYSTEMS  Review of Systems   Constitutional:  Negative for chills and fever.   HENT:  Negative for ear pain and sore throat.    Respiratory:  Positive for cough, chest tightness and shortness of breath.    Cardiovascular:  Negative for chest pain and palpitations.   Gastrointestinal:  Negative for abdominal pain and vomiting.   Genitourinary:  Negative for dysuria and hematuria.   Musculoskeletal:  Negative for arthralgias and joint swelling.   Skin:  Negative for pallor and rash.   Neurological:  Negative for syncope and  headaches.   Psychiatric/Behavioral:  Negative for confusion and hallucinations.      Included in HPI  All systems reviewed and negative except for those discussed in HPI.      PHYSICAL EXAM    I have reviewed the triage vital signs and nursing notes.    ED Triage Vitals   Temp Heart Rate Resp BP SpO2   02/26/24 1319 02/26/24 1319 02/26/24 1319 02/26/24 1320 02/26/24 1319   97.1 °F (36.2 °C) 75 16 167/97 99 %      Temp src Heart Rate Source Patient Position BP Location FiO2 (%)   02/26/24 1319 02/26/24 1319 -- -- --   Tympanic Monitor          Physical Exam  Constitutional:       General: He is not in acute distress.     Appearance: Normal appearance.   HENT:      Head: Normocephalic and atraumatic.      Nose: Nose normal.      Mouth/Throat:      Mouth: Mucous membranes are moist.   Eyes:      Conjunctiva/sclera: Conjunctivae normal.      Pupils: Pupils are equal, round, and reactive to light.   Cardiovascular:      Rate and Rhythm: Normal rate and regular rhythm.      Pulses: Normal pulses.      Heart sounds: Normal heart sounds.      Comments: Distal pulses intact  Pulmonary:      Effort: Pulmonary effort is normal.      Breath sounds: Normal breath sounds.   Abdominal:      General: There is no distension.   Musculoskeletal:         General: Normal range of motion.      Cervical back: Normal range of motion and neck supple.   Skin:     General: Skin is warm.      Capillary Refill: Capillary refill takes less than 2 seconds.   Neurological:      General: No focal deficit present.      Mental Status: He is alert and oriented to person, place, and time.   Psychiatric:         Mood and Affect: Mood normal.           LAB RESULTS  Recent Results (from the past 24 hour(s))   Comprehensive Metabolic Panel    Collection Time: 02/26/24  1:22 PM    Specimen: Arm, Right; Blood   Result Value Ref Range    Glucose 111 (H) 65 - 99 mg/dL    BUN 14 6 - 20 mg/dL    Creatinine 1.49 (H) 0.76 - 1.27 mg/dL    Sodium 138 136 - 145  mmol/L    Potassium 5.3 (H) 3.5 - 5.2 mmol/L    Chloride 102 98 - 107 mmol/L    CO2 24.5 22.0 - 29.0 mmol/L    Calcium 9.5 8.6 - 10.5 mg/dL    Total Protein 7.0 6.0 - 8.5 g/dL    Albumin 4.2 3.5 - 5.2 g/dL    ALT (SGPT) 29 1 - 41 U/L    AST (SGOT) 26 1 - 40 U/L    Alkaline Phosphatase 55 39 - 117 U/L    Total Bilirubin 0.2 0.0 - 1.2 mg/dL    Globulin 2.8 gm/dL    A/G Ratio 1.5 g/dL    BUN/Creatinine Ratio 9.4 7.0 - 25.0    Anion Gap 11.5 5.0 - 15.0 mmol/L    eGFR 54.1 (L) >60.0 mL/min/1.73   High Sensitivity Troponin T    Collection Time: 02/26/24  1:22 PM    Specimen: Arm, Right; Blood   Result Value Ref Range    HS Troponin T 10 <22 ng/L   Green Top (Gel)    Collection Time: 02/26/24  1:22 PM   Result Value Ref Range    Extra Tube Hold for add-ons.    Lavender Top    Collection Time: 02/26/24  1:22 PM   Result Value Ref Range    Extra Tube hold for add-on    Gold Top - SST    Collection Time: 02/26/24  1:22 PM   Result Value Ref Range    Extra Tube Hold for add-ons.    Light Blue Top    Collection Time: 02/26/24  1:22 PM   Result Value Ref Range    Extra Tube Hold for add-ons.    CBC Auto Differential    Collection Time: 02/26/24  1:22 PM    Specimen: Arm, Right; Blood   Result Value Ref Range    WBC 5.56 3.40 - 10.80 10*3/mm3    RBC 5.14 4.14 - 5.80 10*6/mm3    Hemoglobin 15.3 13.0 - 17.7 g/dL    Hematocrit 44.7 37.5 - 51.0 %    MCV 87.0 79.0 - 97.0 fL    MCH 29.8 26.6 - 33.0 pg    MCHC 34.2 31.5 - 35.7 g/dL    RDW 12.0 (L) 12.3 - 15.4 %    RDW-SD 38.1 37.0 - 54.0 fl    MPV 8.6 6.0 - 12.0 fL    Platelets 173 140 - 450 10*3/mm3    Neutrophil % 65.8 42.7 - 76.0 %    Lymphocyte % 25.5 19.6 - 45.3 %    Monocyte % 6.5 5.0 - 12.0 %    Eosinophil % 1.6 0.3 - 6.2 %    Basophil % 0.4 0.0 - 1.5 %    Immature Grans % 0.2 0.0 - 0.5 %    Neutrophils, Absolute 3.66 1.70 - 7.00 10*3/mm3    Lymphocytes, Absolute 1.42 0.70 - 3.10 10*3/mm3    Monocytes, Absolute 0.36 0.10 - 0.90 10*3/mm3    Eosinophils, Absolute 0.09 0.00 - 0.40  10*3/mm3    Basophils, Absolute 0.02 0.00 - 0.20 10*3/mm3    Immature Grans, Absolute 0.01 0.00 - 0.05 10*3/mm3    nRBC 0.0 0.0 - 0.2 /100 WBC   ECG 12 Lead ED Triage Standing Order; Chest Pain    Collection Time: 02/26/24  1:22 PM   Result Value Ref Range    QT Interval 365 ms    QTC Interval 377 ms         RADIOLOGY  XR Chest 1 View    Result Date: 2/26/2024  XR CHEST 1 VW-2/26/2024  HISTORY: Chest pain.  Heart size is within normal limits. Lungs appear free of acute infiltrates. Bones and soft tissues are unremarkable.      1. No acute process.   This report was finalized on 2/26/2024 2:28 PM by Dr. Félix Jacobson M.D on Workstation: [id number]         MEDICATIONS GIVEN IN ER  Medications   sodium chloride 0.9 % flush 10 mL (has no administration in time range)   aspirin tablet 325 mg (325 mg Oral Not Given 2/26/24 1435)         ORDERS PLACED DURING THIS VISIT:  Orders Placed This Encounter   Procedures    Respiratory Panel PCR w/COVID-19(SARS-CoV-2) GIOVANNA/KIMANI/JOSE/PAD/COR/MARIEL In-House, NP Swab in UTM/VTM, 2 HR TAT - Swab, Nasopharynx    XR Chest 1 View    [place] Draw    Comprehensive Metabolic Panel    High Sensitivity Troponin T    CBC Auto Differential    NPO Diet NPO Type: Strict NPO    Undress & Gown    Continuous Pulse Oximetry    Oxygen Therapy- Nasal Cannula; Titrate 1-6 LPM Per SpO2; 90 - 95%    ECG 12 Lead ED Triage Standing Order; Chest Pain    ECG 12 Lead ED Triage Standing Order; Chest Pain    Insert Peripheral IV    CBC & Differential    Green Top (Gel)    Lavender Top    Gold Top - SST    Light Blue Top         OUTPATIENT MEDICATION MANAGEMENT:  Current Facility-Administered Medications Ordered in Epic   Medication Dose Route Frequency Provider Last Rate Last Admin    aspirin tablet 325 mg  325 mg Oral Once Willem Eckert MD        sodium chloride 0.9 % flush 10 mL  10 mL Intravenous PRN Willem Eckert MD         Current Outpatient Medications Ordered in Epic   Medication Sig Dispense  Refill    albuterol sulfate  (90 Base) MCG/ACT inhaler Inhale 2 puffs Every 4 (Four) Hours As Needed for Shortness of Air. 8 g 0    amoxicillin-clavulanate (AUGMENTIN) 875-125 MG per tablet Take 1 tablet by mouth 2 (Two) Times a Day. 20 tablet 0    Bictegravir-Emtricitab-Tenofov (Biktarvy) -25 MG per tablet Take 1 tablet by mouth Daily. 30 tablet 5    Cholecalciferol (Vitamin D) 50 MCG (2000 UT) tablet Take 2,000 Units by mouth Daily. 90 tablet 3    fluticasone (FLONASE) 50 MCG/ACT nasal spray INSTILL 2 SPRAYS INTO EACH NOSTRIL DAILY AS NEEDED FOR ALLERGIES 16 g 2    omeprazole (priLOSEC) 40 MG capsule Take 1 capsule by mouth Daily. 90 capsule 3    ondansetron (Zofran) 4 MG tablet Take 1 tablet by mouth Every 6 (Six) Hours As Needed for Nausea or Vomiting. 20 tablet 1    tadalafil (Cialis) 20 MG tablet Take 1 tablet by mouth Daily As Needed for Erectile Dysfunction. 30 tablet 11    traZODone (DESYREL) 100 MG tablet TAKE 1 TABLET BY MOUTH EVERY DAY AT NIGHT 90 tablet 1    varenicline (Chantix Continuing Month Panfilo) 1 MG tablet Take 1 tablet by mouth 2 (Two) Times a Day for 56 days. 56 tablet 1             PROGRESS, DATA ANALYSIS, CONSULTS, AND MEDICAL DECISION MAKING  All labs have been independently interpreted by me.  All radiology studies have been reviewed by me. All EKG's have been independently viewed and interpreted by me.  Discussion below represents my analysis of pertinent findings related to patient's condition, differential diagnosis, treatment plan and final disposition.    Differential diagnosis includes but is not limited to bronchitis, ACS, PE.    Clinical Scores: HEART Score: 2                   ED Course as of 02/26/24 1512   Mon Feb 26, 2024   1423 Chest x-ray independently interpreted by me as no pneumothorax [MP]   1509 Patient presents emergency department with 5 weeks of exertional dyspnea.  Worked up today with labs including troponin as well as EKG and chest x-ray.  Troponin is  normal, EKG nonischemic.  No acute findings on chest x-ray.  Patient has low heart score and is not having active chest pain.  Will discharge with plan to follow-up with PCP as well as cardiology.  Will discharge with albuterol inhaler to help with symptoms at home.  Discussed ED return precautions.  He is otherwise well-appearing, hemodynamically stable, and therefore appropriate for discharge. [MP]      ED Course User Index  [MP] Mone Gerard PA-C             AS OF 15:12 EST VITALS:    BP - 122/76  HR - 57  TEMP - 97.1 °F (36.2 °C) (Tympanic)  O2 SATS - 94%    COMPLEXITY OF CARE  Admission was considered but after careful review of the patient's presentation, physical examination, diagnostic results, and response to treatment the patient may be safely discharged with outpatient follow-up.      DIAGNOSIS  Final diagnoses:   Exertional dyspnea         DISPOSITION  ED Disposition       ED Disposition   Discharge    Condition   Stable    Comment   --                Please note that portions of this document were completed with a voice recognition program.    Note Disclaimer: At [facility], we believe that sharing information builds trust and better relationships. You are receiving this note because you recently visited [facility]. It is possible you will see health information before a provider has talked with you about it. This kind of information can be easy to misunderstand. To help you fully understand what it means for your health, we urge you to discuss this note with your provider.         Mone Gerard PA-C  02/26/24 7642

## 2024-02-26 NOTE — ED NOTES
Pt has been congested and soa c chest tightness x 5 weeks.  He has had HA.  He has been on amoxicillin x 5 days.

## 2024-02-26 NOTE — ED PROVIDER NOTES
EMERGENCY DEPARTMENT MD ATTESTATION NOTE    SHARED VISIT: This visit was performed by BOTH a physician and an APC. The substantive portion of the medical decision making was performed by this attesting physician who made or approved the management plan and takes responsibility for patient management. All studies documented in the APC note (if performed) were independently interpreted by me.    The FRANDY and I have discussed this patient's history, physical exam, MDM, and treatment plan.  I have reviewed the documentation and personally had a face to face interaction with the patient. The attached note describes my personal findings.        Room Number:  02/02  PCP: Papa Carey MD  Independent Historians: Patient    HPI:    Context: Eliseo Phan is a 58 y.o. male with a medical history of HIV, GERD, CKD who presents to the ED c/o acute dyspnea.  Symptoms been occurring for approximately 5 weeks.  Patient does have associated cough.  Patient completed 5-day course of amoxicillin without relief of symptoms patient was sent to the emergency department from urgent care        Review of prior external notes (non-ED) -and- Review of prior external test results outside of this encounter: Urgent care visit from earlier today reviewed and notable for exertional dyspnea.  Patient sent to the ER for further evaluation  PHYSICAL EXAM    I have reviewed the triage vital signs and nursing notes.    ED Triage Vitals   Temp Heart Rate Resp BP SpO2   02/26/24 1319 02/26/24 1319 02/26/24 1319 02/26/24 1320 02/26/24 1319   97.1 °F (36.2 °C) 75 16 167/97 99 %      Temp src Heart Rate Source Patient Position BP Location FiO2 (%)   02/26/24 1319 02/26/24 1319 -- -- --   Tympanic Monitor          Physical Exam  GENERAL: alert, no acute distress  SKIN: Warm, dry  HENT: Normocephalic, atraumatic  EYES: no scleral icterus  CV: regular rhythm, regular rate  RESPIRATORY: normal effort, lungs clear  ABDOMEN: soft, nontender,  nondistended  MUSCULOSKELETAL: no deformity  NEURO: alert, moves all extremities, follows commands      MEDICATIONS GIVEN IN ER  Medications   sodium chloride 0.9 % flush 10 mL (has no administration in time range)   aspirin tablet 325 mg (325 mg Oral Not Given 2/26/24 1435)         ORDERS PLACED DURING THIS VISIT:  Orders Placed This Encounter   Procedures    Respiratory Panel PCR w/COVID-19(SARS-CoV-2) GIOVANNA/KIMANI/JOSE/PAD/COR/MARIEL In-House, NP Swab in UTM/VTM, 2 HR TAT - Swab, Nasopharynx    XR Chest 1 View    [place] Draw    Comprehensive Metabolic Panel    High Sensitivity Troponin T    CBC Auto Differential    High Sensitivity Troponin T 2Hr    NPO Diet NPO Type: Strict NPO    Undress & Gown    Continuous Pulse Oximetry    Oxygen Therapy- Nasal Cannula; Titrate 1-6 LPM Per SpO2; 90 - 95%    ECG 12 Lead ED Triage Standing Order; Chest Pain    ECG 12 Lead ED Triage Standing Order; Chest Pain    Insert Peripheral IV    CBC & Differential    Green Top (Gel)    Lavender Top    Gold Top - SST    Light Blue Top           PROGRESS, DATA ANALYSIS, CONSULTS, AND MEDICAL DECISION MAKING  All labs have been independently interpreted by me.  All radiology studies have been reviewed by me. All EKG's have been independently viewed and interpreted by me.  Discussion below represents my analysis of pertinent findings related to patient's condition, differential diagnosis, treatment plan and final disposition.    Differential diagnosis includes but is not limited to pneumonia, viral URI, ACS.    Clinical Scores:                   ED Course as of 02/27/24 1620   Mon Feb 26, 2024   1423 Chest x-ray independently interpreted by me as no pneumothorax [MP]   1509 Patient presents emergency department with 5 weeks of exertional dyspnea.  Worked up today with labs including troponin as well as EKG and chest x-ray.  Troponin is normal, EKG nonischemic.  No acute findings on chest x-ray.  Patient has low heart score and is not having active  chest pain.  Will discharge with plan to follow-up with PCP as well as cardiology.  Will discharge with albuterol inhaler to help with symptoms at home.  Discussed ED return precautions.  He is otherwise well-appearing, hemodynamically stable, and therefore appropriate for discharge. [MP]      ED Course User Index  [MP] Mone Gerard PA-C       MDM: 58-year-old male with 5 weeks of exertional dyspnea presenting for evaluation.  Workup in the emergency department is notable for mild elevation of creatinine consistent with patient's baseline and coronavirus H KU 1 on RPP testing.  Remaining workup is unremarkable.  Supportive care measures discussed and patient discharged in stable condition.      COMPLEXITY OF CARE  Admission was considered but after careful review of the patient's presentation, physical examination, diagnostic results, and response to treatment the patient may be safely discharged with outpatient follow-up.    Please note that portions of this document were completed with a voice recognition program.    Note Disclaimer: At [facility], we believe that sharing information builds trust and better relationships. You are receiving this note because you recently visited [facility]. It is possible you will see health information before a provider has talked with you about it. This kind of information can be easy to misunderstand. To help you fully understand what it means for your health, we urge you to discuss this note with your provider.         Willem Eckert MD  02/27/24 0258

## 2024-03-22 RX ORDER — TRAZODONE HYDROCHLORIDE 100 MG/1
TABLET ORAL
Qty: 30 TABLET | Refills: 0 | Status: SHIPPED | OUTPATIENT
Start: 2024-03-22 | End: 2024-04-21

## 2024-03-28 RX ORDER — BACLOFEN 10 MG/1
10 TABLET ORAL 3 TIMES DAILY PRN
Qty: 90 TABLET | Refills: 1 | Status: SHIPPED | OUTPATIENT
Start: 2024-03-28

## 2024-04-05 ENCOUNTER — HOSPITAL ENCOUNTER (OUTPATIENT)
Dept: CT IMAGING | Facility: HOSPITAL | Age: 59
Discharge: HOME | End: 2024-04-05
Admitting: INTERNAL MEDICINE
Payer: COMMERCIAL

## 2024-04-05 DIAGNOSIS — Z12.2 SCREENING FOR LUNG CANCER: ICD-10-CM

## 2024-04-05 PROCEDURE — 71271 CT THORAX LUNG CANCER SCR C-: CPT

## 2024-04-16 DIAGNOSIS — E55.9 VITAMIN D DEFICIENCY: ICD-10-CM

## 2024-04-17 RX ORDER — CHOLECALCIFEROL (VITAMIN D3) 50 MCG
2000 TABLET ORAL DAILY
Qty: 30 TABLET | Refills: 5 | Status: SHIPPED | OUTPATIENT
Start: 2024-04-17

## 2024-04-17 RX ORDER — PRENATAL VIT/IRON FUM/FOLIC AC 27MG-0.8MG
1 TABLET ORAL DAILY
Qty: 30 TABLET | Refills: 5 | Status: SHIPPED | OUTPATIENT
Start: 2024-04-17

## 2024-04-22 RX ORDER — TRAZODONE HYDROCHLORIDE 100 MG/1
TABLET ORAL
Qty: 30 TABLET | Refills: 0 | Status: SHIPPED | OUTPATIENT
Start: 2024-04-22 | End: 2024-05-22

## 2024-05-13 DIAGNOSIS — S39.840D FRACTURE OF CORPUS CAVERNOSUM PENIS, SUBSEQUENT ENCOUNTER: ICD-10-CM

## 2024-05-13 DIAGNOSIS — N52.9 ERECTILE DYSFUNCTION, UNSPECIFIED ERECTILE DYSFUNCTION TYPE: ICD-10-CM

## 2024-05-13 RX ORDER — TADALAFIL 20 MG/1
20 TABLET ORAL DAILY PRN
Qty: 30 TABLET | Refills: 0 | Status: SHIPPED | OUTPATIENT
Start: 2024-05-13

## 2024-05-17 RX ORDER — METHYLPREDNISOLONE 4 MG/1
TABLET ORAL
Qty: 21 TABLET | Refills: 0 | Status: SHIPPED | OUTPATIENT
Start: 2024-05-17

## 2024-05-29 DIAGNOSIS — Z71.6 ENCOUNTER FOR SMOKING CESSATION COUNSELING: ICD-10-CM

## 2024-05-29 RX ORDER — VARENICLINE TARTRATE 1 MG/1
1 TABLET, FILM COATED ORAL 2 TIMES DAILY
Qty: 56 TABLET | Refills: 1 | Status: SHIPPED | OUTPATIENT
Start: 2024-05-29 | End: 2024-07-24

## 2024-06-02 RX ORDER — TRAZODONE HYDROCHLORIDE 100 MG/1
TABLET ORAL
Qty: 30 TABLET | Refills: 0 | Status: SHIPPED | OUTPATIENT
Start: 2024-06-02 | End: 2024-07-02

## 2024-06-04 RX ORDER — TRIAMCINOLONE ACETONIDE 0.25 MG/G
1 CREAM TOPICAL 2 TIMES DAILY
Qty: 30 G | Refills: 0 | Status: SHIPPED | OUTPATIENT
Start: 2024-06-04 | End: 2024-06-04

## 2024-06-04 RX ORDER — TRIAMCINOLONE ACETONIDE 0.25 MG/G
1 CREAM TOPICAL 2 TIMES DAILY
Qty: 30 G | Refills: 0 | Status: SHIPPED | OUTPATIENT
Start: 2024-06-04

## 2024-06-17 DIAGNOSIS — N52.9 ERECTILE DYSFUNCTION, UNSPECIFIED ERECTILE DYSFUNCTION TYPE: ICD-10-CM

## 2024-06-17 DIAGNOSIS — S39.840D FRACTURE OF CORPUS CAVERNOSUM PENIS, SUBSEQUENT ENCOUNTER: ICD-10-CM

## 2024-06-17 RX ORDER — TADALAFIL 20 MG/1
20 TABLET ORAL DAILY PRN
Qty: 30 TABLET | Refills: 0 | Status: SHIPPED | OUTPATIENT
Start: 2024-06-17

## 2024-06-19 RX ORDER — OMEPRAZOLE 40 MG/1
40 CAPSULE, DELAYED RELEASE ORAL DAILY
Qty: 90 CAPSULE | Refills: 3 | Status: SHIPPED | OUTPATIENT
Start: 2024-06-19

## 2024-07-05 RX ORDER — TRAZODONE HYDROCHLORIDE 100 MG/1
TABLET ORAL
Qty: 30 TABLET | Refills: 0 | OUTPATIENT
Start: 2024-07-05 | End: 2024-08-04

## 2024-07-17 DIAGNOSIS — S39.840D FRACTURE OF CORPUS CAVERNOSUM PENIS, SUBSEQUENT ENCOUNTER: ICD-10-CM

## 2024-07-17 DIAGNOSIS — N52.9 ERECTILE DYSFUNCTION, UNSPECIFIED ERECTILE DYSFUNCTION TYPE: ICD-10-CM

## 2024-07-17 RX ORDER — TADALAFIL 20 MG/1
20 TABLET ORAL DAILY PRN
Qty: 30 TABLET | Refills: 0 | Status: SHIPPED | OUTPATIENT
Start: 2024-07-17

## 2024-07-17 NOTE — TELEPHONE ENCOUNTER
Rx Refill Note  Requested Prescriptions     Pending Prescriptions Disp Refills    tadalafil (CIALIS) 20 MG tablet [Pharmacy Med Name: Tadalafil 20 MG Oral Tablet] 30 tablet 0     Sig: TAKE 1 TABLET BY MOUTH ONCE DAILY AS NEEDED FOR ERECTILE DYSFUNCTION      Last office visit with prescribing clinician: 12/13/2023   Last telemedicine visit with prescribing clinician: Visit date not found   Next office visit with prescribing clinician: Visit date not found                         Would you like a call back once the refill request has been completed: [] Yes [] No    If the office needs to give you a call back, can they leave a voicemail: [] Yes [] No    Loli Dawn MA  07/17/24, 11:47 EDT

## 2024-07-23 RX ORDER — TRAZODONE HYDROCHLORIDE 100 MG/1
100 TABLET ORAL
Qty: 30 TABLET | Refills: 0 | Status: SHIPPED | OUTPATIENT
Start: 2024-07-23 | End: 2024-08-22

## 2024-08-06 ENCOUNTER — TELEPHONE (OUTPATIENT)
Dept: SURGERY | Facility: CLINIC | Age: 59
End: 2024-08-06
Payer: COMMERCIAL

## 2024-08-06 DIAGNOSIS — K56.699 DIVERTICULAR STRICTURE: Primary | ICD-10-CM

## 2024-08-06 NOTE — TELEPHONE ENCOUNTER
Lvm for pt to give me a call back about the barium enema scheduled on Tuesday 09/03 arrive @ 9:15 am [facility]. NPO after midnight and I have the full barium prep to explain also

## 2024-08-06 NOTE — TELEPHONE ENCOUNTER
"Relay     \"RETURNED PT'S VM REGARDING SCHEDULING A BARIUM ENEMA. MESSAGE WAS LEFT THAT THIS GOES THROUGH [facility]ENT SCHED @ [phone].\"                "

## 2024-08-19 ENCOUNTER — OFFICE VISIT (OUTPATIENT)
Dept: INFECTIOUS DISEASES | Facility: CLINIC | Age: 59
End: 2024-08-19
Payer: COMMERCIAL

## 2024-08-19 ENCOUNTER — LAB (OUTPATIENT)
Dept: LAB | Facility: HOSPITAL | Age: 59
End: 2024-08-19
Payer: COMMERCIAL

## 2024-08-19 VITALS
SYSTOLIC BLOOD PRESSURE: 123 MMHG | HEART RATE: 65 BPM | HEIGHT: 70 IN | BODY MASS INDEX: 24.31 KG/M2 | TEMPERATURE: 98.3 F | WEIGHT: 169.8 LBS | RESPIRATION RATE: 18 BRPM | DIASTOLIC BLOOD PRESSURE: 83 MMHG

## 2024-08-19 DIAGNOSIS — Z21 ASYMPTOMATIC HIV INFECTION, WITH NO HISTORY OF HIV-RELATED ILLNESS: ICD-10-CM

## 2024-08-19 DIAGNOSIS — Z21 ASYMPTOMATIC HIV INFECTION, WITH NO HISTORY OF HIV-RELATED ILLNESS: Primary | ICD-10-CM

## 2024-08-19 LAB
ALBUMIN SERPL-MCNC: 4.4 G/DL (ref 3.5–5.2)
ALBUMIN/GLOB SERPL: 1.8 G/DL
ALP SERPL-CCNC: 52 U/L (ref 39–117)
ALT SERPL W P-5'-P-CCNC: 37 U/L (ref 1–41)
ANION GAP SERPL CALCULATED.3IONS-SCNC: 8.8 MMOL/L (ref 5–15)
AST SERPL-CCNC: 30 U/L (ref 1–40)
BACTERIA UR QL AUTO: NORMAL /HPF
BASOPHILS # BLD AUTO: 0.02 10*3/MM3 (ref 0–0.2)
BASOPHILS NFR BLD AUTO: 0.5 % (ref 0–1.5)
BILIRUB SERPL-MCNC: 0.3 MG/DL (ref 0–1.2)
BILIRUB UR QL STRIP: NEGATIVE
BUN SERPL-MCNC: 11 MG/DL (ref 6–20)
BUN/CREAT SERPL: 8 (ref 7–25)
CALCIUM SPEC-SCNC: 9.4 MG/DL (ref 8.6–10.5)
CHLORIDE SERPL-SCNC: 103 MMOL/L (ref 98–107)
CHOLEST SERPL-MCNC: 204 MG/DL (ref 0–200)
CLARITY UR: CLEAR
CO2 SERPL-SCNC: 25.2 MMOL/L (ref 22–29)
COLOR UR: YELLOW
CREAT SERPL-MCNC: 1.38 MG/DL (ref 0.76–1.27)
DEPRECATED RDW RBC AUTO: 43.5 FL (ref 37–54)
EGFRCR SERPLBLD CKD-EPI 2021: 59.3 ML/MIN/1.73
EOSINOPHIL # BLD AUTO: 0.12 10*3/MM3 (ref 0–0.4)
EOSINOPHIL NFR BLD AUTO: 2.8 % (ref 0.3–6.2)
ERYTHROCYTE [DISTWIDTH] IN BLOOD BY AUTOMATED COUNT: 13.5 % (ref 12.3–15.4)
GLOBULIN UR ELPH-MCNC: 2.5 GM/DL
GLUCOSE SERPL-MCNC: 97 MG/DL (ref 65–99)
GLUCOSE UR STRIP-MCNC: NEGATIVE MG/DL
HCT VFR BLD AUTO: 47.1 % (ref 37.5–51)
HDLC SERPL-MCNC: 43 MG/DL (ref 40–60)
HGB BLD-MCNC: 16 G/DL (ref 13–17.7)
HGB UR QL STRIP.AUTO: NEGATIVE
HYALINE CASTS UR QL AUTO: NORMAL /LPF
IMM GRANULOCYTES # BLD AUTO: 0.01 10*3/MM3 (ref 0–0.05)
IMM GRANULOCYTES NFR BLD AUTO: 0.2 % (ref 0–0.5)
KETONES UR QL STRIP: NEGATIVE
LDLC SERPL CALC-MCNC: 136 MG/DL (ref 0–100)
LDLC/HDLC SERPL: 3.11 {RATIO}
LEUKOCYTE ESTERASE UR QL STRIP.AUTO: NEGATIVE
LYMPHOCYTES # BLD AUTO: 1.61 10*3/MM3 (ref 0.7–3.1)
LYMPHOCYTES NFR BLD AUTO: 37.8 % (ref 19.6–45.3)
MCH RBC QN AUTO: 30.2 PG (ref 26.6–33)
MCHC RBC AUTO-ENTMCNC: 34 G/DL (ref 31.5–35.7)
MCV RBC AUTO: 89 FL (ref 79–97)
MONOCYTES # BLD AUTO: 0.28 10*3/MM3 (ref 0.1–0.9)
MONOCYTES NFR BLD AUTO: 6.6 % (ref 5–12)
NEUTROPHILS NFR BLD AUTO: 2.22 10*3/MM3 (ref 1.7–7)
NEUTROPHILS NFR BLD AUTO: 52.1 % (ref 42.7–76)
NITRITE UR QL STRIP: NEGATIVE
NRBC BLD AUTO-RTO: 0 /100 WBC (ref 0–0.2)
PH UR STRIP.AUTO: 6 [PH] (ref 5–8)
PLATELET # BLD AUTO: 168 10*3/MM3 (ref 140–450)
PMV BLD AUTO: 8.5 FL (ref 6–12)
POTASSIUM SERPL-SCNC: 4.3 MMOL/L (ref 3.5–5.2)
PROT SERPL-MCNC: 6.9 G/DL (ref 6–8.5)
PROT UR QL STRIP: NEGATIVE
RBC # BLD AUTO: 5.29 10*6/MM3 (ref 4.14–5.8)
RBC # UR STRIP: NORMAL /HPF
REF LAB TEST METHOD: NORMAL
RPR SER QL: ABNORMAL
RPR SER-TITR: ABNORMAL {TITER}
SODIUM SERPL-SCNC: 137 MMOL/L (ref 136–145)
SP GR UR STRIP: 1.01 (ref 1–1.03)
SQUAMOUS #/AREA URNS HPF: NORMAL /HPF
TRIGL SERPL-MCNC: 137 MG/DL (ref 0–150)
UROBILINOGEN UR QL STRIP: NORMAL
VLDLC SERPL-MCNC: 25 MG/DL (ref 5–40)
WBC # UR STRIP: NORMAL /HPF
WBC NRBC COR # BLD AUTO: 4.26 10*3/MM3 (ref 3.4–10.8)

## 2024-08-19 PROCEDURE — 99214 OFFICE O/P EST MOD 30 MIN: CPT | Performed by: STUDENT IN AN ORGANIZED HEALTH CARE EDUCATION/TRAINING PROGRAM

## 2024-08-19 PROCEDURE — 81001 URINALYSIS AUTO W/SCOPE: CPT

## 2024-08-19 PROCEDURE — 86592 SYPHILIS TEST NON-TREP QUAL: CPT

## 2024-08-19 PROCEDURE — 86593 SYPHILIS TEST NON-TREP QUANT: CPT

## 2024-08-19 PROCEDURE — 86361 T CELL ABSOLUTE COUNT: CPT

## 2024-08-19 PROCEDURE — 87536 HIV-1 QUANT&REVRSE TRNSCRPJ: CPT

## 2024-08-19 PROCEDURE — 80053 COMPREHEN METABOLIC PANEL: CPT

## 2024-08-19 PROCEDURE — 85025 COMPLETE CBC W/AUTO DIFF WBC: CPT

## 2024-08-19 PROCEDURE — 80061 LIPID PANEL: CPT

## 2024-08-19 PROCEDURE — 86780 TREPONEMA PALLIDUM: CPT

## 2024-08-19 PROCEDURE — 36415 COLL VENOUS BLD VENIPUNCTURE: CPT

## 2024-08-19 RX ORDER — BICTEGRAVIR SODIUM, EMTRICITABINE, AND TENOFOVIR ALAFENAMIDE FUMARATE 50; 200; 25 MG/1; MG/1; MG/1
1 TABLET ORAL DAILY
Qty: 30 TABLET | Refills: 5 | Status: SHIPPED | OUTPATIENT
Start: 2024-08-19

## 2024-08-19 NOTE — PROGRESS NOTES
Follow-up      HIV Follow-up Visit: Eliseo Phan is a 58 y.o. male here for follow-up HIV visit. He is feeling unchanged since his last visit.      Patient reports he is doing well.  Denies any difficulty taking Biktarvy.  States he has had no missed doses.  No known side effects with this medication.  No new sexual partners and denies any concern for sexually transmitted infections.  He is  his vitamins from taking his Biktarvy.    Does report that he may have upcoming surgery on his colon due to colonic stricture.  Does report that he may have had a recent viral infection that is slowly improving.    Past medical history:  Past Medical History:   Diagnosis Date    Acid reflux     Arthritis     Back pain     Hepatitis B     History of kidney stones     HIV (human immunodeficiency virus infection) 2011    PONV (postoperative nausea and vomiting)        Medications:   Current Outpatient Medications:     baclofen (LIORESAL) 10 MG tablet, Take 1 tablet by mouth 3 (Three) Times a Day As Needed for Muscle Spasms., Disp: 90 tablet, Rfl: 1    Bictegravir-Emtricitab-Tenofov (Biktarvy) -25 MG per tablet, Take 1 tablet by mouth Daily., Disp: 30 tablet, Rfl: 5    Cholecalciferol (Vitamin D) 50 MCG (2000 UT) tablet, TAKE 1 TABLET BY MOUTH DAILY, Disp: 30 tablet, Rfl: 5    fluticasone (FLONASE) 50 MCG/ACT nasal spray, INSTILL 2 SPRAYS INTO EACH NOSTRIL DAILY AS NEEDED FOR ALLERGIES, Disp: 16 g, Rfl: 2    omeprazole (priLOSEC) 40 MG capsule, TAKE 1 CAPSULE BY MOUTH EVERY DAY, Disp: 90 capsule, Rfl: 3    ondansetron (Zofran) 4 MG tablet, Take 1 tablet by mouth Every 6 (Six) Hours As Needed for Nausea or Vomiting., Disp: 20 tablet, Rfl: 1    Prenatal Vit-Fe Fumarate-FA (prenatal vitamin 27-0.8) 27-0.8 MG tablet tablet, TAKE 1 TABLET BY MOUTH DAILY, Disp: 30 tablet, Rfl: 5    tadalafil (CIALIS) 20 MG tablet, TAKE 1 TABLET BY MOUTH ONCE DAILY AS NEEDED FOR ERECTILE DYSFUNCTION, Disp: 30 tablet, Rfl: 0    traZODone  "(DESYREL) 100 MG tablet, Take 1 tablet by mouth every night at bedtime for 30 days., Disp: 30 tablet, Rfl: 0    triamcinolone (KENALOG) 0.025 % cream, Apply 1 Application topically to the appropriate area as directed 2 (Two) Times a Day., Disp: 30 g, Rfl: 0    Allergies:  has No Known Allergies.    Family History: family history includes Alcohol abuse in his mother; Anxiety disorder in his father and mother; Arthritis in his father, maternal grandmother, and mother; Asthma in his brother; Breast cancer in his mother; Cancer in his mother; Depression in his father and mother; Diabetes in his mother; Heart disease in his mother; Hypertension in his father and mother; Nephrolithiasis in his brother; Thyroid disease in his mother.    Social History:  reports that he quit smoking about 4 years ago. His smoking use included cigarettes. He started smoking about 14 years ago. He has a 5 pack-year smoking history. He has never been exposed to tobacco smoke. He has never used smokeless tobacco. He reports that he does not currently use drugs after having used the following drugs: Amphetamines, Fentanyl, and Other. He reports that he does not drink alcohol.    Review of Systems: All other reviewed and negative except as per HPI    Blood pressure 123/83, pulse 65, temperature 98.3 °F (36.8 °C), temperature source Oral, resp. rate 18, height 177.8 cm (70\"), weight 77 kg (169 lb 12.8 oz).  GENERAL: Awake and alert, in no acute distress.   HEENT: Oropharynx is clear. Hearing is grossly normal.   EYES: PERRL. No conjunctival injection. No lid lag.   HEART: Regular rate. No peripheral edema.   LUNGS: Normal respiratory effort  ABDOMEN: Nondistended  SKIN: No rashes or lesions in exposed areas  PSYCHIATRIC: Appropriate mood, affect, insight, and judgment.       DIAGNOSTICS:  Lab Results   Component Value Date    WBC 5.56 02/26/2024    HGB 15.3 02/26/2024    HCT 44.7 02/26/2024     02/26/2024     No results found for: \"CRP\"  No " "results found for: \"SEDRATE\"  Lab Results   Component Value Date    GLUCOSE 111 (H) 02/26/2024    BUN 14 02/26/2024    CREATININE 1.49 (H) 02/26/2024    EGFRIFNONA 53 (L) 02/18/2022    EGFRIFAFRI >60 08/31/2021    BCR 9.4 02/26/2024    CO2 24.5 02/26/2024    CALCIUM 9.5 02/26/2024    PROTENTOTREF 7.4 07/30/2021    ALBUMIN 4.2 02/26/2024    LABIL2 1.4 08/31/2021    AST 26 02/26/2024    ALT 29 02/26/2024       ART history:  2010 through 2017 Atripla  2017 through August 2021 Tivicay plus Descovy  8/2021 through now Biktarvy     Lab history:  -5/23/2012 597  -9/24/2021 566  -1/14/2016 494  -7/24/2017 542  -9/27/2017 527  -7/11/2018 564  -1/9/2019 536  -7/9/2019 465  -10/4/2019 744  -1/6/2020 508  -11/10/2020 513  -2/24/2021 696  -4/2/2021 513  -8/31/2021 462  - 2/18/2022 419, viral load less than 20  -8/22/2022 CD4 402, 28% and viral load 30  -2/20/2023 CD4 533, 29% and viral load 40  -8/16/2023 CD4 454, 32% and viral load less than 20  -2/16/2024 CD4 468, 31% and viral load less than 20    (Z21) Asymptomatic HIV infection, with no history of HIV-related illness - Plan: T-helper Cells (CD4) Count, HIV-1 RNA, Quantitative, PCR (graph), CBC & Differential, Comprehensive Metabolic Panel, RPR Qualitative with Reflex to Quant, Lipid Panel, Urinalysis With Microscopic - Urine, Clean Catch      Will continue treatment with Biktarvy.  Medication adherence education provided by MD.  See lab orders.  Counseling provided on the prevention of transmission of HIV.  See diagnoses,orders and patient instructions.  Total Duration of Visit: 30 Minutes.  Total Counseling Time: 15 Minutes, counseling the patient regarding HIV prognosis, prevention of HIV transmittal to others, compliance with treatment plan, daily disease management, risk factor reduction and meaning of diagnostic test results.   Follow up in 6 months.      Discussed with patient HIV diagnosis, goals of treatment, lab monitoring, means to reduce transmission, need for " adherence to therapy as well as treatment plan, and ways to militate against the progression of disease.  Patient understands and all questions answered.

## 2024-08-20 LAB
BASOPHILS # BLD AUTO: 0 X10E3/UL (ref 0–0.2)
BASOPHILS NFR BLD AUTO: 1 %
CD3+CD4+ CELLS # BLD: 486 /UL (ref 359–1519)
CD3+CD4+ CELLS NFR BLD: 30.4 % (ref 30.8–58.5)
EOSINOPHIL # BLD AUTO: 0.1 X10E3/UL (ref 0–0.4)
EOSINOPHIL NFR BLD AUTO: 3 %
ERYTHROCYTE [DISTWIDTH] IN BLOOD BY AUTOMATED COUNT: 13.4 % (ref 11.6–15.4)
HCT VFR BLD AUTO: 48.9 % (ref 37.5–51)
HGB BLD-MCNC: 16.3 G/DL (ref 13–17.7)
IMM GRANULOCYTES # BLD AUTO: 0 X10E3/UL (ref 0–0.1)
IMM GRANULOCYTES NFR BLD AUTO: 0 %
LYMPHOCYTES # BLD AUTO: 1.6 X10E3/UL (ref 0.7–3.1)
LYMPHOCYTES NFR BLD AUTO: 37 %
MCH RBC QN AUTO: 30.1 PG (ref 26.6–33)
MCHC RBC AUTO-ENTMCNC: 33.3 G/DL (ref 31.5–35.7)
MCV RBC AUTO: 90 FL (ref 79–97)
MONOCYTES # BLD AUTO: 0.3 X10E3/UL (ref 0.1–0.9)
MONOCYTES NFR BLD AUTO: 7 %
NEUTROPHILS # BLD AUTO: 2.2 X10E3/UL (ref 1.4–7)
NEUTROPHILS NFR BLD AUTO: 52 %
PLATELET # BLD AUTO: 177 X10E3/UL (ref 150–450)
RBC # BLD AUTO: 5.41 X10E6/UL (ref 4.14–5.8)
WBC # BLD AUTO: 4.3 X10E3/UL (ref 3.4–10.8)

## 2024-08-20 RX ORDER — TRAZODONE HYDROCHLORIDE 100 MG/1
100 TABLET ORAL
Qty: 30 TABLET | Refills: 0 | Status: SHIPPED | OUTPATIENT
Start: 2024-08-20 | End: 2024-09-19

## 2024-08-24 DIAGNOSIS — S39.840D FRACTURE OF CORPUS CAVERNOSUM PENIS, SUBSEQUENT ENCOUNTER: ICD-10-CM

## 2024-08-24 DIAGNOSIS — N52.9 ERECTILE DYSFUNCTION, UNSPECIFIED ERECTILE DYSFUNCTION TYPE: ICD-10-CM

## 2024-08-26 RX ORDER — TADALAFIL 20 MG/1
20 TABLET ORAL DAILY PRN
Qty: 30 TABLET | Refills: 0 | Status: SHIPPED | OUTPATIENT
Start: 2024-08-26

## 2024-09-03 ENCOUNTER — HOSPITAL ENCOUNTER (OUTPATIENT)
Dept: GENERAL RADIOLOGY | Facility: HOSPITAL | Age: 59
Discharge: HOME | End: 2024-09-03
Admitting: SURGERY
Payer: COMMERCIAL

## 2024-09-03 DIAGNOSIS — K56.699 DIVERTICULAR STRICTURE: ICD-10-CM

## 2024-09-03 PROCEDURE — 74270 X-RAY XM COLON 1CNTRST STD: CPT

## 2024-09-04 ENCOUNTER — TELEPHONE (OUTPATIENT)
Dept: SURGERY | Facility: CLINIC | Age: 59
End: 2024-09-04
Payer: COMMERCIAL

## 2024-09-05 ENCOUNTER — TELEPHONE (OUTPATIENT)
Dept: SURGERY | Facility: CLINIC | Age: 59
End: 2024-09-05
Payer: COMMERCIAL

## 2024-09-06 ENCOUNTER — PREP FOR SURGERY (OUTPATIENT)
Dept: OTHER | Facility: HOSPITAL | Age: 59
End: 2024-09-06
Payer: COMMERCIAL

## 2024-09-06 ENCOUNTER — TELEPHONE (OUTPATIENT)
Dept: SURGERY | Facility: CLINIC | Age: 59
End: 2024-09-06
Payer: COMMERCIAL

## 2024-09-06 DIAGNOSIS — K56.699 DIVERTICULAR STRICTURE: Primary | ICD-10-CM

## 2024-09-06 RX ORDER — METRONIDAZOLE 500 MG/1
500 TABLET ORAL SEE ADMIN INSTRUCTIONS
Qty: 4 TABLET | Refills: 0 | Status: SHIPPED | OUTPATIENT
Start: 2024-09-06 | End: 2024-09-07

## 2024-09-06 RX ORDER — NEOMYCIN SULFATE 500 MG/1
TABLET ORAL
Qty: 4 TABLET | Refills: 0 | Status: SHIPPED | OUTPATIENT
Start: 2024-09-06 | End: 2024-09-09 | Stop reason: SDUPTHER

## 2024-09-06 NOTE — TELEPHONE ENCOUNTER
I called and discussed results of his barium enema.  He has a diverticular stricture is weak on his colonoscopy.  Rest of his colon looks normal.  We discussed proceeding with laparoscopic sigmoid colectomy as he continues to have worsening symptoms of a partial large bowel obstruction from his diverticular stricture.  We discussed postoperative expectations and risks, including creation of colostomy or ileostomy, although very unlikely.    I would like to obtain a CT scan of his abdomen pelvis for surgical planning.    Plan to proceed to the operating room for laparoscopic sigmoid colectomy. ERAS protocols. Full bowel prep.

## 2024-09-06 NOTE — TELEPHONE ENCOUNTER
Patient called stating that he is returning your call and would like to speak to you prior to the weekend.    Thank you!

## 2024-09-08 RX ORDER — TRAZODONE HYDROCHLORIDE 100 MG/1
100 TABLET ORAL
Qty: 30 TABLET | Refills: 0 | Status: SHIPPED | OUTPATIENT
Start: 2024-09-08 | End: 2024-10-08

## 2024-09-09 ENCOUNTER — TELEPHONE (OUTPATIENT)
Dept: SURGERY | Facility: CLINIC | Age: 59
End: 2024-09-09
Payer: COMMERCIAL

## 2024-09-09 PROBLEM — K56.699 DIVERTICULAR STRICTURE: Status: ACTIVE | Noted: 2024-09-06

## 2024-09-09 RX ORDER — NEOMYCIN SULFATE 500 MG/1
TABLET ORAL
Qty: 4 TABLET | Refills: 0 | Status: SHIPPED | OUTPATIENT
Start: 2024-09-09

## 2024-09-09 RX ORDER — METRONIDAZOLE 500 MG/1
500 TABLET ORAL SEE ADMIN INSTRUCTIONS
Qty: 4 TABLET | Refills: 0 | Status: SHIPPED | OUTPATIENT
Start: 2024-09-09 | End: 2024-09-10

## 2024-09-09 NOTE — TELEPHONE ENCOUNTER
----- Message from Dinah CONSTANTINO sent at 9/9/2024  1:19 PM EDT -----  Regarding: Re-send prescription  Neomycin and Flagyl needs to be resent to [facility] pharmacy, not . Records have been updated.

## 2024-09-16 ENCOUNTER — HOSPITAL ENCOUNTER (OUTPATIENT)
Dept: CT IMAGING | Facility: HOSPITAL | Age: 59
Discharge: HOME | End: 2024-09-16
Admitting: SURGERY
Payer: COMMERCIAL

## 2024-09-16 DIAGNOSIS — K56.699 DIVERTICULAR STRICTURE: ICD-10-CM

## 2024-09-16 PROCEDURE — 25510000001 IOPAMIDOL 61 % SOLUTION: Performed by: SURGERY

## 2024-09-16 PROCEDURE — 74177 CT ABD & PELVIS W/CONTRAST: CPT

## 2024-09-16 RX ORDER — IOPAMIDOL 612 MG/ML
100 INJECTION, SOLUTION INTRAVASCULAR
Status: COMPLETED | OUTPATIENT
Start: 2024-09-16 | End: 2024-09-16

## 2024-09-16 RX ADMIN — IOPAMIDOL 85 ML: 612 INJECTION, SOLUTION INTRAVENOUS at 18:25

## 2024-09-17 ENCOUNTER — PRE-ADMISSION TESTING (OUTPATIENT)
Dept: PREADMISSION TESTING | Facility: HOSPITAL | Age: 59
End: 2024-09-17
Payer: COMMERCIAL

## 2024-09-17 VITALS
TEMPERATURE: 97.8 F | HEART RATE: 72 BPM | BODY MASS INDEX: 25.48 KG/M2 | WEIGHT: 172 LBS | DIASTOLIC BLOOD PRESSURE: 65 MMHG | OXYGEN SATURATION: 98 % | SYSTOLIC BLOOD PRESSURE: 117 MMHG | HEIGHT: 69 IN | RESPIRATION RATE: 16 BRPM

## 2024-09-17 LAB
ABO GROUP BLD: NORMAL
ALBUMIN SERPL-MCNC: 4.2 G/DL (ref 3.5–5.2)
ALBUMIN/GLOB SERPL: 1.8 G/DL
ALP SERPL-CCNC: 52 U/L (ref 39–117)
ALT SERPL W P-5'-P-CCNC: 25 U/L (ref 1–41)
ANION GAP SERPL CALCULATED.3IONS-SCNC: 9.3 MMOL/L (ref 5–15)
AST SERPL-CCNC: 25 U/L (ref 1–40)
BILIRUB SERPL-MCNC: 0.3 MG/DL (ref 0–1.2)
BLD GP AB SCN SERPL QL: NEGATIVE
BUN SERPL-MCNC: 13 MG/DL (ref 6–20)
BUN/CREAT SERPL: 9.8 (ref 7–25)
CALCIUM SPEC-SCNC: 9.7 MG/DL (ref 8.6–10.5)
CHLORIDE SERPL-SCNC: 105 MMOL/L (ref 98–107)
CO2 SERPL-SCNC: 24.7 MMOL/L (ref 22–29)
CREAT SERPL-MCNC: 1.33 MG/DL (ref 0.76–1.27)
DEPRECATED RDW RBC AUTO: 42.9 FL (ref 37–54)
EGFRCR SERPLBLD CKD-EPI 2021: 62 ML/MIN/1.73
ERYTHROCYTE [DISTWIDTH] IN BLOOD BY AUTOMATED COUNT: 13.1 % (ref 12.3–15.4)
GLOBULIN UR ELPH-MCNC: 2.4 GM/DL
GLUCOSE SERPL-MCNC: 104 MG/DL (ref 65–99)
HCT VFR BLD AUTO: 42.9 % (ref 37.5–51)
HGB BLD-MCNC: 15 G/DL (ref 13–17.7)
MCH RBC QN AUTO: 31.2 PG (ref 26.6–33)
MCHC RBC AUTO-ENTMCNC: 35 G/DL (ref 31.5–35.7)
MCV RBC AUTO: 89.2 FL (ref 79–97)
PLATELET # BLD AUTO: 173 10*3/MM3 (ref 140–450)
PMV BLD AUTO: 8.6 FL (ref 6–12)
POTASSIUM SERPL-SCNC: 3.7 MMOL/L (ref 3.5–5.2)
PROT SERPL-MCNC: 6.6 G/DL (ref 6–8.5)
RBC # BLD AUTO: 4.81 10*6/MM3 (ref 4.14–5.8)
RH BLD: POSITIVE
SODIUM SERPL-SCNC: 139 MMOL/L (ref 136–145)
T&S EXPIRATION DATE: NORMAL
WBC NRBC COR # BLD AUTO: 4.22 10*3/MM3 (ref 3.4–10.8)

## 2024-09-17 PROCEDURE — 86850 RBC ANTIBODY SCREEN: CPT | Performed by: SURGERY

## 2024-09-17 PROCEDURE — 85027 COMPLETE CBC AUTOMATED: CPT | Performed by: SURGERY

## 2024-09-17 PROCEDURE — 93005 ELECTROCARDIOGRAM TRACING: CPT

## 2024-09-17 PROCEDURE — 86900 BLOOD TYPING SEROLOGIC ABO: CPT | Performed by: SURGERY

## 2024-09-17 PROCEDURE — 86901 BLOOD TYPING SEROLOGIC RH(D): CPT | Performed by: SURGERY

## 2024-09-17 PROCEDURE — 80053 COMPREHEN METABOLIC PANEL: CPT | Performed by: SURGERY

## 2024-09-19 DIAGNOSIS — Z71.6 ENCOUNTER FOR SMOKING CESSATION COUNSELING: ICD-10-CM

## 2024-09-19 LAB
QT INTERVAL: 381 MS
QTC INTERVAL: 403 MS

## 2024-09-20 RX ORDER — VARENICLINE TARTRATE 1 MG/1
1 TABLET, FILM COATED ORAL 2 TIMES DAILY
Qty: 56 TABLET | Refills: 1 | Status: SHIPPED | OUTPATIENT
Start: 2024-09-20 | End: 2024-11-15

## 2024-09-24 ENCOUNTER — HOSPITAL ENCOUNTER (INPATIENT)
Facility: HOSPITAL | Age: 59
LOS: 3 days | Discharge: HOME | End: 2024-09-27
Attending: SURGERY | Admitting: SURGERY
Payer: COMMERCIAL

## 2024-09-24 ENCOUNTER — ANESTHESIA (OUTPATIENT)
Dept: PERIOP | Facility: HOSPITAL | Age: 59
End: 2024-09-24
Payer: COMMERCIAL

## 2024-09-24 ENCOUNTER — ANESTHESIA EVENT (OUTPATIENT)
Dept: PERIOP | Facility: HOSPITAL | Age: 59
End: 2024-09-24
Payer: COMMERCIAL

## 2024-09-24 DIAGNOSIS — K56.699 DIVERTICULAR STRICTURE: ICD-10-CM

## 2024-09-24 PROCEDURE — 25010000002 MAGNESIUM SULFATE PER 500 MG OF MAGNESIUM: Performed by: NURSE ANESTHETIST, CERTIFIED REGISTERED

## 2024-09-24 PROCEDURE — 25010000002 METHOCARBAMOL 1000 MG/10ML SOLUTION: Performed by: STUDENT IN AN ORGANIZED HEALTH CARE EDUCATION/TRAINING PROGRAM

## 2024-09-24 PROCEDURE — 25810000003 LACTATED RINGERS PER 1000 ML: Performed by: SURGERY

## 2024-09-24 PROCEDURE — 44213 LAP MOBIL SPLENIC FL ADD-ON: CPT | Performed by: SPECIALIST/TECHNOLOGIST, OTHER

## 2024-09-24 PROCEDURE — 25010000002 KETOROLAC TROMETHAMINE PER 15 MG: Performed by: STUDENT IN AN ORGANIZED HEALTH CARE EDUCATION/TRAINING PROGRAM

## 2024-09-24 PROCEDURE — 25010000002 DROPERIDOL PER 5 MG: Performed by: NURSE ANESTHETIST, CERTIFIED REGISTERED

## 2024-09-24 PROCEDURE — 25010000002 BUPIVACAINE (PF) 0.5 % SOLUTION: Performed by: STUDENT IN AN ORGANIZED HEALTH CARE EDUCATION/TRAINING PROGRAM

## 2024-09-24 PROCEDURE — 25010000002 SUGAMMADEX 200 MG/2ML SOLUTION: Performed by: NURSE ANESTHETIST, CERTIFIED REGISTERED

## 2024-09-24 PROCEDURE — 25810000003 LACTATED RINGERS PER 1000 ML: Performed by: STUDENT IN AN ORGANIZED HEALTH CARE EDUCATION/TRAINING PROGRAM

## 2024-09-24 PROCEDURE — 25010000002 ONDANSETRON PER 1 MG: Performed by: NURSE ANESTHETIST, CERTIFIED REGISTERED

## 2024-09-24 PROCEDURE — 25010000002 FENTANYL CITRATE (PF) 50 MCG/ML SOLUTION: Performed by: NURSE ANESTHETIST, CERTIFIED REGISTERED

## 2024-09-24 PROCEDURE — 25010000002 HYDROMORPHONE PER 4 MG: Performed by: SURGERY

## 2024-09-24 PROCEDURE — 88307 TISSUE EXAM BY PATHOLOGIST: CPT | Performed by: SURGERY

## 2024-09-24 PROCEDURE — 25810000003 SODIUM CHLORIDE PER 500 ML: Performed by: SURGERY

## 2024-09-24 PROCEDURE — 44213 LAP MOBIL SPLENIC FL ADD-ON: CPT | Performed by: SURGERY

## 2024-09-24 PROCEDURE — 25010000002 HYDROMORPHONE PER 4 MG: Performed by: NURSE ANESTHETIST, CERTIFIED REGISTERED

## 2024-09-24 PROCEDURE — 25010000002 ONDANSETRON PER 1 MG: Performed by: SURGERY

## 2024-09-24 PROCEDURE — 25010000002 GLYCOPYRROLATE 0.2 MG/ML SOLUTION: Performed by: NURSE ANESTHETIST, CERTIFIED REGISTERED

## 2024-09-24 PROCEDURE — 25010000002 LIDOCAINE PER 10 MG: Performed by: NURSE ANESTHETIST, CERTIFIED REGISTERED

## 2024-09-24 PROCEDURE — S0260 H&P FOR SURGERY: HCPCS | Performed by: SURGERY

## 2024-09-24 PROCEDURE — C9290 INJ, BUPIVACAINE LIPOSOME: HCPCS | Performed by: STUDENT IN AN ORGANIZED HEALTH CARE EDUCATION/TRAINING PROGRAM

## 2024-09-24 PROCEDURE — 25010000002 PROPOFOL 10 MG/ML EMULSION: Performed by: NURSE ANESTHETIST, CERTIFIED REGISTERED

## 2024-09-24 PROCEDURE — 44207 L COLECTOMY/COLOPROCTOSTOMY: CPT | Performed by: SURGERY

## 2024-09-24 PROCEDURE — 0 BUPIVACAINE LIPOSOME 1.3 % SUSPENSION: Performed by: STUDENT IN AN ORGANIZED HEALTH CARE EDUCATION/TRAINING PROGRAM

## 2024-09-24 PROCEDURE — 25010000002 PHENYLEPHRINE 10 MG/ML SOLUTION: Performed by: NURSE ANESTHETIST, CERTIFIED REGISTERED

## 2024-09-24 PROCEDURE — 0DTN4ZZ RESECTION OF SIGMOID COLON, PERCUTANEOUS ENDOSCOPIC APPROACH: ICD-10-PCS | Performed by: SURGERY

## 2024-09-24 PROCEDURE — 25010000002 HYDROMORPHONE 1 MG/ML SOLUTION: Performed by: NURSE ANESTHETIST, CERTIFIED REGISTERED

## 2024-09-24 PROCEDURE — 25010000002 CEFOXITIN PER 1 G: Performed by: SURGERY

## 2024-09-24 PROCEDURE — 25010000002 MIDAZOLAM PER 1 MG: Performed by: STUDENT IN AN ORGANIZED HEALTH CARE EDUCATION/TRAINING PROGRAM

## 2024-09-24 PROCEDURE — 25010000002 DEXAMETHASONE PER 1 MG: Performed by: NURSE ANESTHETIST, CERTIFIED REGISTERED

## 2024-09-24 PROCEDURE — 44207 L COLECTOMY/COLOPROCTOSTOMY: CPT | Performed by: SPECIALIST/TECHNOLOGIST, OTHER

## 2024-09-24 DEVICE — CELLULAR STAPLER WITH TRI-STAPLE TECHNOLOGY
Type: IMPLANTABLE DEVICE | Site: ABDOMEN | Status: FUNCTIONAL
Brand: EEA

## 2024-09-24 DEVICE — ENDOPATH ECHELON ENDOSCOPIC LINEAR CUTTER RELOADS, BLUE, 60MM
Type: IMPLANTABLE DEVICE | Site: ABDOMEN | Status: FUNCTIONAL
Brand: ECHELON ENDOPATH

## 2024-09-24 DEVICE — ENDOSCOPIC LINEAR CUTTER RELOADS GRAY 2.0MM, 6 ROWS
Type: IMPLANTABLE DEVICE | Site: ABDOMEN | Status: FUNCTIONAL
Brand: ECHELON ENDOPATH

## 2024-09-24 RX ORDER — ONDANSETRON 4 MG/1
4 TABLET, ORALLY DISINTEGRATING ORAL EVERY 6 HOURS PRN
Status: DISCONTINUED | OUTPATIENT
Start: 2024-09-24 | End: 2024-09-27 | Stop reason: HOSPADM

## 2024-09-24 RX ORDER — HYDROMORPHONE HYDROCHLORIDE 1 MG/ML
0.5 INJECTION, SOLUTION INTRAMUSCULAR; INTRAVENOUS; SUBCUTANEOUS
Status: DISCONTINUED | OUTPATIENT
Start: 2024-09-24 | End: 2024-09-27 | Stop reason: HOSPADM

## 2024-09-24 RX ORDER — MAGNESIUM SULFATE HEPTAHYDRATE 500 MG/ML
INJECTION, SOLUTION INTRAMUSCULAR; INTRAVENOUS AS NEEDED
Status: DISCONTINUED | OUTPATIENT
Start: 2024-09-24 | End: 2024-09-24 | Stop reason: SURG

## 2024-09-24 RX ORDER — LABETALOL HYDROCHLORIDE 5 MG/ML
5 INJECTION, SOLUTION INTRAVENOUS
Status: DISCONTINUED | OUTPATIENT
Start: 2024-09-24 | End: 2024-09-24 | Stop reason: HOSPADM

## 2024-09-24 RX ORDER — TRAZODONE HYDROCHLORIDE 50 MG/1
100 TABLET, FILM COATED ORAL NIGHTLY PRN
Status: DISCONTINUED | OUTPATIENT
Start: 2024-09-24 | End: 2024-09-27 | Stop reason: HOSPADM

## 2024-09-24 RX ORDER — PROMETHAZINE HYDROCHLORIDE 25 MG/1
25 TABLET ORAL ONCE AS NEEDED
Status: DISCONTINUED | OUTPATIENT
Start: 2024-09-24 | End: 2024-09-24 | Stop reason: HOSPADM

## 2024-09-24 RX ORDER — FENTANYL CITRATE 50 UG/ML
INJECTION, SOLUTION INTRAMUSCULAR; INTRAVENOUS AS NEEDED
Status: DISCONTINUED | OUTPATIENT
Start: 2024-09-24 | End: 2024-09-24 | Stop reason: SURG

## 2024-09-24 RX ORDER — OXYCODONE HYDROCHLORIDE 5 MG/1
5 TABLET ORAL EVERY 4 HOURS PRN
Status: DISCONTINUED | OUTPATIENT
Start: 2024-09-24 | End: 2024-09-27 | Stop reason: HOSPADM

## 2024-09-24 RX ORDER — BUPIVACAINE HYDROCHLORIDE 5 MG/ML
INJECTION, SOLUTION EPIDURAL; INTRACAUDAL
Status: COMPLETED | OUTPATIENT
Start: 2024-09-24 | End: 2024-09-24

## 2024-09-24 RX ORDER — SODIUM CHLORIDE 0.9 % (FLUSH) 0.9 %
3 SYRINGE (ML) INJECTION EVERY 12 HOURS SCHEDULED
Status: DISCONTINUED | OUTPATIENT
Start: 2024-09-24 | End: 2024-09-24 | Stop reason: HOSPADM

## 2024-09-24 RX ORDER — HYDROMORPHONE HYDROCHLORIDE 1 MG/ML
0.5 INJECTION, SOLUTION INTRAMUSCULAR; INTRAVENOUS; SUBCUTANEOUS
Status: DISCONTINUED | OUTPATIENT
Start: 2024-09-24 | End: 2024-09-24 | Stop reason: HOSPADM

## 2024-09-24 RX ORDER — HYDRALAZINE HYDROCHLORIDE 20 MG/ML
5 INJECTION INTRAMUSCULAR; INTRAVENOUS
Status: DISCONTINUED | OUTPATIENT
Start: 2024-09-24 | End: 2024-09-24 | Stop reason: HOSPADM

## 2024-09-24 RX ORDER — DIPHENHYDRAMINE HYDROCHLORIDE 50 MG/ML
12.5 INJECTION INTRAMUSCULAR; INTRAVENOUS
Status: DISCONTINUED | OUTPATIENT
Start: 2024-09-24 | End: 2024-09-24 | Stop reason: HOSPADM

## 2024-09-24 RX ORDER — ONDANSETRON 2 MG/ML
4 INJECTION INTRAMUSCULAR; INTRAVENOUS ONCE AS NEEDED
Status: DISCONTINUED | OUTPATIENT
Start: 2024-09-24 | End: 2024-09-24 | Stop reason: HOSPADM

## 2024-09-24 RX ORDER — PROPOFOL 10 MG/ML
VIAL (ML) INTRAVENOUS AS NEEDED
Status: DISCONTINUED | OUTPATIENT
Start: 2024-09-24 | End: 2024-09-24 | Stop reason: SURG

## 2024-09-24 RX ORDER — ENOXAPARIN SODIUM 100 MG/ML
40 INJECTION SUBCUTANEOUS DAILY
Status: DISCONTINUED | OUTPATIENT
Start: 2024-09-25 | End: 2024-09-27 | Stop reason: HOSPADM

## 2024-09-24 RX ORDER — HYDROCODONE BITARTRATE AND ACETAMINOPHEN 5; 325 MG/1; MG/1
1 TABLET ORAL ONCE AS NEEDED
Status: DISCONTINUED | OUTPATIENT
Start: 2024-09-24 | End: 2024-09-24 | Stop reason: HOSPADM

## 2024-09-24 RX ORDER — SODIUM CHLORIDE 0.9 % (FLUSH) 0.9 %
3-10 SYRINGE (ML) INJECTION AS NEEDED
Status: DISCONTINUED | OUTPATIENT
Start: 2024-09-24 | End: 2024-09-24 | Stop reason: HOSPADM

## 2024-09-24 RX ORDER — SODIUM CHLORIDE, SODIUM LACTATE, POTASSIUM CHLORIDE, CALCIUM CHLORIDE 600; 310; 30; 20 MG/100ML; MG/100ML; MG/100ML; MG/100ML
9 INJECTION, SOLUTION INTRAVENOUS CONTINUOUS
Status: DISCONTINUED | OUTPATIENT
Start: 2024-09-24 | End: 2024-09-24

## 2024-09-24 RX ORDER — MIDAZOLAM HYDROCHLORIDE 1 MG/ML
2 INJECTION INTRAMUSCULAR; INTRAVENOUS
Status: DISCONTINUED | OUTPATIENT
Start: 2024-09-24 | End: 2024-09-24 | Stop reason: HOSPADM

## 2024-09-24 RX ORDER — FENTANYL CITRATE 50 UG/ML
50 INJECTION, SOLUTION INTRAMUSCULAR; INTRAVENOUS ONCE AS NEEDED
Status: DISCONTINUED | OUTPATIENT
Start: 2024-09-24 | End: 2024-09-24 | Stop reason: HOSPADM

## 2024-09-24 RX ORDER — GLYCOPYRROLATE 0.2 MG/ML
INJECTION INTRAMUSCULAR; INTRAVENOUS AS NEEDED
Status: DISCONTINUED | OUTPATIENT
Start: 2024-09-24 | End: 2024-09-24 | Stop reason: SURG

## 2024-09-24 RX ORDER — DROPERIDOL 2.5 MG/ML
0.62 INJECTION, SOLUTION INTRAMUSCULAR; INTRAVENOUS
Status: DISCONTINUED | OUTPATIENT
Start: 2024-09-24 | End: 2024-09-24 | Stop reason: HOSPADM

## 2024-09-24 RX ORDER — BACLOFEN 10 MG/1
10 TABLET ORAL 3 TIMES DAILY PRN
Status: DISCONTINUED | OUTPATIENT
Start: 2024-09-24 | End: 2024-09-27 | Stop reason: HOSPADM

## 2024-09-24 RX ORDER — PROMETHAZINE HYDROCHLORIDE 25 MG/1
25 SUPPOSITORY RECTAL ONCE AS NEEDED
Status: DISCONTINUED | OUTPATIENT
Start: 2024-09-24 | End: 2024-09-24 | Stop reason: HOSPADM

## 2024-09-24 RX ORDER — OXYCODONE HYDROCHLORIDE 5 MG/1
10 TABLET ORAL EVERY 4 HOURS PRN
Status: DISCONTINUED | OUTPATIENT
Start: 2024-09-24 | End: 2024-09-27 | Stop reason: HOSPADM

## 2024-09-24 RX ORDER — MAGNESIUM HYDROXIDE 1200 MG/15ML
LIQUID ORAL AS NEEDED
Status: DISCONTINUED | OUTPATIENT
Start: 2024-09-24 | End: 2024-09-24 | Stop reason: HOSPADM

## 2024-09-24 RX ORDER — PANTOPRAZOLE SODIUM 40 MG/1
40 TABLET, DELAYED RELEASE ORAL
Status: DISCONTINUED | OUTPATIENT
Start: 2024-09-25 | End: 2024-09-27 | Stop reason: HOSPADM

## 2024-09-24 RX ORDER — OXYCODONE AND ACETAMINOPHEN 7.5; 325 MG/1; MG/1
1 TABLET ORAL EVERY 4 HOURS PRN
Status: DISCONTINUED | OUTPATIENT
Start: 2024-09-24 | End: 2024-09-24 | Stop reason: HOSPADM

## 2024-09-24 RX ORDER — EPHEDRINE SULFATE 50 MG/ML
5 INJECTION, SOLUTION INTRAVENOUS ONCE AS NEEDED
Status: COMPLETED | OUTPATIENT
Start: 2024-09-24 | End: 2024-09-24

## 2024-09-24 RX ORDER — ONDANSETRON 2 MG/ML
4 INJECTION INTRAMUSCULAR; INTRAVENOUS EVERY 6 HOURS PRN
Status: DISCONTINUED | OUTPATIENT
Start: 2024-09-24 | End: 2024-09-27 | Stop reason: HOSPADM

## 2024-09-24 RX ORDER — KETOROLAC TROMETHAMINE 30 MG/ML
15 INJECTION, SOLUTION INTRAMUSCULAR; INTRAVENOUS ONCE
Status: COMPLETED | OUTPATIENT
Start: 2024-09-24 | End: 2024-09-24

## 2024-09-24 RX ORDER — PHENYLEPHRINE HYDROCHLORIDE 10 MG/ML
INJECTION INTRAVENOUS AS NEEDED
Status: DISCONTINUED | OUTPATIENT
Start: 2024-09-24 | End: 2024-09-24 | Stop reason: SURG

## 2024-09-24 RX ORDER — METHOCARBAMOL 100 MG/ML
1000 INJECTION, SOLUTION INTRAMUSCULAR; INTRAVENOUS ONCE
Status: COMPLETED | OUTPATIENT
Start: 2024-09-24 | End: 2024-09-24

## 2024-09-24 RX ORDER — LIDOCAINE HYDROCHLORIDE 20 MG/ML
INJECTION, SOLUTION INFILTRATION; PERINEURAL AS NEEDED
Status: DISCONTINUED | OUTPATIENT
Start: 2024-09-24 | End: 2024-09-24 | Stop reason: SURG

## 2024-09-24 RX ORDER — ROCURONIUM BROMIDE 10 MG/ML
INJECTION, SOLUTION INTRAVENOUS AS NEEDED
Status: DISCONTINUED | OUTPATIENT
Start: 2024-09-24 | End: 2024-09-24 | Stop reason: SURG

## 2024-09-24 RX ORDER — FLUMAZENIL 0.1 MG/ML
0.2 INJECTION INTRAVENOUS AS NEEDED
Status: DISCONTINUED | OUTPATIENT
Start: 2024-09-24 | End: 2024-09-24 | Stop reason: HOSPADM

## 2024-09-24 RX ORDER — SODIUM CHLORIDE 9 MG/ML
INJECTION, SOLUTION INTRAVENOUS AS NEEDED
Status: DISCONTINUED | OUTPATIENT
Start: 2024-09-24 | End: 2024-09-24 | Stop reason: HOSPADM

## 2024-09-24 RX ORDER — FAMOTIDINE 10 MG/ML
20 INJECTION, SOLUTION INTRAVENOUS ONCE
Status: COMPLETED | OUTPATIENT
Start: 2024-09-24 | End: 2024-09-24

## 2024-09-24 RX ORDER — LIDOCAINE HYDROCHLORIDE 10 MG/ML
0.5 INJECTION, SOLUTION INFILTRATION; PERINEURAL ONCE AS NEEDED
Status: DISCONTINUED | OUTPATIENT
Start: 2024-09-24 | End: 2024-09-24 | Stop reason: HOSPADM

## 2024-09-24 RX ORDER — ONDANSETRON 2 MG/ML
INJECTION INTRAMUSCULAR; INTRAVENOUS AS NEEDED
Status: DISCONTINUED | OUTPATIENT
Start: 2024-09-24 | End: 2024-09-24 | Stop reason: SURG

## 2024-09-24 RX ORDER — LIDOCAINE HYDROCHLORIDE ANHYDROUS AND DEXTROSE MONOHYDRATE 5; 400 G/100ML; MG/100ML
INJECTION, SOLUTION INTRAVENOUS CONTINUOUS PRN
Status: DISCONTINUED | OUTPATIENT
Start: 2024-09-24 | End: 2024-09-24 | Stop reason: SURG

## 2024-09-24 RX ORDER — SODIUM CHLORIDE, SODIUM LACTATE, POTASSIUM CHLORIDE, CALCIUM CHLORIDE 600; 310; 30; 20 MG/100ML; MG/100ML; MG/100ML; MG/100ML
50 INJECTION, SOLUTION INTRAVENOUS CONTINUOUS
Status: DISCONTINUED | OUTPATIENT
Start: 2024-09-24 | End: 2024-09-25

## 2024-09-24 RX ORDER — NALOXONE HCL 0.4 MG/ML
0.2 VIAL (ML) INJECTION AS NEEDED
Status: DISCONTINUED | OUTPATIENT
Start: 2024-09-24 | End: 2024-09-24 | Stop reason: HOSPADM

## 2024-09-24 RX ORDER — DEXAMETHASONE SODIUM PHOSPHATE 4 MG/ML
INJECTION, SOLUTION INTRA-ARTICULAR; INTRALESIONAL; INTRAMUSCULAR; INTRAVENOUS; SOFT TISSUE AS NEEDED
Status: DISCONTINUED | OUTPATIENT
Start: 2024-09-24 | End: 2024-09-24 | Stop reason: SURG

## 2024-09-24 RX ORDER — IPRATROPIUM BROMIDE AND ALBUTEROL SULFATE 2.5; .5 MG/3ML; MG/3ML
3 SOLUTION RESPIRATORY (INHALATION) ONCE AS NEEDED
Status: DISCONTINUED | OUTPATIENT
Start: 2024-09-24 | End: 2024-09-24 | Stop reason: HOSPADM

## 2024-09-24 RX ORDER — FENTANYL CITRATE 50 UG/ML
50 INJECTION, SOLUTION INTRAMUSCULAR; INTRAVENOUS
Status: DISCONTINUED | OUTPATIENT
Start: 2024-09-24 | End: 2024-09-24 | Stop reason: HOSPADM

## 2024-09-24 RX ORDER — METHOCARBAMOL 750 MG/1
750 TABLET, FILM COATED ORAL 4 TIMES DAILY
Status: DISCONTINUED | OUTPATIENT
Start: 2024-09-24 | End: 2024-09-27 | Stop reason: HOSPADM

## 2024-09-24 RX ORDER — ACETAMINOPHEN 500 MG
1000 TABLET ORAL EVERY 6 HOURS
Status: DISCONTINUED | OUTPATIENT
Start: 2024-09-24 | End: 2024-09-27 | Stop reason: HOSPADM

## 2024-09-24 RX ADMIN — METHOCARBAMOL TABLETS 750 MG: 750 TABLET, COATED ORAL at 17:25

## 2024-09-24 RX ADMIN — FAMOTIDINE 20 MG: 10 INJECTION INTRAVENOUS at 06:20

## 2024-09-24 RX ADMIN — Medication 10 MG: at 08:19

## 2024-09-24 RX ADMIN — FENTANYL CITRATE 50 MCG: 50 INJECTION, SOLUTION INTRAMUSCULAR; INTRAVENOUS at 07:29

## 2024-09-24 RX ADMIN — MAGNESIUM SULFATE HEPTAHYDRATE 1 G: 500 INJECTION, SOLUTION INTRAMUSCULAR; INTRAVENOUS at 07:52

## 2024-09-24 RX ADMIN — CEFOXITIN SODIUM 2 G: 2 POWDER, FOR SOLUTION INTRAVENOUS at 09:26

## 2024-09-24 RX ADMIN — HYDROMORPHONE HYDROCHLORIDE 0.5 MG: 1 INJECTION, SOLUTION INTRAMUSCULAR; INTRAVENOUS; SUBCUTANEOUS at 10:08

## 2024-09-24 RX ADMIN — ACETAMINOPHEN 1000 MG: 500 TABLET ORAL at 12:53

## 2024-09-24 RX ADMIN — PHENYLEPHRINE HYDROCHLORIDE 100 MCG: 10 INJECTION INTRAVENOUS at 08:01

## 2024-09-24 RX ADMIN — EPHEDRINE SULFATE 5 MG: 50 INJECTION INTRAVENOUS at 10:32

## 2024-09-24 RX ADMIN — PHENYLEPHRINE HYDROCHLORIDE 100 MCG: 10 INJECTION INTRAVENOUS at 07:53

## 2024-09-24 RX ADMIN — PHENYLEPHRINE HYDROCHLORIDE 100 MCG: 10 INJECTION INTRAVENOUS at 08:32

## 2024-09-24 RX ADMIN — ONDANSETRON 4 MG: 2 INJECTION, SOLUTION INTRAMUSCULAR; INTRAVENOUS at 12:53

## 2024-09-24 RX ADMIN — ROCURONIUM BROMIDE 50 MG: 10 INJECTION, SOLUTION INTRAVENOUS at 07:38

## 2024-09-24 RX ADMIN — ROCURONIUM BROMIDE 20 MG: 10 INJECTION, SOLUTION INTRAVENOUS at 08:24

## 2024-09-24 RX ADMIN — Medication 20 MG: at 07:38

## 2024-09-24 RX ADMIN — OXYCODONE HYDROCHLORIDE 10 MG: 5 TABLET ORAL at 20:07

## 2024-09-24 RX ADMIN — HYDROMORPHONE HYDROCHLORIDE 0.5 MG: 1 INJECTION, SOLUTION INTRAMUSCULAR; INTRAVENOUS; SUBCUTANEOUS at 12:02

## 2024-09-24 RX ADMIN — SODIUM CHLORIDE, POTASSIUM CHLORIDE, SODIUM LACTATE AND CALCIUM CHLORIDE 9 ML/HR: 600; 310; 30; 20 INJECTION, SOLUTION INTRAVENOUS at 06:20

## 2024-09-24 RX ADMIN — ACETAMINOPHEN 1000 MG: 500 TABLET ORAL at 20:07

## 2024-09-24 RX ADMIN — HYDROMORPHONE HYDROCHLORIDE 0.5 MG: 1 INJECTION, SOLUTION INTRAMUSCULAR; INTRAVENOUS; SUBCUTANEOUS at 16:26

## 2024-09-24 RX ADMIN — BICTEGRAVIR SODIUM, EMTRICITABINE, AND TENOFOVIR ALAFENAMIDE FUMARATE 1 TABLET: 50; 200; 25 TABLET ORAL at 14:16

## 2024-09-24 RX ADMIN — METHOCARBAMOL TABLETS 750 MG: 750 TABLET, COATED ORAL at 20:07

## 2024-09-24 RX ADMIN — BUPIVACAINE HYDROCHLORIDE 30 ML: 5 INJECTION, SOLUTION EPIDURAL; INTRACAUDAL; PERINEURAL at 07:50

## 2024-09-24 RX ADMIN — DEXAMETHASONE SODIUM PHOSPHATE 8 MG: 4 INJECTION, SOLUTION INTRA-ARTICULAR; INTRALESIONAL; INTRAMUSCULAR; INTRAVENOUS; SOFT TISSUE at 07:52

## 2024-09-24 RX ADMIN — SUGAMMADEX 200 MG: 100 INJECTION, SOLUTION INTRAVENOUS at 10:06

## 2024-09-24 RX ADMIN — HYDROMORPHONE HYDROCHLORIDE 0.5 MG: 1 INJECTION, SOLUTION INTRAMUSCULAR; INTRAVENOUS; SUBCUTANEOUS at 11:35

## 2024-09-24 RX ADMIN — ONDANSETRON 4 MG: 2 INJECTION, SOLUTION INTRAMUSCULAR; INTRAVENOUS at 20:07

## 2024-09-24 RX ADMIN — SODIUM CHLORIDE, POTASSIUM CHLORIDE, SODIUM LACTATE AND CALCIUM CHLORIDE 50 ML/HR: 600; 310; 30; 20 INJECTION, SOLUTION INTRAVENOUS at 12:52

## 2024-09-24 RX ADMIN — Medication 10 MG: at 10:00

## 2024-09-24 RX ADMIN — CEFOXITIN SODIUM 2 G: 2 POWDER, FOR SOLUTION INTRAVENOUS at 07:25

## 2024-09-24 RX ADMIN — FENTANYL CITRATE 50 MCG: 50 INJECTION, SOLUTION INTRAMUSCULAR; INTRAVENOUS at 10:57

## 2024-09-24 RX ADMIN — HYDROMORPHONE HYDROCHLORIDE 0.5 MG: 1 INJECTION, SOLUTION INTRAMUSCULAR; INTRAVENOUS; SUBCUTANEOUS at 10:45

## 2024-09-24 RX ADMIN — BUPIVACAINE 20 ML: 13.3 INJECTION, SUSPENSION, LIPOSOMAL INFILTRATION at 07:50

## 2024-09-24 RX ADMIN — ONDANSETRON 4 MG: 2 INJECTION INTRAMUSCULAR; INTRAVENOUS at 07:52

## 2024-09-24 RX ADMIN — DROPERIDOL 0.62 MG: 2.5 INJECTION, SOLUTION INTRAMUSCULAR; INTRAVENOUS at 10:45

## 2024-09-24 RX ADMIN — HYDROMORPHONE HYDROCHLORIDE 0.5 MG: 1 INJECTION, SOLUTION INTRAMUSCULAR; INTRAVENOUS; SUBCUTANEOUS at 12:29

## 2024-09-24 RX ADMIN — GLYCOPYRROLATE 0.1 MG: 0.2 INJECTION INTRAMUSCULAR; INTRAVENOUS at 07:38

## 2024-09-24 RX ADMIN — KETOROLAC TROMETHAMINE 15 MG: 30 INJECTION, SOLUTION INTRAMUSCULAR at 10:44

## 2024-09-24 RX ADMIN — METHOCARBAMOL TABLETS 750 MG: 750 TABLET, COATED ORAL at 12:53

## 2024-09-24 RX ADMIN — ROCURONIUM BROMIDE 10 MG: 10 INJECTION, SOLUTION INTRAVENOUS at 09:49

## 2024-09-24 RX ADMIN — ROCURONIUM BROMIDE 10 MG: 10 INJECTION, SOLUTION INTRAVENOUS at 09:08

## 2024-09-24 RX ADMIN — LIDOCAINE HYDROCHLORIDE 2 MG/MIN: 4 INJECTION, SOLUTION INTRAVENOUS at 07:55

## 2024-09-24 RX ADMIN — Medication 10 MG: at 09:17

## 2024-09-24 RX ADMIN — ONDANSETRON 4 MG: 2 INJECTION INTRAMUSCULAR; INTRAVENOUS at 09:50

## 2024-09-24 RX ADMIN — PROPOFOL 200 MG: 10 INJECTION, EMULSION INTRAVENOUS at 07:38

## 2024-09-24 RX ADMIN — MIDAZOLAM 2 MG: 1 INJECTION INTRAMUSCULAR; INTRAVENOUS at 06:20

## 2024-09-24 RX ADMIN — OXYCODONE HYDROCHLORIDE 10 MG: 5 TABLET ORAL at 14:16

## 2024-09-24 RX ADMIN — HYDROMORPHONE HYDROCHLORIDE 0.5 MG: 1 INJECTION, SOLUTION INTRAMUSCULAR; INTRAVENOUS; SUBCUTANEOUS at 11:46

## 2024-09-24 RX ADMIN — METHOCARBAMOL 1000 MG: 100 INJECTION INTRAMUSCULAR; INTRAVENOUS at 11:03

## 2024-09-24 RX ADMIN — TRAZODONE HYDROCHLORIDE 100 MG: 50 TABLET ORAL at 21:47

## 2024-09-24 RX ADMIN — LIDOCAINE HYDROCHLORIDE 50 MG: 20 INJECTION, SOLUTION INFILTRATION; PERINEURAL at 07:38

## 2024-09-24 RX ADMIN — FENTANYL CITRATE 50 MCG: 50 INJECTION, SOLUTION INTRAMUSCULAR; INTRAVENOUS at 11:13

## 2024-09-24 NOTE — PLAN OF CARE
Goal Outcome Evaluation:         Pt A/Ox4, bradycardic at baseline, up adlib, RA. Lap sigmoid colectomy done today, sites are ROMANA with glue. PRN Nan and Dilaudid given for pain. PRN Zofran given. IS and ambulation encouraged, pt is due to void. Tolerating full liq diet. Plan of care ongoing, VSS.

## 2024-09-24 NOTE — H&P
Colorectal & General Surgery  History and Physical    Patient: Eliseo Phan  YOB: 1965  MRN: [id number]      Assessment  Eliseo Phan is a 58 y.o. male with diverticular stricture who presents for laparoscopic sigmoid colectomy today.  We again discussed the risk, benefits, terms of procedure.  Informed consent obtained.    Plan  Proceed with laparoscopic sigmoid colectomy      History of Present Illness   Eliseo Phan is a 58 y.o. male who presents with a symptomatic diverticular stricture.    Past Medical History   Past Medical History:   Diagnosis Date    Acid reflux     Arthritis     Back pain     Diverticular stricture     Hepatitis B     History of kidney stones     HIV (human immunodeficiency virus infection) 2011    PONV (postoperative nausea and vomiting)     Stage 1 chronic kidney disease     r/t medication        Past Surgical History   Past Surgical History:   Procedure Laterality Date    APPENDECTOMY      BONE TUMOR EXCISION Right     right lower leg - as a child    COLONOSCOPY N/A 1/12/2024    Procedure: COLONOSCOPY to cecum with cold polypectomy;  Surgeon: Levi Berman MD;  Location: [facility] ENDOSCOPY;  Service: General;  Laterality: N/A;  PRE - screening  POST - diverticulosis, polyp    KNEE ARTHROSCOPY Left     LUMBAR FUSION Left 07/14/2020    Procedure: LUMBAR 4 TO LUMBAR 5, LUMBAR 5  TO SACRAL 1 LEFT METRX TRANSFORAMINAL LUMBAR INTERBODY FUSION WITH CAGES AND SEXTANT PEDICLE SCREW FIXATION;  Surgeon: Thomas Torres MD;  Location: [facility] OR;  Service: Neurosurgery;  Laterality: Left;    LUMBAR LAMINECTOMY DISCECTOMY DECOMPRESSION N/A 04/05/2018    Procedure: L4-5, L5-S1 Lumbar Laminectomy;  Surgeon: Ernie Curtis MD;  Location: [facility] OR;  Service: Neurosurgery    TONSILLECTOMY  1967       Social History  Social History     Socioeconomic History    Marital status: Single    Highest education level: Master's degree (e.g., MA, MS, [place], MEd, MSW,  FANI)   Tobacco Use    Smoking status: Former     Current packs/day: 0.00     Average packs/day: 0.5 packs/day for 10.0 years (5.0 ttl pk-yrs)     Types: Cigarettes     Start date: 2010     Quit date: 2020     Years since quittin.4     Passive exposure: Never    Smokeless tobacco: Never   Vaping Use    Vaping status: Some Days    Substances: Nicotine   Substance and Sexual Activity    Alcohol use: No    Drug use: Not Currently     Types: Amphetamines, Fentanyl, Other     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    Sexual activity: Not Currently     Partners: Male       Family History  Family History   Problem Relation Age of Onset    Breast cancer Mother     Heart disease Mother     Hypertension Mother     Diabetes Mother     Thyroid disease Mother     Depression Mother     Anxiety disorder Mother     Alcohol abuse Mother     Arthritis Mother     Cancer Mother         Breast cancer    Hypertension Father     Depression Father     Anxiety disorder Father     Arthritis Father     Nephrolithiasis Brother     Asthma Brother     Arthritis Maternal Grandmother     Malig Hyperthermia Neg Hx        Review of Systems  Negative except as documented in the HPI.     Allergies  No Known Allergies    Medications    Current Facility-Administered Medications:     bupivacaine liposome (EXPAREL) 1.3 % injection  - ADS Override Pull, , , ,     cefOXItin (MEFOXIN) 2 g in sodium chloride 0.9 % 100 mL MBP, 2 g, Intravenous, Once, Levi Berman MD    fentaNYL citrate (PF) (SUBLIMAZE) injection 50 mcg, 50 mcg, Intravenous, Once PRN, Apolonia Osborne MD    lactated ringers infusion, 9 mL/hr, Intravenous, Continuous, Apolonia Osborne MD, Last Rate: 9 mL/hr at 24 0620, 9 mL/hr at 24 0620    lidocaine (XYLOCAINE) 1 % injection 0.5 mL, 0.5 mL, Intradermal, Once PRN, Apolonia Osborne MD    midazolam (VERSED) injection 2 mg, 2 mg, Intravenous, Q5 Min PRN, Apolonia Osborne MD, 2 mg  at 09/24/24 0620    sodium chloride 0.9 % flush 3 mL, 3 mL, Intravenous, Q12H, Apolonia Osborne MD    sodium chloride 0.9 % flush 3-10 mL, 3-10 mL, Intravenous, PRN, Apolonia Osborne MD    Vital Signs  Vitals:    09/24/24 0549   BP: 136/89   Pulse: 72   Resp: 16   Temp: 98.6 °F (37 °C)   SpO2: 97%        Physical Exam  Constitutional: Resting comfortably, no acute distress  Neck: Supple, trachea midline  Respiratory: No increased work of breathing, Symmetric excursion  Cardiovascular: Well pefursed, no jugular venous distention evident   Abdominal: Soft, non-tender, non-distended  Lymphatics: No cervical or suprascapular adenopathy  Skin: Warm, dry, no rash on visualized skin surfaces  Musculoskeletal: Symmetric strength, no obvious gross abnormalities  Psychiatric: Alert and oriented ×3, normal affect          Rafal Berman MD  Colorectal & General Surgery  [facility]    [address], [address]  [place], [place], [facility]  P: [phone]  F: [phone]

## 2024-09-24 NOTE — OP NOTE
Colorectal & General Surgery  Operative Report    Patient: Eliseo Phan  YOB: 1965  MRN: [id number]  DATE OF PROCEDURE: 09/24/24     PREOPERATIVE DIAGNOSIS:  Sigmoid diverticular stricture    POSTOPERATIVE DIAGNOSIS:  Same    PROCEDURE:  Laparoscopic low anterior resection  Laparoscopic mobilization of the splenic flexure    FINDINGS:  Segment of diverticular and inflamed sigmoid colon with chronic thickening consistent with diverticular disease.  This segment was resected completely.  Soft descending colon was anastomosed to the distal upper rectum using a 28 mm stapled coloproctostomy.  Leak test was reassuring.  Splenic flexure was completely mobilized allowing adequate reach of the colonic conduit to the pelvis.  The left ureter was identified and kept safe throughout the procedure.    SURGEON:  Rafal Berman MD    ASSISTANT:  Assistant: Ravinder Turner CSA was responsible for performing the following activities: Retraction, Suturing, Closing, Placing Dressing, and Held/Positioned Camera and their skilled assistance was necessary for the success of this case.     ANESTHESIA:  General-endotracheal  Tap blocks    EBL:  15 mL    SPECIMEN:  Sigmoid colon and rectum    OPERATIVE DESCRIPTION:  The patient was brought to the operating room under the care of the nursing staff.  The patient was placed on the operating room table in the supine position where anesthesia was induced.  The patient was then prepped and positioned in the usual sterile fashion.  A standardized timeout was then performed.    5 mm supraumbilical incision was created.  Veress needle inserted in the abdomen with a reassuring saline drop test and aspiration test.  Abdomen was insufflated to 15 mmHg atraumatically.  5 mm optical trocar then placed through this incision.  12 mm trocar placed in the right lower quadrant.  5 mm trocars were placed in the right mid abdomen in the left midabdomen.  Omentum was retracted cephalad.   Gastrocolic ligament incised.  The lesser sac was somewhat difficult to enter  secondary to some adhesive disease.  The transverse colon was completely mobilized away from the lesser sac and the splenic flexure was mobilized completely by connecting this to the dissection of the white line of Toldt and the proximal descending colon.  The retroperitoneum and the transverse colon mesentery were  until the splenic flexure was completely mobilized.  The white line of Toldt was then incised down to the sigmoid colon and the descending colon mesentery was  from the retroperitoneum.  There were some adhesions between the sigmoid colon and the anterior abdominal wall that were taken down sharply.  The left ureter was then identified.  The sigmoid colon was rotated medially as much as possible  the sigmoid colon mesentery from the retroperitoneum.  The sigmoid colon was then reflected laterally and the medial aspect of his mesentery was incised.  The presacral space was entered.  The inferior mesenteric artery was isolated.  The left ureter was identified and kept safe.  A gray load of the 60 mm [place] stapler was used to divide the inferior mesenteric artery.  This was hemostatic.  The remaining mesentery of the descending colon was then divided with the bipolar device.  The presacral space was developed even further.  The peritoneum on either side of the rectum was then incised and the mesorectum was elevated and the presacral space was dissected.  Once adequate dissection had been performed, the mesorectum was incised at the level of the mid upper rectum.  The rectum was circumferentially dissected free from the surrounding distal rectum.  It was divided with a 60 mm  blue load of the [place] stapler.  An extraction site was then created at the site of the prior millimeter left lower quadrant port.  With protector was placed.  Specimen structure cholelithiasis.  Doppler was used to confirm  arterial blood flow to the colonic conduit.  The specimen was divided with the Bovie electrocautery and passed off for pathology.  2-0 Prolene sutures transsphincteric restraint suture at the opening of the colonic conduit.  The 20 mm anvil was placed.  Mesentery was freed from the site of impending anastomosis.  Colonic conduit was then placed back in the abdomen and the protector was removed.  (Changed.  Posterior and anterior fascia were closed with running 0 PDS suture.  The abdomen was reinsufflated and a 28 mm stapled coloproctostomy was created in the standard fashion.  Leak test was reassuring.  Small bowel was swept away from under the colonic conduit.  Millimeter trocar site was closed with figure-of-eight 0 PDS suture.  The abdomen was desufflated.  Skin was reapproximated with 4-0 Monocryl and Exofin.    All needle, sponge, and instrument counts were correct at the end of the case.    The patient tolerated the procedure well and was transferred to the postanesthesia care unit in stable condition.    Rafal Berman M.D.  Colorectal & General Surgery  [facility]    [address] [place] [address], [address]  [place], [place], [address]  P: [phone]  F: [phone]

## 2024-09-25 ENCOUNTER — APPOINTMENT (OUTPATIENT)
Dept: GENERAL RADIOLOGY | Facility: HOSPITAL | Age: 59
End: 2024-09-25
Payer: COMMERCIAL

## 2024-09-25 LAB
ANION GAP SERPL CALCULATED.3IONS-SCNC: 7.4 MMOL/L (ref 5–15)
BASOPHILS # BLD AUTO: 0.02 10*3/MM3 (ref 0–0.2)
BASOPHILS NFR BLD AUTO: 0.3 % (ref 0–1.5)
BILIRUB UR QL STRIP: NEGATIVE
BUN SERPL-MCNC: 16 MG/DL (ref 6–20)
BUN/CREAT SERPL: 13.2 (ref 7–25)
CALCIUM SPEC-SCNC: 8.6 MG/DL (ref 8.6–10.5)
CHLORIDE SERPL-SCNC: 101 MMOL/L (ref 98–107)
CLARITY UR: CLEAR
CO2 SERPL-SCNC: 23.6 MMOL/L (ref 22–29)
COLOR UR: YELLOW
CREAT SERPL-MCNC: 1.21 MG/DL (ref 0.76–1.27)
DEPRECATED RDW RBC AUTO: 43.6 FL (ref 37–54)
EGFRCR SERPLBLD CKD-EPI 2021: 69.4 ML/MIN/1.73
EOSINOPHIL # BLD AUTO: 0.01 10*3/MM3 (ref 0–0.4)
EOSINOPHIL NFR BLD AUTO: 0.2 % (ref 0.3–6.2)
ERYTHROCYTE [DISTWIDTH] IN BLOOD BY AUTOMATED COUNT: 13 % (ref 12.3–15.4)
GLUCOSE SERPL-MCNC: 98 MG/DL (ref 65–99)
GLUCOSE UR STRIP-MCNC: NEGATIVE MG/DL
HCT VFR BLD AUTO: 30.8 % (ref 37.5–51)
HGB BLD-MCNC: 10.4 G/DL (ref 13–17.7)
HGB UR QL STRIP.AUTO: NEGATIVE
IMM GRANULOCYTES # BLD AUTO: 0.02 10*3/MM3 (ref 0–0.05)
IMM GRANULOCYTES NFR BLD AUTO: 0.3 % (ref 0–0.5)
KETONES UR QL STRIP: NEGATIVE
LEUKOCYTE ESTERASE UR QL STRIP.AUTO: NEGATIVE
LYMPHOCYTES # BLD AUTO: 1.63 10*3/MM3 (ref 0.7–3.1)
LYMPHOCYTES NFR BLD AUTO: 25.8 % (ref 19.6–45.3)
MCH RBC QN AUTO: 30.9 PG (ref 26.6–33)
MCHC RBC AUTO-ENTMCNC: 33.8 G/DL (ref 31.5–35.7)
MCV RBC AUTO: 91.4 FL (ref 79–97)
MONOCYTES # BLD AUTO: 0.57 10*3/MM3 (ref 0.1–0.9)
MONOCYTES NFR BLD AUTO: 9 % (ref 5–12)
NEUTROPHILS NFR BLD AUTO: 4.08 10*3/MM3 (ref 1.7–7)
NEUTROPHILS NFR BLD AUTO: 64.4 % (ref 42.7–76)
NITRITE UR QL STRIP: NEGATIVE
NRBC BLD AUTO-RTO: 0 /100 WBC (ref 0–0.2)
PH UR STRIP.AUTO: 5.5 [PH] (ref 5–8)
PLATELET # BLD AUTO: 168 10*3/MM3 (ref 140–450)
PMV BLD AUTO: 9 FL (ref 6–12)
POTASSIUM SERPL-SCNC: 4 MMOL/L (ref 3.5–5.2)
PROT UR QL STRIP: NEGATIVE
RBC # BLD AUTO: 3.37 10*6/MM3 (ref 4.14–5.8)
SODIUM SERPL-SCNC: 132 MMOL/L (ref 136–145)
SP GR UR STRIP: 1.01 (ref 1–1.03)
UROBILINOGEN UR QL STRIP: NORMAL
WBC NRBC COR # BLD AUTO: 6.33 10*3/MM3 (ref 3.4–10.8)

## 2024-09-25 PROCEDURE — 25010000002 ONDANSETRON PER 1 MG: Performed by: SURGERY

## 2024-09-25 PROCEDURE — 25810000003 LACTATED RINGERS PER 1000 ML: Performed by: SURGERY

## 2024-09-25 PROCEDURE — 25010000002 HYDROMORPHONE PER 4 MG: Performed by: STUDENT IN AN ORGANIZED HEALTH CARE EDUCATION/TRAINING PROGRAM

## 2024-09-25 PROCEDURE — 99024 POSTOP FOLLOW-UP VISIT: CPT | Performed by: SURGERY

## 2024-09-25 PROCEDURE — 25010000002 HYDROMORPHONE PER 4 MG: Performed by: SURGERY

## 2024-09-25 PROCEDURE — 81003 URINALYSIS AUTO W/O SCOPE: CPT | Performed by: STUDENT IN AN ORGANIZED HEALTH CARE EDUCATION/TRAINING PROGRAM

## 2024-09-25 PROCEDURE — 80048 BASIC METABOLIC PNL TOTAL CA: CPT | Performed by: SURGERY

## 2024-09-25 PROCEDURE — 25010000002 ENOXAPARIN PER 10 MG: Performed by: SURGERY

## 2024-09-25 PROCEDURE — 25010000002 KETOROLAC TROMETHAMINE PER 15 MG: Performed by: STUDENT IN AN ORGANIZED HEALTH CARE EDUCATION/TRAINING PROGRAM

## 2024-09-25 PROCEDURE — 71045 X-RAY EXAM CHEST 1 VIEW: CPT

## 2024-09-25 PROCEDURE — 85025 COMPLETE CBC W/AUTO DIFF WBC: CPT | Performed by: SURGERY

## 2024-09-25 PROCEDURE — 74018 RADEX ABDOMEN 1 VIEW: CPT

## 2024-09-25 RX ORDER — HYDROMORPHONE HYDROCHLORIDE 1 MG/ML
0.5 INJECTION, SOLUTION INTRAMUSCULAR; INTRAVENOUS; SUBCUTANEOUS ONCE
Status: COMPLETED | OUTPATIENT
Start: 2024-09-25 | End: 2024-09-25

## 2024-09-25 RX ORDER — KETOROLAC TROMETHAMINE 15 MG/ML
15 INJECTION, SOLUTION INTRAMUSCULAR; INTRAVENOUS ONCE
Status: COMPLETED | OUTPATIENT
Start: 2024-09-25 | End: 2024-09-25

## 2024-09-25 RX ADMIN — KETOROLAC TROMETHAMINE 15 MG: 15 INJECTION, SOLUTION INTRAMUSCULAR; INTRAVENOUS at 19:43

## 2024-09-25 RX ADMIN — METHOCARBAMOL TABLETS 750 MG: 750 TABLET, COATED ORAL at 20:12

## 2024-09-25 RX ADMIN — ACETAMINOPHEN 1000 MG: 500 TABLET ORAL at 18:11

## 2024-09-25 RX ADMIN — ACETAMINOPHEN 1000 MG: 500 TABLET ORAL at 13:01

## 2024-09-25 RX ADMIN — ACETAMINOPHEN 1000 MG: 500 TABLET ORAL at 01:24

## 2024-09-25 RX ADMIN — HYDROMORPHONE HYDROCHLORIDE 0.5 MG: 1 INJECTION, SOLUTION INTRAMUSCULAR; INTRAVENOUS; SUBCUTANEOUS at 14:59

## 2024-09-25 RX ADMIN — METHOCARBAMOL TABLETS 750 MG: 750 TABLET, COATED ORAL at 13:00

## 2024-09-25 RX ADMIN — ONDANSETRON 4 MG: 2 INJECTION, SOLUTION INTRAMUSCULAR; INTRAVENOUS at 14:59

## 2024-09-25 RX ADMIN — SODIUM CHLORIDE, POTASSIUM CHLORIDE, SODIUM LACTATE AND CALCIUM CHLORIDE 50 ML/HR: 600; 310; 30; 20 INJECTION, SOLUTION INTRAVENOUS at 01:25

## 2024-09-25 RX ADMIN — OXYCODONE HYDROCHLORIDE 10 MG: 5 TABLET ORAL at 17:10

## 2024-09-25 RX ADMIN — ENOXAPARIN SODIUM 40 MG: 100 INJECTION SUBCUTANEOUS at 08:27

## 2024-09-25 RX ADMIN — METHOCARBAMOL TABLETS 750 MG: 750 TABLET, COATED ORAL at 17:10

## 2024-09-25 RX ADMIN — HYDROMORPHONE HYDROCHLORIDE 0.5 MG: 1 INJECTION, SOLUTION INTRAMUSCULAR; INTRAVENOUS; SUBCUTANEOUS at 19:44

## 2024-09-25 RX ADMIN — PANTOPRAZOLE SODIUM 40 MG: 40 TABLET, DELAYED RELEASE ORAL at 05:46

## 2024-09-25 RX ADMIN — OXYCODONE HYDROCHLORIDE 10 MG: 5 TABLET ORAL at 21:21

## 2024-09-25 RX ADMIN — HYDROMORPHONE HYDROCHLORIDE 0.5 MG: 1 INJECTION, SOLUTION INTRAMUSCULAR; INTRAVENOUS; SUBCUTANEOUS at 10:29

## 2024-09-25 RX ADMIN — OXYCODONE HYDROCHLORIDE 10 MG: 5 TABLET ORAL at 08:27

## 2024-09-25 RX ADMIN — OXYCODONE HYDROCHLORIDE 10 MG: 5 TABLET ORAL at 13:01

## 2024-09-25 RX ADMIN — METHOCARBAMOL TABLETS 750 MG: 750 TABLET, COATED ORAL at 08:27

## 2024-09-25 RX ADMIN — ACETAMINOPHEN 1000 MG: 500 TABLET ORAL at 08:27

## 2024-09-25 RX ADMIN — HYDROMORPHONE HYDROCHLORIDE 0.5 MG: 1 INJECTION, SOLUTION INTRAMUSCULAR; INTRAVENOUS; SUBCUTANEOUS at 01:24

## 2024-09-25 RX ADMIN — ONDANSETRON 4 MG: 2 INJECTION, SOLUTION INTRAMUSCULAR; INTRAVENOUS at 08:39

## 2024-09-25 RX ADMIN — BICTEGRAVIR SODIUM, EMTRICITABINE, AND TENOFOVIR ALAFENAMIDE FUMARATE 1 TABLET: 50; 200; 25 TABLET ORAL at 20:12

## 2024-09-25 RX ADMIN — HYDROMORPHONE HYDROCHLORIDE 0.5 MG: 1 INJECTION, SOLUTION INTRAMUSCULAR; INTRAVENOUS; SUBCUTANEOUS at 18:10

## 2024-09-25 RX ADMIN — TRAZODONE HYDROCHLORIDE 100 MG: 50 TABLET ORAL at 21:21

## 2024-09-25 NOTE — PLAN OF CARE
Goal Outcome Evaluation:  Plan of Care Reviewed With: patient   Pain worse this shift- education provided on tap block wearing off & pt becoming more active, x2 walks in the halls this shift, denies passing gas but has hiccups on and off, PRN tyshawn/dilaudid/Zofran given this shift- pt slept a little bit but not much, VSS, RA, up ad ranjith, POC ongoing, WCTM      Progress: no change

## 2024-09-25 NOTE — CASE MANAGEMENT/SOCIAL WORK
Continued Stay Note  [facility]     Patient Name: Eliseo Phan  MRN: [id number]  Today's Date: 9/25/2024    Admit Date: 9/24/2024    Plan: Home   Discharge Plan       Row Name 09/25/24 1643       Plan    Plan Home    Patient/Family in Agreement with Plan yes    Plan Comments Patient is up walking the halls. Plan is home. CCP following for any discharge needs.                   Discharge Codes    No documentation.

## 2024-09-25 NOTE — PROGRESS NOTES
Colorectal & General Surgery  Progress Note    Patient: Eliseo Phan  YOB: 1965  MRN: [id number]      Assessment  Eliseo Phan is a 58 y.o. male with sigmoid diverticular stricture is now postoperative day 1 from laparoscopic low anterior resection with mobilization of the splenic flexure.  She is to be recovering relatively well.  Afebrile.  No tachycardia or hypotension.  Urine output adequate.  No flatus or bowel function yet.  Pain seems to be well-controlled.  Ambulating independently.    Plan  Continue liquid diet until he passes flatus  Continue maintenance intravenous fluids  Continue current analgesia regimen  Encouraged incentive spirometry and ambulation      Subjective  Feels relatively well.  Complains of pain in 1 area of his abdomen but otherwise does not have any significant pain.    Objective    Vitals:    09/25/24 0450   BP: 100/50   Pulse: 75   Resp: 16   Temp: 98.1 °F (36.7 °C)   SpO2: 92%       Physical Exam  Constitutional: Well-developed well-nourished, no acute distress  Neck: Supple, trachea midline  Respiratory: No increased work of breathing, Symmetric excursion  Cardiovascular: Well pefursed, no jugular venous distention evident   Abdominal: Incisions are in good order.  Soft, appropriately tender, mildly distended.    Skin: Warm, dry, no rash on visualized skin surfaces  Psychiatric: Alert and oriented ×3, normal affect     Laboratory Results  I have personally reviewed CBC with WBC 6, hemoglobin 10, platelets 168.  BMP pending.    Radiology  None to review         Rafal Berman MD  Colorectal & General Surgery  [facility]    [address], [address]  [place], [place], [address]  P: [phone]  F: [phone]

## 2024-09-25 NOTE — PLAN OF CARE
Goal Outcome Evaluation:      Patient pleasant. Pain has been mildly significant this shift, PRN Dilaudid/Oxycodone used, switching between both. Zofran x2. Patient is ambulating frequently and sitting in chair, reports no gas yet. Abdominal lap sites skin glued. Vitals stable.

## 2024-09-26 LAB
ANION GAP SERPL CALCULATED.3IONS-SCNC: 8.8 MMOL/L (ref 5–15)
BUN SERPL-MCNC: 10 MG/DL (ref 6–20)
BUN/CREAT SERPL: 7.5 (ref 7–25)
CALCIUM SPEC-SCNC: 9.1 MG/DL (ref 8.6–10.5)
CHLORIDE SERPL-SCNC: 103 MMOL/L (ref 98–107)
CO2 SERPL-SCNC: 26.2 MMOL/L (ref 22–29)
CREAT SERPL-MCNC: 1.34 MG/DL (ref 0.76–1.27)
CYTO UR: NORMAL
DEPRECATED RDW RBC AUTO: 42.6 FL (ref 37–54)
EGFRCR SERPLBLD CKD-EPI 2021: 61.4 ML/MIN/1.73
ERYTHROCYTE [DISTWIDTH] IN BLOOD BY AUTOMATED COUNT: 12.7 % (ref 12.3–15.4)
GLUCOSE SERPL-MCNC: 90 MG/DL (ref 65–99)
HCT VFR BLD AUTO: 31 % (ref 37.5–51)
HGB BLD-MCNC: 10.4 G/DL (ref 13–17.7)
LAB AP CASE REPORT: NORMAL
MCH RBC QN AUTO: 30.8 PG (ref 26.6–33)
MCHC RBC AUTO-ENTMCNC: 33.5 G/DL (ref 31.5–35.7)
MCV RBC AUTO: 91.7 FL (ref 79–97)
PATH REPORT.ADDENDUM SPEC: NORMAL
PATH REPORT.FINAL DX SPEC: NORMAL
PATH REPORT.GROSS SPEC: NORMAL
PLATELET # BLD AUTO: 150 10*3/MM3 (ref 140–450)
PMV BLD AUTO: 8.9 FL (ref 6–12)
POTASSIUM SERPL-SCNC: 4 MMOL/L (ref 3.5–5.2)
RBC # BLD AUTO: 3.38 10*6/MM3 (ref 4.14–5.8)
SODIUM SERPL-SCNC: 138 MMOL/L (ref 136–145)
WBC NRBC COR # BLD AUTO: 4.69 10*3/MM3 (ref 3.4–10.8)

## 2024-09-26 PROCEDURE — 25010000002 HYDROMORPHONE PER 4 MG: Performed by: SURGERY

## 2024-09-26 PROCEDURE — 99024 POSTOP FOLLOW-UP VISIT: CPT | Performed by: SURGERY

## 2024-09-26 PROCEDURE — 80048 BASIC METABOLIC PNL TOTAL CA: CPT | Performed by: SURGERY

## 2024-09-26 PROCEDURE — 25010000002 ENOXAPARIN PER 10 MG: Performed by: SURGERY

## 2024-09-26 PROCEDURE — 25010000002 ONDANSETRON PER 1 MG: Performed by: SURGERY

## 2024-09-26 PROCEDURE — 85027 COMPLETE CBC AUTOMATED: CPT | Performed by: SURGERY

## 2024-09-26 PROCEDURE — 25010000002 KETOROLAC TROMETHAMINE PER 15 MG: Performed by: SURGERY

## 2024-09-26 RX ORDER — SIMETHICONE 80 MG
80 TABLET,CHEWABLE ORAL 4 TIMES DAILY PRN
Status: DISCONTINUED | OUTPATIENT
Start: 2024-09-26 | End: 2024-09-27 | Stop reason: HOSPADM

## 2024-09-26 RX ORDER — KETOROLAC TROMETHAMINE 30 MG/ML
15 INJECTION, SOLUTION INTRAMUSCULAR; INTRAVENOUS EVERY 6 HOURS
Status: DISCONTINUED | OUTPATIENT
Start: 2024-09-26 | End: 2024-09-27 | Stop reason: HOSPADM

## 2024-09-26 RX ADMIN — HYDROMORPHONE HYDROCHLORIDE 0.5 MG: 1 INJECTION, SOLUTION INTRAMUSCULAR; INTRAVENOUS; SUBCUTANEOUS at 04:23

## 2024-09-26 RX ADMIN — METHOCARBAMOL TABLETS 750 MG: 750 TABLET, COATED ORAL at 18:32

## 2024-09-26 RX ADMIN — HYDROMORPHONE HYDROCHLORIDE 0.5 MG: 1 INJECTION, SOLUTION INTRAMUSCULAR; INTRAVENOUS; SUBCUTANEOUS at 20:41

## 2024-09-26 RX ADMIN — KETOROLAC TROMETHAMINE 15 MG: 30 INJECTION, SOLUTION INTRAMUSCULAR at 20:41

## 2024-09-26 RX ADMIN — SIMETHICONE 80 MG: 80 TABLET, CHEWABLE ORAL at 20:41

## 2024-09-26 RX ADMIN — BACLOFEN 10 MG: 10 TABLET ORAL at 04:23

## 2024-09-26 RX ADMIN — HYDROMORPHONE HYDROCHLORIDE 0.5 MG: 1 INJECTION, SOLUTION INTRAMUSCULAR; INTRAVENOUS; SUBCUTANEOUS at 01:13

## 2024-09-26 RX ADMIN — SIMETHICONE 80 MG: 80 TABLET, CHEWABLE ORAL at 10:10

## 2024-09-26 RX ADMIN — BICTEGRAVIR SODIUM, EMTRICITABINE, AND TENOFOVIR ALAFENAMIDE FUMARATE 1 TABLET: 50; 200; 25 TABLET ORAL at 20:44

## 2024-09-26 RX ADMIN — METHOCARBAMOL TABLETS 750 MG: 750 TABLET, COATED ORAL at 12:15

## 2024-09-26 RX ADMIN — PANTOPRAZOLE SODIUM 40 MG: 40 TABLET, DELAYED RELEASE ORAL at 06:35

## 2024-09-26 RX ADMIN — ACETAMINOPHEN 1000 MG: 500 TABLET ORAL at 06:35

## 2024-09-26 RX ADMIN — SIMETHICONE 80 MG: 80 TABLET, CHEWABLE ORAL at 12:15

## 2024-09-26 RX ADMIN — OXYCODONE HYDROCHLORIDE 10 MG: 5 TABLET ORAL at 10:34

## 2024-09-26 RX ADMIN — METHOCARBAMOL TABLETS 750 MG: 750 TABLET, COATED ORAL at 20:41

## 2024-09-26 RX ADMIN — ACETAMINOPHEN 1000 MG: 500 TABLET ORAL at 18:32

## 2024-09-26 RX ADMIN — ACETAMINOPHEN 1000 MG: 500 TABLET ORAL at 12:15

## 2024-09-26 RX ADMIN — HYDROMORPHONE HYDROCHLORIDE 0.5 MG: 1 INJECTION, SOLUTION INTRAMUSCULAR; INTRAVENOUS; SUBCUTANEOUS at 12:15

## 2024-09-26 RX ADMIN — TRAZODONE HYDROCHLORIDE 100 MG: 50 TABLET ORAL at 20:41

## 2024-09-26 RX ADMIN — METHOCARBAMOL TABLETS 750 MG: 750 TABLET, COATED ORAL at 08:36

## 2024-09-26 RX ADMIN — HYDROMORPHONE HYDROCHLORIDE 0.5 MG: 1 INJECTION, SOLUTION INTRAMUSCULAR; INTRAVENOUS; SUBCUTANEOUS at 08:39

## 2024-09-26 RX ADMIN — ONDANSETRON 4 MG: 2 INJECTION, SOLUTION INTRAMUSCULAR; INTRAVENOUS at 16:11

## 2024-09-26 RX ADMIN — OXYCODONE HYDROCHLORIDE 10 MG: 5 TABLET ORAL at 06:35

## 2024-09-26 RX ADMIN — KETOROLAC TROMETHAMINE 15 MG: 30 INJECTION, SOLUTION INTRAMUSCULAR at 14:17

## 2024-09-26 RX ADMIN — OXYCODONE HYDROCHLORIDE 10 MG: 5 TABLET ORAL at 18:32

## 2024-09-26 RX ADMIN — OXYCODONE HYDROCHLORIDE 10 MG: 5 TABLET ORAL at 14:17

## 2024-09-26 RX ADMIN — HYDROMORPHONE HYDROCHLORIDE 0.5 MG: 1 INJECTION, SOLUTION INTRAMUSCULAR; INTRAVENOUS; SUBCUTANEOUS at 16:11

## 2024-09-26 NOTE — PROGRESS NOTES
Postoperative day 2 laparoscopic low anterior resection for diverticulitis    Tolerating full liquid diet.  Some flatus and small bowel movement.  No nausea or vomiting.    Afebrile, vital signs stable and normal (isolated temperature of 100 yesterday evening).    Abdomen is soft and appropriately tender.  Incisions are healing well.    WBC 4.7, hemoglobin 10.4  GFR 61, potassium 4.0    Pathology: Diverticulosis and serosal adhesions    Advance to regular diet  Toradol was very beneficial in terms of pain and short course of this has been ordered  Likely home tomorrow

## 2024-09-26 NOTE — PLAN OF CARE
Goal Outcome Evaluation:  Plan of Care Reviewed With: patient   LLQ bruising worsening along w/. Left testicular swelling and bruising- MD made aware yesterday on day shift & was outlined for observation, 1 time does Toradol really helped patients discomfort- believe pt would benefit from simethicone today, using IS & ambulating in the halls frequently, PRN oxy/dilaudid/baclofen also given for pain control this shift, denies passing gas but is belching & getting hiccups intermittently, pt did sleep this shift tho, POC continued, WCTM      Progress: no change

## 2024-09-26 NOTE — PLAN OF CARE
Goal Outcome Evaluation:      Patient pleasant. PRN Dilaudid, Oxycodone given for pain. Scheduled Toradol, PRN Simethicone added to MAR. Nausea has been pretty controlled, zofran x1. Started on a regular diet. Bruising remains on scrotal area, abdomen. MD Coburn aware, and hemoglobin stable at 10.4. Lovenox held this morning. Having some red tinged, mucous like output from rectum, MD Coburn made aware. Abdominal lap sites clean and intact. Ambulating independently and sitting in chair.

## 2024-09-27 ENCOUNTER — READMISSION MANAGEMENT (OUTPATIENT)
Dept: CALL CENTER | Facility: HOSPITAL | Age: 59
End: 2024-09-27
Payer: COMMERCIAL

## 2024-09-27 VITALS
OXYGEN SATURATION: 94 % | HEART RATE: 63 BPM | TEMPERATURE: 98.2 F | SYSTOLIC BLOOD PRESSURE: 124 MMHG | DIASTOLIC BLOOD PRESSURE: 79 MMHG | BODY MASS INDEX: 25.48 KG/M2 | WEIGHT: 172 LBS | RESPIRATION RATE: 16 BRPM | HEIGHT: 69 IN

## 2024-09-27 PROCEDURE — 25010000002 KETOROLAC TROMETHAMINE PER 15 MG: Performed by: SURGERY

## 2024-09-27 PROCEDURE — 25010000002 HYDROMORPHONE PER 4 MG: Performed by: SURGERY

## 2024-09-27 RX ORDER — OXYCODONE AND ACETAMINOPHEN 5; 325 MG/1; MG/1
1 TABLET ORAL EVERY 6 HOURS PRN
Qty: 10 TABLET | Refills: 0 | Status: SHIPPED | OUTPATIENT
Start: 2024-09-27 | End: 2024-09-30 | Stop reason: SDUPTHER

## 2024-09-27 RX ORDER — METHOCARBAMOL 750 MG/1
750 TABLET, FILM COATED ORAL 4 TIMES DAILY
Qty: 20 TABLET | Refills: 0 | Status: SHIPPED | OUTPATIENT
Start: 2024-09-27

## 2024-09-27 RX ADMIN — KETOROLAC TROMETHAMINE 15 MG: 30 INJECTION, SOLUTION INTRAMUSCULAR at 08:32

## 2024-09-27 RX ADMIN — HYDROMORPHONE HYDROCHLORIDE 0.5 MG: 1 INJECTION, SOLUTION INTRAMUSCULAR; INTRAVENOUS; SUBCUTANEOUS at 01:42

## 2024-09-27 RX ADMIN — METHOCARBAMOL TABLETS 750 MG: 750 TABLET, COATED ORAL at 08:32

## 2024-09-27 RX ADMIN — PANTOPRAZOLE SODIUM 40 MG: 40 TABLET, DELAYED RELEASE ORAL at 06:15

## 2024-09-27 RX ADMIN — ACETAMINOPHEN 1000 MG: 500 TABLET ORAL at 01:41

## 2024-09-27 RX ADMIN — HYDROMORPHONE HYDROCHLORIDE 0.5 MG: 1 INJECTION, SOLUTION INTRAMUSCULAR; INTRAVENOUS; SUBCUTANEOUS at 08:31

## 2024-09-27 RX ADMIN — KETOROLAC TROMETHAMINE 15 MG: 30 INJECTION, SOLUTION INTRAMUSCULAR at 01:42

## 2024-09-27 RX ADMIN — OXYCODONE HYDROCHLORIDE 10 MG: 5 TABLET ORAL at 06:15

## 2024-09-27 RX ADMIN — SIMETHICONE 80 MG: 80 TABLET, CHEWABLE ORAL at 06:40

## 2024-09-27 RX ADMIN — ACETAMINOPHEN 1000 MG: 500 TABLET ORAL at 06:40

## 2024-09-27 NOTE — PAYOR COMM NOTE
"Juliette Phan S \"KOFFI\" (58 y.o. Male)          DC SUMMARY FOR [id number]          Date of Birth   1965    Social Security Number       Address   [address] [place] [address]    Home Phone   [phone]    MRN   [id number]       Religious   None    Marital Status   Single                            Admission Date   9/24/24    Admission Type   Elective    Admitting Provider   Levi Berman MD    Attending Provider       Department, Room/Bed   [address], P394/1       Discharge Date   9/27/2024    Discharge Disposition   Home or Self Care    Discharge Destination                                 Attending Provider: (none)   Allergies: No Known Allergies    Isolation: None   Infection: None   Code Status: Prior    Ht: 174 cm (68.5\")   Wt: 78 kg (172 lb)    Admission Cmt: None   Principal Problem: Diverticular stricture [K56.699]                   Active Insurance as of 9/24/2024       Primary Coverage       Payor Plan Insurance Group Employer/Plan Group    [employer] [place] [id number]       Payor Plan Address Payor Plan Phone Number Payor Plan Fax Number Effective Dates    PO    8/1/2021 - None Entered    [facility] [id number]         Subscriber Name Subscriber Birth Date Member ID       JULIETTE PHAN 1965 [id number]                     Emergency Contacts        (Rel.) Home Phone Work Phone Mobile Phone    JOSHUA RUIZ (Friend) [phone] -- [phone]    Ritchie Pichardo (Brother) [phone] -- --                 Discharge Summary        Washington Coburn MD at 09/27/24 0814          DATE OF ADMIT:    9/24/2024    DATE OF DISCHARGE:    9/27/2024    DIAGNOSIS:    Sigmoid diverticular stricture    FINAL PATHOLOGY:    Segment of colon with diverticulosis and serosal adhesions    PROCEDURES:    Laparoscopic low anterior resection with mobilization of splenic flexure 9/24/2024    SUMMARY OF HOSPITAL COURSE:     Uneventful postop " course. Tolerating diet, incisions in good order and afebrile with stable vital signs at discharge.  Was having bowel function both in terms of flatus and small bowel movements.     DIET: Regular    ACTIVITY: Walking encouraged, no lifting or strenuous activity    MEDICATIONS:  No changes made to preoperative medication list  Prescriptions given for:  Percocet 5 1 p.o. every 6 hours as needed for pain, #10  Robaxin-750 milligrams p.o. 4 times daily as needed pain, #20    FOLLOW-UP: To call office and schedule 1 week follow-up appointment    Washington Coburn M.D.    Electronically signed by Washington Coburn MD at 09/27/24 0422

## 2024-09-27 NOTE — PLAN OF CARE
Goal Outcome Evaluation: Patient up ambulating. Tolerated diet well no nausea or vomiting. No fevers and incisions healing well. Going home this am.

## 2024-09-27 NOTE — DISCHARGE SUMMARY
DATE OF ADMIT:    9/24/2024    DATE OF DISCHARGE:    9/27/2024    DIAGNOSIS:    Sigmoid diverticular stricture    FINAL PATHOLOGY:    Segment of colon with diverticulosis and serosal adhesions    PROCEDURES:    Laparoscopic low anterior resection with mobilization of splenic flexure 9/24/2024    SUMMARY OF HOSPITAL COURSE:     Uneventful postop course. Tolerating diet, incisions in good order and afebrile with stable vital signs at discharge.  Was having bowel function both in terms of flatus and small bowel movements.     DIET: Regular    ACTIVITY: Walking encouraged, no lifting or strenuous activity    MEDICATIONS:  No changes made to preoperative medication list  Prescriptions given for:  Percocet 5 1 p.o. every 6 hours as needed for pain, #10  Robaxin-750 milligrams p.o. 4 times daily as needed pain, #20    FOLLOW-UP: To call office and schedule 1 week follow-up appointment    Washington Coburn M.D.

## 2024-09-27 NOTE — PLAN OF CARE
Goal Outcome Evaluation:  Plan of Care Reviewed With: patient   A&Ox4, VSS, RA, red-tinged mucous from rectum continued this shift; no actual BM yet, passing a little bit of gas, tolerated the regular diet @ dinner last night, PRN dilaudid/tyshawn/simethicone this shift, pain doing better w/ the Toradol added to his scheduled meds, scrotal edema & bruising still present, lap sites CDI & ROMANA- healing well, no major issues this shift & pt slept well this shift, POC ongoing, WCTM        Progress: improving                                   It is confirmed that patient is taking 1 mg/0.5 ml    Thank you

## 2024-09-27 NOTE — CASE MANAGEMENT/SOCIAL WORK
Case Management Discharge Note      Final Note: Home         Selected Continued Care - Discharged on 9/27/2024 Admission date: 9/24/2024 - Discharge disposition: Home or Self Care      Destination    No services have been selected for the patient.                Durable Medical Equipment    No services have been selected for the patient.                Dialysis/Infusion    No services have been selected for the patient.                Home Medical Care    No services have been selected for the patient.                Therapy    No services have been selected for the patient.                Community Resources    No services have been selected for the patient.                Community & DME    No services have been selected for the patient.                    Transportation Services  Private: Car    Final Discharge Disposition Code: 01 - home or self-care

## 2024-09-29 ENCOUNTER — TELEPHONE (OUTPATIENT)
Dept: SURGERY | Facility: CLINIC | Age: 59
End: 2024-09-29
Payer: COMMERCIAL

## 2024-09-29 DIAGNOSIS — G89.18 ACUTE POSTOPERATIVE PAIN: Primary | ICD-10-CM

## 2024-09-29 DIAGNOSIS — K56.699 DIVERTICULAR STRICTURE: ICD-10-CM

## 2024-09-29 RX ORDER — OXYCODONE AND ACETAMINOPHEN 5; 325 MG/1; MG/1
1 TABLET ORAL EVERY 6 HOURS PRN
Qty: 4 TABLET | Refills: 0 | Status: SHIPPED | OUTPATIENT
Start: 2024-09-29 | End: 2024-09-30

## 2024-09-30 ENCOUNTER — TRANSITIONAL CARE MANAGEMENT TELEPHONE ENCOUNTER (OUTPATIENT)
Dept: CALL CENTER | Facility: HOSPITAL | Age: 59
End: 2024-09-30
Payer: COMMERCIAL

## 2024-09-30 ENCOUNTER — TELEPHONE (OUTPATIENT)
Dept: SURGERY | Facility: CLINIC | Age: 59
End: 2024-09-30
Payer: COMMERCIAL

## 2024-09-30 DIAGNOSIS — K56.699 DIVERTICULAR STRICTURE: ICD-10-CM

## 2024-09-30 RX ORDER — OXYCODONE AND ACETAMINOPHEN 5; 325 MG/1; MG/1
1 TABLET ORAL EVERY 6 HOURS PRN
Qty: 15 TABLET | Refills: 0 | Status: SHIPPED | OUTPATIENT
Start: 2024-09-30 | End: 2024-10-04 | Stop reason: SDUPTHER

## 2024-09-30 NOTE — TELEPHONE ENCOUNTER
Returned phone call.  Refilled pain medication.  He will follow-up on the 14th and call us if he needs to be seen sooner.

## 2024-09-30 NOTE — OUTREACH NOTE
Call Center TCM Note      Flowsheet Row Responses   [facility] patient discharged from? [place]   Does the patient have one of the following disease processes/diagnoses(primary or secondary)? General Surgery   TCM attempt successful? Yes   Call start time 0903   Call end time 0908   Discharge diagnosis Laparoscopic sigmoid colectomy   Does the patient have an appointment with their PCP within 7-14 days of discharge? No appointments available   Nursing Interventions Routed TCM call to PCP office   Has home health visited the patient within 72 hours of discharge? N/A   Psychosocial issues? No   What is the patient's perception of their health status since discharge? Same  [Pt reports swelling, bruising, left side pain that radiates to groin area, decreased appetite, occcasional nausea, denies vomiting. Is calling surgeons office to schedule f/u appt and to inform of symptoms]   Nursing interventions Nurse provided patient education   Is the patient /caregiver able to teach back basic post-op care? Lifting as instructed by MD in discharge instructions, Keep incision areas clean,dry and protected, No tub bath, swimming, or hot tub until instructed by MD, Take showers only when approved by MD-sponge bathe until then   Is the patient/caregiver able to teach back signs and symptoms of incisional infection? Increased redness, swelling or pain at the incisonal site, Increased drainage or bleeding   Is the patient/caregiver able to teach back steps to recovery at home? Set small, achievable goals for return to baseline health   TCM call completed? Yes   Call end time 0908   Would this patient benefit from a Referral to Amb Social Work? No   Is the patient interested in additional calls from an ambulatory ? No            Catrachita Cain RN    9/30/2024, 09:10 EDT

## 2024-09-30 NOTE — TELEPHONE ENCOUNTER
"I called pt to schedule his PO appt with you - scheduled for 10/14/24.    He wanted to ask if you could call him - he is having several concerns PO.    He states he is very swollen down to scrotum - very bruised.  No appetite - sharp pain on Lt side.  Small BM on Sat and that has been all.  He stated \"feeling defeated\".    Was trying not to take any pain meds, but ended up having to take yesterday.  He will be out of pain meds after today and also requesting a refill.    Pt can be reached at [phone].  "

## 2024-09-30 NOTE — PROGRESS NOTES
I s/w Jamison and he said that he's doing fine.  He's got a f/u with the surgeon and an appt with you on 10/31/24.  He will call back if anything changes.    Thank you

## 2024-10-04 ENCOUNTER — TELEPHONE (OUTPATIENT)
Dept: SURGERY | Facility: CLINIC | Age: 59
End: 2024-10-04
Payer: COMMERCIAL

## 2024-10-04 DIAGNOSIS — K56.699 DIVERTICULAR STRICTURE: ICD-10-CM

## 2024-10-04 RX ORDER — OXYCODONE AND ACETAMINOPHEN 5; 325 MG/1; MG/1
1 TABLET ORAL EVERY 6 HOURS PRN
Qty: 10 TABLET | Refills: 0 | Status: SHIPPED | OUTPATIENT
Start: 2024-10-04

## 2024-10-04 RX ORDER — METHOCARBAMOL 750 MG/1
750 TABLET, FILM COATED ORAL 4 TIMES DAILY
Qty: 20 TABLET | Refills: 0 | Status: SHIPPED | OUTPATIENT
Start: 2024-10-04

## 2024-10-04 NOTE — TELEPHONE ENCOUNTER
The patient called the office requesting more Percocet and Robaxin.  He states he continues to have left lower quadrant abdominal pain.  He is experiencing constipation and he was warned that the narcotic can contribute to constipation.  He was advised to increase MiraLAX and use milk of magnesia as needed to keep adequate bowel function.  He is scheduled to see Dr. Berman in the office next week.  He was given a prescription for Percocet (10 tablets) and Robaxin.  He was advised to see Dr. Berman as scheduled.

## 2024-10-08 ENCOUNTER — READMISSION MANAGEMENT (OUTPATIENT)
Dept: CALL CENTER | Facility: HOSPITAL | Age: 59
End: 2024-10-08
Payer: COMMERCIAL

## 2024-10-08 NOTE — OUTREACH NOTE
General Surgery Week 2 Survey      Flowsheet Row Responses   [facility] patient discharged from? [place]   Does the patient have one of the following disease processes/diagnoses(primary or secondary)? General Surgery   Week 2 attempt successful? No  [Reached friend, Ladonna, who was not with patient at time of call.]   Unsuccessful attempts Attempt 1            Sandra BROWN - Registered Nurse

## 2024-10-09 ENCOUNTER — TELEPHONE (OUTPATIENT)
Dept: SURGERY | Facility: CLINIC | Age: 59
End: 2024-10-09

## 2024-10-09 DIAGNOSIS — K56.699 DIVERTICULAR STRICTURE: ICD-10-CM

## 2024-10-09 RX ORDER — OXYCODONE AND ACETAMINOPHEN 5; 325 MG/1; MG/1
1 TABLET ORAL EVERY 6 HOURS PRN
Qty: 10 TABLET | Refills: 0 | Status: SHIPPED | OUTPATIENT
Start: 2024-10-09 | End: 2024-10-14

## 2024-10-14 ENCOUNTER — OFFICE VISIT (OUTPATIENT)
Dept: SURGERY | Facility: CLINIC | Age: 59
End: 2024-10-14
Payer: COMMERCIAL

## 2024-10-14 VITALS
BODY MASS INDEX: 24.41 KG/M2 | HEIGHT: 69 IN | DIASTOLIC BLOOD PRESSURE: 88 MMHG | SYSTOLIC BLOOD PRESSURE: 128 MMHG | WEIGHT: 164.8 LBS

## 2024-10-14 DIAGNOSIS — K56.699 DIVERTICULAR STRICTURE: ICD-10-CM

## 2024-10-14 PROCEDURE — 99024 POSTOP FOLLOW-UP VISIT: CPT | Performed by: SURGERY

## 2024-10-14 RX ORDER — OXYCODONE AND ACETAMINOPHEN 5; 325 MG/1; MG/1
1 TABLET ORAL EVERY 6 HOURS PRN
Qty: 10 TABLET | Refills: 0 | Status: SHIPPED | OUTPATIENT
Start: 2024-10-14 | End: 2024-10-18 | Stop reason: SDUPTHER

## 2024-10-14 RX ORDER — GABAPENTIN 100 MG/1
100 CAPSULE ORAL 3 TIMES DAILY
Qty: 42 CAPSULE | Refills: 0 | Status: SHIPPED | OUTPATIENT
Start: 2024-10-14 | End: 2024-10-28

## 2024-10-14 NOTE — PROGRESS NOTES
Colorectal & General Surgery  Post - Op    Patient: Eliseo Phan  YOB: 1965  MRN: [id number]      Assessment  Eliseo Phan is a 58 y.o. male with diverticular stricture now status post laparoscopic sigmoid colectomy who presents in follow-up today.  From a gastrointestinal standpoint, he is recovering nicely.  He is having good bowel function with no significant abdominal pain.  He does describe nervelike burning pain near his extraction site.  I suspect that this will continue to improve over the next few weeks.  I have refilled his Percocets and started on a more short course of gabapentin.  He will call the office or send me a [employer] message with any further issues.    History of Present Illness   Eliseo Phan is a 58 y.o. male who presents in follow-up after undergoing laparoscopic sigmoid colectomy for diverticular stricture.    Vital Signs  Vitals:    10/14/24 1303   BP: 128/88        Physical Exam  Constitutional: Resting comfortably, no acute distress  Neck: Supple, trachea midline  Respiratory: No increased work of breathing, Symmetric excursion  Cardiovascular: Well pefursed, no jugular venous distention evident   Abdominal: Incisions healing well.  Soft, non-tender, non-distended  Lymphatics: No cervical or suprascapular adenopathy  Skin: Warm, dry, no rash on visualized skin surfaces  Musculoskeletal: Symmetric strength, no obvious gross abnormalities  Psychiatric: Alert and oriented ×3, normal affect           Pathology  I have personally reviewed pathology results with the patient demonstrating diverticulosis with serosal adhesions.  No active colitis or malignancy.    Rafal Berman MD  Colorectal & General Surgery  [facility]    [facility], [address]  [place], [place], [facility]  P: [phone]  F: [phone]

## 2024-10-15 ENCOUNTER — READMISSION MANAGEMENT (OUTPATIENT)
Dept: CALL CENTER | Facility: HOSPITAL | Age: 59
End: 2024-10-15
Payer: COMMERCIAL

## 2024-10-15 DIAGNOSIS — R11.0 NAUSEA: ICD-10-CM

## 2024-10-15 DIAGNOSIS — S39.840D FRACTURE OF CORPUS CAVERNOSUM PENIS, SUBSEQUENT ENCOUNTER: ICD-10-CM

## 2024-10-15 DIAGNOSIS — E55.9 VITAMIN D DEFICIENCY: ICD-10-CM

## 2024-10-15 DIAGNOSIS — N52.9 ERECTILE DYSFUNCTION, UNSPECIFIED ERECTILE DYSFUNCTION TYPE: ICD-10-CM

## 2024-10-15 RX ORDER — ONDANSETRON 4 MG/1
4 TABLET, FILM COATED ORAL EVERY 6 HOURS PRN
Qty: 8 TABLET | Refills: 4 | Status: SHIPPED | OUTPATIENT
Start: 2024-10-15

## 2024-10-15 RX ORDER — TRAZODONE HYDROCHLORIDE 100 MG/1
100 TABLET ORAL
Qty: 30 TABLET | Refills: 0 | Status: SHIPPED | OUTPATIENT
Start: 2024-10-15 | End: 2024-11-14

## 2024-10-15 RX ORDER — TADALAFIL 20 MG/1
20 TABLET ORAL DAILY PRN
Qty: 30 TABLET | Refills: 0 | Status: SHIPPED | OUTPATIENT
Start: 2024-10-15

## 2024-10-15 NOTE — TELEPHONE ENCOUNTER
Rx Refill Note  Requested Prescriptions     Pending Prescriptions Disp Refills    traZODone (DESYREL) 100 MG tablet [Pharmacy Med Name: TRAZODONE 100 MG TABLET] 30 tablet 0     Sig: TAKE 1 TABLET BY MOUTH EVERY NIGHT AT BEDTIME FOR 30 DAYS.    ondansetron (ZOFRAN) 4 MG tablet [Pharmacy Med Name: ONDANSETRON HCL 4 MG TABLET] 8 tablet 4     Sig: TAKE 1 TABLET BY MOUTH EVERY 6 HOURS AS NEEDED FOR NAUSEA OR VOMITING.      Last office visit with prescribing clinician: 12/13/2023   Last telemedicine visit with prescribing clinician: Visit date not found   Next office visit with prescribing clinician: 10/31/2024                         Would you like a call back once the refill request has been completed: [] Yes [] No    If the office needs to give you a call back, can they leave a voicemail: [] Yes [] No    Loli Dawn MA  10/15/24, 09:49 EDT

## 2024-10-15 NOTE — TELEPHONE ENCOUNTER
Rx Refill Note  Requested Prescriptions     Pending Prescriptions Disp Refills    tadalafil (CIALIS) 20 MG tablet [Pharmacy Med Name: Tadalafil 20 MG Oral Tablet] 30 tablet 0     Sig: TAKE 1 TABLET BY MOUTH ONCE DAILY AS NEEDED FOR ERECTILE DYSFUNCTION      Last office visit with prescribing clinician: 12/13/2023   Last telemedicine visit with prescribing clinician: Visit date not found   Next office visit with prescribing clinician: 10/15/2024                         Would you like a call back once the refill request has been completed: [] Yes [] No    If the office needs to give you a call back, can they leave a voicemail: [] Yes [] No    Loli Dawn MA  10/15/24, 09:42 EDT

## 2024-10-15 NOTE — OUTREACH NOTE
General Surgery Week 3 Survey      Flowsheet Row Responses   [facility] patient discharged from? [place]   Does the patient have one of the following disease processes/diagnoses(primary or secondary)? General Surgery   Week 3 attempt successful? Yes   Call start time 1603   Call end time 1605   Discharge diagnosis Laparoscopic sigmoid colectomy   Meds reviewed with patient/caregiver? Yes   Is the patient having any side effects they believe may be caused by any medication additions or changes? No   Does the patient have all medications related to this admission filled (includes all antibiotics, pain medications, etc.) Yes   Is the patient taking all medications as directed (includes completed medication regime)? Yes   Does the patient have a follow up appointment scheduled with their surgeon? Yes   Has the patient kept scheduled appointments due by today? Yes   Psychosocial issues? No   Did the patient receive a copy of their discharge instructions? Yes   Nursing interventions Reviewed instructions with patient   What is the patient's perception of their health status since discharge? Improving   Nursing interventions Nurse provided patient education   Is the patient /caregiver able to teach back basic post-op care? Lifting as instructed by MD in discharge instructions, Keep incision areas clean,dry and protected, No tub bath, swimming, or hot tub until instructed by MD, Take showers only when approved by MD-sponge bathe until then   Is the patient/caregiver able to teach back steps to recovery at home? Set small, achievable goals for return to baseline health   If the patient is a current smoker, are they able to teach back resources for cessation? Not a smoker   Is the patient/caregiver able to teach back the hierarchy of who to call/visit for symptoms/problems? PCP, Specialist, Home health nurse, Urgent Care, ED, 911 Yes   Week 3 call completed? Yes   Graduated Yes   Is the patient interested in  additional calls from an ambulatory ? No   Would this patient benefit from a Referral to [facility] Social Work? No   Call end time 9760            ALEKSANDR SANTACRUZ - Registered Nurse

## 2024-10-16 RX ORDER — PRENATAL WITH FERROUS FUM AND FOLIC ACID 3080; 920; 120; 400; 22; 1.84; 3; 20; 10; 1; 12; 200; 27; 25; 2 [IU]/1; [IU]/1; MG/1; [IU]/1; MG/1; MG/1; MG/1; MG/1; MG/1; MG/1; UG/1; MG/1; MG/1; MG/1; MG/1
1 TABLET ORAL DAILY
Qty: 90 TABLET | Refills: 3 | Status: SHIPPED | OUTPATIENT
Start: 2024-10-16

## 2024-10-16 RX ORDER — CHOLECALCIFEROL (VITAMIN D3) 50 MCG
2000 TABLET ORAL DAILY
Qty: 90 TABLET | Refills: 3 | Status: SHIPPED | OUTPATIENT
Start: 2024-10-16

## 2024-10-18 ENCOUNTER — TELEPHONE (OUTPATIENT)
Dept: SURGERY | Facility: CLINIC | Age: 59
End: 2024-10-18
Payer: COMMERCIAL

## 2024-10-18 DIAGNOSIS — K56.699 DIVERTICULAR STRICTURE: ICD-10-CM

## 2024-10-18 RX ORDER — OXYCODONE AND ACETAMINOPHEN 5; 325 MG/1; MG/1
1 TABLET ORAL EVERY 12 HOURS PRN
Qty: 14 TABLET | Refills: 0 | Status: SHIPPED | OUTPATIENT
Start: 2024-10-18

## 2024-10-18 NOTE — TELEPHONE ENCOUNTER
Patient called triage line stating that he is still experiencing some pain. He stated that he has tried to take tylenol but it doesn't offer much relief. Patient also stated that he is using the lidocaine patches. Patient would like a refill on his Percocet to get him through the next few days. Please advise

## 2024-10-18 NOTE — TELEPHONE ENCOUNTER
Spoke with patient advised a refill was sent to pharmacy. Also advised that Dr. Berman would like him to take one every 12 hours as needed and that he needs him to wean off the medication. Patient voiced understanding and did not have any further questions.

## 2024-10-31 ENCOUNTER — OFFICE VISIT (OUTPATIENT)
Dept: INTERNAL MEDICINE | Facility: CLINIC | Age: 59
End: 2024-10-31
Payer: COMMERCIAL

## 2024-10-31 VITALS
BODY MASS INDEX: 25.48 KG/M2 | SYSTOLIC BLOOD PRESSURE: 122 MMHG | HEIGHT: 69 IN | DIASTOLIC BLOOD PRESSURE: 82 MMHG | HEART RATE: 88 BPM | OXYGEN SATURATION: 96 % | WEIGHT: 172 LBS

## 2024-10-31 DIAGNOSIS — Z71.6 ENCOUNTER FOR SMOKING CESSATION COUNSELING: ICD-10-CM

## 2024-10-31 DIAGNOSIS — M21.619 BUNION: Primary | ICD-10-CM

## 2024-10-31 DIAGNOSIS — F51.01 PRIMARY INSOMNIA: ICD-10-CM

## 2024-10-31 RX ORDER — VARENICLINE TARTRATE 1 MG/1
TABLET, FILM COATED ORAL
COMMUNITY
Start: 2024-10-18

## 2024-10-31 RX ORDER — TRAZODONE HYDROCHLORIDE 100 MG/1
100 TABLET ORAL
Qty: 90 TABLET | Refills: 1 | Status: SHIPPED | OUTPATIENT
Start: 2024-10-31

## 2024-10-31 NOTE — PROGRESS NOTES
"Chief Complaint  Follow-up (Med refill visit)    Subjective        Eliseo Phan presents to [facility]  History of Present Illness  Here for follow up     Was admitted details as below  DATE OF ADMIT:    9/24/2024     DATE OF DISCHARGE:    9/27/2024     DIAGNOSIS:    Sigmoid diverticular stricture     FINAL PATHOLOGY:    Segment of colon with diverticulosis and serosal adhesions     PROCEDURES:    Laparoscopic low anterior resection with mobilization of splenic flexure 9/24/2024     SUMMARY OF HOSPITAL COURSE:     Uneventful postop course. Tolerating diet, incisions in good order and afebrile with stable vital signs at discharge.  Was having bowel function both in terms of flatus and small bowel movements.       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    Weight as of this encounter: 78 kg (172 lb).    BMI is >= 25 and <30. (Overweight) The following options were offered after discussion;: weight loss educational material (shared in after visit summary), exercise counseling/recommendations, and nutrition counseling/recommendations      Physical Exam  Vitals and nursing note reviewed.   Constitutional:       General: He is not in acute distress.     Appearance: Normal appearance. He is not toxic-appearing.   HENT:      Head: Normocephalic and atraumatic.      Right Ear: External ear normal.      Left Ear: External ear normal.      Nose: Nose normal.   Eyes:      General: No scleral icterus.        Right " eye: No discharge.         Left eye: No discharge.      Extraocular Movements: Extraocular movements intact.      Conjunctiva/sclera: Conjunctivae normal.      Pupils: Pupils are equal, round, and reactive to light.   Cardiovascular:      Rate and Rhythm: Normal rate and regular rhythm.      Pulses: Normal pulses.      Heart sounds: Normal heart sounds. No murmur heard.  Pulmonary:      Effort: Pulmonary effort is normal.   Musculoskeletal:         General: Normal range of motion.   Skin:     General: Skin is warm.      Capillary Refill: Capillary refill takes less than 2 seconds.   Neurological:      General: No focal deficit present.      Mental Status: He is alert and oriented to person, place, and time. Mental status is at baseline.      Cranial Nerves: No cranial nerve deficit.      Gait: Gait normal.   Psychiatric:         Mood and Affect: Mood normal.         Behavior: Behavior normal.         Thought Content: Thought content normal.         Judgment: Judgment normal.        Result Review :  The following data was reviewed by: Papa Carey MD on 10/31/2024:  Common labs          9/17/2024    15:38 9/25/2024    05:52 9/26/2024    11:57   Common Labs   Glucose 104  98  90    BUN 13  16  10    Creatinine 1.33  1.21  1.34    Sodium 139  132  138    Potassium 3.7  4.0  4.0    Chloride 105  101  103    Calcium 9.7  8.6  9.1    Albumin 4.2      Total Bilirubin 0.3      Alkaline Phosphatase 52      AST (SGOT) 25      ALT (SGPT) 25      WBC 4.22  6.33  4.69    Hemoglobin 15.0  10.4  10.4    Hematocrit 42.9  30.8  31.0    Platelets 173  168  150      Data reviewed : prior office notes reviewed           Assessment and Plan   Diagnoses and all orders for this visit:    1. Bunion (Primary)  -     Ambulatory Referral to Podiatry    2. Primary insomnia  -     traZODone (DESYREL) 100 MG tablet; Take 1 tablet by mouth every night at bedtime.  Dispense: 90 tablet; Refill: 1    3. Encounter for smoking cessation  counseling  Continue chantix      Note sent via [address] for nursing board       I spent 15 minutes caring for Eliseo on this date of service. This time includes time spent by me in the following activities:preparing for the visit, reviewing tests, obtaining and/or reviewing a separately obtained history, performing a medically appropriate examination and/or evaluation , counseling and educating the patient/family/caregiver, ordering medications, tests, or procedures, and documenting information in the medical record  Follow Up   No follow-ups on file.  Patient was given instructions and counseling regarding his condition or for health maintenance advice. Please see specific information pulled into the AVS if appropriate.       Papa Carey MD  [facility]  [facility]  Phone: [phone]

## 2024-10-31 NOTE — LETTER
November 7, 2024    To whom it may concern,     Eliseo Phan is a patient of my practice and it is my medical opinion that he is fit to return to practice. There are no medical issues preventing him from working full time. 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     If you have any questions or concerns, please feel free to reach out.     Papa Carey MD  [facility]  [address] [place] [address]  Phone: [phone]                                  Papa Carey MD

## 2024-11-15 DIAGNOSIS — L98.9 SKIN LESIONS: Primary | ICD-10-CM

## 2024-11-30 DIAGNOSIS — S39.840D FRACTURE OF CORPUS CAVERNOSUM PENIS, SUBSEQUENT ENCOUNTER: ICD-10-CM

## 2024-11-30 DIAGNOSIS — N52.9 ERECTILE DYSFUNCTION, UNSPECIFIED ERECTILE DYSFUNCTION TYPE: ICD-10-CM

## 2024-12-02 RX ORDER — TADALAFIL 20 MG/1
20 TABLET ORAL DAILY PRN
Qty: 30 TABLET | Refills: 0 | Status: SHIPPED | OUTPATIENT
Start: 2024-12-02

## 2025-01-12 DIAGNOSIS — S39.840D FRACTURE OF CORPUS CAVERNOSUM PENIS, SUBSEQUENT ENCOUNTER: ICD-10-CM

## 2025-01-12 DIAGNOSIS — N52.9 ERECTILE DYSFUNCTION, UNSPECIFIED ERECTILE DYSFUNCTION TYPE: ICD-10-CM

## 2025-01-13 RX ORDER — TADALAFIL 20 MG/1
20 TABLET ORAL DAILY PRN
Qty: 30 TABLET | Refills: 0 | Status: SHIPPED | OUTPATIENT
Start: 2025-01-13

## 2025-02-14 ENCOUNTER — LAB (OUTPATIENT)
Dept: LAB | Facility: HOSPITAL | Age: 60
End: 2025-02-14
Payer: COMMERCIAL

## 2025-02-14 ENCOUNTER — OFFICE VISIT (OUTPATIENT)
Dept: INFECTIOUS DISEASES | Facility: CLINIC | Age: 60
End: 2025-02-14
Payer: COMMERCIAL

## 2025-02-14 VITALS
WEIGHT: 172 LBS | HEART RATE: 79 BPM | TEMPERATURE: 97.1 F | BODY MASS INDEX: 25.77 KG/M2 | DIASTOLIC BLOOD PRESSURE: 79 MMHG | SYSTOLIC BLOOD PRESSURE: 135 MMHG | RESPIRATION RATE: 16 BRPM

## 2025-02-14 DIAGNOSIS — Z11.3 SCREENING EXAMINATION FOR SEXUALLY TRANSMITTED DISEASE: ICD-10-CM

## 2025-02-14 DIAGNOSIS — Z21 ASYMPTOMATIC HIV INFECTION, WITH NO HISTORY OF HIV-RELATED ILLNESS: ICD-10-CM

## 2025-02-14 DIAGNOSIS — Z21 ASYMPTOMATIC HIV INFECTION, WITH NO HISTORY OF HIV-RELATED ILLNESS: Primary | ICD-10-CM

## 2025-02-14 LAB
ALBUMIN SERPL-MCNC: 4.2 G/DL (ref 3.5–5.2)
ALBUMIN/GLOB SERPL: 1.4 G/DL
ALP SERPL-CCNC: 45 U/L (ref 39–117)
ALT SERPL W P-5'-P-CCNC: 23 U/L (ref 1–41)
ANION GAP SERPL CALCULATED.3IONS-SCNC: 8.4 MMOL/L (ref 5–15)
AST SERPL-CCNC: 25 U/L (ref 1–40)
BASOPHILS # BLD AUTO: 0.02 10*3/MM3 (ref 0–0.2)
BASOPHILS NFR BLD AUTO: 0.4 % (ref 0–1.5)
BILIRUB SERPL-MCNC: 0.4 MG/DL (ref 0–1.2)
BUN SERPL-MCNC: 14 MG/DL (ref 6–20)
BUN/CREAT SERPL: 10.4 (ref 7–25)
CALCIUM SPEC-SCNC: 9.4 MG/DL (ref 8.6–10.5)
CHLORIDE SERPL-SCNC: 102 MMOL/L (ref 98–107)
CO2 SERPL-SCNC: 26.6 MMOL/L (ref 22–29)
CREAT SERPL-MCNC: 1.34 MG/DL (ref 0.76–1.27)
DEPRECATED RDW RBC AUTO: 48.7 FL (ref 37–54)
EGFRCR SERPLBLD CKD-EPI 2021: 61 ML/MIN/1.73
EOSINOPHIL # BLD AUTO: 0.05 10*3/MM3 (ref 0–0.4)
EOSINOPHIL NFR BLD AUTO: 0.9 % (ref 0.3–6.2)
ERYTHROCYTE [DISTWIDTH] IN BLOOD BY AUTOMATED COUNT: 15.1 % (ref 12.3–15.4)
GLOBULIN UR ELPH-MCNC: 3.1 GM/DL
GLUCOSE SERPL-MCNC: 85 MG/DL (ref 65–99)
HCT VFR BLD AUTO: 53.2 % (ref 37.5–51)
HCV AB SER QL: NORMAL
HGB BLD-MCNC: 17.2 G/DL (ref 13–17.7)
IMM GRANULOCYTES # BLD AUTO: 0.01 10*3/MM3 (ref 0–0.05)
IMM GRANULOCYTES NFR BLD AUTO: 0.2 % (ref 0–0.5)
LYMPHOCYTES # BLD AUTO: 1.23 10*3/MM3 (ref 0.7–3.1)
LYMPHOCYTES NFR BLD AUTO: 23.1 % (ref 19.6–45.3)
MCH RBC QN AUTO: 28.9 PG (ref 26.6–33)
MCHC RBC AUTO-ENTMCNC: 32.3 G/DL (ref 31.5–35.7)
MCV RBC AUTO: 89.3 FL (ref 79–97)
MONOCYTES # BLD AUTO: 0.31 10*3/MM3 (ref 0.1–0.9)
MONOCYTES NFR BLD AUTO: 5.8 % (ref 5–12)
NEUTROPHILS NFR BLD AUTO: 3.7 10*3/MM3 (ref 1.7–7)
NEUTROPHILS NFR BLD AUTO: 69.6 % (ref 42.7–76)
NRBC BLD AUTO-RTO: 0 /100 WBC (ref 0–0.2)
PLATELET # BLD AUTO: 203 10*3/MM3 (ref 140–450)
PMV BLD AUTO: 8.4 FL (ref 6–12)
POTASSIUM SERPL-SCNC: 4.1 MMOL/L (ref 3.5–5.2)
PROT SERPL-MCNC: 7.3 G/DL (ref 6–8.5)
RBC # BLD AUTO: 5.96 10*6/MM3 (ref 4.14–5.8)
SODIUM SERPL-SCNC: 137 MMOL/L (ref 136–145)
WBC NRBC COR # BLD AUTO: 5.32 10*3/MM3 (ref 3.4–10.8)

## 2025-02-14 PROCEDURE — 86780 TREPONEMA PALLIDUM: CPT

## 2025-02-14 PROCEDURE — 86593 SYPHILIS TEST NON-TREP QUANT: CPT

## 2025-02-14 PROCEDURE — 86803 HEPATITIS C AB TEST: CPT

## 2025-02-14 PROCEDURE — 86592 SYPHILIS TEST NON-TREP QUAL: CPT

## 2025-02-14 PROCEDURE — 87591 N.GONORRHOEAE DNA AMP PROB: CPT

## 2025-02-14 PROCEDURE — 86361 T CELL ABSOLUTE COUNT: CPT

## 2025-02-14 PROCEDURE — 80053 COMPREHEN METABOLIC PANEL: CPT

## 2025-02-14 PROCEDURE — 87536 HIV-1 QUANT&REVRSE TRNSCRPJ: CPT

## 2025-02-14 PROCEDURE — 99214 OFFICE O/P EST MOD 30 MIN: CPT | Performed by: STUDENT IN AN ORGANIZED HEALTH CARE EDUCATION/TRAINING PROGRAM

## 2025-02-14 PROCEDURE — 85025 COMPLETE CBC W/AUTO DIFF WBC: CPT

## 2025-02-14 PROCEDURE — 36415 COLL VENOUS BLD VENIPUNCTURE: CPT

## 2025-02-14 PROCEDURE — 87491 CHLMYD TRACH DNA AMP PROBE: CPT

## 2025-02-14 RX ORDER — BICTEGRAVIR SODIUM, EMTRICITABINE, AND TENOFOVIR ALAFENAMIDE FUMARATE 50; 200; 25 MG/1; MG/1; MG/1
1 TABLET ORAL DAILY
Qty: 30 TABLET | Refills: 5 | Status: SHIPPED | OUTPATIENT
Start: 2025-02-14

## 2025-02-14 NOTE — PROGRESS NOTES
Follow-up      HIV Follow-up Visit: Eliseo Phan is a 59 y.o. male here for follow-up HIV visit. He is feeling unchanged since his last visit.      Patient reports he has been taking his Biktarvy daily without any difficulties.  Did recently started on some new supplements and states he is controlling his lipids better.  Does take a prenatal vitamin is going to separate this from his Biktarvy dose.  Denies any missed doses.  Denies any side effects.    Does report multiple new sexual partners.  States he is use condoms consistently.  Denies any concern for sexually transmitted infections however he is agreeable to screening today.  Reports both receptive and penetrative anal sex.  Reports performing oral sex as well.    Past medical history:  Past Medical History:   Diagnosis Date    Acid reflux     Arthritis     Back pain     Diverticular stricture     Hepatitis B     History of kidney stones     HIV (human immunodeficiency virus infection) 2011    PONV (postoperative nausea and vomiting)     Stage 1 chronic kidney disease     r/t medication       Medications:   Current Outpatient Medications:     baclofen (LIORESAL) 10 MG tablet, Take 1 tablet by mouth 3 (Three) Times a Day As Needed for Muscle Spasms., Disp: 90 tablet, Rfl: 1    Bictegravir-Emtricitab-Tenofov (Biktarvy) -25 MG per tablet, Take 1 tablet by mouth Daily., Disp: 30 tablet, Rfl: 5    Cholecalciferol (Vitamin D) 50 MCG (2000 UT) tablet, Take 1 tablet by mouth Daily. HOLD PRIOR TO SURGERY, Disp: 90 tablet, Rfl: 3    fluticasone (FLONASE) 50 MCG/ACT nasal spray, INSTILL 2 SPRAYS INTO EACH NOSTRIL DAILY AS NEEDED FOR ALLERGIES, Disp: 16 g, Rfl: 2    omeprazole (priLOSEC) 40 MG capsule, TAKE 1 CAPSULE BY MOUTH EVERY DAY, Disp: 90 capsule, Rfl: 3    ondansetron (ZOFRAN) 4 MG tablet, TAKE 1 TABLET BY MOUTH EVERY 6 HOURS AS NEEDED FOR NAUSEA OR VOMITING., Disp: 8 tablet, Rfl: 4    Prenatal Vit-Fe Fumarate-FA (Prenatal 27-1) 27-1 MG tablet tablet,  TAKE 1 TABLET BY MOUTH DAILY, Disp: 90 tablet, Rfl: 3    tadalafil (CIALIS) 20 MG tablet, TAKE 1 TABLET BY MOUTH ONCE DAILY AS NEEDED FOR ERECTILE DYSFUNCTION, Disp: 30 tablet, Rfl: 0    traZODone (DESYREL) 100 MG tablet, Take 1 tablet by mouth every night at bedtime., Disp: 90 tablet, Rfl: 1    varenicline (CHANTIX) 1 MG tablet, TAKE 1 TABLET BY MOUTH 2 (TWO) TIMES A DAY FOR 56 DAYS., Disp: , Rfl:     Allergies:  has No Known Allergies.    Family History: family history includes Alcohol abuse in his mother; Anxiety disorder in his father and mother; Arthritis in his father, maternal grandmother, and mother; Asthma in his brother; Breast cancer in his mother; Cancer in his mother; Depression in his father and mother; Diabetes in his mother; Heart disease in his mother; Hypertension in his father and mother; Nephrolithiasis in his brother; Thyroid disease in his mother.    Social History:  reports that he quit smoking about 4 years ago. His smoking use included cigarettes. He started smoking about 14 years ago. He has a 5 pack-year smoking history. He has never been exposed to tobacco smoke. He has never used smokeless tobacco. He reports that he does not currently use drugs after having used the following drugs: Amphetamines, Fentanyl, and Other. He reports that he does not drink alcohol.    Review of Systems: All other reviewed and negative except as per HPI    Blood pressure 135/79, pulse 79, temperature 97.1 °F (36.2 °C), resp. rate 16, weight 78 kg (172 lb).  GENERAL: Awake and alert, in no acute distress.   HEENT: Oropharynx is clear. Hearing is grossly normal.   EYES: PERRL. No conjunctival injection. No lid lag.   HEART: Regular rate. No peripheral edema.   LUNGS: Normal respiratory effort  ABDOMEN: Nondistended  SKIN: No rashes or lesions in exposed areas  PSYCHIATRIC: Appropriate mood, affect, insight, and judgment.       DIAGNOSTICS:  Lab Results   Component Value Date    WBC 4.69 09/26/2024    HGB 10.4  "(L) 09/26/2024    HCT 31.0 (L) 09/26/2024     09/26/2024     No results found for: \"CRP\"  No results found for: \"SEDRATE\"  Lab Results   Component Value Date    GLUCOSE 90 09/26/2024    BUN 10 09/26/2024    CREATININE 1.34 (H) 09/26/2024    EGFRIFNONA 53 (L) 02/18/2022    EGFRIFAFRI >60 08/31/2021    BCR 7.5 09/26/2024    CO2 26.2 09/26/2024    CALCIUM 9.1 09/26/2024    ALBUMIN 4.2 09/17/2024    LABIL2 1.4 08/31/2021    AST 25 09/17/2024    ALT 25 09/17/2024       ART history:  2010 through 2017 Atripla  2017 through August 2021 Tivicay plus Descovy  8/2021 through now Biktarvy     Lab history:  -5/23/2012 597  -9/24/2021 566  -1/14/2016 494  -7/24/2017 542  -9/27/2017 527  -7/11/2018 564  -1/9/2019 536  -7/9/2019 465  -10/4/2019 744  -1/6/2020 508  -11/10/2020 513  -2/24/2021 696  -4/2/2021 513  -8/31/2021 462  - 2/18/2022 419, viral load less than 20  -8/22/2022 CD4 402, 28% and viral load 30  -2/20/2023 CD4 533, 29% and viral load 40  -8/16/2023 CD4 454, 32% and viral load less than 20  -2/16/2024 CD4 468, 31% and viral load less than 20  -8/19/2024 CD4 486, 30% and viral load less than 20    (Z21) Asymptomatic HIV infection, with no history of HIV-related illness - Plan: T-helper Cells (CD4) Count, HIV-1 RNA, Quantitative, PCR (graph), CBC & Differential, Comprehensive Metabolic Panel, Chlamydia trachomatis, Neisseria gonorrhoeae, PCR - , Urine, Clean Catch, RPR Qualitative with Reflex to Quant, Neisseria gonorrhoeae, Pharyngeal Swab, CLARICE - Swab, Oropharynx    (Z11.3) Screening examination for sexually transmitted disease - Plan: Chlamydia trachomatis, Neisseria gonorrhoeae, PCR - , Urine, Clean Catch, RPR Qualitative with Reflex to Quant, Neisseria gonorrhoeae, Pharyngeal Swab, CLARICE - Swab, Oropharynx    Discussed STI screening today.  Will perform the above screening.  Continue Biktarvy    Will continue treatment with Biktarvy.  Medication adherence education provided by MD.  See lab orders.  Counseling " provided on the prevention of transmission of HIV.  See diagnoses,orders and patient instructions.  Total Duration of Visit: 30 Minutes.  Total Counseling Time: 15 Minutes, counseling the patient regarding HIV prognosis, prevention of HIV transmittal to others, compliance with treatment plan, daily disease management, risk factor reduction and meaning of diagnostic test results.   Follow up in 6 months.      Discussed with patient HIV diagnosis, goals of treatment, lab monitoring, means to reduce transmission, need for adherence to therapy as well as treatment plan, and ways to militate against the progression of disease.  Patient understands and all questions answered.

## 2025-02-15 LAB
RPR SER QL: REACTIVE
RPR SER-TITR: ABNORMAL {TITER}

## 2025-02-17 LAB
BASOPHILS # BLD AUTO: 0 X10E3/UL (ref 0–0.2)
BASOPHILS NFR BLD AUTO: 0 %
C TRACH RRNA SPEC QL NAA+PROBE: NEGATIVE
C TRACH RRNA SPEC QL NAA+PROBE: NEGATIVE
CD3+CD4+ CELLS # BLD: 355 /UL (ref 359–1519)
CD3+CD4+ CELLS NFR BLD: 29.6 % (ref 30.8–58.5)
EOSINOPHIL # BLD AUTO: 0.1 X10E3/UL (ref 0–0.4)
EOSINOPHIL NFR BLD AUTO: 1 %
ERYTHROCYTE [DISTWIDTH] IN BLOOD BY AUTOMATED COUNT: 14.7 % (ref 11.6–15.4)
HCT VFR BLD AUTO: 51.6 % (ref 37.5–51)
HGB BLD-MCNC: 17.7 G/DL (ref 13–17.7)
HIV1 RNA # SERPL NAA+PROBE: <20 COPIES/ML
HIV1 RNA SERPL NAA+PROBE-LOG#: NORMAL LOG10COPY/ML
IMM GRANULOCYTES # BLD AUTO: 0 X10E3/UL (ref 0–0.1)
IMM GRANULOCYTES NFR BLD AUTO: 0 %
LYMPHOCYTES # BLD AUTO: 1.2 X10E3/UL (ref 0.7–3.1)
LYMPHOCYTES NFR BLD AUTO: 23 %
MCH RBC QN AUTO: 30 PG (ref 26.6–33)
MCHC RBC AUTO-ENTMCNC: 34.3 G/DL (ref 31.5–35.7)
MCV RBC AUTO: 88 FL (ref 79–97)
MONOCYTES # BLD AUTO: 0.3 X10E3/UL (ref 0.1–0.9)
MONOCYTES NFR BLD AUTO: 5 %
N GONORRHOEA RRNA NPH QL NAA+PROBE: NEGATIVE
N GONORRHOEA RRNA SPEC QL NAA+PROBE: NEGATIVE
N GONORRHOEA RRNA SPEC QL NAA+PROBE: NEGATIVE
NEUTROPHILS # BLD AUTO: 3.7 X10E3/UL (ref 1.4–7)
NEUTROPHILS NFR BLD AUTO: 71 %
PLATELET # BLD AUTO: 211 X10E3/UL (ref 150–450)
RBC # BLD AUTO: 5.9 X10E6/UL (ref 4.14–5.8)
TREPONEMA PALLIDUM IGG+IGM AB [PRESENCE] IN SERUM OR PLASMA BY IMMUNOASSAY: REACTIVE
WBC # BLD AUTO: 5.3 X10E3/UL (ref 3.4–10.8)

## 2025-02-27 ENCOUNTER — PATIENT MESSAGE (OUTPATIENT)
Dept: INFECTIOUS DISEASES | Facility: CLINIC | Age: 60
End: 2025-02-27
Payer: COMMERCIAL

## 2025-02-27 DIAGNOSIS — Z21 ASYMPTOMATIC HIV INFECTION, WITH NO HISTORY OF HIV-RELATED ILLNESS: Primary | ICD-10-CM

## 2025-03-08 DIAGNOSIS — S39.840D FRACTURE OF CORPUS CAVERNOSUM PENIS, SUBSEQUENT ENCOUNTER: ICD-10-CM

## 2025-03-08 DIAGNOSIS — N52.9 ERECTILE DYSFUNCTION, UNSPECIFIED ERECTILE DYSFUNCTION TYPE: ICD-10-CM

## 2025-03-10 RX ORDER — TADALAFIL 20 MG/1
20 TABLET ORAL DAILY PRN
Qty: 30 TABLET | Refills: 0 | Status: SHIPPED | OUTPATIENT
Start: 2025-03-10

## 2025-03-21 ENCOUNTER — LAB (OUTPATIENT)
Dept: LAB | Facility: HOSPITAL | Age: 60
End: 2025-03-21
Payer: COMMERCIAL

## 2025-03-21 DIAGNOSIS — Z21 ASYMPTOMATIC HIV INFECTION, WITH NO HISTORY OF HIV-RELATED ILLNESS: ICD-10-CM

## 2025-03-21 PROCEDURE — 86735 MUMPS ANTIBODY: CPT

## 2025-03-21 PROCEDURE — 86762 RUBELLA ANTIBODY: CPT

## 2025-03-21 PROCEDURE — 86765 RUBEOLA ANTIBODY: CPT

## 2025-03-21 PROCEDURE — 36415 COLL VENOUS BLD VENIPUNCTURE: CPT

## 2025-03-22 LAB
MEV IGG SER IA-ACNC: 17.2 AU/ML
MUV IGG SER IA-ACNC: 58.2 AU/ML
RUBV IGG SERPL IA-ACNC: 11.4 INDEX

## 2025-03-27 DIAGNOSIS — Z12.2 SCREENING FOR LUNG CANCER: Primary | ICD-10-CM

## 2025-03-28 DIAGNOSIS — M25.562 CHRONIC PAIN OF LEFT KNEE: Primary | ICD-10-CM

## 2025-03-28 DIAGNOSIS — G89.29 CHRONIC PAIN OF LEFT KNEE: Primary | ICD-10-CM

## 2025-04-03 ENCOUNTER — OFFICE VISIT (OUTPATIENT)
Dept: INTERNAL MEDICINE | Facility: CLINIC | Age: 60
End: 2025-04-03
Payer: COMMERCIAL

## 2025-04-03 VITALS
OXYGEN SATURATION: 97 % | SYSTOLIC BLOOD PRESSURE: 154 MMHG | HEART RATE: 82 BPM | HEIGHT: 69 IN | BODY MASS INDEX: 25.48 KG/M2 | WEIGHT: 172 LBS | DIASTOLIC BLOOD PRESSURE: 86 MMHG

## 2025-04-03 DIAGNOSIS — Z00.00 ENCOUNTER FOR ROUTINE ADULT HEALTH EXAMINATION WITHOUT ABNORMAL FINDINGS: ICD-10-CM

## 2025-04-03 DIAGNOSIS — E78.5 HYPERLIPIDEMIA, UNSPECIFIED HYPERLIPIDEMIA TYPE: ICD-10-CM

## 2025-04-03 DIAGNOSIS — Z23 IMMUNIZATION DUE: Primary | ICD-10-CM

## 2025-04-03 PROBLEM — N20.0 NEPHROLITHIASIS: Status: ACTIVE | Noted: 2024-03-14

## 2025-04-03 PROBLEM — Z21 HIV POSITIVE: Chronic | Status: ACTIVE | Noted: 2024-03-14

## 2025-04-03 RX ORDER — CHLORAL HYDRATE 500 MG
CAPSULE ORAL
COMMUNITY

## 2025-04-07 NOTE — PROGRESS NOTES
"Chief Complaint   Patient presents with    Follow-up     Physical       Subjective   Eliseo Phan is a 59 y.o. male here for   Chief Complaint   Patient presents with    Follow-up     Physical   .    History of Present Illness   Here for annual exam  He has been on [facility], they are checking his labs routinely   Following with ID - stable on current regimen  Following with nephrology  BP elevated in office today, prior readings have been around 130 or less systolic       The following portions of the patient's history were reviewed and updated as appropriate: allergies, current medications, past family history, past medical history, past social history, past surgical history, and problem list.    Review of Systems   All other systems reviewed and are negative.      Body mass index is 25.77 kg/m².   Vitals:    04/03/25 1449   BP: 154/86   BP Location: Right arm   Patient Position: Sitting   Cuff Size: Adult   Pulse: 82   SpO2: 97%   Weight: 78 kg (172 lb)   Height: 174 cm (68.5\")        Objective   Physical Exam  Vitals and nursing note reviewed.   Constitutional:       General: He is not in acute distress.     Appearance: Normal appearance. He is not toxic-appearing.   HENT:      Head: Normocephalic and atraumatic.      Right Ear: Tympanic membrane, ear canal and external ear normal.      Left Ear: Tympanic membrane, ear canal and external ear normal.      Nose: Nose normal. No congestion or rhinorrhea.      Mouth/Throat:      Mouth: Mucous membranes are moist.      Pharynx: No oropharyngeal exudate.   Eyes:      General: No scleral icterus.        Right eye: No discharge.         Left eye: No discharge.      Extraocular Movements: Extraocular movements intact.      Conjunctiva/sclera: Conjunctivae normal.      Pupils: Pupils are equal, round, and reactive to light.   Cardiovascular:      Rate and Rhythm: Normal rate and regular rhythm.      Pulses: Normal pulses.      Heart sounds: Normal heart " sounds. No murmur heard.  Pulmonary:      Effort: Pulmonary effort is normal. No respiratory distress.      Breath sounds: Normal breath sounds. No wheezing.   Abdominal:      General: Abdomen is flat. Bowel sounds are normal. There is no distension.      Palpations: Abdomen is soft.      Tenderness: There is no abdominal tenderness. There is no guarding.   Musculoskeletal:         General: No swelling, tenderness or deformity. Normal range of motion.      Cervical back: Normal range of motion and neck supple. No rigidity or tenderness.   Lymphadenopathy:      Cervical: No cervical adenopathy.   Skin:     General: Skin is warm.      Capillary Refill: Capillary refill takes less than 2 seconds.   Neurological:      General: No focal deficit present.      Mental Status: He is alert and oriented to person, place, and time. Mental status is at baseline.      Cranial Nerves: No cranial nerve deficit.      Gait: Gait normal.   Psychiatric:         Mood and Affect: Mood normal.         Behavior: Behavior normal.         Thought Content: Thought content normal.         Judgment: Judgment normal.         Lab Results   Component Value Date    HGBA1C 5.50 04/27/2023    TSH 1.290 04/27/2023    PSA 0.641 07/30/2021    TESTOSTERONE 378.1 07/09/2019        Assessment & Plan   Problems Addressed this Visit    None  Visit Diagnoses         Immunization due    -  Primary    Relevant Orders    Pneumococcal Conjugate Vaccine 20-Valent (PCV20) (Completed)      Hyperlipidemia, unspecified hyperlipidemia type        Relevant Orders    CT Cardiac Calcium Score Without Dye      Encounter for routine adult health examination without abnormal findings              Diagnoses         Codes Comments      Immunization due    -  Primary ICD-10-CM: Z23  ICD-9-CM: V05.9       Hyperlipidemia, unspecified hyperlipidemia type     ICD-10-CM: E78.5  ICD-9-CM: 272.4       Encounter for routine adult health examination without abnormal findings      ICD-10-CM: Z00.00  ICD-9-CM: V70.0             Annual exam -discussed routine screening, health maintenance, immunizations, etc.   HLD - plan for CAC screening, he is getting labs with separate clinic for TRT  Low dose CT scan pending  Cscope utd   HIV - managed by ID, stable     F/u 1 year or sooner prn     Papa Carey MD  [facility]  Phone: [phone]

## 2025-05-01 DIAGNOSIS — M79.671 PAIN IN BOTH FEET: Primary | ICD-10-CM

## 2025-05-01 DIAGNOSIS — M79.672 PAIN IN BOTH FEET: Primary | ICD-10-CM

## 2025-05-11 DIAGNOSIS — F51.01 PRIMARY INSOMNIA: ICD-10-CM

## 2025-05-12 RX ORDER — TRAZODONE HYDROCHLORIDE 100 MG/1
100 TABLET ORAL
Qty: 90 TABLET | Refills: 1 | Status: SHIPPED | OUTPATIENT
Start: 2025-05-12

## 2025-05-15 RX ORDER — OMEPRAZOLE 40 MG/1
40 CAPSULE, DELAYED RELEASE ORAL DAILY
Qty: 90 CAPSULE | Refills: 3 | Status: SHIPPED | OUTPATIENT
Start: 2025-05-15

## 2025-05-16 ENCOUNTER — PATIENT MESSAGE (OUTPATIENT)
Dept: INFECTIOUS DISEASES | Facility: CLINIC | Age: 60
End: 2025-05-16
Payer: COMMERCIAL

## 2025-05-16 ENCOUNTER — HOSPITAL ENCOUNTER (OUTPATIENT)
Dept: INFUSION THERAPY | Facility: HOSPITAL | Age: 60
Discharge: HOME | End: 2025-05-16
Payer: COMMERCIAL

## 2025-05-16 ENCOUNTER — TELEPHONE (OUTPATIENT)
Dept: INFECTIOUS DISEASES | Facility: CLINIC | Age: 60
End: 2025-05-16
Payer: COMMERCIAL

## 2025-05-16 VITALS
TEMPERATURE: 96.8 F | SYSTOLIC BLOOD PRESSURE: 130 MMHG | HEART RATE: 73 BPM | DIASTOLIC BLOOD PRESSURE: 84 MMHG | RESPIRATION RATE: 16 BRPM | OXYGEN SATURATION: 100 %

## 2025-05-16 DIAGNOSIS — A64 STI (SEXUALLY TRANSMITTED INFECTION): Primary | ICD-10-CM

## 2025-05-16 PROCEDURE — 96372 THER/PROPH/DIAG INJ SC/IM: CPT

## 2025-05-16 PROCEDURE — 25010000002 CEFTRIAXONE PER 250 MG: Performed by: STUDENT IN AN ORGANIZED HEALTH CARE EDUCATION/TRAINING PROGRAM

## 2025-05-16 RX ORDER — CEFTRIAXONE 1 G/1
500 INJECTION, POWDER, FOR SOLUTION INTRAMUSCULAR; INTRAVENOUS ONCE
Status: COMPLETED | OUTPATIENT
Start: 2025-05-16 | End: 2025-05-16

## 2025-05-16 RX ORDER — CEFTRIAXONE 1 G/1
500 INJECTION, POWDER, FOR SOLUTION INTRAMUSCULAR; INTRAVENOUS ONCE
OUTPATIENT
Start: 2025-05-16

## 2025-05-16 RX ORDER — DOXYCYCLINE HYCLATE 100 MG
100 TABLET ORAL EVERY 12 HOURS
Qty: 14 TABLET | Refills: 0 | Status: SHIPPED | OUTPATIENT
Start: 2025-05-16 | End: 2025-05-23

## 2025-05-16 RX ORDER — CEFTRIAXONE 1 G/1
500 INJECTION, POWDER, FOR SOLUTION INTRAMUSCULAR; INTRAVENOUS ONCE
Status: CANCELLED | OUTPATIENT
Start: 2025-05-16

## 2025-05-16 RX ADMIN — CEFTRIAXONE SODIUM 500 MG: 1 INJECTION, POWDER, FOR SOLUTION INTRAMUSCULAR; INTRAVENOUS at 14:45

## 2025-05-16 NOTE — TELEPHONE ENCOUNTER
After receiving Dr. Delgado's message, I contacted scheduling and spoke w/ Scot and confirmed she had received the order for patient to receive IM injection in the ACU. She stated she would make sure that [facility] staff saw the message & to contact patient to schedule. I then called patient to inform him that the ACU nurse would be calling per Dr. Delgado's order to schedule him an appt for an IM abx injection x1. I gave him scheduling's number in case he had not hear from them. I also informed him that Dr. Delgado had sent in a Rx for Doxycyclin 100mg twice a day for 7 days to his [facility]. He stated understanding and said he would  the new med. Patient denied having any questions at this time.

## 2025-05-16 NOTE — PROGRESS NOTES
Per Pharmacist Tim Rocephin vial  was reconstituted with 3.6 ml of saline and administered 2ml of it as patient was ordered only 500 mg.

## 2025-05-30 ENCOUNTER — HOSPITAL ENCOUNTER (EMERGENCY)
Facility: HOSPITAL | Age: 60
Discharge: HOME | End: 2025-05-30
Attending: EMERGENCY MEDICINE
Payer: COMMERCIAL

## 2025-05-30 ENCOUNTER — APPOINTMENT (OUTPATIENT)
Dept: GENERAL RADIOLOGY | Facility: HOSPITAL | Age: 60
End: 2025-05-30
Payer: COMMERCIAL

## 2025-05-30 VITALS
TEMPERATURE: 97.7 F | RESPIRATION RATE: 20 BRPM | OXYGEN SATURATION: 95 % | HEART RATE: 84 BPM | SYSTOLIC BLOOD PRESSURE: 129 MMHG | DIASTOLIC BLOOD PRESSURE: 96 MMHG

## 2025-05-30 DIAGNOSIS — I48.91 ATRIAL FIBRILLATION, NEW ONSET: Primary | ICD-10-CM

## 2025-05-30 DIAGNOSIS — I48.91 ATRIAL FIBRILLATION WITH RVR: ICD-10-CM

## 2025-05-30 LAB
ALBUMIN SERPL-MCNC: 3.9 G/DL (ref 3.5–5.2)
ALBUMIN/GLOB SERPL: 1.3 G/DL
ALP SERPL-CCNC: 58 U/L (ref 39–117)
ALT SERPL W P-5'-P-CCNC: 33 U/L (ref 1–41)
ANION GAP SERPL CALCULATED.3IONS-SCNC: 8.5 MMOL/L (ref 5–15)
AST SERPL-CCNC: 29 U/L (ref 1–40)
BASOPHILS # BLD AUTO: 0.03 10*3/MM3 (ref 0–0.2)
BASOPHILS NFR BLD AUTO: 0.5 % (ref 0–1.5)
BILIRUB SERPL-MCNC: <0.2 MG/DL (ref 0–1.2)
BUN SERPL-MCNC: 12 MG/DL (ref 6–20)
BUN/CREAT SERPL: 8.6 (ref 7–25)
CALCIUM SPEC-SCNC: 9.4 MG/DL (ref 8.6–10.5)
CHLORIDE SERPL-SCNC: 105 MMOL/L (ref 98–107)
CHOLEST SERPL-MCNC: 188 MG/DL (ref 0–200)
CO2 SERPL-SCNC: 26.5 MMOL/L (ref 22–29)
CREAT SERPL-MCNC: 1.39 MG/DL (ref 0.76–1.27)
DEPRECATED RDW RBC AUTO: 43.8 FL (ref 37–54)
EGFRCR SERPLBLD CKD-EPI 2021: 58.4 ML/MIN/1.73
EOSINOPHIL # BLD AUTO: 0.14 10*3/MM3 (ref 0–0.4)
EOSINOPHIL NFR BLD AUTO: 2.1 % (ref 0.3–6.2)
ERYTHROCYTE [DISTWIDTH] IN BLOOD BY AUTOMATED COUNT: 12.9 % (ref 12.3–15.4)
GEN 5 1HR TROPONIN T REFLEX: 10 NG/L
GLOBULIN UR ELPH-MCNC: 2.9 GM/DL
GLUCOSE SERPL-MCNC: 78 MG/DL (ref 65–99)
HBA1C MFR BLD: 5 % (ref 4.8–5.6)
HCT VFR BLD AUTO: 50.5 % (ref 37.5–51)
HDLC SERPL-MCNC: 33 MG/DL (ref 40–60)
HGB BLD-MCNC: 17.2 G/DL (ref 13–17.7)
HOLD SPECIMEN: NORMAL
HOLD SPECIMEN: NORMAL
IMM GRANULOCYTES # BLD AUTO: 0.02 10*3/MM3 (ref 0–0.05)
IMM GRANULOCYTES NFR BLD AUTO: 0.3 % (ref 0–0.5)
LDLC SERPL CALC-MCNC: 105 MG/DL (ref 0–100)
LDLC/HDLC SERPL: 2.94 {RATIO}
LYMPHOCYTES # BLD AUTO: 2.49 10*3/MM3 (ref 0.7–3.1)
LYMPHOCYTES NFR BLD AUTO: 37.4 % (ref 19.6–45.3)
MCH RBC QN AUTO: 31.9 PG (ref 26.6–33)
MCHC RBC AUTO-ENTMCNC: 34.1 G/DL (ref 31.5–35.7)
MCV RBC AUTO: 93.7 FL (ref 79–97)
MONOCYTES # BLD AUTO: 0.43 10*3/MM3 (ref 0.1–0.9)
MONOCYTES NFR BLD AUTO: 6.5 % (ref 5–12)
NEUTROPHILS NFR BLD AUTO: 3.55 10*3/MM3 (ref 1.7–7)
NEUTROPHILS NFR BLD AUTO: 53.2 % (ref 42.7–76)
NRBC BLD AUTO-RTO: 0 /100 WBC (ref 0–0.2)
NT-PROBNP SERPL-MCNC: 399 PG/ML (ref 0–900)
PLATELET # BLD AUTO: 217 10*3/MM3 (ref 140–450)
PMV BLD AUTO: 8.5 FL (ref 6–12)
POTASSIUM SERPL-SCNC: 4.1 MMOL/L (ref 3.5–5.2)
PROT SERPL-MCNC: 6.8 G/DL (ref 6–8.5)
QT INTERVAL: 271 MS
QTC INTERVAL: 423 MS
RBC # BLD AUTO: 5.39 10*6/MM3 (ref 4.14–5.8)
SODIUM SERPL-SCNC: 140 MMOL/L (ref 136–145)
T3 SERPL-MCNC: 119 NG/DL (ref 80–200)
T3FREE SERPL-MCNC: 3.5 PG/ML (ref 2–4.4)
TRIGL SERPL-MCNC: 290 MG/DL (ref 0–150)
TROPONIN T NUMERIC DELTA: -2 NG/L
TROPONIN T SERPL HS-MCNC: 12 NG/L
TSH SERPL DL<=0.05 MIU/L-ACNC: 1.29 UIU/ML (ref 0.27–4.2)
VLDLC SERPL-MCNC: 50 MG/DL (ref 5–40)
WBC NRBC COR # BLD AUTO: 6.66 10*3/MM3 (ref 3.4–10.8)
WHOLE BLOOD HOLD COAG: NORMAL
WHOLE BLOOD HOLD SPECIMEN: NORMAL

## 2025-05-30 PROCEDURE — 84481 FREE ASSAY (FT-3): CPT | Performed by: EMERGENCY MEDICINE

## 2025-05-30 PROCEDURE — 96374 THER/PROPH/DIAG INJ IV PUSH: CPT

## 2025-05-30 PROCEDURE — 84484 ASSAY OF TROPONIN QUANT: CPT | Performed by: EMERGENCY MEDICINE

## 2025-05-30 PROCEDURE — 93005 ELECTROCARDIOGRAM TRACING: CPT

## 2025-05-30 PROCEDURE — 80053 COMPREHEN METABOLIC PANEL: CPT | Performed by: EMERGENCY MEDICINE

## 2025-05-30 PROCEDURE — 83036 HEMOGLOBIN GLYCOSYLATED A1C: CPT | Performed by: INTERNAL MEDICINE

## 2025-05-30 PROCEDURE — 99285 EMERGENCY DEPT VISIT HI MDM: CPT

## 2025-05-30 PROCEDURE — 83880 ASSAY OF NATRIURETIC PEPTIDE: CPT | Performed by: EMERGENCY MEDICINE

## 2025-05-30 PROCEDURE — 84443 ASSAY THYROID STIM HORMONE: CPT | Performed by: EMERGENCY MEDICINE

## 2025-05-30 PROCEDURE — 71045 X-RAY EXAM CHEST 1 VIEW: CPT

## 2025-05-30 PROCEDURE — 36415 COLL VENOUS BLD VENIPUNCTURE: CPT

## 2025-05-30 PROCEDURE — 80061 LIPID PANEL: CPT | Performed by: INTERNAL MEDICINE

## 2025-05-30 PROCEDURE — 85025 COMPLETE CBC W/AUTO DIFF WBC: CPT | Performed by: EMERGENCY MEDICINE

## 2025-05-30 PROCEDURE — 93010 ELECTROCARDIOGRAM REPORT: CPT | Performed by: INTERNAL MEDICINE

## 2025-05-30 PROCEDURE — 93005 ELECTROCARDIOGRAM TRACING: CPT | Performed by: EMERGENCY MEDICINE

## 2025-05-30 RX ORDER — DILTIAZEM HYDROCHLORIDE 5 MG/ML
5 INJECTION INTRAVENOUS ONCE
Status: COMPLETED | OUTPATIENT
Start: 2025-05-30 | End: 2025-05-30

## 2025-05-30 RX ORDER — METOPROLOL SUCCINATE 25 MG/1
25 TABLET, EXTENDED RELEASE ORAL ONCE
Status: COMPLETED | OUTPATIENT
Start: 2025-05-30 | End: 2025-05-30

## 2025-05-30 RX ORDER — METOPROLOL TARTRATE 25 MG/1
25 TABLET, FILM COATED ORAL ONCE
Status: COMPLETED | OUTPATIENT
Start: 2025-05-30 | End: 2025-05-30

## 2025-05-30 RX ORDER — METOPROLOL SUCCINATE 50 MG/1
50 TABLET, EXTENDED RELEASE ORAL 2 TIMES DAILY
Qty: 30 TABLET | Refills: 0 | Status: SHIPPED | OUTPATIENT
Start: 2025-05-30 | End: 2025-06-14

## 2025-05-30 RX ORDER — ASPIRIN 325 MG
325 TABLET ORAL ONCE
Status: DISCONTINUED | OUTPATIENT
Start: 2025-05-30 | End: 2025-05-30 | Stop reason: HOSPADM

## 2025-05-30 RX ORDER — SODIUM CHLORIDE 0.9 % (FLUSH) 0.9 %
10 SYRINGE (ML) INJECTION AS NEEDED
Status: DISCONTINUED | OUTPATIENT
Start: 2025-05-30 | End: 2025-05-30 | Stop reason: HOSPADM

## 2025-05-30 RX ADMIN — METOPROLOL SUCCINATE 25 MG: 25 TABLET, EXTENDED RELEASE ORAL at 19:14

## 2025-05-30 RX ADMIN — METOPROLOL TARTRATE 25 MG: 25 TABLET, FILM COATED ORAL at 17:51

## 2025-05-30 RX ADMIN — DILTIAZEM HYDROCHLORIDE 5 MG: 5 INJECTION, SOLUTION INTRAVENOUS at 17:51

## 2025-05-30 NOTE — DISCHARGE INSTRUCTIONS
Avoid caffeine or any other stimulant products/medications as we discussed.  Drink plenty of fluids to stay well-hydrated.  Must follow-up in the cardiology office for further testing and evaluation as we discussed.  Please return to the emergency room for any worsening palpitations, chest pain, weakness, dizziness, nausea, difficulties breathing or any other concerns.

## 2025-05-30 NOTE — ED PROVIDER NOTES
EMERGENCY DEPARTMENT ENCOUNTER  Room Number:  30/30  PCP: Papa Carey MD  Independent Historians: Patient      HPI:  Chief Complaint: had concerns including Palpitations.,  Dyspnea    A complete HPI/ROS/PMH/PSH/SH/FH are unobtainable due to: None    Chronic or social conditions impacting patient care (Social Determinants of Health): None      Context: Eliseo Phan is a 59 y.o. male with a medical history of HIV, arthritis, CKD and acid reflux who presents to the ED c/o acute palpitations and dyspnea.  Patient says he has had some palpitations since he awoke this morning.  He is heart rate has been as fast as 160 bpm at times.  This has caused some feeling of shortness of breath.  However he is not having pain.  He denies any syncope.  Denies any dizziness.  Denies any recent fevers or flulike symptoms.  He tells me that he has been taking T3 supplements as prescribed by one of his providers.      Review of prior external notes (non-ED) -and- Review of prior external test results outside of this encounter: I independently reviewed the internal medicine progress note from April 3, 2025.  Patient had annual exam at that time.  Hyperlipidemia was discussed.  Low-dose CT scan was pending.    Prescription drug monitoring program review: FRANCISCO reviewed by Jorge Luis Ferrell MD   N/A and FRANCISCO query complete and reviewed. Patient receives regular prescriptions for controlled substances.    PAST MEDICAL HISTORY  Active Ambulatory Problems     Diagnosis Date Noted    HIV (human immunodeficiency virus infection) 06/14/2017    Acid reflux 06/14/2017    Penis disorder 06/14/2017    Sciatica of left side 06/14/2017    Hepatitis B virus infection 06/14/2017    Colon cancer screening 06/14/2017    Drug addiction in remission 06/14/2017    Acute left-sided low back pain with left-sided sciatica 08/29/2017    DDD (degenerative disc disease), lumbar 08/29/2017    Health maintenance examination 08/31/2017    Lumbar disc herniation  with radiculopathy 08/31/2017    Hypogonadism in male 08/31/2017    Symptoms involving urinary system 08/31/2017    Bulging lumbar disc 09/15/2017    Lumbar stenosis with neurogenic claudication 04/03/2018    Herpes zoster without complication 09/26/2018    Postlaminectomy syndrome of lumbar region 05/28/2019    Neuropathic pain, leg, left 05/28/2019    Lumbar facet arthropathy 01/20/2020    Lumbar herniated disc 07/14/2020    Postoperative visit 07/29/2020    Muscle spasm 07/29/2020    History of lumbar spinal fusion 10/26/2020    Encounter for screening for malignant neoplasm of colon 06/30/2021    Stage 3 chronic kidney disease 10/21/2021    HIV infection 09/19/2022    Anal skin tag 12/28/2023    Diverticular stricture 09/06/2024    HIV positive 03/14/2024    Nephrolithiasis 03/14/2024    STI (sexually transmitted infection) 05/16/2025     Resolved Ambulatory Problems     Diagnosis Date Noted    No Resolved Ambulatory Problems     Past Medical History:   Diagnosis Date    Arthritis     Back pain     Hepatitis B     History of kidney stones     PONV (postoperative nausea and vomiting)     Stage 1 chronic kidney disease          PAST SURGICAL HISTORY  Past Surgical History:   Procedure Laterality Date    APPENDECTOMY      BONE TUMOR EXCISION Right     right lower leg - as a child    COLON RESECTION N/A 9/24/2024    Procedure: Laparoscopic sigmoid colectomy;  Surgeon: Levi Berman MD;  Location: [facility] MAIN OR;  Service: General;  Laterality: N/A;    COLONOSCOPY N/A 1/12/2024    Procedure: COLONOSCOPY to cecum with cold polypectomy;  Surgeon: Levi Berman MD;  Location: [facility] ENDOSCOPY;  Service: General;  Laterality: N/A;  PRE - screening  POST - diverticulosis, polyp    KNEE ARTHROSCOPY Left     LUMBAR FUSION Left 07/14/2020    Procedure: LUMBAR 4 TO LUMBAR 5, LUMBAR 5  TO SACRAL 1 LEFT METRX TRANSFORAMINAL LUMBAR INTERBODY FUSION WITH CAGES AND SEXTANT PEDICLE SCREW FIXATION;  Surgeon:  Thomas Torres MD;  Location: [facility] MAIN OR;  Service: Neurosurgery;  Laterality: Left;    LUMBAR LAMINECTOMY DISCECTOMY DECOMPRESSION N/A 2018    Procedure: L4-5, L5-S1 Lumbar Laminectomy;  Surgeon: Ernie Curtis MD;  Location: [facility] OR;  Service: Neurosurgery    TONSILLECTOMY  1967         FAMILY HISTORY  Family History   Problem Relation Age of Onset    Breast cancer Mother     Heart disease Mother     Hypertension Mother     Diabetes Mother     Thyroid disease Mother     Depression Mother     Anxiety disorder Mother     Alcohol abuse Mother     Arthritis Mother     Cancer Mother         Breast cancer    Hypertension Father     Depression Father     Anxiety disorder Father     Arthritis Father     Nephrolithiasis Brother     Asthma Brother     Arthritis Maternal Grandmother     Malig Hyperthermia Neg Hx          SOCIAL HISTORY  Social History     Socioeconomic History    Marital status: Single    Highest education level: Master's degree (e.g., MA, MS, [place], MEd, MSW, FANI)   Tobacco Use    Smoking status: Former     Current packs/day: 0.00     Average packs/day: 0.5 packs/day for 10.0 years (5.0 ttl pk-yrs)     Types: Cigarettes     Start date: 2010     Quit date: 2020     Years since quittin.1     Passive exposure: Never    Smokeless tobacco: Never   Vaping Use    Vaping status: Some Days    Substances: Nicotine   Substance and Sexual Activity    Alcohol use: No    Drug use: Not Currently     Types: Amphetamines, Fentanyl, Other     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    Sexual activity: Not Currently     Partners: Male         ALLERGIES  Patient has no known allergies.      REVIEW OF SYSTEMS  Review of Systems  Included in HPI  All systems reviewed and negative except for those discussed in HPI.      PHYSICAL EXAM    I have reviewed the triage vital signs and nursing notes.    ED Triage Vitals   Temp Heart Rate Resp BP SpO2   25 1701 25 1701 25 1701  05/30/25 1709 05/30/25 1701   97.7 °F (36.5 °C) 72 20 140/100 98 %      Temp src Heart Rate Source Patient Position BP Location FiO2 (%)   05/30/25 1701 -- -- -- --   Oral           Physical Exam  GENERAL: alert, no acute distress  SKIN: Warm, dry, no rashes  HENT: Normocephalic, atraumatic  EYES: no scleral icterus, normal conjunctivae  CV: Irregularly irregular rhythm, tachycardic rate, normal pulses.  RESPIRATORY: normal effort, lungs clear bilaterally, no wheezes or rales or rhonchi.  No stridor.  ABDOMEN: soft, nondistended, nontender  MUSCULOSKELETAL: no deformity, no edema of the lower extremities.  No asymmetry.  NEURO: alert, moves all extremities, follows commands      LAB RESULTS  Recent Results (from the past 24 hours)   ECG 12 Lead ED Triage Standing Order; Chest Pain    Collection Time: 05/30/25  5:03 PM   Result Value Ref Range    QT Interval 271 ms    QTC Interval 423 ms   Comprehensive Metabolic Panel    Collection Time: 05/30/25  5:06 PM    Specimen: Arm, Right; Blood   Result Value Ref Range    Glucose 78 65 - 99 mg/dL    BUN 12.0 6.0 - 20.0 mg/dL    Creatinine 1.39 (H) 0.76 - 1.27 mg/dL    Sodium 140 136 - 145 mmol/L    Potassium 4.1 3.5 - 5.2 mmol/L    Chloride 105 98 - 107 mmol/L    CO2 26.5 22.0 - 29.0 mmol/L    Calcium 9.4 8.6 - 10.5 mg/dL    Total Protein 6.8 6.0 - 8.5 g/dL    Albumin 3.9 3.5 - 5.2 g/dL    ALT (SGPT) 33 1 - 41 U/L    AST (SGOT) 29 1 - 40 U/L    Alkaline Phosphatase 58 39 - 117 U/L    Total Bilirubin <0.2 0.0 - 1.2 mg/dL    Globulin 2.9 gm/dL    A/G Ratio 1.3 g/dL    BUN/Creatinine Ratio 8.6 7.0 - 25.0    Anion Gap 8.5 5.0 - 15.0 mmol/L    eGFR 58.4 (L) >60.0 mL/min/1.73   High Sensitivity Troponin T    Collection Time: 05/30/25  5:06 PM    Specimen: Arm, Right; Blood   Result Value Ref Range    HS Troponin T 12 <22 ng/L   Green Top (Gel)    Collection Time: 05/30/25  5:06 PM   Result Value Ref Range    Extra Tube Hold for add-ons.    Lavender Top    Collection Time: 05/30/25   5:06 PM   Result Value Ref Range    Extra Tube hold for add-on    Gold Top - SST    Collection Time: 05/30/25  5:06 PM   Result Value Ref Range    Extra Tube Hold for add-ons.    Light Blue Top    Collection Time: 05/30/25  5:06 PM   Result Value Ref Range    Extra Tube Hold for add-ons.    CBC Auto Differential    Collection Time: 05/30/25  5:06 PM    Specimen: Arm, Right; Blood   Result Value Ref Range    WBC 6.66 3.40 - 10.80 10*3/mm3    RBC 5.39 4.14 - 5.80 10*6/mm3    Hemoglobin 17.2 13.0 - 17.7 g/dL    Hematocrit 50.5 37.5 - 51.0 %    MCV 93.7 79.0 - 97.0 fL    MCH 31.9 26.6 - 33.0 pg    MCHC 34.1 31.5 - 35.7 g/dL    RDW 12.9 12.3 - 15.4 %    RDW-SD 43.8 37.0 - 54.0 fl    MPV 8.5 6.0 - 12.0 fL    Platelets 217 140 - 450 10*3/mm3    Neutrophil % 53.2 42.7 - 76.0 %    Lymphocyte % 37.4 19.6 - 45.3 %    Monocyte % 6.5 5.0 - 12.0 %    Eosinophil % 2.1 0.3 - 6.2 %    Basophil % 0.5 0.0 - 1.5 %    Immature Grans % 0.3 0.0 - 0.5 %    Neutrophils, Absolute 3.55 1.70 - 7.00 10*3/mm3    Lymphocytes, Absolute 2.49 0.70 - 3.10 10*3/mm3    Monocytes, Absolute 0.43 0.10 - 0.90 10*3/mm3    Eosinophils, Absolute 0.14 0.00 - 0.40 10*3/mm3    Basophils, Absolute 0.03 0.00 - 0.20 10*3/mm3    Immature Grans, Absolute 0.02 0.00 - 0.05 10*3/mm3    nRBC 0.0 0.0 - 0.2 /100 WBC   TSH Rfx On Abnormal To Free T4    Collection Time: 05/30/25  5:06 PM    Specimen: Arm, Right; Blood   Result Value Ref Range    TSH 1.290 0.270 - 4.200 uIU/mL   T3    Collection Time: 05/30/25  5:06 PM    Specimen: Arm, Right; Blood   Result Value Ref Range    T3, Total 119.0 80.0 - 200.0 ng/dl   T3, Free    Collection Time: 05/30/25  5:06 PM    Specimen: Arm, Right; Blood   Result Value Ref Range    T3, Free 3.50 2.00 - 4.40 pg/mL   BNP    Collection Time: 05/30/25  5:06 PM    Specimen: Arm, Right; Blood   Result Value Ref Range    proBNP 399.0 0.0 - 900.0 pg/mL   Hemoglobin A1c    Collection Time: 05/30/25  5:06 PM    Specimen: Arm, Right; Blood   Result  Value Ref Range    Hemoglobin A1C 5.00 4.80 - 5.60 %   High Sensitivity Troponin T 1Hr    Collection Time: 05/30/25  6:26 PM    Specimen: Blood   Result Value Ref Range    HS Troponin T 10 <22 ng/L    Troponin T Numeric Delta -2 Abnormal if >/=3 ng/L   Lipid Panel    Collection Time: 05/30/25  6:26 PM    Specimen: Blood   Result Value Ref Range    Total Cholesterol 188 0 - 200 mg/dL    Triglycerides 290 (H) 0 - 150 mg/dL    HDL Cholesterol 33 (L) 40 - 60 mg/dL    LDL Cholesterol  105 (H) 0 - 100 mg/dL    VLDL Cholesterol 50 (H) 5 - 40 mg/dL    LDL/HDL Ratio 2.94          RADIOLOGY  XR Chest 1 View  Result Date: 5/30/2025  CXR ONE VIEW  HISTORY: Chest Pain Triage Protocol  COMPARISON: 9/25/2024  TECHNIQUE: single portable AP       The heart size is within normal limits.  The lungs are normally aerated. There is no pleural effusion or pneumothorax.    This report was finalized on 5/30/2025 5:52 PM by Dr. Can Brown M.D on Workstation: [id number]          MEDICATIONS GIVEN IN ER  Medications   dilTIAZem (CARDIZEM) injection 5 mg (5 mg Intravenous Given 5/30/25 1751)   metoprolol tartrate (LOPRESSOR) tablet 25 mg (25 mg Oral Given 5/30/25 1751)   metoprolol succinate XL (TOPROL-XL) 24 hr tablet 25 mg (25 mg Oral Given 5/30/25 1914)         ORDERS PLACED DURING THIS VISIT:  Orders Placed This Encounter   Procedures    XR Chest 1 View    [facility] Draw    Comprehensive Metabolic Panel    High Sensitivity Troponin T    CBC Auto Differential    TSH Rfx On Abnormal To Free T4    T3    T3, Free    BNP    High Sensitivity Troponin T 1Hr    Lipid Panel    Hemoglobin A1c    Ambulatory Referral to Cardiology for Atrial Fibrillation    ECG 12 Lead ED Triage Standing Order; Chest Pain    CBC & Differential    Green Top (Gel)    Lavender Top    Gold Top - SST    Light Blue Top         OUTPATIENT MEDICATION MANAGEMENT:  No current Epic-ordered facility-administered medications on file.     Current Outpatient Medications Ordered  in Epic   Medication Sig Dispense Refill    baclofen (LIORESAL) 10 MG tablet Take 1 tablet by mouth 3 (Three) Times a Day As Needed for Muscle Spasms. 90 tablet 1    Bictegravir-Emtricitab-Tenofov (Biktarvy) -25 MG per tablet Take 1 tablet by mouth Daily. 30 tablet 5    Cholecalciferol (Vitamin D) 50 MCG (2000 UT) tablet Take 1 tablet by mouth Daily. HOLD PRIOR TO SURGERY 90 tablet 3    fluticasone (FLONASE) 50 MCG/ACT nasal spray INSTILL 2 SPRAYS INTO EACH NOSTRIL DAILY AS NEEDED FOR ALLERGIES 16 g 2    metoprolol succinate XL (TOPROL-XL) 50 MG 24 hr tablet Take 1 tablet by mouth 2 (Two) Times a Day for 15 days. 30 tablet 0    Omega-3 Fatty Acids (fish oil) 1000 MG capsule capsule Take  by mouth Daily With Breakfast.      omeprazole (priLOSEC) 40 MG capsule TAKE 1 CAPSULE BY MOUTH EVERY DAY 90 capsule 3    ondansetron (ZOFRAN) 4 MG tablet TAKE 1 TABLET BY MOUTH EVERY 6 HOURS AS NEEDED FOR NAUSEA OR VOMITING. 8 tablet 4    Prenatal Vit-Fe Fumarate-FA (Prenatal 27-1) 27-1 MG tablet tablet TAKE 1 TABLET BY MOUTH DAILY 90 tablet 3    tadalafil (CIALIS) 20 MG tablet TAKE 1 TABLET BY MOUTH ONCE DAILY AS NEEDED FOR ERECTILE DYSFUNCTION 30 tablet 0    traZODone (DESYREL) 100 MG tablet TAKE 1 TABLET BY MOUTH EVERYDAY AT BEDTIME 90 tablet 1    varenicline (CHANTIX) 1 MG tablet TAKE 1 TABLET BY MOUTH 2 (TWO) TIMES A DAY FOR 56 DAYS.           PROCEDURES  Procedures      Critical care provider statement:    Critical care time (minutes): 33.   Critical care time was exclusive of:  Separately billable procedures and treating other patients   Critical care was necessary to treat or prevent imminent or life-threatening deterioration of the following conditions:  Cardiac Failure   Critical care was time spent personally by me on the following activities:  Development of treatment plan with patient or surrogate, discussions with consultants, evaluation of patient's response to treatment, examination of patient, obtaining  history from patient or surrogate, ordering and performing treatments and interventions, ordering and review of laboratory studies, ordering and review of radiographic studies, pulse oximetry, re-evaluation of patient's condition and review of old charts. Critical Care indicators: Atrial Fibrillation with Tachycardia and Rapid heart rate requiring IV therapies and/or close monitoring in ED Anti-arrhythmics: adenocard, adenosine, amiodarone, atropine, bretylium, cardizem, digoxin, inderal, lidocaine, magnesium sulfate, procainamide, verapamil, et al.      PROGRESS, DATA ANALYSIS, CONSULTS, AND MEDICAL DECISION MAKING  All labs have been independently interpreted by me.  All radiology studies have been reviewed by me. All EKG's have been independently viewed and interpreted by me.  Discussion below represents my analysis of pertinent findings related to patient's condition, differential diagnosis, treatment plan and final disposition.    Differential diagnosis includes but is not limited to SVT, atrial fibrillation, atrial flutter, ventricular tachycardia, acute MI, electrolyte abnormality.    Clinical Scores: HEART Score: 2    [id number] Score: 0                ED Course as of 05/30/25 2359   Fri May 30, 2025   1738 EKG         EKG time/Interp time: 1703/1738  Rhythm/Rate: Atrial fibrillation with RVR, 146 ms  P waves and DC: None  QRS, axis: Narrow complex, 76 ms, normal axis  ST and T waves: No ST segment elevations.  No ischemic changes.  Independently interpreted by me contemporaneously with treatment   [MORENO]   1841 I independently interpreted the Chest X-ray and my findings are: No Pneumothorax, No Effusion, No Infiltrate   [MORENO]   1904 I discussed with Dr. Richter from [place] [facility] about this patient.  I reviewed with him all the findings that we have obtained here.  I explained that the patient's heart rate has decreased now to the low 100s range.  Patient has maintained a normal blood pressure.  He  reviewed all the test results with me.  I also calculated the [id number] score with him while we are on the telephone together.  It looks like the chads Vas score is 0 and therefore Dr. Richter recommends no anticoagulation medicines.  He says the patient should be discharged home with Toprol-XL 50 mg twice daily and make arrangements to follow-up in the cardiology office for outpatient follow-up soon. [MORENO]   2358 HS Troponin T: 10 [MORENO]   2358 Troponin T Numeric Delta: -2 [MORENO]   2358 proBNP: 399.0 [MORENO]      ED Course User Index  [MORENO] Jorge Luis Ferrell MD       AS OF 23:59 EDT VITALS:    BP - 129/96  HR - 84  TEMP - 97.7 °F (36.5 °C) (Oral)  O2 SATS - 95%    COMPLEXITY OF CARE  Admission was considered but after careful review of the patient's presentation, physical examination, diagnostic results, and response to treatment the patient may be safely discharged with outpatient follow-up.      DIAGNOSIS  Final diagnoses:   Atrial fibrillation, new onset   Atrial fibrillation with RVR         DISPOSITION  ED Disposition       ED Disposition   Discharge    Condition   Stable    Comment   --                Please note that portions of this document were completed with a voice recognition program.    Note Disclaimer: At [facility], we believe that sharing information builds trust and better relationships. You are receiving this note because you recently visited [facility]. It is possible you will see health information before a provider has talked with you about it. This kind of information can be easy to misunderstand. To help you fully understand what it means for your health, we urge you to discuss this note with your provider.         Jorge Luis Ferrell MD  05/30/25 [id number]

## 2025-06-04 ENCOUNTER — OFFICE VISIT (OUTPATIENT)
Dept: CARDIOLOGY | Age: 60
End: 2025-06-04
Payer: COMMERCIAL

## 2025-06-04 VITALS
HEIGHT: 69 IN | BODY MASS INDEX: 24.17 KG/M2 | WEIGHT: 163.2 LBS | DIASTOLIC BLOOD PRESSURE: 78 MMHG | OXYGEN SATURATION: 97 % | SYSTOLIC BLOOD PRESSURE: 120 MMHG

## 2025-06-04 DIAGNOSIS — M51.369 DEGENERATION OF INTERVERTEBRAL DISC OF LUMBAR REGION, UNSPECIFIED WHETHER PAIN PRESENT: ICD-10-CM

## 2025-06-04 DIAGNOSIS — Z21 ASYMPTOMATIC HIV INFECTION, WITH NO HISTORY OF HIV-RELATED ILLNESS: ICD-10-CM

## 2025-06-04 DIAGNOSIS — N18.30 STAGE 3 CHRONIC KIDNEY DISEASE, UNSPECIFIED WHETHER STAGE 3A OR 3B CKD: ICD-10-CM

## 2025-06-04 DIAGNOSIS — I48.91 ATRIAL FIBRILLATION WITH RVR: Primary | ICD-10-CM

## 2025-06-04 DIAGNOSIS — E78.2 MIXED HYPERLIPIDEMIA: ICD-10-CM

## 2025-06-04 PROCEDURE — 99204 OFFICE O/P NEW MOD 45 MIN: CPT | Performed by: INTERNAL MEDICINE

## 2025-06-04 RX ORDER — METOPROLOL SUCCINATE 50 MG/1
50 TABLET, EXTENDED RELEASE ORAL DAILY
Qty: 90 TABLET | Refills: 3 | Status: SHIPPED | OUTPATIENT
Start: 2025-06-04 | End: 2025-06-06 | Stop reason: SDUPTHER

## 2025-06-04 NOTE — PROGRESS NOTES
CARDIOLOGY    Colt Richter MD    ENCOUNTER DATE:  06/04/25      Eliseo Phan / 59 y.o. / male        CHIEF COMPLAINT / REASON FOR OFFICE VISIT     Establish Care (Patient is in the office today for atrial fibrillation. )      HISTORY OF PRESENT ILLNESS       HPI    Eliseo Phan is a 59 y.o. male with newly diagnosed atrial fibrillation, HIV, GERD, CKD, hyperlipidemia who presents to establish care as a new patient.    Patient was seen in the emergency room a few days ago for palpitation and was found to be in A-fib with RVR in the 140s.  He also had chest tightness and dyspnea at that time as well as some dizziness. He was discharged on Toprol-XL 50 bid, and no anticoagulation was started given [id number] score of 0.    Today, patient has no acute complaints. He woke up yesterday morning and thinks he converted back to normal sinus rhythm. Thinks he had a previous episode 5-6 weeks ago that lasted for under a day. 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 Pt is quite physically active with running and swimming. He has been receiving DHEA and T3 but did stop both recently. Denies chest pain, dyspnea on exertion, leg edema, orthopnea, PND, syncope, bleeding, or unexpected falls. Does not recall having had a heart attack or stroke. HIV is well controlled. He has had COVID 3 times and the most recent episode was 5-6 weeks ago. He had root canal done in March for a dental abscess and has recovered by now. Former smoker, 1 ppd x 20 years and quit in 2020. He vaped for 1 year and just stopped last week. Denies alcohol or substance.     REVIEW OF SYSTEMS     Review of Systems   Constitutional: Negative for weight loss.   Cardiovascular:  Positive for palpitations. Negative for chest pain, dyspnea on exertion, leg swelling, orthopnea, paroxysmal nocturnal dyspnea and syncope.   Respiratory:  Positive for shortness of breath.    Hematologic/Lymphatic: Negative for bleeding problem.   Musculoskeletal:   "Negative for falls.   Gastrointestinal:  Negative for hematochezia and melena.   Genitourinary:  Negative for hematuria.   Neurological:  Positive for dizziness. Negative for light-headedness.   Psychiatric/Behavioral:  Negative for altered mental status.            MEDICATIONS      Outpatient Encounter Medications as of 6/4/2025   Medication Sig Dispense Refill    Bictegravir-Emtricitab-Tenofov (Biktarvy) -25 MG per tablet Take 1 tablet by mouth Daily. 30 tablet 5    Cholecalciferol (Vitamin D) 50 MCG (2000 UT) tablet Take 1 tablet by mouth Daily. HOLD PRIOR TO SURGERY 90 tablet 3    fluticasone (FLONASE) 50 MCG/ACT nasal spray INSTILL 2 SPRAYS INTO EACH NOSTRIL DAILY AS NEEDED FOR ALLERGIES 16 g 2    metoprolol succinate XL (TOPROL-XL) 50 MG 24 hr tablet Take 1 tablet by mouth 2 (Two) Times a Day for 15 days. 30 tablet 0    Omega-3 Fatty Acids (fish oil) 1000 MG capsule capsule Take  by mouth Daily With Breakfast.      omeprazole (priLOSEC) 40 MG capsule TAKE 1 CAPSULE BY MOUTH EVERY DAY 90 capsule 3    Prenatal Vit-Fe Fumarate-FA (Prenatal 27-1) 27-1 MG tablet tablet TAKE 1 TABLET BY MOUTH DAILY 90 tablet 3    tadalafil (CIALIS) 20 MG tablet TAKE 1 TABLET BY MOUTH ONCE DAILY AS NEEDED FOR ERECTILE DYSFUNCTION 30 tablet 0    traZODone (DESYREL) 100 MG tablet TAKE 1 TABLET BY MOUTH EVERYDAY AT BEDTIME 90 tablet 1    varenicline (CHANTIX) 1 MG tablet TAKE 1 TABLET BY MOUTH 2 (TWO) TIMES A DAY FOR 56 DAYS.      baclofen (LIORESAL) 10 MG tablet Take 1 tablet by mouth 3 (Three) Times a Day As Needed for Muscle Spasms. 90 tablet 1    ondansetron (ZOFRAN) 4 MG tablet TAKE 1 TABLET BY MOUTH EVERY 6 HOURS AS NEEDED FOR NAUSEA OR VOMITING. 8 tablet 4     No facility-administered encounter medications on file as of 6/4/2025.         VITAL SIGNS     Visit Vitals  /78 (BP Location: Left arm, Patient Position: Sitting, Cuff Size: Adult)   Ht 174 cm (68.5\")   Wt 74 kg (163 lb 3.2 oz)   SpO2 97%   BMI 24.45 kg/m² "         Wt Readings from Last 3 Encounters:   06/04/25 74 kg (163 lb 3.2 oz)   04/03/25 78 kg (172 lb)   02/14/25 78 kg (172 lb)     Body mass index is 24.45 kg/m².      PHYSICAL EXAMINATION     Vitals and nursing note reviewed.   Constitutional:       Appearance: Healthy appearance. Not in distress.   Neck:      Vascular: No JVR.   Pulmonary:      Effort: Pulmonary effort is normal.      Breath sounds: Normal breath sounds. No wheezing. No rhonchi. No rales.   Cardiovascular:      Normal rate. Regular rhythm.      Murmurs: There is no murmur.   Edema:     Peripheral edema absent.   Abdominal:      General: There is no distension.      Palpations: Abdomen is soft.      Tenderness: There is no abdominal tenderness.   Musculoskeletal:      Cervical back: Neck supple. Skin:     General: Skin is cool.   Neurological:      Mental Status: Alert and oriented to person, place and time.           REVIEWED DATA     Procedures      Lab Results   Component Value Date    WBC 6.66 05/30/2025    HGB 17.2 05/30/2025    HCT 50.5 05/30/2025    MCV 93.7 05/30/2025     05/30/2025       Lab Results   Component Value Date    HGBA1C 5.00 05/30/2025       Lab Results   Component Value Date    GLUCOSE 78 05/30/2025    BUN 12.0 05/30/2025    CREATININE 1.39 (H) 05/30/2025    EGFR 58.4 (L) 05/30/2025    BCR 8.6 05/30/2025     05/30/2025    K 4.1 05/30/2025    CO2 26.5 05/30/2025    CALCIUM 9.4 05/30/2025    ALBUMIN 3.9 05/30/2025    BILITOT <0.2 05/30/2025    AST 29 05/30/2025    ALT 33 05/30/2025       Lab Results   Component Value Date    CHOL 188 05/30/2025    CHLPL 169 04/27/2023    TRIG 290 (H) 05/30/2025    HDL 33 (L) 05/30/2025     (H) 05/30/2025       No results found for this or any previous visit.        ASSESSMENT & PLAN     Diagnoses and all orders for this visit:    1. Atrial fibrillation with RVR (Primary)    2. Stage 3 chronic kidney disease, unspecified whether stage 3a or 3b CKD    3. Asymptomatic HIV  infection, with no history of HIV-related illness  Overview:  Diagnosed 2010, after initiation antivirals viral load undetectable      4. Degeneration of intervertebral disc of lumbar region, unspecified whether pain present    5. Mixed hyperlipidemia       New onset atrial fibrillation: ED visit recently for palpitation was found to be in A-fib with RVR in the 140s.  Started on Toprol-XL 50 bid and patient has converted to normal sinus rhythm.  Pulse appears regular on exam today.  He did not take his medication yesterday due to pulse rate in the high 60s, will reduce to 50 once daily and monitor for tolerance.  CHADS2 Vasc sore 0 if no diagnosis of hypertension, okay to hold off on anticoagulation at this time.  We will further evaluate with outpatient echo and ECG treadmill stress test (hold metoprolol for 24 hours prior to test).  Patient appears fairly hesitant about starting any medications or continuing beta-blocker long-term, we may reassess with prn beta-blocker or flecainide depending on testing results.  He also wanted to consider A-fib ablation and I told him that at this stage it is too early to determination without better understanding of his heart function and A-fib burden.  Hyperlipidemia/ASCVD risk: Lipid panel last month 5/2025 showed HDL 33, , not well-controlled.  CT calcium score at [facility] earlier this year 4/2025 was only 5 in RCA distribution.  Okay to hold off on statin therapy for now.  HIV: Well-controlled, management per primary team and other specialists.  CKD3: Cr 1.4 last month 5/2025. Management per primary team and other specialists.  GERD: Management per primary team and other specialists.    I spent 37 minutes caring for Eliseo on this date of service. This time includes time spent by me in the following activities: preparing for the visit, reviewing tests, performing a medically appropriate examination and/or evaluation, counseling and educating the  patient/family/caregiver, and referring and communicating with other health care professionals.     Additionally, I am providing longitudinal care which includes continuously being a focal point for all of the patient's health care services and ongoing medical care related to atrial fibrillation and hyperlipidemia with ASCVD risk assessment as documented above.    Cotl Richter MD. PhD. [facility]  06/04/25  09:59 EDT    Part of this note may be an electronic transcription/translation of spoken language to printed text using the Dragon dictation system.

## 2025-06-05 ENCOUNTER — HOSPITAL ENCOUNTER (OUTPATIENT)
Dept: CARDIOLOGY | Facility: HOSPITAL | Age: 60
Discharge: HOME | End: 2025-06-05
Admitting: INTERNAL MEDICINE
Payer: COMMERCIAL

## 2025-06-05 VITALS
HEART RATE: 71 BPM | HEIGHT: 70 IN | WEIGHT: 163 LBS | SYSTOLIC BLOOD PRESSURE: 110 MMHG | BODY MASS INDEX: 23.34 KG/M2 | DIASTOLIC BLOOD PRESSURE: 75 MMHG

## 2025-06-05 DIAGNOSIS — M51.369 DEGENERATION OF INTERVERTEBRAL DISC OF LUMBAR REGION, UNSPECIFIED WHETHER PAIN PRESENT: ICD-10-CM

## 2025-06-05 DIAGNOSIS — I48.91 ATRIAL FIBRILLATION WITH RVR: ICD-10-CM

## 2025-06-05 DIAGNOSIS — Z21 ASYMPTOMATIC HIV INFECTION, WITH NO HISTORY OF HIV-RELATED ILLNESS: ICD-10-CM

## 2025-06-05 DIAGNOSIS — N18.30 STAGE 3 CHRONIC KIDNEY DISEASE, UNSPECIFIED WHETHER STAGE 3A OR 3B CKD: ICD-10-CM

## 2025-06-05 LAB
AORTIC ARCH: 2.3 CM
AORTIC DIMENSIONLESS INDEX: 0.78 (DI)
ASCENDING AORTA: 3 CM
AV MEAN PRESS GRAD SYS DOP V1V2: 2 MMHG
AV VMAX SYS DOP: 102 CM/SEC
BH CV ECHO MEAS - ACS: 2.13 CM
BH CV ECHO MEAS - AO MAX PG: 4.2 MMHG
BH CV ECHO MEAS - AO ROOT DIAM: 3.4 CM
BH CV ECHO MEAS - AO V2 VTI: 19.5 CM
BH CV ECHO MEAS - AVA(I,D): 3.2 CM2
BH CV ECHO MEAS - EDV(CUBED): 74.1 ML
BH CV ECHO MEAS - EDV(MOD-SP2): 107 ML
BH CV ECHO MEAS - EDV(MOD-SP4): 99 ML
BH CV ECHO MEAS - EF(MOD-SP2): 55.1 %
BH CV ECHO MEAS - EF(MOD-SP4): 53.5 %
BH CV ECHO MEAS - ESV(CUBED): 23 ML
BH CV ECHO MEAS - ESV(MOD-SP2): 48 ML
BH CV ECHO MEAS - ESV(MOD-SP4): 46 ML
BH CV ECHO MEAS - FS: 32.3 %
BH CV ECHO MEAS - IVS/LVPW: 1.18 CM
BH CV ECHO MEAS - IVSD: 1.3 CM
BH CV ECHO MEAS - LAT PEAK E' VEL: 8.5 CM/SEC
BH CV ECHO MEAS - LV DIASTOLIC VOL/BSA (35-75): 51.7 CM2
BH CV ECHO MEAS - LV MASS(C)D: 178.2 GRAMS
BH CV ECHO MEAS - LV MAX PG: 2.8 MMHG
BH CV ECHO MEAS - LV MEAN PG: 1 MMHG
BH CV ECHO MEAS - LV SYSTOLIC VOL/BSA (12-30): 24 CM2
BH CV ECHO MEAS - LV V1 MAX: 83.1 CM/SEC
BH CV ECHO MEAS - LV V1 VTI: 15.3 CM
BH CV ECHO MEAS - LVIDD: 4.2 CM
BH CV ECHO MEAS - LVIDS: 2.8 CM
BH CV ECHO MEAS - LVOT AREA: 4.1 CM2
BH CV ECHO MEAS - LVOT DIAM: 2.27 CM
BH CV ECHO MEAS - LVPWD: 1.1 CM
BH CV ECHO MEAS - MED PEAK E' VEL: 7 CM/SEC
BH CV ECHO MEAS - MR MAX PG: 66.8 MMHG
BH CV ECHO MEAS - MR MAX VEL: 408.7 CM/SEC
BH CV ECHO MEAS - MV A DUR: 0.12 SEC
BH CV ECHO MEAS - MV A MAX VEL: 52.3 CM/SEC
BH CV ECHO MEAS - MV DEC SLOPE: 768.6 CM/SEC2
BH CV ECHO MEAS - MV DEC TIME: 0.17 SEC
BH CV ECHO MEAS - MV E MAX VEL: 57 CM/SEC
BH CV ECHO MEAS - MV E/A: 1.09
BH CV ECHO MEAS - MV MAX PG: 3.9 MMHG
BH CV ECHO MEAS - MV MEAN PG: 1.33 MMHG
BH CV ECHO MEAS - MV P1/2T: 33.5 MSEC
BH CV ECHO MEAS - MV V2 VTI: 21 CM
BH CV ECHO MEAS - MVA(P1/2T): 6.6 CM2
BH CV ECHO MEAS - MVA(VTI): 2.9 CM2
BH CV ECHO MEAS - PA ACC TIME: 0.06 SEC
BH CV ECHO MEAS - PA V2 MAX: 88.5 CM/SEC
BH CV ECHO MEAS - PULM A REVS DUR: 0.09 SEC
BH CV ECHO MEAS - PULM A REVS VEL: 23.1 CM/SEC
BH CV ECHO MEAS - PULM DIAS VEL: 31.3 CM/SEC
BH CV ECHO MEAS - PULM S/D: 1.45
BH CV ECHO MEAS - PULM SYS VEL: 45.4 CM/SEC
BH CV ECHO MEAS - QP/QS: 0.89
BH CV ECHO MEAS - RAP SYSTOLE: 3 MMHG
BH CV ECHO MEAS - RV MAX PG: 1.52 MMHG
BH CV ECHO MEAS - RV V1 MAX: 61.7 CM/SEC
BH CV ECHO MEAS - RV V1 VTI: 12.2 CM
BH CV ECHO MEAS - RVOT DIAM: 2.41 CM
BH CV ECHO MEAS - SUP REN AO DIAM: 1.6 CM
BH CV ECHO MEAS - SV(LVOT): 62 ML
BH CV ECHO MEAS - SV(MOD-SP2): 59 ML
BH CV ECHO MEAS - SV(MOD-SP4): 53 ML
BH CV ECHO MEAS - SV(RVOT): 55.4 ML
BH CV ECHO MEAS - SVI(LVOT): 32.4 ML/M2
BH CV ECHO MEAS - SVI(MOD-SP2): 30.8 ML/M2
BH CV ECHO MEAS - SVI(MOD-SP4): 27.7 ML/M2
BH CV ECHO MEAS - TAPSE (>1.6): 1.88 CM
BH CV ECHO MEASUREMENTS AVERAGE E/E' RATIO: 7.35
BH CV STRESS BP STAGE 1: NORMAL
BH CV STRESS BP STAGE 2: NORMAL
BH CV STRESS BP STAGE 3: NORMAL
BH CV STRESS BP STAGE 4: NORMAL
BH CV STRESS DURATION MIN STAGE 1: 3
BH CV STRESS DURATION MIN STAGE 2: 3
BH CV STRESS DURATION MIN STAGE 3: 3
BH CV STRESS DURATION MIN STAGE 4: 3
BH CV STRESS DURATION SEC STAGE 1: 0
BH CV STRESS DURATION SEC STAGE 2: 0
BH CV STRESS DURATION SEC STAGE 3: 0
BH CV STRESS DURATION SEC STAGE 4: 0
BH CV STRESS GRADE STAGE 1: 10
BH CV STRESS GRADE STAGE 2: 12
BH CV STRESS GRADE STAGE 3: 14
BH CV STRESS GRADE STAGE 4: 16
BH CV STRESS HR STAGE 1: 86
BH CV STRESS HR STAGE 2: 100
BH CV STRESS HR STAGE 3: 125
BH CV STRESS HR STAGE 4: 150
BH CV STRESS METS STAGE 1: 5
BH CV STRESS METS STAGE 2: 7.5
BH CV STRESS METS STAGE 3: 10
BH CV STRESS METS STAGE 4: 13.5
BH CV STRESS PROTOCOL 1: NORMAL
BH CV STRESS RECOVERY BP: NORMAL MMHG
BH CV STRESS RECOVERY HR: 99 BPM
BH CV STRESS SPEED STAGE 1: 1.7
BH CV STRESS SPEED STAGE 2: 2.5
BH CV STRESS SPEED STAGE 3: 3.4
BH CV STRESS SPEED STAGE 4: 4.2
BH CV STRESS STAGE 1: 1
BH CV STRESS STAGE 2: 2
BH CV STRESS STAGE 3: 3
BH CV STRESS STAGE 4: 4
BH CV XLRA - RV BASE: 3.8 CM
BH CV XLRA - RV LENGTH: 7.4 CM
BH CV XLRA - RV MID: 3.6 CM
BH CV XLRA - TDI S': 8.6 CM/SEC
LEFT ATRIUM VOLUME INDEX: 23.2 ML/M2
LV EF BIPLANE MOD: 56.7 %
MAXIMAL PREDICTED HEART RATE: 161 BPM
PERCENT MAX PREDICTED HR: 93.17 %
SINUS: 3.5 CM
STJ: 2.45 CM
STRESS BASELINE BP: NORMAL MMHG
STRESS BASELINE HR: 75 BPM
STRESS PERCENT HR: 110 %
STRESS POST ESTIMATED WORKLOAD: 13.5 METS
STRESS POST EXERCISE DUR MIN: 12 MIN
STRESS POST EXERCISE DUR SEC: 0 SEC
STRESS POST PEAK BP: NORMAL MMHG
STRESS POST PEAK HR: 150 BPM
STRESS TARGET HR: 137 BPM

## 2025-06-05 PROCEDURE — 93306 TTE W/DOPPLER COMPLETE: CPT

## 2025-06-05 PROCEDURE — 93017 CV STRESS TEST TRACING ONLY: CPT

## 2025-06-26 DIAGNOSIS — R11.0 NAUSEA: ICD-10-CM

## 2025-06-26 RX ORDER — ONDANSETRON 4 MG/1
4 TABLET, FILM COATED ORAL
Qty: 8 TABLET | Refills: 4 | Status: SHIPPED | OUTPATIENT
Start: 2025-06-26

## 2025-07-02 RX ORDER — BACLOFEN 10 MG/1
10 TABLET ORAL 3 TIMES DAILY PRN
Qty: 90 TABLET | Refills: 1 | Status: SHIPPED | OUTPATIENT
Start: 2025-07-02

## 2025-07-14 NOTE — PROGRESS NOTES
RM:__________                    PCP:Aurelio, Papa, MD                      Last EKG:    :_1965                                                                    AGE:59 y.o.      REASON FOR VISIT:____________________________________________________________________________        WT:____________    HT:____________   BP:____________  HR:____________       SMOKER: CURRENT/PPD:___________________ FORMER:______________ NEVER:______________          RECENT HOSPITALIZATIONS OR   ER VISITS:________________________________________________________        Lipid Panel          2024    11:45 2025    18:26   Lipid Panel   Total Cholesterol 204  188    Triglycerides 137  290    HDL Cholesterol 43  33    VLDL Cholesterol 25  50    LDL Cholesterol  136  105    LDL/HDL Ratio 3.11  2.94

## 2025-07-15 ENCOUNTER — PATIENT MESSAGE (OUTPATIENT)
Dept: CARDIOLOGY | Age: 60
End: 2025-07-15
Payer: COMMERCIAL

## 2025-07-15 RX ORDER — METOPROLOL SUCCINATE 25 MG/1
25 TABLET, EXTENDED RELEASE ORAL DAILY
Qty: 90 TABLET | Refills: 1 | Status: SHIPPED | OUTPATIENT
Start: 2025-07-15

## 2025-07-15 NOTE — TELEPHONE ENCOUNTER
Patient is switching to Dr. Narvaez (cc'ed here along with PCP Dr. Carey) for care and has appointment with him in about a week.  If patient has enough supplies, will defer to Dr. Narvaez at the time of appointment to take care of the prescription pending repeat assessment.

## 2025-07-22 ENCOUNTER — OFFICE VISIT (OUTPATIENT)
Dept: CARDIOLOGY | Age: 60
End: 2025-07-22
Payer: COMMERCIAL

## 2025-07-22 VITALS
HEART RATE: 54 BPM | HEIGHT: 70 IN | DIASTOLIC BLOOD PRESSURE: 80 MMHG | WEIGHT: 164.8 LBS | BODY MASS INDEX: 23.59 KG/M2 | SYSTOLIC BLOOD PRESSURE: 120 MMHG

## 2025-07-22 DIAGNOSIS — I48.0 PAROXYSMAL ATRIAL FIBRILLATION: Primary | ICD-10-CM

## 2025-07-22 PROCEDURE — 99214 OFFICE O/P EST MOD 30 MIN: CPT | Performed by: INTERNAL MEDICINE

## 2025-07-22 PROCEDURE — 93000 ELECTROCARDIOGRAM COMPLETE: CPT | Performed by: INTERNAL MEDICINE

## 2025-07-22 RX ORDER — DILTIAZEM HYDROCHLORIDE 120 MG/1
120 CAPSULE, EXTENDED RELEASE ORAL DAILY
Qty: 90 CAPSULE | Refills: 3 | Status: SHIPPED | OUTPATIENT
Start: 2025-07-22

## 2025-07-22 NOTE — PROGRESS NOTES
[facility]      Patient Name: Eliseo Phan  :1965  Age: 59 y.o.  Encounter Provider:  Jerrod Narvaez Jr, MD      Chief Complaint:   Chief Complaint   Patient presents with    Atrial fibrillation with RVR         HPI  Eliseo Phan is a 59 y.o. male history of HIV presents for follow-up of recently diagnosed atrial fibrillation.  Patient presented to the emergency room in the end of May with atrial fibrillation RVR.  There was spontaneous cardioversion and he was seen by Dr. Richter in the general cardiology clinic thereafter.  He was continued on metoprolol but gets very fatigued with the medication and has not been taking it recently.  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  He is very symptomatic when in atrial fibrillation even though it is rate controlled.  He notes increased dizziness and shortness of breath along with palpitations.  He came out of atrial fibrillation spontaneously on .  He is very active gentleman denies any limitations with activity.  No orthopnea, PND or edema.  No episodes of syncope.  Family history of coronary artery disease with mother requiring surgical revascularization at age 60.  He had a very low calcium score at outside facility in April with a total score of 5.  Blood pressure and heart rate are well-controlled today in clinic.      The following portions of the patient's history were reviewed and updated as appropriate: allergies, current medications, past family history, past medical history, past social history, past surgical history and problem list.      Review of Systems   Constitutional: Negative for chills and fever.   HENT:  Negative for hoarse voice and sore throat.    Eyes:  Negative for double vision and photophobia.   Cardiovascular:  Positive for palpitations. Negative for chest pain, leg swelling, near-syncope, orthopnea, paroxysmal nocturnal dyspnea and syncope.   Respiratory:  " Negative for cough and wheezing.    Skin:  Negative for poor wound healing and rash.   Musculoskeletal:  Negative for arthritis and joint swelling.   Gastrointestinal:  Negative for bloating, abdominal pain, hematemesis and hematochezia.   Neurological:  Negative for dizziness and focal weakness.   Psychiatric/Behavioral:  Negative for depression and suicidal ideas.        OBJECTIVE:   Vital Signs  Vitals:    07/22/25 0944   BP: 120/80   Pulse: 54     Estimated body mass index is 23.65 kg/m² as calculated from the following:    Height as of this encounter: 177.8 cm (70\").    Weight as of this encounter: 74.8 kg (164 lb 12.8 oz).    Vitals reviewed.   Constitutional:       Appearance: Healthy appearance. Not in distress.   Neck:      Vascular: No JVR. JVD normal.   Pulmonary:      Effort: Pulmonary effort is normal.      Breath sounds: Normal breath sounds. No wheezing. No rhonchi. No rales.   Chest:      Chest wall: Not tender to palpatation.   Cardiovascular:      PMI at left midclavicular line. Normal rate. Regular rhythm. Normal S1. Normal S2.       Murmurs: There is no murmur.      No gallop.  No click. No rub.   Pulses:     Intact distal pulses.   Edema:     Peripheral edema absent.   Abdominal:      General: Bowel sounds are normal.      Palpations: Abdomen is soft.      Tenderness: There is no abdominal tenderness.   Musculoskeletal: Normal range of motion.         General: No tenderness. Skin:     General: Skin is warm and dry.   Neurological:      General: No focal deficit present.      Mental Status: Alert and oriented to person, place and time.           ECG 12 Lead    Date/Time: 7/22/2025 9:51 AM  Performed by: Jerrod Narvaez Jr., MD    Authorized by: Jerrod Narvaez Jr., MD  Comparison: compared with previous ECG from 5/30/2025  Comparison to previous ECG: Sinus rhythm replaces atrial fibrillation  Rhythm: sinus rhythm  Other findings: non-specific ST-T wave changes and left atrial " abnormality    Clinical impression: non-specific ECG        Lipid Panel          8/19/2024    11:45 5/30/2025    18:26   Lipid Panel   Total Cholesterol 204  188    Triglycerides 137  290    HDL Cholesterol 43  33    VLDL Cholesterol 25  50    LDL Cholesterol  136  105    LDL/HDL Ratio 3.11  2.94         BUN   Date Value Ref Range Status   05/30/2025 12.0 6.0 - 20.0 mg/dL Final   08/31/2021 10 8 - 26 mg/dL Final     Creatinine   Date Value Ref Range Status   05/30/2025 1.39 (H) 0.76 - 1.27 mg/dL Final   08/31/2021 1.41 (H) 0.73 - 1.18 mg/dL Final     Potassium   Date Value Ref Range Status   05/30/2025 4.1 3.5 - 5.2 mmol/L Final     Comment:     Slight hemolysis detected by analyzer. Result may be falsely elevated.   08/31/2021 4.3 3.5 - 5.1 mmol/L Final     ALT (SGPT)   Date Value Ref Range Status   05/30/2025 33 1 - 41 U/L Final   08/31/2021 15 0 - 55 U/L Final     AST (SGOT)   Date Value Ref Range Status   05/30/2025 29 1 - 40 U/L Final   08/31/2021 22 5 - 34 U/L Final           ASSESSMENT:     59-year-old male presents for follow-up of atrial fibrillation    PLAN OF CARE:     PAF -patient is very fatigued with beta-blocker and we will switch him over to diltiazem.  He is very symptomatic with atrial fibrillation and should be considered for potential intervention.  I will set him up in electrophysiology visit to discuss candidacy for ablation.  He has a [id number] score of 0 and unless a procedure is contemplated we will not start him on anticoagulation at this time.  History of HIV    Return to clinic 6 months             Discharge Medications            Accurate as of July 22, 2025  9:51 AM. If you have any questions, ask your nurse or doctor.                Continue These Medications        Instructions Start Date   baclofen 10 MG tablet  Commonly known as: LIORESAL   10 mg, Oral, 3 Times Daily PRN      Biktarvy -25 MG per tablet  Generic drug: Bictegravir-Emtricitab-Tenofov   1 tablet, Oral, Daily       fish oil 1000 MG capsule capsule   Daily With Breakfast      fluticasone 50 MCG/ACT nasal spray  Commonly known as: FLONASE   INSTILL 2 SPRAYS INTO EACH NOSTRIL DAILY AS NEEDED FOR ALLERGIES      MAGNESIUM PO   Daily      metoprolol succinate XL 25 MG 24 hr tablet  Commonly known as: TOPROL-XL   25 mg, Oral, Daily      omeprazole 40 MG capsule  Commonly known as: priLOSEC   40 mg, Oral, Daily      ondansetron 4 MG tablet  Commonly known as: ZOFRAN   4 mg, Oral, Every 6 to 8 Hours PRN      Prenatal 27-1 27-1 MG tablet tablet   1 tablet, Oral, Daily      RED YEAST RICE PO   Daily      tadalafil 20 MG tablet  Commonly known as: CIALIS   20 mg, Oral, Daily PRN      traZODone 100 MG tablet  Commonly known as: DESYREL   100 mg, Oral, Every Night at Bedtime      varenicline 1 MG tablet  Commonly known as: CHANTIX   TAKE 1 TABLET BY MOUTH 2 (TWO) TIMES A DAY FOR 56 DAYS.      VITAMIN B COMPLEX PO   Daily      VITAMIN C PO   2 Times Daily      Vitamin D 50 MCG (2000 UT) tablet   2,000 Units, Oral, Daily, HOLD PRIOR TO SURGERY               Thank you for allowing me to participate in the care of your patient,      Sincerely,   Jerrod Narvaez MD  [facility]  07/22/25  09:51 EDT

## 2025-08-15 ENCOUNTER — OFFICE VISIT (OUTPATIENT)
Age: 60
End: 2025-08-15
Payer: COMMERCIAL

## 2025-08-15 VITALS
HEIGHT: 70 IN | DIASTOLIC BLOOD PRESSURE: 78 MMHG | BODY MASS INDEX: 24.2 KG/M2 | HEART RATE: 61 BPM | SYSTOLIC BLOOD PRESSURE: 122 MMHG | WEIGHT: 169 LBS

## 2025-08-15 DIAGNOSIS — I48.91 ATRIAL FIBRILLATION WITH RVR: Primary | ICD-10-CM

## (undated) DEVICE — GUIDEWIRE 2345050 BLUNT THREADED 24IN

## (undated) DEVICE — GLV SURG PREMIERPRO ORTHO LTX PF SZ6.5 BRN

## (undated) DEVICE — DISPOSABLE GRASPER CARTRIDGE: Brand: DIRECT DRIVE REPOSABLE GRASPERS

## (undated) DEVICE — NEEDLE, QUINCKE 22GX3.5": Brand: MEDLINE INDUSTRIES, INC.

## (undated) DEVICE — OCCLUSIVE GAUZE STRIP,3% BISMUTH TRIBROMOPHENATE IN PETROLATUM BLEND: Brand: XEROFORM

## (undated) DEVICE — NDL SPINE 18G 31/2IN PNK

## (undated) DEVICE — KT ORCA ORCAPOD DISP STRL

## (undated) DEVICE — TRAP FLD MINIVAC MEGADYNE 100ML

## (undated) DEVICE — DIFFUSER: Brand: CORE, MAESTRO

## (undated) DEVICE — PK ATS CUST W CARDIOTOMY RESEVOIR

## (undated) DEVICE — SINGLE-USE BIOPSY FORCEPS: Brand: RADIAL JAW 4

## (undated) DEVICE — DISPOSABLE BIPOLAR CABLE 12FT. (3.6M): Brand: KIRWAN

## (undated) DEVICE — LAPAROVUE VISIBILITY SYSTEM LAPAROSCOPIC SOLUTIONS: Brand: LAPAROVUE

## (undated) DEVICE — SYR LL TP 10ML STRL

## (undated) DEVICE — ADHS SKIN DERMABOND TOP ADVANCED

## (undated) DEVICE — NEEDLE, QUINCKE, 18GX3.5": Brand: MEDLINE

## (undated) DEVICE — SPNG GZ WOVN 4X4IN 12PLY 10/BX STRL

## (undated) DEVICE — SUT MNCRYL PLS ANTIB UD 4/0 PS2 18IN

## (undated) DEVICE — CANN O2 ETCO2 FITS ALL CONN CO2 SMPL A/ 7IN DISP LF

## (undated) DEVICE — DISPOSABLE BIPOLAR FORCEPS 7 3/4" (19.7CM) SCOVILLE BAYONET, 1.5MM TIP AND 12 FT. (3.6M) CABLE: Brand: KIRWAN

## (undated) DEVICE — SUT PDS 0 CT1 36IN Z346H

## (undated) DEVICE — TOWEL,OR,DSP,ST,BLUE,STD,4/PK,20PK/CS: Brand: MEDLINE

## (undated) DEVICE — TUBING, SUCTION, 1/4" X 10', STRAIGHT: Brand: MEDLINE

## (undated) DEVICE — DEV SUT GRSPR CLOSUR 15CM 14G

## (undated) DEVICE — SPONGE,LAP,12"X12",XR,ST,5/PK,40PK/CS: Brand: MEDLINE

## (undated) DEVICE — GLV SURG BIOGEL LTX PF 7 1/2

## (undated) DEVICE — 3M™ STERI-STRIP™ REINFORCED ADHESIVE SKIN CLOSURES, R1547, 1/2 IN X 4 IN (12 MM X 100 MM), 6 STRIPS/ENVELOPE: Brand: 3M™ STERI-STRIP™

## (undated) DEVICE — PK NEURO SPINE 40

## (undated) DEVICE — ANTIBACTERIAL UNDYED BRAIDED (POLYGLACTIN 910), SYNTHETIC ABSORBABLE SUTURE: Brand: COATED VICRYL

## (undated) DEVICE — DRSNG WND GZ PAD BORDERED 4X8IN STRL

## (undated) DEVICE — ENCORE® LATEX ORTHO SIZE 8, STERILE LATEX POWDER-FREE SURGICAL GLOVE: Brand: ENCORE

## (undated) DEVICE — 6.0MM PRECISION ROUND

## (undated) DEVICE — ADAPT CLN BIOGUARD AIR/H2O DISP

## (undated) DEVICE — SUT VIC 1 OS8 27IN J699H

## (undated) DEVICE — DRP MICROSCOPE 4 BINOCULAR CV 54X150IN

## (undated) DEVICE — TOTAL TRAY, 16FR 10ML SIL FOLEY, URN: Brand: MEDLINE

## (undated) DEVICE — WOUND RETRACTOR AND PROTECTOR: Brand: ALEXIS WOUND PROTECTOR-RETRACTOR

## (undated) DEVICE — PATIENT RETURN ELECTRODE, SINGLE-USE, CONTACT QUALITY MONITORING, ADULT, WITH 9FT CORD, FOR PATIENTS WEIGING OVER 33LBS. (15KG): Brand: MEGADYNE

## (undated) DEVICE — ADHS SKIN SURG TISS VISC PREMIERPRO EXOFIN HI/VISC FAST/DRY

## (undated) DEVICE — SYR LUERLOK 20CC

## (undated) DEVICE — TROCAR: Brand: KII FIOS FIRST ENTRY

## (undated) DEVICE — 3M™ IOBAN™ 2 ANTIMICROBIAL INCISE DRAPE 6650EZ: Brand: IOBAN™ 2

## (undated) DEVICE — SUT VIC 0 UR6 27IN VCP603H

## (undated) DEVICE — APPL CHLORAPREP HI/LITE 26ML ORNG

## (undated) DEVICE — MARKR SKIN W/RULR AND LBL

## (undated) DEVICE — OIL CARTRIDGE: Brand: CORE, MAESTRO

## (undated) DEVICE — GLV SURG PREMIERPRO ORTHO LTX PF SZ8 BRN

## (undated) DEVICE — SUT PDS 3/0 SH 27IN DYED Z316H

## (undated) DEVICE — APPL DURAPREP IODOPHOR APL 26ML

## (undated) DEVICE — ELECTRD BLD EXT EDGE 1P COAT 6.5IN STRL

## (undated) DEVICE — TROCAR: Brand: KII SLEEVE

## (undated) DEVICE — DRP C/ARM 41X74IN

## (undated) DEVICE — 1010 S-DRAPE TOWEL DRAPE 10/BX: Brand: STERI-DRAPE™

## (undated) DEVICE — TROCAR: Brand: KII OPTICAL ACCESS SYSTEM

## (undated) DEVICE — SUT PROLN 2/0 SH 36IN 8523H

## (undated) DEVICE — LOU LAP SIGMOID COLON: Brand: MEDLINE INDUSTRIES, INC.

## (undated) DEVICE — SUT SILK 2/0 SH 30IN K833H

## (undated) DEVICE — SOL NACL 0.9PCT 1000ML

## (undated) DEVICE — PENCL SMOKE/EVAC MEGADYNE TELESCP 10FT

## (undated) DEVICE — 3M™ STERI-DRAPE™ INSTRUMENT POUCH 1018: Brand: STERI-DRAPE™

## (undated) DEVICE — Device

## (undated) DEVICE — SUT PDS O CT1 CR/8 18IN Z740D

## (undated) DEVICE — GLV SURG PREMIERPRO ORTHO LTX PF SZ7 BRN

## (undated) DEVICE — GOWN,NON-REINFORCED,SIRUS,SET IN SLV,XXL: Brand: MEDLINE

## (undated) DEVICE — PENCL E/S ULTRAVAC TELESCP NOSE HOLSTR 10FT

## (undated) DEVICE — GAUZE,SPONGE,4"X4",16PLY,XRAY,STRL,LF: Brand: MEDLINE

## (undated) DEVICE — 3.0MM PRECISION NEURO (MATCH HEAD)

## (undated) DEVICE — DEV COND GAS LAP INSUFLOW W/LUER CONN

## (undated) DEVICE — GLV SURG BIOGEL LTX PF 7

## (undated) DEVICE — ENDOPATH PNEUMONEEDLE INSUFFLATION NEEDLES WITH LUER LOCK CONNECTORS 120MM: Brand: ENDOPATH

## (undated) DEVICE — GOWN,ECLIPSE,FABRIC-REINFORCED,X-LARGE: Brand: MEDLINE

## (undated) DEVICE — SYR CONTRL LUERLOK 10CC

## (undated) DEVICE — LAPAROSCOPIC SMOKE FILTRATION SYSTEM: Brand: PALL LAPAROSHIELD® PLUS LAPAROSCOPIC SMOKE FILTRATION SYSTEM

## (undated) DEVICE — ENDOCUT SCISSOR TIP, DISPOSABLE: Brand: RENEW

## (undated) DEVICE — SENSR O2 OXIMAX FNGR A/ 18IN NONSTR

## (undated) DEVICE — PAD,ABDOMINAL,8"X10",ST,LF: Brand: MEDLINE

## (undated) DEVICE — MEDI-VAC YANKAUER SUCTION HANDLE W/BULBOUS TIP: Brand: CARDINAL HEALTH

## (undated) DEVICE — RETRACTABLE L-HOOK LAPAROSCOPIC SEALER/DIVIDER: Brand: LIGASURE